# Patient Record
Sex: FEMALE | Race: BLACK OR AFRICAN AMERICAN | Employment: OTHER | URBAN - NONMETROPOLITAN AREA
[De-identification: names, ages, dates, MRNs, and addresses within clinical notes are randomized per-mention and may not be internally consistent; named-entity substitution may affect disease eponyms.]

---

## 2017-01-09 ENCOUNTER — HOSPITAL ENCOUNTER (OUTPATIENT)
Dept: OTHER | Age: 62
Discharge: HOME | End: 2017-01-31
Attending: PREVENTIVE MEDICINE | Admitting: PREVENTIVE MEDICINE

## 2017-02-01 ENCOUNTER — HOSPITAL ENCOUNTER (OUTPATIENT)
Dept: OTHER | Age: 62
Discharge: HOME | End: 2017-02-28
Attending: PREVENTIVE MEDICINE | Admitting: PREVENTIVE MEDICINE

## 2017-03-01 ENCOUNTER — HOSPITAL ENCOUNTER (OUTPATIENT)
Dept: OTHER | Age: 62
Discharge: HOME | End: 2017-03-31
Attending: PREVENTIVE MEDICINE | Admitting: PREVENTIVE MEDICINE

## 2017-03-24 ENCOUNTER — HOSPITAL ENCOUNTER (OUTPATIENT)
Dept: ULTRASOUND IMAGING | Age: 62
Discharge: HOME | End: 2017-03-24
Attending: GENERAL PRACTICE | Admitting: GENERAL PRACTICE

## 2017-03-24 DIAGNOSIS — R59.0 LOCALIZED ENLARGED LYMPH NODES: ICD-10-CM

## 2017-03-24 DIAGNOSIS — N63.0 BREAST LUMP: ICD-10-CM

## 2017-03-24 DIAGNOSIS — R60.0 LOCALIZED EDEMA: ICD-10-CM

## 2018-11-09 ENCOUNTER — HOSPITAL ENCOUNTER (OUTPATIENT)
Dept: WOMENS IMAGING | Age: 63
Discharge: HOME | End: 2018-11-09
Payer: COMMERCIAL

## 2018-11-09 DIAGNOSIS — Z12.31 ENCOUNTER FOR SCREENING MAMMOGRAM FOR BREAST CANCER: ICD-10-CM

## 2018-11-09 PROCEDURE — 77067 SCR MAMMO BI INCL CAD: CPT

## 2019-11-06 ENCOUNTER — HOSPITAL ENCOUNTER (OUTPATIENT)
Dept: ULTRASOUND IMAGING | Age: 64
Discharge: HOME | End: 2019-11-06
Payer: COMMERCIAL

## 2019-11-06 DIAGNOSIS — N17.9 ACUTE RENAL FAILURE, UNSPECIFIED ACUTE RENAL FAILURE TYPE (HCC): ICD-10-CM

## 2019-11-06 PROBLEM — I10 ESSENTIAL HYPERTENSION: Status: ACTIVE | Noted: 2019-11-06

## 2019-11-06 PROBLEM — F41.9 ANXIETY: Status: ACTIVE | Noted: 2019-11-06

## 2019-11-06 PROBLEM — N18.30 CHRONIC KIDNEY DISEASE, STAGE III (MODERATE) (HCC): Status: ACTIVE | Noted: 2019-11-06

## 2019-11-06 PROBLEM — I65.22 CAROTID STENOSIS, ASYMPTOMATIC, LEFT: Status: ACTIVE | Noted: 2019-11-06

## 2019-11-06 PROCEDURE — 76775 US EXAM ABDO BACK WALL LIM: CPT

## 2019-11-12 ENCOUNTER — HOSPITAL ENCOUNTER (OUTPATIENT)
Dept: WOMENS IMAGING | Age: 64
Discharge: HOME | End: 2019-11-12
Payer: COMMERCIAL

## 2019-11-12 DIAGNOSIS — Z12.31 VISIT FOR SCREENING MAMMOGRAM: ICD-10-CM

## 2019-11-12 DIAGNOSIS — N95.1 MENOPAUSAL STATE: ICD-10-CM

## 2019-11-12 PROCEDURE — 77080 DXA BONE DENSITY AXIAL: CPT

## 2019-11-12 PROCEDURE — 77067 SCR MAMMO BI INCL CAD: CPT

## 2019-11-13 ENCOUNTER — HOSPITAL ENCOUNTER (OUTPATIENT)
Age: 64
Discharge: HOME | End: 2019-11-13
Payer: COMMERCIAL

## 2019-11-13 LAB
ALBUMIN SERPL-MCNC: 4 GM/DL (ref 3.4–5)
ANION GAP SERPL CALCULATED.3IONS-SCNC: 12 MMOL/L (ref 4–16)
BACTERIA: ABNORMAL /HPF
BASOPHILS ABSOLUTE: 0.1 K/CU MM
BASOPHILS RELATIVE PERCENT: 1.3 % (ref 0–1)
BILIRUBIN URINE: NEGATIVE MG/DL
BLOOD, URINE: NEGATIVE
BUN BLDV-MCNC: 14 MG/DL (ref 6–23)
CALCIUM SERPL-MCNC: 9.6 MG/DL (ref 8.3–10.6)
CHLORIDE BLD-SCNC: 99 MMOL/L (ref 99–110)
CLARITY: CLEAR
CO2: 29 MMOL/L (ref 21–32)
COLOR: YELLOW
CREAT SERPL-MCNC: 0.9 MG/DL (ref 0.6–1.1)
CREATININE URINE: 113.1 MG/DL (ref 28–217)
DIFFERENTIAL TYPE: ABNORMAL
EOSINOPHILS ABSOLUTE: 0.2 K/CU MM
EOSINOPHILS RELATIVE PERCENT: 2.2 % (ref 0–3)
GFR AFRICAN AMERICAN: >60 ML/MIN/1.73M2
GFR NON-AFRICAN AMERICAN: >60 ML/MIN/1.73M2
GLUCOSE BLD-MCNC: 102 MG/DL (ref 70–99)
GLUCOSE, URINE: NEGATIVE MG/DL
HCT VFR BLD CALC: 43.6 % (ref 37–47)
HEMOGLOBIN: 13.9 GM/DL (ref 12.5–16)
IMMATURE NEUTROPHIL %: 0.5 % (ref 0–0.43)
KETONES, URINE: NEGATIVE MG/DL
LEUKOCYTE ESTERASE, URINE: NEGATIVE
LYMPHOCYTES ABSOLUTE: 3.7 K/CU MM
LYMPHOCYTES RELATIVE PERCENT: 44 % (ref 24–44)
MCH RBC QN AUTO: 28.3 PG (ref 27–31)
MCHC RBC AUTO-ENTMCNC: 31.9 % (ref 32–36)
MCV RBC AUTO: 88.8 FL (ref 78–100)
MONOCYTES ABSOLUTE: 0.6 K/CU MM
MONOCYTES RELATIVE PERCENT: 7.7 % (ref 0–4)
MUCUS: ABNORMAL HPF
NITRITE URINE, QUANTITATIVE: NEGATIVE
NUCLEATED RBC %: 0 %
PDW BLD-RTO: 13.2 % (ref 11.7–14.9)
PH, URINE: 7 (ref 5–8)
PHOSPHORUS: 3.8 MG/DL (ref 2.5–4.9)
PLATELET # BLD: 253 K/CU MM (ref 140–440)
PMV BLD AUTO: 11.8 FL (ref 7.5–11.1)
POTASSIUM SERPL-SCNC: 3.8 MMOL/L (ref 3.5–5.1)
PROT/CREAT RATIO, UR: ABNORMAL
PROTEIN UA: NEGATIVE MG/DL
RBC # BLD: 4.91 M/CU MM (ref 4.2–5.4)
RBC URINE: <1 /HPF (ref 0–6)
SEGMENTED NEUTROPHILS ABSOLUTE COUNT: 3.7 K/CU MM
SEGMENTED NEUTROPHILS RELATIVE PERCENT: 44.3 % (ref 36–66)
SODIUM BLD-SCNC: 140 MMOL/L (ref 135–145)
SPECIFIC GRAVITY UA: 1.01 (ref 1–1.03)
SQUAMOUS EPITHELIAL: 5 /HPF
TOTAL IMMATURE NEUTOROPHIL: 0.04 K/CU MM
TOTAL NUCLEATED RBC: 0 K/CU MM
TRICHOMONAS: ABNORMAL /HPF
URINE TOTAL PROTEIN: 14.7 MG/DL
UROBILINOGEN, URINE: NORMAL MG/DL (ref 0.2–1)
WBC # BLD: 8.4 K/CU MM (ref 4–10.5)
WBC UA: <1 /HPF (ref 0–5)

## 2019-11-13 PROCEDURE — 82040 ASSAY OF SERUM ALBUMIN: CPT

## 2019-11-13 PROCEDURE — 80048 BASIC METABOLIC PNL TOTAL CA: CPT

## 2019-11-13 PROCEDURE — 84156 ASSAY OF PROTEIN URINE: CPT

## 2019-11-13 PROCEDURE — 81001 URINALYSIS AUTO W/SCOPE: CPT

## 2019-11-13 PROCEDURE — 85025 COMPLETE CBC W/AUTO DIFF WBC: CPT

## 2019-11-13 PROCEDURE — 84100 ASSAY OF PHOSPHORUS: CPT

## 2019-11-13 PROCEDURE — 36415 COLL VENOUS BLD VENIPUNCTURE: CPT

## 2019-11-13 PROCEDURE — 82570 ASSAY OF URINE CREATININE: CPT

## 2019-11-25 ENCOUNTER — HOSPITAL ENCOUNTER (OUTPATIENT)
Dept: CT IMAGING | Age: 64
Discharge: HOME | End: 2019-11-25
Payer: COMMERCIAL

## 2019-11-25 DIAGNOSIS — I65.22 CAROTID STENOSIS, ASYMPTOMATIC, LEFT: ICD-10-CM

## 2019-11-25 PROCEDURE — 70498 CT ANGIOGRAPHY NECK: CPT

## 2019-11-25 PROCEDURE — 6360000004 HC RX CONTRAST MEDICATION: Performed by: SURGERY

## 2019-11-25 RX ADMIN — IOPAMIDOL 75 ML: 755 INJECTION, SOLUTION INTRAVENOUS at 10:07

## 2020-06-04 ENCOUNTER — HOSPITAL ENCOUNTER (OUTPATIENT)
Age: 65
Discharge: HOME | End: 2020-06-04
Payer: MEDICARE

## 2020-06-04 LAB
ANION GAP SERPL CALCULATED.3IONS-SCNC: 14 MMOL/L (ref 4–16)
BUN BLDV-MCNC: 14 MG/DL (ref 6–23)
CALCIUM SERPL-MCNC: 10 MG/DL (ref 8.3–10.6)
CHLORIDE BLD-SCNC: 101 MMOL/L (ref 99–110)
CO2: 25 MMOL/L (ref 21–32)
CREAT SERPL-MCNC: 0.9 MG/DL (ref 0.6–1.1)
GFR AFRICAN AMERICAN: >60 ML/MIN/1.73M2
GFR NON-AFRICAN AMERICAN: >60 ML/MIN/1.73M2
GLUCOSE BLD-MCNC: 78 MG/DL (ref 70–99)
POTASSIUM SERPL-SCNC: 3.7 MMOL/L (ref 3.5–5.1)
SODIUM BLD-SCNC: 140 MMOL/L (ref 135–145)

## 2020-06-04 PROCEDURE — 80048 BASIC METABOLIC PNL TOTAL CA: CPT

## 2020-06-04 PROCEDURE — 36415 COLL VENOUS BLD VENIPUNCTURE: CPT

## 2020-06-11 PROBLEM — I65.23 BILATERAL CAROTID ARTERY STENOSIS: Status: ACTIVE | Noted: 2020-06-11

## 2020-06-11 PROBLEM — F17.200 TOBACCO DEPENDENCE: Status: ACTIVE | Noted: 2020-06-11

## 2020-10-19 ENCOUNTER — HOSPITAL ENCOUNTER (OUTPATIENT)
Age: 65
Discharge: HOME | End: 2020-10-19
Payer: MEDICARE

## 2020-10-19 LAB
ALBUMIN SERPL-MCNC: 4 GM/DL (ref 3.4–5)
ALP BLD-CCNC: 105 IU/L (ref 40–128)
ALT SERPL-CCNC: 10 U/L (ref 10–40)
ANION GAP SERPL CALCULATED.3IONS-SCNC: 11 MMOL/L (ref 4–16)
AST SERPL-CCNC: 13 IU/L (ref 15–37)
BACTERIA: ABNORMAL /HPF
BASOPHILS ABSOLUTE: 0.1 K/CU MM
BASOPHILS RELATIVE PERCENT: 0.8 % (ref 0–1)
BILIRUB SERPL-MCNC: 0.4 MG/DL (ref 0–1)
BILIRUBIN URINE: NEGATIVE MG/DL
BLOOD, URINE: NEGATIVE
BUN BLDV-MCNC: 10 MG/DL (ref 6–23)
CALCIUM SERPL-MCNC: 9.6 MG/DL (ref 8.3–10.6)
CHLORIDE BLD-SCNC: 102 MMOL/L (ref 99–110)
CHOLESTEROL: 206 MG/DL
CLARITY: ABNORMAL
CO2: 27 MMOL/L (ref 21–32)
COLOR: YELLOW
CREAT SERPL-MCNC: 0.8 MG/DL (ref 0.6–1.1)
DIFFERENTIAL TYPE: ABNORMAL
EOSINOPHILS ABSOLUTE: 0.2 K/CU MM
EOSINOPHILS RELATIVE PERCENT: 3.2 % (ref 0–3)
GFR AFRICAN AMERICAN: >60 ML/MIN/1.73M2
GFR NON-AFRICAN AMERICAN: >60 ML/MIN/1.73M2
GLUCOSE BLD-MCNC: 94 MG/DL (ref 70–99)
GLUCOSE, URINE: NEGATIVE MG/DL
HCT VFR BLD CALC: 44.8 % (ref 37–47)
HDLC SERPL-MCNC: 60 MG/DL
HEMOGLOBIN: 14.5 GM/DL (ref 12.5–16)
IMMATURE NEUTROPHIL %: 0.4 % (ref 0–0.43)
KETONES, URINE: NEGATIVE MG/DL
LDL CHOLESTEROL DIRECT: 135 MG/DL
LEUKOCYTE ESTERASE, URINE: NEGATIVE
LYMPHOCYTES ABSOLUTE: 2.8 K/CU MM
LYMPHOCYTES RELATIVE PERCENT: 37.4 % (ref 24–44)
MCH RBC QN AUTO: 28.8 PG (ref 27–31)
MCHC RBC AUTO-ENTMCNC: 32.4 % (ref 32–36)
MCV RBC AUTO: 89.1 FL (ref 78–100)
MONOCYTES ABSOLUTE: 0.7 K/CU MM
MONOCYTES RELATIVE PERCENT: 9.8 % (ref 0–4)
MUCUS: ABNORMAL HPF
NITRITE URINE, QUANTITATIVE: NEGATIVE
NUCLEATED RBC %: 0 %
PDW BLD-RTO: 13.4 % (ref 11.7–14.9)
PH, URINE: 7 (ref 5–8)
PLATELET # BLD: 236 K/CU MM (ref 140–440)
PMV BLD AUTO: 12.3 FL (ref 7.5–11.1)
POTASSIUM SERPL-SCNC: 3.6 MMOL/L (ref 3.5–5.1)
PROTEIN UA: NEGATIVE MG/DL
RBC # BLD: 5.03 M/CU MM (ref 4.2–5.4)
RBC URINE: ABNORMAL /HPF (ref 0–6)
SEGMENTED NEUTROPHILS ABSOLUTE COUNT: 3.6 K/CU MM
SEGMENTED NEUTROPHILS RELATIVE PERCENT: 48.4 % (ref 36–66)
SODIUM BLD-SCNC: 140 MMOL/L (ref 135–145)
SPECIFIC GRAVITY UA: 1.01 (ref 1–1.03)
SQUAMOUS EPITHELIAL: 27 /HPF
TOTAL IMMATURE NEUTOROPHIL: 0.03 K/CU MM
TOTAL NUCLEATED RBC: 0 K/CU MM
TOTAL PROTEIN: 7.8 GM/DL (ref 6.4–8.2)
TRICHOMONAS: ABNORMAL /HPF
TRIGL SERPL-MCNC: 108 MG/DL
TSH HIGH SENSITIVITY: 3.14 UIU/ML (ref 0.27–4.2)
UROBILINOGEN, URINE: NORMAL MG/DL (ref 0.2–1)
WBC # BLD: 7.4 K/CU MM (ref 4–10.5)
WBC UA: 1 /HPF (ref 0–5)

## 2020-10-19 PROCEDURE — 85025 COMPLETE CBC W/AUTO DIFF WBC: CPT

## 2020-10-19 PROCEDURE — 84443 ASSAY THYROID STIM HORMONE: CPT

## 2020-10-19 PROCEDURE — 80061 LIPID PANEL: CPT

## 2020-10-19 PROCEDURE — 83721 ASSAY OF BLOOD LIPOPROTEIN: CPT

## 2020-10-19 PROCEDURE — 36415 COLL VENOUS BLD VENIPUNCTURE: CPT

## 2020-10-19 PROCEDURE — 81001 URINALYSIS AUTO W/SCOPE: CPT

## 2020-10-19 PROCEDURE — 80053 COMPREHEN METABOLIC PANEL: CPT

## 2020-12-28 ENCOUNTER — HOSPITAL ENCOUNTER (OUTPATIENT)
Age: 65
Discharge: HOME | End: 2020-12-28
Payer: MEDICARE

## 2020-12-28 LAB
ALBUMIN SERPL-MCNC: 4 GM/DL (ref 3.4–5)
ALP BLD-CCNC: 97 IU/L (ref 40–128)
ALT SERPL-CCNC: 10 U/L (ref 10–40)
ANION GAP SERPL CALCULATED.3IONS-SCNC: 11 MMOL/L (ref 4–16)
AST SERPL-CCNC: 14 IU/L (ref 15–37)
BASOPHILS ABSOLUTE: 0.1 K/CU MM
BASOPHILS RELATIVE PERCENT: 1.4 % (ref 0–1)
BILIRUB SERPL-MCNC: 0.3 MG/DL (ref 0–1)
BUN BLDV-MCNC: 14 MG/DL (ref 6–23)
CALCIUM SERPL-MCNC: 9.2 MG/DL (ref 8.3–10.6)
CHLORIDE BLD-SCNC: 104 MMOL/L (ref 99–110)
CHOLESTEROL: 219 MG/DL
CO2: 25 MMOL/L (ref 21–32)
CREAT SERPL-MCNC: 1 MG/DL (ref 0.6–1.1)
DIFFERENTIAL TYPE: ABNORMAL
EOSINOPHILS ABSOLUTE: 0.2 K/CU MM
EOSINOPHILS RELATIVE PERCENT: 2.7 % (ref 0–3)
GFR AFRICAN AMERICAN: >60 ML/MIN/1.73M2
GFR NON-AFRICAN AMERICAN: 55 ML/MIN/1.73M2
GLUCOSE BLD-MCNC: 91 MG/DL (ref 70–99)
HCT VFR BLD CALC: 43 % (ref 37–47)
HDLC SERPL-MCNC: 63 MG/DL
HEMOGLOBIN: 14.2 GM/DL (ref 12.5–16)
IMMATURE NEUTROPHIL %: 0.2 % (ref 0–0.43)
LDL CHOLESTEROL DIRECT: 144 MG/DL
LYMPHOCYTES ABSOLUTE: 3.3 K/CU MM
LYMPHOCYTES RELATIVE PERCENT: 40.3 % (ref 24–44)
MCH RBC QN AUTO: 29.3 PG (ref 27–31)
MCHC RBC AUTO-ENTMCNC: 33 % (ref 32–36)
MCV RBC AUTO: 88.8 FL (ref 78–100)
MONOCYTES ABSOLUTE: 0.6 K/CU MM
MONOCYTES RELATIVE PERCENT: 6.8 % (ref 0–4)
NUCLEATED RBC %: 0 %
PDW BLD-RTO: 13.4 % (ref 11.7–14.9)
PLATELET # BLD: 236 K/CU MM (ref 140–440)
PMV BLD AUTO: 11.8 FL (ref 7.5–11.1)
POTASSIUM SERPL-SCNC: 3.6 MMOL/L (ref 3.5–5.1)
RBC # BLD: 4.84 M/CU MM (ref 4.2–5.4)
SEGMENTED NEUTROPHILS ABSOLUTE COUNT: 3.9 K/CU MM
SEGMENTED NEUTROPHILS RELATIVE PERCENT: 48.6 % (ref 36–66)
SODIUM BLD-SCNC: 140 MMOL/L (ref 135–145)
TOTAL IMMATURE NEUTOROPHIL: 0.02 K/CU MM
TOTAL NUCLEATED RBC: 0 K/CU MM
TOTAL PROTEIN: 7.8 GM/DL (ref 6.4–8.2)
TRIGL SERPL-MCNC: 73 MG/DL
TSH HIGH SENSITIVITY: 1.82 UIU/ML (ref 0.27–4.2)
WBC # BLD: 8.1 K/CU MM (ref 4–10.5)

## 2020-12-28 PROCEDURE — 80061 LIPID PANEL: CPT

## 2020-12-28 PROCEDURE — 81001 URINALYSIS AUTO W/SCOPE: CPT

## 2020-12-28 PROCEDURE — 84443 ASSAY THYROID STIM HORMONE: CPT

## 2020-12-28 PROCEDURE — 36415 COLL VENOUS BLD VENIPUNCTURE: CPT

## 2020-12-28 PROCEDURE — 85025 COMPLETE CBC W/AUTO DIFF WBC: CPT

## 2020-12-28 PROCEDURE — 83721 ASSAY OF BLOOD LIPOPROTEIN: CPT

## 2020-12-28 PROCEDURE — 80053 COMPREHEN METABOLIC PANEL: CPT

## 2020-12-29 ENCOUNTER — HOSPITAL ENCOUNTER (OUTPATIENT)
Age: 65
Setting detail: SPECIMEN
Discharge: HOME | End: 2020-12-29
Payer: MEDICARE

## 2020-12-29 LAB
BACTERIA: ABNORMAL /HPF
BILIRUBIN URINE: NEGATIVE MG/DL
BLOOD, URINE: NEGATIVE
CLARITY: CLEAR
COLOR: ABNORMAL
GLUCOSE, URINE: NEGATIVE MG/DL
KETONES, URINE: NEGATIVE MG/DL
LEUKOCYTE ESTERASE, URINE: NEGATIVE
NITRITE URINE, QUANTITATIVE: NEGATIVE
PH, URINE: 6 (ref 5–8)
PROTEIN UA: NEGATIVE MG/DL
RBC URINE: 1 /HPF (ref 0–6)
SPECIFIC GRAVITY UA: 1 (ref 1–1.03)
SQUAMOUS EPITHELIAL: 2 /HPF
TRICHOMONAS: ABNORMAL /HPF
UROBILINOGEN, URINE: NORMAL MG/DL (ref 0.2–1)
WBC UA: <1 /HPF (ref 0–5)

## 2020-12-29 PROCEDURE — 81001 URINALYSIS AUTO W/SCOPE: CPT

## 2020-12-29 PROCEDURE — 82274 ASSAY TEST FOR BLOOD FECAL: CPT

## 2020-12-31 LAB — OCCULT BLOOD, FECAL, IA: NEGATIVE

## 2021-01-26 ENCOUNTER — HOSPITAL ENCOUNTER (OUTPATIENT)
Age: 66
Discharge: HOME | End: 2021-01-26
Payer: MEDICARE

## 2021-01-26 LAB
ALBUMIN SERPL-MCNC: 4 GM/DL (ref 3.4–5)
ALP BLD-CCNC: 104 IU/L (ref 40–129)
ALT SERPL-CCNC: 11 U/L (ref 10–40)
AST SERPL-CCNC: 14 IU/L (ref 15–37)
BILIRUB SERPL-MCNC: 0.4 MG/DL (ref 0–1)
BILIRUBIN DIRECT: 0.2 MG/DL (ref 0–0.3)
BILIRUBIN, INDIRECT: 0.2 MG/DL (ref 0–0.7)
TOTAL PROTEIN: 8 GM/DL (ref 6.4–8.2)

## 2021-01-26 PROCEDURE — 80076 HEPATIC FUNCTION PANEL: CPT

## 2021-01-26 PROCEDURE — 36415 COLL VENOUS BLD VENIPUNCTURE: CPT

## 2021-02-23 ENCOUNTER — HOSPITAL ENCOUNTER (OUTPATIENT)
Age: 66
Discharge: HOME | End: 2021-02-23
Payer: MEDICARE

## 2021-02-23 LAB
ALBUMIN SERPL-MCNC: 4 GM/DL (ref 3.4–5)
ALP BLD-CCNC: 110 IU/L (ref 40–129)
ALT SERPL-CCNC: 12 U/L (ref 10–40)
AST SERPL-CCNC: 16 IU/L (ref 15–37)
BILIRUB SERPL-MCNC: 0.3 MG/DL (ref 0–1)
BILIRUBIN DIRECT: 0.2 MG/DL (ref 0–0.3)
BILIRUBIN, INDIRECT: 0.1 MG/DL (ref 0–0.7)
TOTAL PROTEIN: 8.2 GM/DL (ref 6.4–8.2)

## 2021-02-23 PROCEDURE — 36415 COLL VENOUS BLD VENIPUNCTURE: CPT

## 2021-02-23 PROCEDURE — 80076 HEPATIC FUNCTION PANEL: CPT

## 2021-04-07 ENCOUNTER — HOSPITAL ENCOUNTER (OUTPATIENT)
Age: 66
Discharge: HOME | End: 2021-04-07
Payer: MEDICARE

## 2021-04-07 LAB
ALBUMIN SERPL-MCNC: 4.1 GM/DL (ref 3.4–5)
ALP BLD-CCNC: 104 IU/L (ref 40–128)
ALT SERPL-CCNC: 11 U/L (ref 10–40)
ANION GAP SERPL CALCULATED.3IONS-SCNC: 13 MMOL/L (ref 4–16)
AST SERPL-CCNC: 15 IU/L (ref 15–37)
BILIRUB SERPL-MCNC: 0.3 MG/DL (ref 0–1)
BUN BLDV-MCNC: 11 MG/DL (ref 6–23)
CALCIUM SERPL-MCNC: 9.3 MG/DL (ref 8.3–10.6)
CHLORIDE BLD-SCNC: 103 MMOL/L (ref 99–110)
CHOLESTEROL: 177 MG/DL
CO2: 24 MMOL/L (ref 21–32)
CREAT SERPL-MCNC: 0.8 MG/DL (ref 0.6–1.1)
GFR AFRICAN AMERICAN: >60 ML/MIN/1.73M2
GFR NON-AFRICAN AMERICAN: >60 ML/MIN/1.73M2
GLUCOSE BLD-MCNC: 94 MG/DL (ref 70–99)
HDLC SERPL-MCNC: 67 MG/DL
LDL CHOLESTEROL DIRECT: 105 MG/DL
POTASSIUM SERPL-SCNC: 3.9 MMOL/L (ref 3.5–5.1)
SODIUM BLD-SCNC: 140 MMOL/L (ref 135–145)
TOTAL PROTEIN: 8 GM/DL (ref 6.4–8.2)
TRIGL SERPL-MCNC: 86 MG/DL

## 2021-04-07 PROCEDURE — 36415 COLL VENOUS BLD VENIPUNCTURE: CPT

## 2021-04-07 PROCEDURE — 80061 LIPID PANEL: CPT

## 2021-04-07 PROCEDURE — 83721 ASSAY OF BLOOD LIPOPROTEIN: CPT

## 2021-04-07 PROCEDURE — 80053 COMPREHEN METABOLIC PANEL: CPT

## 2021-06-29 ENCOUNTER — HOSPITAL ENCOUNTER (OUTPATIENT)
Age: 66
Discharge: HOME | End: 2021-06-29
Payer: MEDICARE

## 2021-06-29 LAB
ALBUMIN SERPL-MCNC: 4 GM/DL (ref 3.4–5)
ALP BLD-CCNC: 107 IU/L (ref 40–128)
ALT SERPL-CCNC: 10 U/L (ref 10–40)
ANION GAP SERPL CALCULATED.3IONS-SCNC: 11 MMOL/L (ref 4–16)
AST SERPL-CCNC: 13 IU/L (ref 15–37)
BILIRUB SERPL-MCNC: 0.4 MG/DL (ref 0–1)
BUN BLDV-MCNC: 11 MG/DL (ref 6–23)
CALCIUM SERPL-MCNC: 9.8 MG/DL (ref 8.3–10.6)
CHLORIDE BLD-SCNC: 103 MMOL/L (ref 99–110)
CHOLESTEROL: 188 MG/DL
CO2: 26 MMOL/L (ref 21–32)
CREAT SERPL-MCNC: 0.8 MG/DL (ref 0.6–1.1)
GFR AFRICAN AMERICAN: >60 ML/MIN/1.73M2
GFR NON-AFRICAN AMERICAN: >60 ML/MIN/1.73M2
GLUCOSE BLD-MCNC: 90 MG/DL (ref 70–99)
HDLC SERPL-MCNC: 63 MG/DL
LDL CHOLESTEROL DIRECT: 112 MG/DL
POTASSIUM SERPL-SCNC: 3.6 MMOL/L (ref 3.5–5.1)
SODIUM BLD-SCNC: 140 MMOL/L (ref 135–145)
TOTAL PROTEIN: 8 GM/DL (ref 6.4–8.2)
TRIGL SERPL-MCNC: 99 MG/DL

## 2021-06-29 PROCEDURE — 80061 LIPID PANEL: CPT

## 2021-06-29 PROCEDURE — 83721 ASSAY OF BLOOD LIPOPROTEIN: CPT

## 2021-06-29 PROCEDURE — 36415 COLL VENOUS BLD VENIPUNCTURE: CPT

## 2021-06-29 PROCEDURE — 80053 COMPREHEN METABOLIC PANEL: CPT

## 2021-11-24 ENCOUNTER — APPOINTMENT (OUTPATIENT)
Dept: GENERAL RADIOLOGY | Age: 66
End: 2021-11-24
Payer: MEDICARE

## 2021-11-24 ENCOUNTER — HOSPITAL ENCOUNTER (EMERGENCY)
Age: 66
Discharge: HOME | End: 2021-11-24
Attending: STUDENT IN AN ORGANIZED HEALTH CARE EDUCATION/TRAINING PROGRAM
Payer: MEDICARE

## 2021-11-24 VITALS
DIASTOLIC BLOOD PRESSURE: 65 MMHG | OXYGEN SATURATION: 97 % | HEIGHT: 67 IN | RESPIRATION RATE: 17 BRPM | HEART RATE: 72 BPM | BODY MASS INDEX: 23.54 KG/M2 | TEMPERATURE: 97.3 F | SYSTOLIC BLOOD PRESSURE: 119 MMHG | WEIGHT: 150 LBS

## 2021-11-24 DIAGNOSIS — M67.911 TENDINOPATHY OF RIGHT ROTATOR CUFF: ICD-10-CM

## 2021-11-24 DIAGNOSIS — M25.511 ACUTE PAIN OF RIGHT SHOULDER: Primary | ICD-10-CM

## 2021-11-24 PROCEDURE — 6360000002 HC RX W HCPCS: Performed by: STUDENT IN AN ORGANIZED HEALTH CARE EDUCATION/TRAINING PROGRAM

## 2021-11-24 PROCEDURE — 96372 THER/PROPH/DIAG INJ SC/IM: CPT

## 2021-11-24 PROCEDURE — 73030 X-RAY EXAM OF SHOULDER: CPT

## 2021-11-24 PROCEDURE — 99283 EMERGENCY DEPT VISIT LOW MDM: CPT

## 2021-11-24 RX ORDER — KETOROLAC TROMETHAMINE 30 MG/ML
30 INJECTION, SOLUTION INTRAMUSCULAR; INTRAVENOUS ONCE
Status: COMPLETED | OUTPATIENT
Start: 2021-11-24 | End: 2021-11-24

## 2021-11-24 RX ORDER — IBUPROFEN 800 MG/1
800 TABLET ORAL EVERY 8 HOURS PRN
Qty: 24 TABLET | Refills: 0 | Status: SHIPPED | OUTPATIENT
Start: 2021-11-24

## 2021-11-24 RX ORDER — HYDROCODONE BITARTRATE AND ACETAMINOPHEN 5; 325 MG/1; MG/1
1 TABLET ORAL EVERY 6 HOURS PRN
Qty: 12 TABLET | Refills: 0 | Status: SHIPPED | OUTPATIENT
Start: 2021-11-24 | End: 2021-11-27

## 2021-11-24 RX ADMIN — KETOROLAC TROMETHAMINE 30 MG: 30 INJECTION, SOLUTION INTRAMUSCULAR; INTRAVENOUS at 04:52

## 2021-11-24 ASSESSMENT — PAIN DESCRIPTION - PAIN TYPE: TYPE: ACUTE PAIN

## 2021-11-24 ASSESSMENT — PAIN DESCRIPTION - ORIENTATION: ORIENTATION: RIGHT

## 2021-11-24 ASSESSMENT — PAIN DESCRIPTION - LOCATION: LOCATION: SHOULDER

## 2021-11-24 ASSESSMENT — PAIN SCALES - GENERAL
PAINLEVEL_OUTOF10: 10
PAINLEVEL_OUTOF10: 10

## 2021-11-24 NOTE — ED NOTES
Asked pt again to get an EKG.  Pt stated she would like to know the results of her Xray before consenting to an EKG     Darby Álvarez RN  11/24/21 1371

## 2021-11-24 NOTE — ED NOTES
Pt refusing EKG. Pt stated she did not think it is necessary and would like to get the XRAY first and then possibly do the EKG.       Emily Vidal RN  11/24/21 7950       Emily Vidal RN  11/24/21 2258

## 2021-11-24 NOTE — ED PROVIDER NOTES
COLONOSCOPY       Surgical history reviewed and no pertinent surgical history other than the ones mentioned above    CURRENT MEDICATIONS    Current Outpatient Rx   Medication Sig Dispense Refill    amLODIPine (NORVASC) 10 MG tablet Take 10 mg by mouth daily      carvedilol (COREG) 12.5 MG tablet Take 12.5 mg by mouth 2 times daily      triamterene-hydrochlorothiazide (MAXZIDE-25) 37.5-25 MG per tablet Take 1 tablet by mouth three times a week Take Monday wednesday friday 45 tablet 3    citalopram (CELEXA) 10 MG tablet Take 1 tablet by mouth daily 90 tablet 3     Medication is reviewed    ALLERGIES    No Known Allergies  Allergy is reviewed    FAMILY HISTORY    No family history on file. Family history reviewed and no pertinent family history other than the ones mentioned above    SOCIAL HISTORY    Social History     Socioeconomic History    Marital status:      Spouse name: Not on file    Number of children: Not on file    Years of education: Not on file    Highest education level: Not on file   Occupational History    Not on file   Tobacco Use    Smoking status: Current Every Day Smoker    Smokeless tobacco: Former User     Quit date: 10/5/2013   Substance and Sexual Activity    Alcohol use: Yes     Comment: socially    Drug use: No    Sexual activity: Never   Other Topics Concern    Not on file   Social History Narrative    Not on file     Social Determinants of Health     Financial Resource Strain:     Difficulty of Paying Living Expenses: Not on file   Food Insecurity:     Worried About [address] in the Last Year: Not on file    [address] in the Last Year: Not on file   Transportation Needs:     Lack of Transportation (Medical): Not on file    Lack of Transportation (Non-Medical):  Not on file   Physical Activity:     Days of Exercise per Week: Not on file    Minutes of Exercise per Session: Not on file   Stress:     Feeling of Stress : Not on file   Social Connections:     Frequency of Communication with Friends and Family: Not on file    Frequency of Social Gatherings with Friends and Family: Not on file    Attends Baptism Services: Not on file    Active Member of Clubs or Organizations: Not on file    Attends Club or Organization Meetings: Not on file    Marital Status: Not on file   Intimate Partner Violence:     Fear of Current or Ex-Partner: Not on file    Emotionally Abused: Not on file    Physically Abused: Not on file    Sexually Abused: Not on file   Housing Stability:     Unable to Pay for Housing in the Last Year: Not on file    Number of [place] in the Last Year: Not on file    Unstable Housing in the Last Year: Not on file     Live with self  Alcohol and recreational drug use: denies  Social history reviewed and no pertinent social history other than the ones mentioned above    PHYSICAL EXAM    Vital Signs:/65   Pulse 72   Temp 97.3 °F (36.3 °C) (Oral)   Resp 17   Ht 5' 6.5\" (1.689 m)   Wt 150 lb (68 kg)   SpO2 97%   BMI 23.85 kg/m²   I have reviewed the triage vital signs. Constitutional: Well nourished, well developed, appears stated age  Eyes: PERRL, no conjunctival injection  HENT: NCAT, Neck supple without meningismus   CV: RRR, Warm, no edema  RESP: Normal RR, no increased respiratory efforts  GI: soft, non-distended  MSK: Right-sided shoulder around the glenohumeral joint and the biceps insertion area and top of the scapular is tender to palpation, range of motion is limited, patient is unable to raise the arm more than 90 degrees, Laird test is positive, external rotation also exacerbates the pain. There is no pain over the palpation of the clavicle  Skin: Warm, dry. No rashes  Neuro: Alert, CNs II-XII grossly intact. Moving all 4 extremities  Psych: Appropriate mood and affect.     Labs:   Labs Reviewed - No data to display    Radiology:  XR SHOULDER RIGHT (MIN 2 VIEWS)    (Results Pending)       I directly reviewed the images and radiology interpretation    EKG interpreted by myself: See ED course    ED COURSE  Assessment & Medical Decision Making:  Lalo Gomez is a 78 y.o. female who presents with shoulder pain, patient does have pain on palpation with limited range of motion making this more likely musculoskeletal etiology rather than atypical presentation of ACS but will still obtain a EKG to make sure patient does not any significant ST changes. Will obtain x-ray to rule out any fracture or dislocation given patient does have some range of motion I think is unlikely the patient on a dislocation with no injury and unlikely to be fracture, but rotator cuff disease acromial impingement arthritis or adhesive capsulitis still possible, patient is given Toradol for pain control and a sling will be provided for symptoms control. If patient is x-ray EKG is unremarkable patient can be cleared for discharge pending symptom improvement          DDx includes but not limited to: Fracture, dislocations, acute presentation of chest pain, rotator cuff injuries, tendinitis, subacromial impingement, adhesive capsulitis    Workup includes but not limited to: Shoulder x-ray, EKG    Treatment includes but not limited to: Toradol, sling    Critical care time: NA    Impression:   1. Shoulder pain    Disposition: Pending work-up    This note dictated using Dragon medical voice recognition software. Attempts at proofreading were made, but errors may occasionally still occur.            Radha Umana MD  11/24/21 6224

## 2021-11-24 NOTE — ED PROVIDER NOTES
eMERGENCY dEPARTMENT eNCOUnter    ATTENDING SIGN OUT NOTE    HPI/Physical Exam/Medical Decision Making  Time: 06:00 am  I have received sign out of [address] [address]  Emergency Department care from Dr. Leslie Butler. We discussed the history, physical exam, completed/pending test results (if obtained) and current treatment plan. Please refer to his/her chart for further details. In brief, the patient is a 78 y.o. female who presented to the ED with right shoulder pain for the past 2 days. It is sharp and achy. It radiates down the right arm. Denies any known trauma. Right shoulder x-rays have been ordered. An EKG was also ordered, however the patient is refusing the EKG until she has the results of her shoulder x-ray. I am asked to follow-up the results of the shoulder x-ray, to obtain an EKG if the patient is agreeable, and to disposition the patient. 07:30am  X-rays of the right shoulder resulted and there is a suspected calcific rotator cuff tendinopathy. I reassessed the patient he feels significantly better after Toradol and a sling. She does not want EKG. I suspect that she has musculoskeletal right shoulder pain. She will be discharged home and referred to orthopedic surgery for follow-up in 2 to 3 days. Return precautions are given. The patient verbalized understanding, was agreeable with plan, and was discharged in stable condition. Diagnostics:  XR SHOULDER RIGHT (MIN 2 VIEWS) (Final result)  Result time 11/24/21 07:08:06  Final result by Musa Santo MD (11/24/21 07:08:06)                Impression:    No acute osseous abnormality of the right shoulder.  Suspected calcific   rotator cuff tendinopathy. Narrative:    EXAMINATION:   3 XRAY VIEWS OF THE RIGHT SHOULDER     11/24/2021 6:16 am     COMPARISON:   None. HISTORY:   Acute pain. FINDINGS:   No acute fracture or dislocation.  Mild osteoarthrosis of the glenohumeral   joint.  Acromioclavicular joint is unremarkable.  Small calcific density   noted adjacent to the greater tuberosity.  Soft tissues are otherwise   unremarkable. Clinical Impression:  1. Acute pain of right shoulder    2. Tendinopathy of right rotator cuff        Disposition referral (if applicable):  Neva Olsen DO  [address]  [phone]    Schedule an appointment as soon as possible for a visit in 3 days      [facility] Emergency Department  [place] [phone]  Go to   If symptoms worsen      Disposition medications (if applicable):  Discharge Medication List as of 11/24/2021  7:54 AM      START taking these medications    Details   ibuprofen (ADVIL;MOTRIN) 800 MG tablet Take 1 tablet by mouth every 8 hours as needed for Pain, Disp-24 tablet, R-0Normal      HYDROcodone-acetaminophen (NORCO) 5-325 MG per tablet Take 1 tablet by mouth every 6 hours as needed for Pain for up to 3 days. Intended supply: 3 days. Take lowest dose possible to manage pain, Disp-12 tablet, R-0Normal               Comment: Please note this report has been produced using speech recognition software and may contain errors related to that system including errors in grammar, punctuation, and spelling, as well as words and phrases that may be inappropriate. If there are any questions or concerns please feel free to contact the dictating provider for clarification.         Dhiraj Barber MD  11/24/21 3311

## 2021-12-08 ENCOUNTER — OFFICE VISIT (OUTPATIENT)
Dept: ORTHOPEDIC SURGERY | Age: 66
End: 2021-12-08
Payer: MEDICARE

## 2021-12-08 VITALS
HEART RATE: 74 BPM | OXYGEN SATURATION: 95 % | HEIGHT: 67 IN | WEIGHT: 150 LBS | RESPIRATION RATE: 16 BRPM | BODY MASS INDEX: 23.54 KG/M2

## 2021-12-08 DIAGNOSIS — M75.31 CALCIFIC TENDINITIS OF RIGHT SHOULDER: Primary | ICD-10-CM

## 2021-12-08 PROCEDURE — 99203 OFFICE O/P NEW LOW 30 MIN: CPT | Performed by: STUDENT IN AN ORGANIZED HEALTH CARE EDUCATION/TRAINING PROGRAM

## 2021-12-08 ASSESSMENT — ENCOUNTER SYMPTOMS
SHORTNESS OF BREATH: 0
ABDOMINAL PAIN: 0
FACIAL SWELLING: 0
PHOTOPHOBIA: 0
COLOR CHANGE: 0
COUGH: 0
WHEEZING: 0
EYE REDNESS: 0
EYE PAIN: 0
VOMITING: 0
STRIDOR: 0
NAUSEA: 0
BACK PAIN: 0

## 2021-12-08 NOTE — PROGRESS NOTES
12/8/2021   Chief Complaint   Patient presents with    Shoulder Pain     right        History of Present Illness:                             Jam Hagen is a 78 y.o. female left handed patient referred by emergency room for evaluation and treatment right shoulder pain. This is evaluated as a personal injury. Patient states that her pain began approximately 2 weeks prior. She woke up in the morning and had a significant pain at the rotator cuff insertion of the right shoulder without any known injury. She was seen in the emergency room and was given pain medication. She states over this last several weeks she significantly improved and is asymptomatic at this time. She states she has been diagnosed with rotator cuff calcific tendinitis previously and had a cortisone injection which simply helped her pain. She states that this was more than 10 years prior. She has had no issues with the shoulder since then until now. Again however she is asymptomatic at this time. She is here today for her first visit with myself. She has had no recent advanced imaging of the shoulder. The pain occurs every day and when active. Location of pain is right shoulder rotator cuff insertion. No history of shoulder separation, subluxation or dislocation. Symptoms are aggravated by ADL's, repetitive use, work at or above shoulder height, difficulty sleeping on affected side. Symptoms are diminished by rest, avoiding the painful activities. Limited activities include: lifting, repetitive use, work at or above shoulder height, difficulty sleeping, difficulty with ADLs but this is significantly improved. No stiffness is reported. Related history: negative for prior surgery, trauma, arthritis or disorders. Is affecting ADLs. Pain is 2/10 at it's worst.    Outside reports reviewed: Emergency room note and imaging reviewed    MA HPI: Patient is a 78year old female.  Patient is in the office today with right shoulder pain. Pain scale  0/10 currently. Raising her shoulder, getting undressed or lifting increase her pain. Her pain is located is located in the front of her right shoulder. She has been alternating heat and ice for pain relief. She denies any falls or injuries to her right shoulder. She states that her shoulder started hurting when she woke up on 21 and gradually got worse. She was seen in the ED on 21. She denies any surgeries. She states that she had an injection in her right shoulder 20 years ago for bursitis. Dominant hand: left but works with her right  Occupation: retired    Patient's medications, allergies, past medical, surgical, social and family histories were reviewed and updated as appropriate. Patient has previously attempted CSI, PT, NSAIDs for pain relief      Medical History  Patient's medications, allergies, past medical, surgical, social and family histories were reviewed and updated as appropriate. Past Medical History:   Diagnosis Date    Heart abnormality     Hypertension     Pap smear for cervical cancer screening 2010    neg     Past Surgical History:   Procedure Laterality Date    ANKLE FRACTURE SURGERY      right     SECTION      COLONOSCOPY       No family history on file.   Social History     Socioeconomic History    Marital status:      Spouse name: Not on file    Number of children: Not on file    Years of education: Not on file    Highest education level: Not on file   Occupational History    Not on file   Tobacco Use    Smoking status: Current Every Day Smoker    Smokeless tobacco: Former User     Quit date: 10/5/2013   Substance and Sexual Activity    Alcohol use: Yes     Comment: socially    Drug use: No    Sexual activity: Never   Other Topics Concern    Not on file   Social History Narrative    Not on file     Social Determinants of Health     Financial Resource Strain:     Difficulty of Paying Living Expenses: Not on file   Food Insecurity:     Worried About [facility] in the Last Year: Not on file    [employer] in the Last Year: Not on file   Transportation Needs:     Lack of Transportation (Medical): Not on file    Lack of Transportation (Non-Medical): Not on file   Physical Activity:     Days of Exercise per Week: Not on file    Minutes of Exercise per Session: Not on file   Stress:     Feeling of Stress : Not on file   Social Connections:     Frequency of Communication with Friends and Family: Not on file    Frequency of Social Gatherings with Friends and Family: Not on file    Attends [employer] Services: Not on file    Active Member of [address] [place] [address] or Organizations: Not on file    Attends Club or Organization Meetings: Not on file    Marital Status: Not on file   Intimate Partner Violence:     Fear of Current or Ex-Partner: Not on file    Emotionally Abused: Not on file    Physically Abused: Not on file    Sexually Abused: Not on file   Housing Stability:     Unable to Pay for Housing in the Last Year: Not on file    Number of [place] in the Last Year: Not on file    Unstable Housing in the Last Year: Not on file     Current Outpatient Medications   Medication Sig Dispense Refill    ibuprofen (ADVIL;MOTRIN) 800 MG tablet Take 1 tablet by mouth every 8 hours as needed for Pain 24 tablet 0    amLODIPine (NORVASC) 10 MG tablet Take 10 mg by mouth daily      carvedilol (COREG) 12.5 MG tablet Take 12.5 mg by mouth 2 times daily      triamterene-hydrochlorothiazide (MAXZIDE-25) 37.5-25 MG per tablet Take 1 tablet by mouth three times a week Take Monday wednesday friday 45 tablet 3    citalopram (CELEXA) 10 MG tablet Take 1 tablet by mouth daily (Patient not taking: Reported on 12/8/2021) 90 tablet 3     No current facility-administered medications for this visit.      No Known Allergies      Review of Systems   Constitutional: Negative for activity change, appetite change, chills, diaphoresis, fatigue, fever and unexpected weight change. HENT: Negative for congestion, ear pain, facial swelling, hearing loss and sneezing. Eyes: Negative for photophobia, pain and redness. Respiratory: Negative for cough, shortness of breath, wheezing and stridor. Cardiovascular: Negative for chest pain, palpitations and leg swelling. Gastrointestinal: Negative for abdominal pain, nausea and vomiting. Endocrine: Negative for cold intolerance and heat intolerance. Musculoskeletal: Negative for arthralgias, back pain, gait problem, joint swelling, myalgias, neck pain and neck stiffness. Skin: Negative for color change, pallor, rash and wound. Neurological: Negative for dizziness, facial asymmetry, weakness and numbness. Examination:  General Exam:  Vitals: Pulse 74   Resp 16   Ht 5' 6.5\" (1.689 m)   Wt 150 lb (68 kg)   SpO2 95%   BMI 23.85 kg/m²    Physical Exam  Constitutional:       Appearance: Normal appearance. HENT:      Head: Normocephalic and atraumatic. Nose: Nose normal.   Eyes:      General:         Right eye: No discharge. Left eye: No discharge. Extraocular Movements: Extraocular movements intact. Cardiovascular:      Pulses: Normal pulses. Pulmonary:      Effort: Pulmonary effort is normal.      Breath sounds: Normal breath sounds. Musculoskeletal:         General: No swelling, tenderness, deformity or signs of injury. Right shoulder: No swelling, deformity, effusion, laceration, bony tenderness or crepitus. Normal range of motion. Normal strength. Normal pulse. Left shoulder: Normal.      Right upper arm: Normal.      Left upper arm: Normal.      Right elbow: Normal.      Left elbow: Normal.      Right forearm: Normal.      Left forearm: Normal.      Right wrist: Normal.      Left wrist: Normal.      Cervical back: Normal and normal range of motion. No rigidity or tenderness.    Skin: General: Skin is warm and dry. Neurological:      General: No focal deficit present. Mental Status: She is alert and oriented to person, place, and time. RIGHT SHOULDER / UPPER EXTREMITY EXAM      OBSERVATION:      Swelling: none   Deformity: none    Discoloration: none   Scapular winging: none    Scars: none    Atrophy: none      TENDERNESS / CREPITUS (T/C):      Clavicle -/-    SUPRAspinatus  -/-     AC Jt. -/-    INFRAspinatus -/-     SC Jt. -/-    Deltoid -/-     G. Tuberosity -/-   LH BICEP groove -/-    Acromion: -/-    Midline Neck -/-     Scapular Spine -/-   Trapezium -/-    SMA Scapula -/-   GH jt. line - post -/-     Scapulothoracic -/-   GH jt. line - ant -/-       ROM: (* = with pain)  Left shoulder   Right shoulder     AROM (PROM)  AROM (PROM)    FE   170° (175°)    170° (175°)      ER 0°   60° (65°)   60°  (60°)    ER 90° ABD  90°  (90°)   90°  (90°)    IR 90° ABD  NA  (40°)    NA (40°)      IR (spine) T10    T12      STRENGTH: (* = with pain) Left shoulder   Right shoulder    SCAPTION   5/5    5/5     IR    5/5    5/5    ER    5/5    5/5    BICEPS   5/5    5/5    Deltoid   5/5    5/5      SIGNS:  RUE     NEER: neg    OSANDIS: neg      MURCIA: neg  SPEEDS: neg     DROP ARM: neg  BELLY PRESS: neg    LIFT-OFF: neg  Superior escape: none     X-Body ADD: neg   MOVING VALGUS: neg     Crank: neg   Bear Hug: neg    Biceps stretch test: neg      EXTREMITY NEURO-VASCULAR EXAM:     Sensation grossly intact to light touch all dermatomal regions. DTR 2+ Biceps, Triceps, BR and Negative Ozzys sign    Grossly intact motor function at Elbow, Wrist and Hand    Distal pulses radial and ulnar 2+, brisk cap refill, symmetric. NECK:  Painless FROM and spinous processes non-tender. Negative Spurlings sign. Diagnostic testing:  X-ray images were reviewed by myself and discussed with the patient:  No acute fracture or dislocation.  Mild osteoarthrosis of the glenohumeral   joint.

## 2021-12-08 NOTE — PATIENT INSTRUCTIONS
Home exercises given in office today  Continue to weight bear as tolerated  Continue range of motion  Ice and elevate as needed  Tylenol or Motrin for pain  Follow up as needed    Patient Education        Rotator Cuff: Exercises  Introduction  Here are some examples of exercises for you to try. The exercises may be suggested for a condition or for rehabilitation. Start each exercise slowly. Ease off the exercises if you start to have pain. You will be told when to start these exercises and which ones will work best for you. How to do the exercises  Pendulum swing    If you have pain in your back, do not do this exercise. 1. Hold on to a table or the back of a chair with your good arm. Then bend forward a little and let your sore arm hang straight down. This exercise does not use the arm muscles. Rather, use your legs and your hips to create movement that makes your arm swing freely. 2. Use the movement from your hips and legs to guide the slightly swinging arm back and forth like a pendulum (or elephant trunk). Then guide it in circles that start small (about the size of a dinner plate). Make the circles a bit larger each day, as your pain allows. 3. Do this exercise for 5 minutes, 5 to 7 times each day. 4. As you have less pain, try bending over a little farther to do this exercise. This will increase the amount of movement at your shoulder. Posterior stretching exercise    1. Hold the elbow of your injured arm with your other hand. 2. Use your hand to pull your injured arm gently up and across your body. You will feel a gentle stretch across the back of your injured shoulder. 3. Hold for at least 15 to 30 seconds. Then slowly lower your arm. 4. Repeat 2 to 4 times. Up-the-back stretch    Your doctor or physical therapist may want you to wait to do this stretch until you have regained most of your range of motion and strength. You can do this stretch in different ways.  Hold any of these stretches for at least 15 to 30 seconds. Repeat them 2 to 4 times. 1. Light stretch: Put your hand in your back pocket. Let it rest there to stretch your shoulder. 2. Moderate stretch: With your other hand, hold your injured arm (palm outward) behind your back by the wrist. Pull your arm up gently to stretch your shoulder. 3. Advanced stretch: Put a towel over your other shoulder. Put the hand of your injured arm behind your back. Now hold the back end of the towel. With the other hand, hold the front end of the towel in front of your body. Pull gently on the front end of the towel. This will bring your hand farther up your back to stretch your shoulder. Overhead stretch    1. Standing about an arm's length away, grasp onto a solid surface. You could use a countertop, a doorknob, or the back of a sturdy chair. 2. With your knees slightly bent, bend forward with your arms straight. Lower your upper body, and let your shoulders stretch. 3. As your shoulders are able to stretch farther, you may need to take a step or two backward. 4. Hold for at least 15 to 30 seconds. Then stand up and relax. If you had stepped back during your stretch, step forward so you can keep your hands on the solid surface. 5. Repeat 2 to 4 times. Shoulder flexion (lying down)    To make a wand for this exercise, use a piece of PVC pipe or a broom handle with the broom removed. Make the wand about a foot wider than your shoulders. 1. Lie on your back, holding a wand with both hands. Your palms should face down as you hold the wand. 2. Keeping your elbows straight, slowly raise your arms over your head. Raise them until you feel a stretch in your shoulders, upper back, and chest.  3. Hold for 15 to 30 seconds. 4. Repeat 2 to 4 times. Shoulder rotation (lying down)    To make a wand for this exercise, use a piece of PVC pipe or a broom handle with the broom removed. Make the wand about a foot wider than your shoulders. 1. Lie on your back.  Hold a wand with both hands with your elbows bent and palms up. 2. Keep your elbows close to your body, and move the wand across your body toward the sore arm. 3. Hold for 8 to 12 seconds. 4. Repeat 2 to 4 times. Wall climbing (to the side)    Avoid any movement that is straight to your side, and be careful not to arch your back. Your arm should stay about 30 degrees to the front of your side. 1. Stand with your side to a wall so that your fingers can just touch it at an angle about 30 degrees toward the front of your body. 2. Walk the fingers of your injured arm up the wall as high as pain permits. Try not to shrug your shoulder up toward your ear as you move your arm up. 3. Hold that position for a count of at least 15 to 20.  4. Walk your fingers back down to the starting position. 5. Repeat at least 2 to 4 times. Try to reach higher each time. Wall climbing (to the front)    During this stretching exercise, be careful not to arch your back. 1. Face a wall, and stand so your fingers can just touch it. 2. Keeping your shoulder down, walk the fingers of your injured arm up the wall as high as pain permits. (Don't shrug your shoulder up toward your ear.)  3. Hold your arm in that position for at least 15 to 30 seconds. 4. Slowly walk your fingers back down to where you started. 5. Repeat at least 2 to 4 times. Try to reach higher each time. Shoulder blade squeeze    1. Stand with your arms at your sides, and squeeze your shoulder blades together. Do not raise your shoulders up as you squeeze. 2. Hold 6 seconds. 3. Repeat 8 to 12 times. Scapular exercise: Arm reach    1. Lie flat on your back. This exercise is a very slight motion that starts with your arms raised (elbows straight, arms straight). 2. From this position, reach higher toward the lea or ceiling. Keep your elbows straight. All motion should be from your shoulder blade only. 3. Relax your arms back to where you started.   4. Repeat 8 to 12 times.  Arm raise to the side    During this strengthening exercise, your arm should stay about 30 degrees to the front of your side. 1. Slowly raise your injured arm to the side, with your thumb facing up. Raise your arm 60 degrees at the most (shoulder level is 90 degrees). 2. Hold the position for 3 to 5 seconds. Then lower your arm back to your side. If you need to, bring your \"good\" arm across your body and place it under the elbow as you lower your injured arm. Use your good arm to keep your injured arm from dropping down too fast.  3. Repeat 8 to 12 times. 4. When you first start out, don't hold any extra weight in your hand. As you get stronger, you may use a 1-pound to 2-pound dumbbell or a small can of food. Shoulder flexor and extensor exercise    These are isometric exercises. That means you contract your muscles without actually moving. 1. Push forward (flex): Stand facing a wall or doorjamb, about 6 inches or less back. Hold your injured arm against your body. Make a closed fist with your thumb on top. Then gently push your hand forward into the wall with about 25% to 50% of your strength. Don't let your body move backward as you push. Hold for about 6 seconds. Relax for a few seconds. Repeat 8 to 12 times. 2. Push backward (extend): Stand with your back flat against a wall. Your upper arm should be against the wall, with your elbow bent 90 degrees (your hand straight ahead). Push your elbow gently back against the wall with about 25% to 50% of your strength. Don't let your body move forward as you push. Hold for about 6 seconds. Relax for a few seconds. Repeat 8 to 12 times. Scapular exercise: Wall push-ups    This exercise is best done with your fingers somewhat turned out, rather than straight up and down. 1. Stand facing a wall, about 12 inches to 18 inches away. 2. Place your hands on the wall at shoulder height. 3. Slowly bend your elbows and bring your face to the wall.  Keep your back and hips straight. 4. Push back to where you started. 5. Repeat 8 to 12 times. 6. When you can do this exercise against a wall comfortably, you can try it against a counter. You can then slowly progress to the end of a couch, then to a sturdy chair, and finally to the floor. Scapular exercise: Retraction    For this exercise, you will need elastic exercise material, such as surgical tubing or Thera-Band. 1. Put the band around a solid object at about waist level. (A bedpost will work well.) Each hand should hold an end of the band. 2. With your elbows at your sides and bent to 90 degrees, pull the band back. Your shoulder blades should move toward each other. Then move your arms back where you started. 3. Repeat 8 to 12 times. 4. If you have good range of motion in your shoulders, try this exercise with your arms lifted out to the sides. Keep your elbows at a 90-degree angle. Raise the elastic band up to about shoulder level. Pull the band back to move your shoulder blades toward each other. Then move your arms back where you started. Internal rotator strengthening exercise    1. Start by tying a piece of elastic exercise material to a doorknob. You can use surgical tubing or Thera-Band. 2. Stand or sit with your shoulder relaxed and your elbow bent 90 degrees. Your upper arm should rest comfortably against your side. Squeeze a rolled towel between your elbow and your body for comfort. This will help keep your arm at your side. 3. Hold one end of the elastic band in the hand of the painful arm. 4. Slowly rotate your forearm toward your body until it touches your belly. Slowly move it back to where you started. 5. Keep your elbow and upper arm firmly tucked against the towel roll or at your side. 6. Repeat 8 to 12 times. External rotator strengthening exercise    1. Start by tying a piece of elastic exercise material to a doorknob. You can use surgical tubing or Thera-Band.  (You may also hold one end of the band in each hand.)  2. Stand or sit with your shoulder relaxed and your elbow bent 90 degrees. Your upper arm should rest comfortably against your side. Squeeze a rolled towel between your elbow and your body for comfort. This will help keep your arm at your side. 3. Hold one end of the elastic band with the hand of the painful arm. 4. Start with your forearm across your belly. Slowly rotate the forearm out away from your body. Keep your elbow and upper arm tucked against the towel roll or the side of your body until you begin to feel tightness in your shoulder. Slowly move your arm back to where you started. 5. Repeat 8 to 12 times. Follow-up care is a key part of your treatment and safety. Be sure to make and go to all appointments, and call your doctor if you are having problems. It's also a good idea to know your test results and keep a list of the medicines you take. Where can you learn more? Go to https://[employer]. org and sign in to your [employer] account. Enter Camille Ribeiro in the [employer] box to learn more about \"Rotator Cuff: Exercises. \"     If you do not have an account, please click on the \"Sign Up Now\" link. Current as of: July 1, 2021               Content Version: 13.0  © 0465-5245 [employer]. Care instructions adapted under license by [facility] ([facility]). If you have questions about a medical condition or this instruction, always ask your healthcare professional. [place] [address] any warranty or liability for your use of this information.

## 2021-12-08 NOTE — PROGRESS NOTES
Patient is a 78year old female. Patient is in the office today with right shoulder pain. Pain scale  0/10 currently. Raising her shoulder, getting undressed or lifting increase her pain. Her pain is located is located in the front of her right shoulder. She has been alternating heat and ice for pain relief. She denies any falls or injuries to her right shoulder. She states that her shoulder started hurting when she woke up on 11/21/21 and gradually got worse. She was seen in the ED on 11/24/21. She denies any surgeries. She states that she had an injection in her right shoulder 20 years ago for bursitis.    Dominant hand: left but works with her right  Occupation: retired

## 2021-12-10 ENCOUNTER — HOSPITAL ENCOUNTER (OUTPATIENT)
Age: 66
Discharge: HOME | End: 2021-12-10
Payer: MEDICARE

## 2021-12-10 LAB
ALBUMIN SERPL-MCNC: 4 GM/DL (ref 3.4–5)
ALP BLD-CCNC: 124 IU/L (ref 40–128)
ALT SERPL-CCNC: 10 U/L (ref 10–40)
ANION GAP SERPL CALCULATED.3IONS-SCNC: 9 MMOL/L (ref 4–16)
AST SERPL-CCNC: 13 IU/L (ref 15–37)
BILIRUB SERPL-MCNC: 0.3 MG/DL (ref 0–1)
BUN BLDV-MCNC: 12 MG/DL (ref 6–23)
CALCIUM SERPL-MCNC: 9.8 MG/DL (ref 8.3–10.6)
CHLORIDE BLD-SCNC: 105 MMOL/L (ref 99–110)
CHOLESTEROL: 171 MG/DL
CO2: 28 MMOL/L (ref 21–32)
CREAT SERPL-MCNC: 0.8 MG/DL (ref 0.6–1.1)
GFR AFRICAN AMERICAN: >60 ML/MIN/1.73M2
GFR NON-AFRICAN AMERICAN: >60 ML/MIN/1.73M2
GLUCOSE BLD-MCNC: 91 MG/DL (ref 70–99)
HDLC SERPL-MCNC: 76 MG/DL
LDL CHOLESTEROL DIRECT: 81 MG/DL
POTASSIUM SERPL-SCNC: 4.3 MMOL/L (ref 3.5–5.1)
SODIUM BLD-SCNC: 142 MMOL/L (ref 135–145)
TOTAL PROTEIN: 8 GM/DL (ref 6.4–8.2)
TRIGL SERPL-MCNC: 83 MG/DL

## 2021-12-10 PROCEDURE — 36415 COLL VENOUS BLD VENIPUNCTURE: CPT

## 2021-12-10 PROCEDURE — 80053 COMPREHEN METABOLIC PANEL: CPT

## 2021-12-10 PROCEDURE — 83721 ASSAY OF BLOOD LIPOPROTEIN: CPT

## 2021-12-10 PROCEDURE — 80061 LIPID PANEL: CPT

## 2022-01-28 ENCOUNTER — HOSPITAL ENCOUNTER (OUTPATIENT)
Dept: WOMENS IMAGING | Age: 67
Discharge: HOME | End: 2022-01-28
Payer: MEDICARE

## 2022-01-28 DIAGNOSIS — Z12.39 SCREENING BREAST EXAMINATION: ICD-10-CM

## 2022-01-28 PROCEDURE — 77067 SCR MAMMO BI INCL CAD: CPT

## 2022-03-29 ENCOUNTER — HOSPITAL ENCOUNTER (OUTPATIENT)
Age: 67
Discharge: HOME | End: 2022-03-29
Payer: MEDICARE

## 2022-03-29 ENCOUNTER — OFFICE VISIT (OUTPATIENT)
Dept: ORTHOPEDIC SURGERY | Age: 67
End: 2022-03-29
Payer: MEDICARE

## 2022-03-29 VITALS
WEIGHT: 150 LBS | BODY MASS INDEX: 23.54 KG/M2 | HEIGHT: 67 IN | OXYGEN SATURATION: 91 % | RESPIRATION RATE: 16 BRPM | HEART RATE: 76 BPM

## 2022-03-29 DIAGNOSIS — M75.01 ADHESIVE CAPSULITIS OF RIGHT SHOULDER: Primary | ICD-10-CM

## 2022-03-29 LAB
ALBUMIN SERPL-MCNC: 4.2 GM/DL (ref 3.4–5)
ALP BLD-CCNC: 100 IU/L (ref 40–129)
ALT SERPL-CCNC: 13 U/L (ref 10–40)
ANION GAP SERPL CALCULATED.3IONS-SCNC: 11 MMOL/L (ref 4–16)
AST SERPL-CCNC: 13 IU/L (ref 15–37)
BILIRUB SERPL-MCNC: 0.2 MG/DL (ref 0–1)
BUN BLDV-MCNC: 17 MG/DL (ref 6–23)
CALCIUM SERPL-MCNC: 9.6 MG/DL (ref 8.3–10.6)
CHLORIDE BLD-SCNC: 105 MMOL/L (ref 99–110)
CHOLESTEROL: 210 MG/DL
CO2: 27 MMOL/L (ref 21–32)
CREAT SERPL-MCNC: 0.8 MG/DL (ref 0.6–1.1)
GFR AFRICAN AMERICAN: >60 ML/MIN/1.73M2
GFR NON-AFRICAN AMERICAN: >60 ML/MIN/1.73M2
GLUCOSE BLD-MCNC: 105 MG/DL (ref 70–99)
HDLC SERPL-MCNC: 96 MG/DL
LDL CHOLESTEROL CALCULATED: 94 MG/DL
POTASSIUM SERPL-SCNC: 4.3 MMOL/L (ref 3.5–5.1)
SODIUM BLD-SCNC: 143 MMOL/L (ref 135–145)
TOTAL PROTEIN: 8.1 GM/DL (ref 6.4–8.2)
TRIGL SERPL-MCNC: 99 MG/DL

## 2022-03-29 PROCEDURE — 80053 COMPREHEN METABOLIC PANEL: CPT

## 2022-03-29 PROCEDURE — 99213 OFFICE O/P EST LOW 20 MIN: CPT | Performed by: STUDENT IN AN ORGANIZED HEALTH CARE EDUCATION/TRAINING PROGRAM

## 2022-03-29 PROCEDURE — 36415 COLL VENOUS BLD VENIPUNCTURE: CPT

## 2022-03-29 PROCEDURE — 80061 LIPID PANEL: CPT

## 2022-03-29 ASSESSMENT — ENCOUNTER SYMPTOMS
NAUSEA: 0
PHOTOPHOBIA: 0
ABDOMINAL PAIN: 0
FACIAL SWELLING: 0
VOMITING: 0
EYE REDNESS: 0
STRIDOR: 0
BACK PAIN: 0
SHORTNESS OF BREATH: 0
EYE PAIN: 0
WHEEZING: 0
COLOR CHANGE: 0
COUGH: 0

## 2022-03-29 NOTE — PROGRESS NOTES
Pt returns to the office today for a follow up on her R shoulder. Pt states that her shoulder hasn't improved since visit. Pt states she currently has no pain but will occasionally have spasms in her shoulder that will increase pain to a 10/10. Pt states she is unable to lift, reach, or putting any weight through her arm without excruciating pain. Pt states she has been taking prednisone and will apply ice or heat for pain with no relief. Pt states she has tried doing her exercises that were given to her at her last office visit but has no relief due to pain  Pt reports no new injuries, accidents or concerns since her last visit.

## 2022-03-29 NOTE — PROGRESS NOTES
3/29/2022   Chief Complaint   Patient presents with    Shoulder Pain     R shoulder pain       Update HPI: Patient is here for reevaluation of her right shoulder pain. Patient was seen previously after being sent here due to right shoulder pain but at her visit she had no symptoms and was doing great. Patient had been doing well for the last several months but admits that over last several weeks has had increasing right shoulder pain with intermittent episodes of severe 10 out of 10 pain. She denies any injury to the shoulder. She was recently put on prednisone by an outside physician and states that this did not provide any pain relief. She has difficulty doing overhead activities and other activities of daily life. No other treatment. No other complaints this time. No advanced imaging thus far. Previous HPI (12/8/2022): Kelvin Rahman is a 78 y.o. female left handed patient referred by emergency room for evaluation and treatment right shoulder pain. This is evaluated as a personal injury. Patient states that her pain began approximately 2 weeks prior. She woke up in the morning and had a significant pain at the rotator cuff insertion of the right shoulder without any known injury. She was seen in the emergency room and was given pain medication. She states over this last several weeks she significantly improved and is asymptomatic at this time. She states she has been diagnosed with rotator cuff calcific tendinitis previously and had a cortisone injection which simply helped her pain. She states that this was more than 10 years prior. She has had no issues with the shoulder since then until now. Again however she is asymptomatic at this time. She is here today for her first visit with myself. She has had no recent advanced imaging of the shoulder. The pain occurs every day and when active. Location of pain is right shoulder rotator cuff insertion.  No history of shoulder separation, subluxation or dislocation. Symptoms are aggravated by ADL's, repetitive use, work at or above shoulder height, difficulty sleeping on affected side. Symptoms are diminished by rest, avoiding the painful activities. Limited activities include: lifting, repetitive use, work at or above shoulder height, difficulty sleeping, difficulty with ADLs but this is significantly improved. No stiffness is reported. Related history: negative for prior surgery, trauma, arthritis or disorders. Is affecting ADLs. Pain is 2/10 at it's worst.    Outside reports reviewed: Emergency room note and imaging reviewed    MA HPI: Patient is a 78year old female. Patient is in the office today with right shoulder pain. Pain scale  0/10 currently. Raising her shoulder, getting undressed or lifting increase her pain. Her pain is located is located in the front of her right shoulder. She has been alternating heat and ice for pain relief. She denies any falls or injuries to her right shoulder. She states that her shoulder started hurting when she woke up on 21 and gradually got worse. She was seen in the ED on 21. She denies any surgeries. She states that she had an injection in her right shoulder 20 years ago for bursitis. Dominant hand: left but works with her right  Occupation: retired    Patient's medications, allergies, past medical, surgical, social and family histories were reviewed and updated as appropriate. Patient has previously attempted CSI, PT, NSAIDs for pain relief      Medical History  Patient's medications, allergies, past medical, surgical, social and family histories were reviewed and updated as appropriate.     Past Medical History:   Diagnosis Date    Heart abnormality     Hypertension     Pap smear for cervical cancer screening 2010    neg     Past Surgical History:   Procedure Laterality Date    ANKLE FRACTURE SURGERY      right     SECTION      COLONOSCOPY       Family History   Problem Relation Age of Onset    Breast Cancer Neg Hx      Social History     Socioeconomic History    Marital status:      Spouse name: Not on file    Number of children: Not on file    Years of education: Not on file    Highest education level: Not on file   Occupational History    Not on file   Tobacco Use    Smoking status: Current Every Day Smoker    Smokeless tobacco: Former User     Quit date: 10/5/2013   Substance and Sexual Activity    Alcohol use: Yes     Comment: socially    Drug use: No    Sexual activity: Never   Other Topics Concern    Not on file   Social History Narrative    Not on file     Social Determinants of Health     Financial Resource Strain:     Difficulty of Paying Living Expenses: Not on file   Food Insecurity:     Worried About [address] in the Last Year: Not on file    [address] [place] in the Last Year: Not on file   Transportation Needs:     Lack of Transportation (Medical): Not on file    Lack of Transportation (Non-Medical):  Not on file   Physical Activity:     Days of Exercise per Week: Not on file    Minutes of Exercise per Session: Not on file   Stress:     Feeling of Stress : Not on file   Social Connections:     Frequency of Communication with Friends and Family: Not on file    Frequency of Social Gatherings with Friends and Family: Not on file    Attends Buddhism Services: Not on file    Active Member of [address] [place] [address] [employer] or Organizations: Not on file    Attends Club or Organization Meetings: Not on file    Marital Status: Not on file   Intimate Partner Violence:     Fear of Current or Ex-Partner: Not on file    Emotionally Abused: Not on file    Physically Abused: Not on file    Sexually Abused: Not on file   Housing Stability:     Unable to Pay for Housing in the Last Year: Not on file    Number of [place] in the Last Year: Not on file    Unstable Housing in the Last Year: Not on file     Current Outpatient Medications   Medication Sig Dispense Refill    ibuprofen (ADVIL;MOTRIN) 800 MG tablet Take 1 tablet by mouth every 8 hours as needed for Pain 24 tablet 0    amLODIPine (NORVASC) 10 MG tablet Take 10 mg by mouth daily      carvedilol (COREG) 12.5 MG tablet Take 12.5 mg by mouth 2 times daily      triamterene-hydrochlorothiazide (MAXZIDE-25) 37.5-25 MG per tablet Take 1 tablet by mouth three times a week Take Monday wednesday friday 45 tablet 3    citalopram (CELEXA) 10 MG tablet Take 1 tablet by mouth daily (Patient not taking: Reported on 12/8/2021) 90 tablet 3     No current facility-administered medications for this visit. No Known Allergies      Review of Systems   Constitutional: Positive for activity change. Negative for appetite change, chills, diaphoresis, fatigue, fever and unexpected weight change. HENT: Negative for congestion, ear pain, facial swelling, hearing loss and sneezing. Eyes: Negative for photophobia, pain and redness. Respiratory: Negative for cough, shortness of breath, wheezing and stridor. Cardiovascular: Negative for chest pain, palpitations and leg swelling. Gastrointestinal: Negative for abdominal pain, nausea and vomiting. Endocrine: Negative for cold intolerance and heat intolerance. Musculoskeletal: Positive for arthralgias and myalgias. Negative for back pain, gait problem, joint swelling, neck pain and neck stiffness. Skin: Negative for color change, pallor, rash and wound. Neurological: Negative for dizziness, facial asymmetry, weakness and numbness. Examination:  General Exam:  Vitals: There were no vitals taken for this visit. Physical Exam  Constitutional:       Appearance: Normal appearance. HENT:      Head: Normocephalic and atraumatic. Nose: Nose normal.   Eyes:      General:         Right eye: No discharge. Left eye: No discharge.       Extraocular Movements: Extraocular movements intact. Cardiovascular:      Pulses: Normal pulses. Pulmonary:      Effort: Pulmonary effort is normal.      Breath sounds: Normal breath sounds. Musculoskeletal:         General: No swelling, deformity or signs of injury. Right shoulder: Tenderness and bony tenderness present. No swelling, deformity, effusion, laceration or crepitus. Decreased range of motion. Decreased strength. Normal pulse. Left shoulder: Normal.      Right upper arm: Normal.      Left upper arm: Normal.      Right elbow: Normal.      Left elbow: Normal.      Right forearm: Normal.      Left forearm: Normal.      Right wrist: Normal.      Left wrist: Normal.      Cervical back: Normal and normal range of motion. No rigidity or tenderness. Skin:     General: Skin is warm and dry. Neurological:      General: No focal deficit present. Mental Status: She is alert and oriented to person, place, and time. RIGHT SHOULDER / UPPER EXTREMITY EXAM      OBSERVATION:      Swelling: none   Deformity: none    Discoloration: none   Scapular winging: none    Scars: none    Atrophy: none      TENDERNESS / CREPITUS (T/C):      Clavicle -/-    SUPRAspinatus  +/-     AC Jt. -/-    INFRAspinatus -/-     SC Jt. -/-    Deltoid -/-     G.  Tuberosity +/-   LH BICEP groove -/-    Acromion: -/-    Midline Neck -/-     Scapular Spine -/-   Trapezium -/-    SMA Scapula -/-   GH jt. line - post -/-     Scapulothoracic -/-   GH jt. line - ant +/-       ROM: (* = with pain)  Left shoulder   Right shoulder     AROM (PROM)  AROM (PROM)    FE   170° (175°)    130° (155°)*      ER 0°   60° (65°)   30°  (40°)    ER 90° ABD  90°  (90°)   60°  (60°)*    IR 90° ABD  40  (40°)    20 (40°)      IR (spine) T10    sacrum      STRENGTH: (* = with pain) Left shoulder   Right shoulder    SCAPTION   5/5    4/5 *    IR    5/5    5-/5    ER    5/5    4/5*    BICEPS   5/5    5/5    Deltoid   5/5    5/5      SIGNS:  RUE     NEER: +   OSANDIS: neg MURCIA: +   SPEEDS: neg     DROP ARM: neg  BELLY PRESS: neg    LIFT-OFF: neg      X-Body ADD: neg       Bear Hug: neg    Biceps stretch test: +      EXTREMITY NEURO-VASCULAR EXAM:     Sensation grossly intact to light touch all dermatomal regions. DTR 2+ Biceps, Triceps, BR and Negative Ozzys sign    Grossly intact motor function at Elbow, Wrist and Hand    Distal pulses radial and ulnar 2+, brisk cap refill, symmetric. NECK:  Painless FROM and spinous processes non-tender. Negative Spurlings sign. Diagnostic testing:  No new orthopedic imaging    Previous imaging:  X-ray images were reviewed by myself and discussed with the patient:  No acute fracture or dislocation.  Mild osteoarthrosis of the glenohumeral   joint.  Acromioclavicular joint is unremarkable.  Small calcific density   noted adjacent to the greater tuberosity.  Soft tissues are otherwise   unremarkable. Office Procedures:  No orders of the defined types were placed in this encounter. Assessment and Plan    A: Right shoulder calcific tendinitis, adhesive capsulitis    P:   I had a thorough conversation with the patient regarding her right shoulder symptoms and treatment course. I explained that because she recently finished prednisone regimen, I will not proceed with any type of cortisone injection at this time. I explained that she does demonstrate concerning findings for adhesive capsulitis but also has some findings that are related to tendinitis and possibly partial tearing of the rotator cuff. Because she has failed conservative measures and does have pathology on x-ray, we will order an MRI of the right shoulder to better evaluate the shoulder for both the rotator cuff as well as adhesive capsulitis. She was given a prescription for Mobic to help with pain control.   She will follow-up after MRI to discuss the results as well as treatment course which may be conservative in nature versus arthroscopic intervention. Patient should continue anti-inflammatories as needed as well as ice. All questions were answered and patient voiced understanding.     Electronically signed by Augusta Valera DO on 3/29/2022 at 3:27 PM

## 2022-03-31 NOTE — TELEPHONE ENCOUNTER
Patient is requesting the refill of Mobic that was discussed yesterday to be sent in to [facility]. Please advise.

## 2022-04-01 RX ORDER — MELOXICAM 15 MG/1
15 TABLET ORAL DAILY PRN
Qty: 30 TABLET | Refills: 0 | Status: SHIPPED | OUTPATIENT
Start: 2022-04-01

## 2022-04-12 ENCOUNTER — HOSPITAL ENCOUNTER (OUTPATIENT)
Dept: MRI IMAGING | Age: 67
Discharge: HOME | End: 2022-04-12
Payer: MEDICARE

## 2022-04-12 DIAGNOSIS — M75.01 ADHESIVE CAPSULITIS OF RIGHT SHOULDER: ICD-10-CM

## 2022-04-12 PROCEDURE — 73221 MRI JOINT UPR EXTREM W/O DYE: CPT

## 2022-04-13 ENCOUNTER — OFFICE VISIT (OUTPATIENT)
Dept: ORTHOPEDIC SURGERY | Age: 67
End: 2022-04-13
Payer: MEDICARE

## 2022-04-13 VITALS
HEART RATE: 72 BPM | RESPIRATION RATE: 16 BRPM | OXYGEN SATURATION: 93 % | BODY MASS INDEX: 23.54 KG/M2 | WEIGHT: 150 LBS | HEIGHT: 67 IN

## 2022-04-13 DIAGNOSIS — M75.01 ADHESIVE CAPSULITIS OF RIGHT SHOULDER: Primary | ICD-10-CM

## 2022-04-13 PROCEDURE — 99213 OFFICE O/P EST LOW 20 MIN: CPT | Performed by: STUDENT IN AN ORGANIZED HEALTH CARE EDUCATION/TRAINING PROGRAM

## 2022-04-13 RX ORDER — CYCLOBENZAPRINE HCL 5 MG
5 TABLET ORAL 2 TIMES DAILY PRN
Qty: 30 TABLET | Refills: 0 | Status: SHIPPED | OUTPATIENT
Start: 2022-04-13 | End: 2022-04-23

## 2022-04-13 ASSESSMENT — ENCOUNTER SYMPTOMS
STRIDOR: 0
EYE PAIN: 0
PHOTOPHOBIA: 0
COUGH: 0
NAUSEA: 0
COLOR CHANGE: 0
VOMITING: 0
EYE REDNESS: 0
FACIAL SWELLING: 0
WHEEZING: 0
SHORTNESS OF BREATH: 0
BACK PAIN: 0
ABDOMINAL PAIN: 0

## 2022-04-13 NOTE — PROGRESS NOTES
4/13/2022   Chief Complaint   Patient presents with    Shoulder Pain     R shoulder pain       Updated HPI: She is here for reevaluation of her right shoulder pain. Patient states she feels about the same compared to her previous visit. She underwent MRI of the right shoulder and is here for MRI review. She has no new complaints or injury since last being seen. She states that she only has pain with active range of motion beyond certain degrees of movement. No additional treatment since being seen. Previous HPI (3/29/2022): Patient is here for reevaluation of her right shoulder pain. Patient was seen previously after being sent here due to right shoulder pain but at her visit she had no symptoms and was doing great. Patient had been doing well for the last several months but admits that over last several weeks has had increasing right shoulder pain with intermittent episodes of severe 10 out of 10 pain. She denies any injury to the shoulder. She was recently put on prednisone by an outside physician and states that this did not provide any pain relief. She has difficulty doing overhead activities and other activities of daily life. No other treatment. No other complaints this time. No advanced imaging thus far. Previous HPI (12/8/2022): Nils Colorado is a 78 y.o. female left handed patient referred by emergency room for evaluation and treatment right shoulder pain. This is evaluated as a personal injury. Patient states that her pain began approximately 2 weeks prior. She woke up in the morning and had a significant pain at the rotator cuff insertion of the right shoulder without any known injury. She was seen in the emergency room and was given pain medication. She states over this last several weeks she significantly improved and is asymptomatic at this time.   She states she has been diagnosed with rotator cuff calcific tendinitis previously and had a cortisone injection which simply helped her pain. She states that this was more than 10 years prior. She has had no issues with the shoulder since then until now. Again however she is asymptomatic at this time. She is here today for her first visit with myself. She has had no recent advanced imaging of the shoulder. The pain occurs every day and when active. Location of pain is right shoulder rotator cuff insertion. No history of shoulder separation, subluxation or dislocation. Symptoms are aggravated by ADL's, repetitive use, work at or above shoulder height, difficulty sleeping on affected side. Symptoms are diminished by rest, avoiding the painful activities. Limited activities include: lifting, repetitive use, work at or above shoulder height, difficulty sleeping, difficulty with ADLs but this is significantly improved. No stiffness is reported. Related history: negative for prior surgery, trauma, arthritis or disorders. Is affecting ADLs. Pain is 2/10 at it's worst.    Outside reports reviewed: Emergency room note and imaging reviewed    MA HPI: Patient is a 78year old female. Patient is in the office today with right shoulder pain. Pain scale  0/10 currently. Raising her shoulder, getting undressed or lifting increase her pain. Her pain is located is located in the front of her right shoulder. She has been alternating heat and ice for pain relief. She denies any falls or injuries to her right shoulder. She states that her shoulder started hurting when she woke up on 11/21/21 and gradually got worse. She was seen in the ED on 11/24/21. She denies any surgeries. She states that she had an injection in her right shoulder 20 years ago for bursitis. Dominant hand: left but works with her right  Occupation: retired    Patient's medications, allergies, past medical, surgical, social and family histories were reviewed and updated as appropriate.      Patient has previously attempted CSI, PT, NSAIDs for pain relief      Medical History  Patient's medications, allergies, past medical, surgical, social and family histories were reviewed and updated as appropriate. Past Medical History:   Diagnosis Date    Heart abnormality     Hypertension     Pap smear for cervical cancer screening 2010    neg     Past Surgical History:   Procedure Laterality Date    ANKLE FRACTURE SURGERY      right     SECTION      COLONOSCOPY       Family History   Problem Relation Age of Onset    Breast Cancer Neg Hx      Social History     Socioeconomic History    Marital status:      Spouse name: Not on file    Number of children: Not on file    Years of education: Not on file    Highest education level: Not on file   Occupational History    Not on file   Tobacco Use    Smoking status: Current Every Day Smoker    Smokeless tobacco: Former User     Quit date: 10/5/2013   Substance and Sexual Activity    Alcohol use: Yes     Comment: socially    Drug use: No    Sexual activity: Never   Other Topics Concern    Not on file   Social History Narrative    Not on file     Social Determinants of Health     Financial Resource Strain:     Difficulty of Paying Living Expenses: Not on file   Food Insecurity:     Worried About [address] in the Last Year: Not on file    [address] in the Last Year: Not on file   Transportation Needs:     Lack of Transportation (Medical): Not on file    Lack of Transportation (Non-Medical):  Not on file   Physical Activity:     Days of Exercise per Week: Not on file    Minutes of Exercise per Session: Not on file   Stress:     Feeling of Stress : Not on file   Social Connections:     Frequency of Communication with Friends and Family: Not on file    Frequency of Social Gatherings with Friends and Family: Not on file    Attends Buddhism Services: Not on file    Active Member of Clubs or Organizations: Not on file    Attends Club or Organization Meetings: Not on file    Marital Status: Not on file   Intimate Partner Violence:     Fear of Current or Ex-Partner: Not on file    Emotionally Abused: Not on file    Physically Abused: Not on file    Sexually Abused: Not on file   Housing Stability:     Unable to Pay for Housing in the Last Year: Not on file    Number of Amanda in the Last Year: Not on file    Unstable Housing in the Last Year: Not on file     Current Outpatient Medications   Medication Sig Dispense Refill    meloxicam (MOBIC) 15 MG tablet Take 1 tablet by mouth daily as needed for Pain 30 tablet 0    ibuprofen (ADVIL;MOTRIN) 800 MG tablet Take 1 tablet by mouth every 8 hours as needed for Pain (Patient not taking: Reported on 3/29/2022) 24 tablet 0    amLODIPine (NORVASC) 10 MG tablet Take 10 mg by mouth daily      carvedilol (COREG) 12.5 MG tablet Take 12.5 mg by mouth 2 times daily      triamterene-hydrochlorothiazide (MAXZIDE-25) 37.5-25 MG per tablet Take 1 tablet by mouth three times a week Take Monday wednesday friday (Patient not taking: Reported on 3/29/2022) 45 tablet 3    citalopram (CELEXA) 10 MG tablet Take 1 tablet by mouth daily (Patient not taking: Reported on 12/8/2021) 90 tablet 3     No current facility-administered medications for this visit. No Known Allergies      Review of Systems   Constitutional: Positive for activity change. Negative for appetite change, chills, diaphoresis, fatigue, fever and unexpected weight change. HENT: Negative for congestion, ear pain, facial swelling, hearing loss and sneezing. Eyes: Negative for photophobia, pain and redness. Respiratory: Negative for cough, shortness of breath, wheezing and stridor. Cardiovascular: Negative for chest pain, palpitations and leg swelling. Gastrointestinal: Negative for abdominal pain, nausea and vomiting. Endocrine: Negative for cold intolerance and heat intolerance. Musculoskeletal: Positive for arthralgias and myalgias. Negative for back pain, gait problem, joint swelling, neck pain and neck stiffness. Skin: Negative for color change, pallor, rash and wound. Neurological: Negative for dizziness, facial asymmetry, weakness and numbness. Examination:  General Exam:  Vitals: There were no vitals taken for this visit. Physical Exam  Constitutional:       Appearance: Normal appearance. HENT:      Head: Normocephalic and atraumatic. Nose: Nose normal.   Eyes:      General:         Right eye: No discharge. Left eye: No discharge. Extraocular Movements: Extraocular movements intact. Cardiovascular:      Pulses: Normal pulses. Pulmonary:      Effort: Pulmonary effort is normal.      Breath sounds: Normal breath sounds. Musculoskeletal:         General: No swelling, deformity or signs of injury. Right shoulder: Tenderness and bony tenderness present. No swelling, deformity, effusion, laceration or crepitus. Decreased range of motion. Decreased strength. Normal pulse. Left shoulder: Normal.      Right upper arm: Normal.      Left upper arm: Normal.      Right elbow: Normal.      Left elbow: Normal.      Right forearm: Normal.      Left forearm: Normal.      Right wrist: Normal.      Left wrist: Normal.      Cervical back: Normal and normal range of motion. No rigidity or tenderness. Skin:     General: Skin is warm and dry. Neurological:      General: No focal deficit present. Mental Status: She is alert and oriented to person, place, and time. RIGHT SHOULDER / UPPER EXTREMITY EXAM      OBSERVATION:      Swelling: none   Deformity: none    Discoloration: none   Scapular winging: none    Scars: none    Atrophy: none      TENDERNESS / CREPITUS (T/C):      Clavicle -/-    SUPRAspinatus  +/-     AC Jt. -/-    INFRAspinatus -/-     SC Jt. -/-    Deltoid -/-     G.  Tuberosity +/-   LH BICEP groove -/-    Acromion: -/-    Midline Neck -/- Scapular Spine -/-   Trapezium -/-    SMA Scapula -/-   GH jt. line - post -/-     Scapulothoracic -/-   [address] jt. line - ant +/-       ROM: (* = with pain)  Left shoulder   Right shoulder     AROM (PROM)  AROM (PROM)    FE   170° (175°)    130° (155°)*      ER 0°   60° (65°)   30°  (40°)    ER 90° ABD  90°  (90°)   60°  (60°)*    IR 90° ABD  40  (40°)    20 (40°)      IR (spine) T10    sacrum      STRENGTH: (* = with pain) Left shoulder   Right shoulder    SCAPTION   5/5    4/5 *    IR    5/5    5-/5    ER    5/5    4/5*    BICEPS   5/5    5/5    Deltoid   5/5    5/5      SIGNS:  RUE     NEER: +   OSANDIS: neg      MURCIA: +   SPEEDS: neg     DROP ARM: neg  BELLY PRESS: neg    LIFT-OFF: neg      X-Body ADD: neg       Bear Hug: neg    Biceps stretch test: +      EXTREMITY NEURO-VASCULAR EXAM:     Sensation grossly intact to light touch all dermatomal regions. DTR 2+ Biceps, Triceps, BR and Negative Ozzys sign    Grossly intact motor function at Elbow, Wrist and Hand    Distal pulses radial and ulnar 2+, brisk cap refill, symmetric. NECK:  Painless FROM and spinous processes non-tender. Negative Spurlings sign. Diagnostic testing:  MRI R Shoulder without contrast:  ROTATOR CUFF: Mild supraspinatus and infraspinatus tendinosis.  Low-grade   interstitial tearing at the humeral insertion in the far posterior fibers of   the infraspinatus.  Anteriorly within the supraspinatus tendon is a tiny   focus of low signal with adjacent edema and measures approximately 4 x 5 mm. Mild subscapularis tendinosis without tearing.  The teres minor tendon is   intact.  No significant muscle atrophy.       BICEPS TENDON: Biceps tendon is intact.  Increased fluid signal within the   biceps tendon sheath in the bicipital groove.       LABRUM: Mild blunting with signal irregularity throughout the labrum.    Tearing of the labrum is noted anteroinferiorly, for example on series 2,   image 15, as well as along the chondrolabral junction posteroinferiorly, also   on series 2, image 15.       GLENOHUMERAL JOINT: Thickening the inferior glenohumeral ligaments at the   axillary recess with adjacent T2 signal, as well as infiltration of fat and   increased T2 signal within the rotator interval.  No joint effusion.  No   large, full-thickness chondral defect.       AC JOINT AND ACROMIOCLAVICULAR ARCH: Anatomically aligned.  No significant   degenerative changes.  Trace volume of fluid within the   subacromial/subdeltoid bursa.       BONE MARROW: No acute osseous abnormality or suspicious osseous lesion.       OUTLET SPACES: Supraspinatus and spinoglenoid notches and quadrilateral space   normal in appearance.  No mass effect. Previous imaging:  X-ray images were reviewed by myself and discussed with the patient:  No acute fracture or dislocation.  Mild osteoarthrosis of the glenohumeral   joint.  Acromioclavicular joint is unremarkable.  Small calcific density   noted adjacent to the greater tuberosity.  Soft tissues are otherwise   unremarkable. Office Procedures:  No orders of the defined types were placed in this encounter. Assessment and Plan    A: Right shoulder calcific tendinitis and tendinopathy, adhesive capsulitis    P:   I had a thorough conversation with the patient regarding her right shoulder physical exam findings and how they correlate with her MRI findings. I explained that the rotator cuff does not look significantly pathologic however she does demonstrate rotator cuff tendinopathy as well as mild adhesive capsulitis which I think is causing the majority of her symptoms. At this time I suggest we do a course of formal physical therapy and the patient agreed. She will return in approximately 2 months for reevaluation and we will also discuss doing a right subacromial cortisone injection at that time depending on the patient's physical exam findings and resolution of symptoms.   Patient can remain weightbearing and range of motion as tolerated and I encouraged her to continue doing range of motion exercises to try to regain some of her range of motion. Patient was provided with a prescription for Flexeril to help with muscle cramping as well as a new prescription for ibuprofen as she feels the Mobic was not providing any pain relief. All questions were answered and patient voiced understanding.     Electronically signed by Maritza Goldmann, DO on 4/13/2022 at 8:44 AM

## 2022-04-13 NOTE — PROGRESS NOTES
Pt returns to the office today for a follow up on her R shoulder. Pt has completed an MRI on 04/13/22. Pt reports her shoulder is about the same from her last visit. Pt states her pain at rest is 0/10 and 10/10 with 10/10.      MRI Impression:     Impression   Findings suggestive of mild adhesive capsulitis, including thickening of the   inferior glenohumeral ligaments at the axillary recess with increased T2   signal, as well as infiltration increased T2 signal in the rotator interval.       Mild supraspinatus tendinosis.  A tiny focus of low signal with mild adjacent   edema in the anterior supraspinatus tendon is consistent with calcium   hydroxyapatite deposition in the findings of prior radiographs.       Mild infraspinatus tendinosis with low-grade interstitial tearing in the far   posterior fibers at the humeral insertion.       Mild subscapularis tendinosis.       Diffuse degenerative changes of labrum, with tearing in the anteroinferior   and posteroinferior quadrants

## 2022-04-13 NOTE — PATIENT INSTRUCTIONS
Continue weight-bearing as tolerated. Continue range of motion exercises as instructed. Ice and elevate as needed. Tylenol or Motrin for pain. Physical Therapy ordered today.    Follow up in 2 months

## 2022-04-15 ENCOUNTER — HOSPITAL ENCOUNTER (OUTPATIENT)
Dept: PHYSICAL THERAPY | Age: 67
Setting detail: THERAPIES SERIES
Discharge: HOME | End: 2022-04-15
Payer: MEDICARE

## 2022-04-15 PROCEDURE — 97162 PT EVAL MOD COMPLEX 30 MIN: CPT

## 2022-04-15 PROCEDURE — 97110 THERAPEUTIC EXERCISES: CPT

## 2022-04-15 ASSESSMENT — PAIN DESCRIPTION - FREQUENCY: FREQUENCY: INTERMITTENT

## 2022-04-15 ASSESSMENT — PAIN DESCRIPTION - DESCRIPTORS: DESCRIPTORS: SPASM

## 2022-04-15 ASSESSMENT — PAIN DESCRIPTION - LOCATION: LOCATION: SHOULDER

## 2022-04-15 ASSESSMENT — PAIN DESCRIPTION - PAIN TYPE: TYPE: CHRONIC PAIN

## 2022-04-15 ASSESSMENT — PAIN - FUNCTIONAL ASSESSMENT: PAIN_FUNCTIONAL_ASSESSMENT: PREVENTS OR INTERFERES SOME ACTIVE ACTIVITIES AND ADLS

## 2022-04-15 ASSESSMENT — PAIN SCALES - GENERAL: PAINLEVEL_OUTOF10: 0

## 2022-04-15 ASSESSMENT — PAIN DESCRIPTION - PROGRESSION: CLINICAL_PROGRESSION: GRADUALLY IMPROVING

## 2022-04-15 ASSESSMENT — PAIN DESCRIPTION - ORIENTATION: ORIENTATION: RIGHT;OUTER;POSTERIOR

## 2022-04-15 ASSESSMENT — PAIN DESCRIPTION - ONSET: ONSET: SUDDEN

## 2022-04-15 NOTE — PLAN OF CARE
Outpatient Physical Therapy           [place]           [] Phone: [phone]   Fax: [phone]  [facility]           [] Phone: [phone]   Fax: [phone]     To: Referring Practitioner: Dr. Kayla Huff    From: Nida Johnson, PT, DPT, OCS      Patient: aMrques Sanderson        : 1955  Diagnosis: Diagnosis: R shoulder adhesive capsulitis   Treatment Diagnosis: Treatment Diagnosis: R UE pain, weakness, stiffness   Date: 4/15/2022    Physical Therapy Certification/Re-Certification Form  Dear Dr. Kayla Huff   The following patient has been evaluated for physical therapy services and for therapy to continue, insurance requires physician review of the treatment plan initially and every 90 days. Please review the attached evaluation and/or summary of the patient's plan of care, and verify that you agree therapy should continue by signing the attached document and sending it back to our office. Assessment:      Pt is 78year old female with 5 month sudden onset of R shoulder. Pt now has difficulties completing ADLs with R UE. Pt demo deficits this date that include poor A/PROM, weakness and pain . Testing this date indicate signs and symptoms of RTC involvement with adhesive capsulitis with poor ROM in all directions. Pt will benefit with PT services with progression of strength/ROM, manual and modalities to return to PLOF. Pt prior to onset of current condition had no pain with able to complete full ADLs and work activities. Patient received education on their current pathology and how their condition effects them with their functional activities. Patient understood discussion and questions were answered. Patient understands their activity limitations and understands rational for treatment progression.     Plan of Care/Treatment to date:  [x] Therapeutic Exercise  [x] Modalities:  [x] Therapeutic Activity     [] Ultrasound  [x] Electrical Stimulation  [x] Gait Training      [] Cervical Traction [] Lumbar Traction  [x] Neuromuscular Re-education    [x] Cold/hotpack [] Iontophoresis   [x] Instruction in HEP      [x] Vasopneumatic    [] Dry Needling  [x] Manual Therapy               [] Aquatic Therapy       Other:          Frequency/Duration:  # Days per week: [] 1 day # Weeks: [] 1 week [] 5 weeks     [x] 2 days   [] 2 weeks [x] 6 weeks     [] 3 days   [] 3 weeks [] 7 weeks     [] 4 days   [] 4 weeks [] 8 weeks         [] 9 weeks [] 10 weeks         [] 11 weeks [] 12 weeks    Rehab Potential/Progress: [] Excellent [x] Good [] Fair  [] Poor     Goals:    Patient goals : improve pain to ease sleeping and ADLs. Short term goals  Time Frame for Short term goals: Defer to [address] term goals  Time Frame for Long term goals : 6 weeks 5/29/22  Long term goal 1: Pt will demo I with HEP/symptom management. Long term goal 2: Pt will demo <50% disability per [employer] to demo improved functional use of R UE for ADLs. Long term goal 3: Pt will demo >25 deg improvement in R UE AROM to ease reaching. Long term goal 4: Pt will demo >Fair (+) strength in neutral to demo improved ease with light lifting. Long term goal 5: Pt will demo able to lay on shoulder without increase in pain to ease sleeping. Electronically signed by:  Maurice Odell, PT, DPT, OCS  4/15/2022, 1:12 PM    4/15/2022 1:12 PM         If you have any questions or concerns, please don't hesitate to call.   Thank you for your referral.      Physician Signature:________________________________Date:_________ TIME: _____  By signing above, therapists plan is approved by physician

## 2022-04-15 NOTE — PROGRESS NOTES
Physical Therapy  Initial Assessment  Date: 4/15/2022  Patient Name: Eric Christopher  MRN: [id number]  : 1955     Treatment Diagnosis: R UE pain, weakness, stiffness    Restrictions       Subjective   General  Chart Reviewed: Yes  Patient assessed for rehabilitation services?: Yes  Referring Practitioner: Dr. Zuleyma Sawyer  Diagnosis: R shoulder adhesive capsulitis  PT Visit Information  PT Insurance Information: [place]   Pre-cert  Subjective  Subjective: End of 2021 upon waking with pain into R shoulder. Denies history of R shoulder issues. Intermittent pain pending on AROM and at night laying on the shoulder. Have completed ice/heat to the shoulder and muscle relaxant at night and has improved sleep. No injection at this time. Completed MRI with thickening of structures. Pt is L handed but completes most activities R handed. Return to Dr. Zuleyma Sawyer on 2 months. Pain Screening  Patient Currently in Pain: Yes  Pain Assessment  Pain Assessment: 0-10  Pain Level: 0 (Worst: 10/10 with reaching)  Patient's Stated Pain Goal: No pain  Pain Type: Chronic pain  Pain Location: Shoulder  Pain Orientation: Right; Outer;Posterior  Pain Descriptors: Spasm  Pain Frequency: Intermittent  Pain Onset: Sudden  Clinical Progression: Gradually improving  Functional Pain Assessment: Prevents or interferes some active activities and ADLs  Vital Signs  Patient Currently in Pain: Yes    Vision/Hearing  Vision  Vision: Within Functional Limits  Hearing  Hearing: Within functional limits    Orientation  Orientation  Overall Orientation Status: Within Normal Limits    Social/Functional History  Social/Functional History  Lives With: Spouse  ADL Assistance: Independent  Homemaking Assistance: Independent  Ambulation Assistance: Independent  Transfer Assistance: Independent  Active : Yes  Mode of Transportation: Car  Occupation: Retired  Leisure & Hobbies: Gardening    Objective     Observation/Palpation  Palpation: Denies pain with palpation. Observation: Min guarding of R UE, min assist with doffing hoodie for pt comfort. No observable deformities. PROM RUE (degrees)  RUE PROM: Exceptions. Pain limiting at 7/10 at end range. R Shoulder Flex  0-180: 95  R Shoulder Ext  0-45: slightly past  R Shoulder ABduction 0-180: 60 seg scaption  R Shoulder Int Rotation  0-70: WFL  R Shoulder Ext Rotation  0-90: 35 in neutral  AROM RUE (degrees)  RUE AROM : Exceptions  R Shoulder Flexion 0-180: 77*   7/10 pain  R Shoulder Extension 0-45: 22*  7/10 pain  R Shoulder ABduction 0-180: 36* 7/10 pain  R Shoulder Int Rotation  0-70: WFL in neutral, R lateral thigh functionally  R Shoulder Ext Rotation 0-90: 10 deg in neutral, functinoally to occiput  R Shoulder Horiz ADduction 0-120: to armpit. AROM LUE (degrees)  LUE AROM : WNL  LUE General AROM: Flex:   150                      Ext:   58                        ABD: 135                        ER:90, functionally T2                         IR: 70, functionally T4  Joint Mobility  ROM RUE: Min hypomobility with AP/inferior glides with min/no discomfort with Grade III mobs. Strength RUE  Comment: 2-/5 in all directions secondary to pain with empty end feel. Strength LUE  Strength LUE: WNL  Comment: 5/5 in all directions. Strength Other  Other: Poor iso strength in all directions with exception of shoulder ext at fair in neutral     Additional Measures  Other: Quick DASH: 77% disability       Assessment   Conditions Requiring Skilled Therapeutic Intervention  Body structures, Functions, Activity limitations: Decreased functional mobility ; Decreased ROM; Decreased strength;Decreased endurance; Increased pain  Pt is 78year old female with 5 month sudden onset of R shoulder. Pt now has difficulties completing ADLs with R UE. Pt demo deficits this date that include poor A/PROM, weakness and pain .   Testing this date indicate signs and symptoms of RTC involvement with adhesive capsulitis with poor ROM in all directions. Pt will benefit with PT services with progression of strength/ROM, manual and modalities to return to PLOF. Pt prior to onset of current condition had no pain with able to complete full ADLs and work activities. Patient received education on their current pathology and how their condition effects them with their functional activities. Patient understood discussion and questions were answered. Patient understands their activity limitations and understands rational for treatment progression. Treatment Diagnosis: R UE pain, weakness, stiffness  Prognosis: Good  Decision Making: Medium Complexity  Barriers to Learning: None  REQUIRES PT FOLLOW UP: Yes  Activity Tolerance  Activity Tolerance: Patient Tolerated treatment well         Plan   Plan  Times per week: 2  Plan weeks: 6  Specific instructions for Next Treatment: review HEP, PROM manual as tolerated, PROM exercises as tolerated, light scap activation, modalities PRN. Current Treatment Recommendations: Strengthening,ROM,Neuromuscular Re-education,Home Exercise Program,Manual Therapy - Soft Tissue Mobilization,Modalities,Gait Training,Endurance Training    Goals  Short term goals  Time Frame for Short term goals: Defer to [address] term goals  Time Frame for Long term goals : 6 weeks 5/29/22  Long term goal 1: Pt will demo I with HEP/symptom management. Long term goal 2: Pt will demo <50% disability per Quick DASH to demo improved functional use of R UE for ADLs. Long term goal 3: Pt will demo >25 deg improvement in R UE AROM to ease reaching. Long term goal 4: Pt will demo >Fair (+) strength in neutral to demo improved ease with light lifting. Long term goal 5: Pt will demo able to lay on shoulder without increase in pain to ease sleeping. Patient Goals   Patient goals : improve pain to ease sleeping and ADLs.        Alin Hurst, PT, DPT, OCS    4/15/2022 1:07 PM

## 2022-04-15 NOTE — FLOWSHEET NOTE
Outpatient Physical Therapy  [place]           [x] Phone: [phone]   Fax: [phone]  [facility]           [] Phone: [phone]   Fax: [phone]        Physical Therapy Daily Treatment Note  Date:  4/15/2022    Patient Name:  Jackie Huerta    :  1955  MRN: [id number]  Restrictions/Precautions:  Limited ROM  Diagnosis:   Diagnosis: R shoulder adhesive capsulitis  Date of Injury/Surgery:   Treatment Diagnosis: Treatment Diagnosis: R UE pain, weakness, stiffness    Insurance/Certification information: PT Insurance Information: [employer]   Referring Physician:  Referring Practitioner: Dr. Nakia Ashley  Next Doctor Visit:    Plan of care signed (Y/N):  N, sent 4/15/22  Outcome Measure:  Quick DASH: 77% disability   Visit# / total visits:    per POC  Pain level: 2/10   Goals:     Patient goals : improve pain to ease sleeping and ADLs. Short term goals  Time Frame for Short term goals: Defer to [address] [facility] term goals  Time Frame for Long term goals : 6 weeks 22  Long term goal 1: Pt will demo I with HEP/symptom management. Long term goal 2: Pt will demo <50% disability per Quick DASH to demo improved functional use of R UE for ADLs. Long term goal 3: Pt will demo >25 deg improvement in R UE AROM to ease reaching. Long term goal 4: Pt will demo >Fair (+) strength in neutral to demo improved ease with light lifting. Long term goal 5: Pt will demo able to lay on shoulder without increase in pain to ease sleeping. Summary of Evaluation:   Pt is 78year old female with 5 month sudden onset of R shoulder. Pt now has difficulties completing ADLs with R UE. Pt demo deficits this date that include poor A/PROM, weakness and pain . Testing this date indicate signs and symptoms of RTC involvement with adhesive capsulitis with poor ROM in all directions. Pt will benefit with PT services with progression of strength/ROM, manual and modalities to return to PLOF.  Pt prior to onset of current condition had no pain with able to complete full ADLs and work activities. Patient received education on their current pathology and how their condition effects them with their functional activities. Patient understood discussion and questions were answered. Patient understands their activity limitations and understands rational for treatment progression. Subjective:  See eval         Any changes in Ambulatory Summary Sheet? None        Objective:  See eval   COVID screening questions were asked and patient attested that there had been no contact or symptoms        Exercises: (No more than 4 columns)   Exercise/Equipment 4/15/22  #1 Date Date           WARM UP         Pulleys ? TABLE      *PROM table flexion slides  10x2  3\" with stool      *scap retractions  10x2  2\"                          STANDING      *Pendulums in all dir 20\"x2 each dir including circles     *iso shoulder flexion  10x2  2\" Add ext     SB circles/flex      PROM scaption with cane                                                      PROPRIOCEPTION                                    MODALITIES                      Other Therapeutic Activities/Education: Patient received education on their current pathology and how their condition effects them with their functional activities. Patient understood discussion and questions were answered. Patient understands their activity limitations and understands rational for treatment progression. Home Exercise Program:  HO issued, reviewed and discussed with patient. Pt agreed to comply. Manual Treatments:        Modalities:        Communication with other providers:  POC sent 4/15/22       Assessment:  (Response towards treatment session) (Pain Rating)       Pt is 78year old female with 5 month sudden onset of R shoulder. Pt now has difficulties completing ADLs with R UE. Pt demo deficits this date that include poor A/PROM, weakness and pain .   Testing this date

## 2022-04-20 ENCOUNTER — HOSPITAL ENCOUNTER (OUTPATIENT)
Dept: PHYSICAL THERAPY | Age: 67
Setting detail: THERAPIES SERIES
Discharge: HOME | End: 2022-04-20
Payer: MEDICARE

## 2022-04-20 PROCEDURE — 97140 MANUAL THERAPY 1/> REGIONS: CPT

## 2022-04-20 PROCEDURE — 97110 THERAPEUTIC EXERCISES: CPT

## 2022-04-20 NOTE — FLOWSHEET NOTE
Outpatient Physical Therapy  [place]           [x] Phone: [phone]   Fax: [phone]  [place]           [] Phone: [phone]   Fax: [phone]        Physical Therapy Daily Treatment Note  Date:  2022    Patient Name:  Neri Stratton    :  1955  MRN: [id number]  Restrictions/Precautions:  Limited ROM  Diagnosis:   Diagnosis: R shoulder adhesive capsulitis  Date of Injury/Surgery:   Treatment Diagnosis: Treatment Diagnosis: R UE pain, weakness, stiffness    Insurance/Certification information: PT Insurance Information: [employer]   Referring Physician:  Referring Practitioner: Dr. Brandan Kim  Next Doctor Visit:    Plan of care signed (Y/N):  N, sent 4/15/22  Outcome Measure:  Quick DASH: 77% disability   Visit# / total visits:    per POC  Pain level: 2/10   Goals:     Patient goals : improve pain to ease sleeping and ADLs. Short term goals  Time Frame for Short term goals: Defer to [address] [facility] term goals  Time Frame for Long term goals : 6 weeks 22  Long term goal 1: Pt will demo I with HEP/symptom management. Long term goal 2: Pt will demo <50% disability per Quick DASH to demo improved functional use of R UE for ADLs. Long term goal 3: Pt will demo >25 deg improvement in R UE AROM to ease reaching. Long term goal 4: Pt will demo >Fair (+) strength in neutral to demo improved ease with light lifting. Long term goal 5: Pt will demo able to lay on shoulder without increase in pain to ease sleeping. Summary of Evaluation:   Pt is 78year old female with 5 month sudden onset of R shoulder. Pt now has difficulties completing ADLs with R UE. Pt demo deficits this date that include poor A/PROM, weakness and pain . Testing this date indicate signs and symptoms of RTC involvement with adhesive capsulitis with poor ROM in all directions. Pt will benefit with PT services with progression of strength/ROM, manual and modalities to return to PLOF.  Pt prior to onset of Program:  HO issued, reviewed and discussed with patient. Pt agreed to comply. Manual Treatments:  PROM all directions to tolerance, posterior and inferior mobs. Modalities:        Communication with other providers:  POC sent 4/15/22       Assessment: Radha tolerated today's session well. She did experience increase in pain during isometric flex/ext exercise, instructed her to decrease the pressure that she was applying, and pain decreased some. She is very limited with shoulder ROM ER and ext especially. She will continue to benefit from skilled therapy services in order to increase strength and ROM to return to PLOF.     End session pain 0/10    (Response towards treatment session) (Pain Rating)         Plan for Next Session:  review HEP, PROM manual as tolerated, PROM exercises as tolerated, light scap activation, modalities PRN      Time In / Time Out:    1102/1158     If BWC Please Indicate Time In/Out/Total Time  CPT Code Time in Time out Total Time                                                            Total for session             Timed Code/Total Treatment Minutes:  64' 1 MAN 20'  2 TE 36'    Next Progress Note due:  Karyna 4/15/22   Visit 10       Plan of Care Interventions:  [x] Therapeutic Exercise  [x] Modalities:  [x] Therapeutic Activity     [] Ultrasound  [] Estim  [x] Gait Training      [] Cervical Traction [] Lumbar Traction  [x] Neuromuscular Re-education    [x] Cold/hotpack [] Iontophoresis   [x] Instruction in HEP      [x] Vasopneumatic   [] Dry Needling    [x] Manual Therapy               [] Aquatic Therapy              Electronically signed by:  Homero Enciso, PTA     9:16 AM 4/20/2022

## 2022-04-21 ENCOUNTER — HOSPITAL ENCOUNTER (OUTPATIENT)
Dept: PHYSICAL THERAPY | Age: 67
Setting detail: THERAPIES SERIES
Discharge: HOME | End: 2022-04-21
Payer: MEDICARE

## 2022-04-21 PROCEDURE — 97110 THERAPEUTIC EXERCISES: CPT

## 2022-04-21 PROCEDURE — 97140 MANUAL THERAPY 1/> REGIONS: CPT

## 2022-04-21 PROCEDURE — 97016 VASOPNEUMATIC DEVICE THERAPY: CPT

## 2022-04-21 NOTE — FLOWSHEET NOTE
Outpatient Physical Therapy  [place]           [x] Phone: [phone]   Fax: [phone]  [facility]           [] Phone: [phone]   Fax: [phone]        Physical Therapy Daily Treatment Note  Date:  2022    Patient Name:  Holly Corona    :  1955  MRN: [id number]  Restrictions/Precautions:  Limited ROM  Diagnosis:   Diagnosis: R shoulder adhesive capsulitis  Date of Injury/Surgery:   Treatment Diagnosis: Treatment Diagnosis: R UE pain, weakness, stiffness    Insurance/Certification information: PT Insurance Information: [place]   Pre-cert Pt approved for 7 visits  Includes date of eval.  4/15/2022-2022  Auth# [id number]  Referring Physician:  Referring Practitioner: Dr. Rocky Sicard  Next Doctor Visit:    Plan of care signed (Y/N):  N, sent 4/15/22  Outcome Measure:  Quick DASH: 77% disability   Visit# / total visits:   3/12 per POC  Pain level: 2/10   Goals:     Patient goals : improve pain to ease sleeping and ADLs. Short term goals  Time Frame for Short term goals: Defer to [address] [facility] term goals  Time Frame for Long term goals : 6 weeks 22  Long term goal 1: Pt will demo I with HEP/symptom management. Long term goal 2: Pt will demo <50% disability per Quick DASH to demo improved functional use of R UE for ADLs. Long term goal 3: Pt will demo >25 deg improvement in R UE AROM to ease reaching. Long term goal 4: Pt will demo >Fair (+) strength in neutral to demo improved ease with light lifting. Long term goal 5: Pt will demo able to lay on shoulder without increase in pain to ease sleeping. Summary of Evaluation:   Pt is 78year old female with 5 month sudden onset of R shoulder. Pt now has difficulties completing ADLs with R UE. Pt demo deficits this date that include poor A/PROM, weakness and pain . Testing this date indicate signs and symptoms of RTC involvement with adhesive capsulitis with poor ROM in all directions.  Pt will benefit with PT services with progression of strength/ROM, manual and modalities to return to PLOF. Pt prior to onset of current condition had no pain with able to complete full ADLs and work activities. Patient received education on their current pathology and how their condition effects them with their functional activities. Patient understood discussion and questions were answered. Patient understands their activity limitations and understands rational for treatment progression. Subjective: Connor Kendrick arrives to therapy stating that she feels pretty good today. She states that she feels like she has been sleeping better. She also states that she noticed being able to reach up higher in her cabinets yesterday. No pain except with certain movements, currently 0-1/10     Any changes in Ambulatory Summary Sheet? None        Objective:    COVID screening questions were asked and patient attested that there had been no contact or symptoms    Noticeable improvement during PROM flex/ext especially  Slight increase in pain with supine circles - 3/10    Exercises: (No more than 4 columns)   Exercise/Equipment 4/15/22  #1 4/20/22 #2 4/21/22 #3           WARM UP         Pulleys   2x10 ea  2x10 ea          TABLE      Supine circles CW/CCW   2x10 ea    *PROM table flexion slides  10x2  3\" with stool  2x10  3\" with stool 2x10  3\" with stool   *scap retractions  10x2  2\" 2x10 3\" 2x10 3\"                        STANDING      *Pendulums in all dir 20\"x2 each dir including circles 20\"x2 each dir including circles 20\"x2 each dir including circles   *iso shoulder flexion/ext  10x2  2\" 2x10 2\" 2x10 2\"   SB circles/flex   20x ea    PROM scaption with cane    2x10                                                  PROPRIOCEPTION                                    MODALITIES   Vaso 10'                   Other Therapeutic Activities/Education: Patient received education on their current pathology and how their condition effects them with their functional activities. Patient understood discussion and questions were answered. Patient understands their activity limitations and understands rational for treatment progression. Home Exercise Program:  HO issued, reviewed and discussed with patient. Pt agreed to comply. Manual Treatments:  PROM all directions to tolerance, posterior and inferior mobs. Modalities:  VASO 10'      Communication with other providers:  POC sent 4/15/22       Assessment: Shira Goodman tolerated today's session well with slight increase in pain, pain decreased by the end of therapy. Pt demonstrated noticeable increase in PROM with flex and ext especially! Pt reported noticeable increase in AROM while putting away dishes after yesterday's session!      End session pain 0/10       (Response towards treatment session) (Pain Rating)         Plan for Next Session:  review HEP, PROM manual as tolerated, PROM exercises as tolerated, light scap activation, modalities PRN      Time In / Time Out: 4981/8734        If BWC Please Indicate Time In/Out/Total Time  CPT Code Time in Time out Total Time                                                            Total for session             Timed Code/Total Treatment Minutes:  45'/55' 1 MAN 15' , 2 TE 30' 1 vaso 10'    Next Progress Note due:  Eval 4/15/22   Visit 10       Plan of Care Interventions:  [x] Therapeutic Exercise  [x] Modalities:  [x] Therapeutic Activity     [] Ultrasound  [] Estim  [x] Gait Training      [] Cervical Traction [] Lumbar Traction  [x] Neuromuscular Re-education    [x] Cold/hotpack [] Iontophoresis   [x] Instruction in HEP      [x] Vasopneumatic   [] Dry Needling    [x] Manual Therapy               [] Aquatic Therapy              Electronically signed by:  Divya Ramos PTA     10:59 AM 4/21/2022

## 2022-04-27 ENCOUNTER — HOSPITAL ENCOUNTER (OUTPATIENT)
Age: 67
Discharge: HOME | End: 2022-04-27
Payer: MEDICARE

## 2022-04-27 ENCOUNTER — HOSPITAL ENCOUNTER (OUTPATIENT)
Dept: PHYSICAL THERAPY | Age: 67
Setting detail: THERAPIES SERIES
Discharge: HOME | End: 2022-04-27
Payer: MEDICARE

## 2022-04-27 LAB
ALBUMIN SERPL-MCNC: 4.3 GM/DL (ref 3.4–5)
ALP BLD-CCNC: 100 IU/L (ref 40–129)
ALT SERPL-CCNC: 11 U/L (ref 10–40)
AST SERPL-CCNC: 14 IU/L (ref 15–37)
BILIRUB SERPL-MCNC: 0.3 MG/DL (ref 0–1)
BILIRUBIN DIRECT: 0.2 MG/DL (ref 0–0.3)
BILIRUBIN, INDIRECT: 0.1 MG/DL (ref 0–0.7)
TOTAL PROTEIN: 7.8 GM/DL (ref 6.4–8.2)

## 2022-04-27 PROCEDURE — 97140 MANUAL THERAPY 1/> REGIONS: CPT

## 2022-04-27 PROCEDURE — 80076 HEPATIC FUNCTION PANEL: CPT

## 2022-04-27 PROCEDURE — 36415 COLL VENOUS BLD VENIPUNCTURE: CPT

## 2022-04-27 PROCEDURE — 97110 THERAPEUTIC EXERCISES: CPT

## 2022-04-27 NOTE — FLOWSHEET NOTE
strength/ROM, manual and modalities to return to PLOF. Pt prior to onset of current condition had no pain with able to complete full ADLs and work activities. Patient received education on their current pathology and how their condition effects them with their functional activities. Patient understood discussion and questions were answered. Patient understands their activity limitations and understands rational for treatment progression. Subjective: Medardo Emmanuel arrives to therapy stating that she feels pretty good today. She states that she feels like she has been sleeping better with pill has helped. Feels she is improving her ROM. No pain except with certain movements, currently 0-1/10     Any changes in Ambulatory Summary Sheet?   None        Objective:    COVID screening questions were asked and patient attested that there had been no contact or symptoms    R shoulder AROM:  Flexion: 115*     3/10 pain  Extension: 35*      3/10 pain  ABduction: 54*    3/10 pain        Exercises: (No more than 4 columns)   Exercise/Equipment 4/15/22  #1 4/20/22 #2 4/21/22 #3 4/27/22  #4            WARM UP          Pulleys   2x10 ea  2x10 ea  10x2 each           TABLE       Supine circles CW/CCW   2x10 ea     *PROM table flexion slides  10x2  3\" with stool  2x10  3\" with stool 2x10  3\" with stool    *scap retractions  10x2  2\" 2x10 3\" 2x10 3\"    Supine AAROM with cane    10x2                    STANDING       *Pendulums in all dir 20\"x2 each dir including circles 20\"x2 each dir including circles 20\"x2 each dir including circles    *iso shoulder flexion/ext  10x2  2\" 2x10 2\" 2x10 2\"    SB circles/flex   20x ea     PROM scaption with cane    2x10 10x2   3\"   Wall saw    PSB 10x2  2\"    Mid rows     YSB 10x2                                           PROPRIOCEPTION                                          MODALITIES   Vaso 10'                      Other Therapeutic Activities/Education: --      Home Exercise Program:  NANY cardoso, reviewed and discussed with patient. Pt agreed to comply. Added scaption with cane and AAROM shoulder flexion 4/27/22    Manual Treatments:  PROM all directions to tolerance, posterior and inferior mobs into scaption and flexion x15'       Modalities:       Communication with other providers:  POC sent 4/15/22       Assessment: Radha tolerated today's session well. Demo improved AR OM with R UE with able to complete manual and tolerated well with min discomfort. Added to home program with scaption with cane and AAROM shoulder flexion and demo good technique. Progressing well with mod improvement in couple of weeks with home program with anticipated additional improvement. Will assess next visit and progress as tolerated. Pt would continue to benefit from skilled therapy interventions to address remaining impairments, improve mobility and strength and progress toward goal completion while reducing risk for re-injury or further decline.     End session pain 0/10 at rest        (Response towards treatment session) (Pain Rating)         Plan for Next Session:   PROM manual as tolerated, PROM exercises as tolerated, light scap activation, modalities PRN      Time In / Time Out: 3494-0706        Timed Code/Total Treatment Minutes:  40/40'     1 MAN 15' , 2 TE 25'      Next Progress Note due:  Eval 4/15/22   Visit 10       Plan of Care Interventions:  [x] Therapeutic Exercise  [x] Modalities:  [x] Therapeutic Activity     [] Ultrasound  [] Estim  [x] Gait Training      [] Cervical Traction [] Lumbar Traction  [x] Neuromuscular Re-education    [x] Cold/hotpack [] Iontophoresis   [x] Instruction in HEP      [x] Vasopneumatic   [] Dry Needling    [x] Manual Therapy               [] Aquatic Therapy              Electronically signed by:  Burton Habermann PT, DPT, OCS     4/27/2022 8:26 AM

## 2022-04-29 ENCOUNTER — HOSPITAL ENCOUNTER (OUTPATIENT)
Dept: PHYSICAL THERAPY | Age: 67
Setting detail: THERAPIES SERIES
Discharge: HOME | End: 2022-04-29
Payer: MEDICARE

## 2022-04-29 PROCEDURE — 97110 THERAPEUTIC EXERCISES: CPT

## 2022-04-29 PROCEDURE — 97140 MANUAL THERAPY 1/> REGIONS: CPT

## 2022-04-29 NOTE — FLOWSHEET NOTE
Outpatient Physical Therapy  [place]           [x] Phone: [phone]   Fax: [phone]  [facility]           [] Phone: [phone]   Fax: [phone]        Physical Therapy Daily Treatment Note  Date:  2022    Patient Name:  Esequiel Kendrick    :  1955  MRN: [id number]  Restrictions/Precautions:  Limited ROM  Diagnosis:   Diagnosis: R shoulder adhesive capsulitis  Date of Injury/Surgery:   Treatment Diagnosis: Treatment Diagnosis: R UE pain, weakness, stiffness    Insurance/Certification information: PT Insurance Information: [place]   Pre-cert Pt approved for 7 visits  Includes date of eval.  4/15/2022-2022  Auth# [id number]  Referring Physician:  Referring Practitioner: Dr. Ce Perkins  Next Doctor Visit:    Plan of care signed (Y/N):  Yes  Outcome Measure:  Quick DASH: 77% disability   Visit# / total visits:    per POC  Pain level: 0 /10   Goals:     Patient goals : improve pain to ease sleeping and ADLs. Short term goals  Time Frame for Short term goals: Defer to [address] [facility] term goals  Time Frame for Long term goals : 6 weeks 22  Long term goal 1: Pt will demo I with HEP/symptom management. Long term goal 2: Pt will demo <50% disability per Quick DASH to demo improved functional use of R UE for ADLs. Long term goal 3: Pt will demo >25 deg improvement in R UE AROM to ease reaching. Long term goal 4: Pt will demo >Fair (+) strength in neutral to demo improved ease with light lifting. Long term goal 5: Pt will demo able to lay on shoulder without increase in pain to ease sleeping. Summary of Evaluation:   Pt is 78year old female with 5 month sudden onset of R shoulder. Pt now has difficulties completing ADLs with R UE. Pt demo deficits this date that include poor A/PROM, weakness and pain . Testing this date indicate signs and symptoms of RTC involvement with adhesive capsulitis with poor ROM in all directions.  Pt will benefit with PT services with progression of strength/ROM, manual and modalities to return to PLOF. Pt prior to onset of current condition had no pain with able to complete full ADLs and work activities. Patient received education on their current pathology and how their condition effects them with their functional activities. Patient understood discussion and questions were answered. Patient understands their activity limitations and understands rational for treatment progression. Subjective: Cuca Bray arrives to therapy stating that she feels really sore today. Pain is present, but not constant. Pt states that she stumbled while walking out of the back door Wednesday night and jammed her shoulder in the doorway trying to catch herself. Pain beginning session 1/10     Any changes in Ambulatory Summary Sheet? None        Objective:    COVID screening questions were asked and patient attested that there had been no contact or symptoms    R shoulder AROM:  Flexion: 120*       Extension: 40*        ABduction: 70*            Exercises: (No more than 4 columns)   Exercise/Equipment 4/21/22 #3 4/27/22  #4 4/29/22 #5           WARM UP         Pulleys  2x10 ea  10x2 each  2x10 ea          TABLE      Supine circles CW/CCW 2x10 ea      *PROM table flexion slides  2x10  3\" with stool     *scap retractions  2x10 3\"  2x10 3\"   Supine AAROM with cane  10x2                   STANDING      *Pendulums in all dir 20\"x2 each dir including circles     *iso shoulder flexion/ext  2x10 2\"     SB circles/flex 20x ea      PROM scaption with cane  2x10 10x2   3\" 2x10 3\"   Wall saw  PSB 10x2  2\"  RSB 2x10 2\"   Mid rows   YSB 10x2 YTB 2x10                                       PROPRIOCEPTION                                    MODALITIES Vaso 10'  --                   Other Therapeutic Activities/Education: --      Home Exercise Program:  HO issued, reviewed and discussed with patient. Pt agreed to comply.     Added scaption with cane and AAROM shoulder flexion 4/27/22    Manual Treatments:  PROM all directions to tolerance, posterior and inferior mobs into scaption and flexion x15'       Modalities:       Communication with other providers:  POC sent 4/15/22       Assessment: Ana María Haider tolerated today's session well with no increase in pain despite recent accident at home. She is progressing every session with ROM, but still demonstrates strength and ROM deficits, especially with abd AROM which we will continue to address. Pt will continue to benefit from skilled therapy services in order to return to [facility].      End session pain 1/10     (Response towards treatment session) (Pain Rating)         Plan for Next Session:   PROM manual as tolerated, PROM exercises as tolerated, light scap activation, modalities PRN      Time In / Time Out: 1350/1430        Timed Code/Total Treatment Minutes:  36' 1 MAN 10' , 2 TE 30'    Next Progress Note due:  Eval 4/15/22   Visit 10       Plan of Care Interventions:  [x] Therapeutic Exercise  [x] Modalities:  [x] Therapeutic Activity     [] Ultrasound  [] Estim  [x] Gait Training      [] Cervical Traction [] Lumbar Traction  [x] Neuromuscular Re-education    [x] Cold/hotpack [] Iontophoresis   [x] Instruction in HEP      [x] Vasopneumatic   [] Dry Needling    [x] Manual Therapy               [] Aquatic Therapy              Electronically signed by:  Gela Mata, JETT    4/29/2022 7:05 AM

## 2022-05-05 ENCOUNTER — HOSPITAL ENCOUNTER (OUTPATIENT)
Dept: PHYSICAL THERAPY | Age: 67
Setting detail: THERAPIES SERIES
Discharge: HOME | End: 2022-05-05
Payer: MEDICARE

## 2022-05-05 PROCEDURE — 97140 MANUAL THERAPY 1/> REGIONS: CPT

## 2022-05-05 PROCEDURE — 97110 THERAPEUTIC EXERCISES: CPT

## 2022-05-05 NOTE — FLOWSHEET NOTE
Outpatient Physical Therapy  [place]           [x] Phone: [phone]   Fax: [phone]  [facility]           [] Phone: [phone]   Fax: [phone]        Physical Therapy Daily Treatment Note  Date:  2022    Patient Name:  Holly Corona    :  1955  MRN: [id number]  Restrictions/Precautions:  Limited ROM  Diagnosis:   Diagnosis: R shoulder adhesive capsulitis  Date of Injury/Surgery:   Treatment Diagnosis: Treatment Diagnosis: R UE pain, weakness, stiffness    Insurance/Certification information: PT Insurance Information: [place]   Pre-cert Pt approved for 7 visits  Includes date of eval.  4/15/2022-2022  Auth# [id number]  Referring Physician:  Referring Practitioner: Dr. Rocky Sicard  Next Doctor Visit:    Plan of care signed (Y/N):  Yes  Outcome Measure:  Quick DASH: 77% disability   Visit# / total visits:    per POC  Pain level: 0 /10   Goals:     Patient goals : improve pain to ease sleeping and ADLs. Short term goals  Time Frame for Short term goals: Defer to [address] [facility] term goals  Time Frame for Long term goals : 6 weeks 22  Long term goal 1: Pt will demo I with HEP/symptom management. Long term goal 2: Pt will demo <50% disability per Quick DASH to demo improved functional use of R UE for ADLs. Long term goal 3: Pt will demo >25 deg improvement in R UE AROM to ease reaching. Long term goal 4: Pt will demo >Fair (+) strength in neutral to demo improved ease with light lifting. Long term goal 5: Pt will demo able to lay on shoulder without increase in pain to ease sleeping. Summary of Evaluation:   Pt is 78year old female with 5 month sudden onset of R shoulder. Pt now has difficulties completing ADLs with R UE. Pt demo deficits this date that include poor A/PROM, weakness and pain . Testing this date indicate signs and symptoms of RTC involvement with adhesive capsulitis with poor ROM in all directions.  Pt will benefit with PT services with progression of strength/ROM, manual and modalities to return to PLOF. Pt prior to onset of current condition had no pain with able to complete full ADLs and work activities. Patient received education on their current pathology and how their condition effects them with their functional activities. Patient understood discussion and questions were answered. Patient understands their activity limitations and understands rational for treatment progression. Subjective: Padmini Galindo arrives to therapy stating that she feels okay today, no pain just the normal sore shoulder. Pt states that she notices that she has more motion in her shoulder. Pain beginning session 0/10     Any changes in Ambulatory Summary Sheet? None        Objective:    COVID screening questions were asked and patient attested that there had been no contact or symptoms    R shoulder AROM in standing   Flexion: 125*       Extension: 45*        ABduction: 85*      Cueing during Abd in standing to avoid shrugging shoulder for compensation    Exercises: (No more than 4 columns)   Exercise/Equipment 4/27/22  #4 4/29/22 #5 5/5/22 #6           WARM UP         Pulleys  10x2 each  2x10 ea  2x10 ea         TABLE      Supine circles CW/CCW      *PROM table flexion slides       *scap retractions   2x10 3\" 2x10 3\"   Supine AAROM with cane 10x2  2x10 1# bar                  STANDING      *Pendulums in all dir      *iso shoulder flexion/ext       SB circles/flex      PROM scaption with cane  10x2   3\" 2x10 3\" 2x10 3\"   Wall saw PSB 10x2  2\"  RSB 2x10 2\" RSB 2x10 2\"   Mid rows  YSB 10x2 YTB 2x10  YTB 2x10   Bent rows       Abduction     x10                          PROPRIOCEPTION                                    MODALITIES  -- --                   Other Therapeutic Activities/Education: --      Home Exercise Program:  HO issued, reviewed and discussed with patient. Pt agreed to comply.     Added scaption with cane and AAROM shoulder flexion 4/27/22    Manual Treatments:  PROM all directions to tolerance, posterior and inferior mobs into scaption and flexion x15'       Modalities:       Communication with other providers:  POC sent 4/15/22       Assessment: Anand Bailey tolerated today's session well with no increase in pain. Pt AROM improved since last session, and pt reported feeling like she has more motion available in R shoulder recently. Anand Bailey demonstrates fatigue with abduction in standing and generalized weakness in shoulder.       End session pain 0/10     (Response towards treatment session) (Pain Rating)         Plan for Next Session:   PROM manual as tolerated, PROM exercises as tolerated, light scap activation, modalities PRN      Time In / Time Out: 0931/1010        Timed Code/Total Treatment Minutes:  44' , 1 MAN 10' , 2 TE 29'    Next Progress Note due:  Eval 4/15/22   Visit 10       Plan of Care Interventions:  [x] Therapeutic Exercise  [x] Modalities:  [x] Therapeutic Activity     [] Ultrasound  [] Estim  [x] Gait Training      [] Cervical Traction [] Lumbar Traction  [x] Neuromuscular Re-education    [x] Cold/hotpack [] Iontophoresis   [x] Instruction in HEP      [x] Vasopneumatic   [] Dry Needling    [x] Manual Therapy               [] Aquatic Therapy              Electronically signed by:  Pedro Brizuela PTA    5/5/2022 9:09 AM

## 2022-05-10 ENCOUNTER — HOSPITAL ENCOUNTER (OUTPATIENT)
Dept: PHYSICAL THERAPY | Age: 67
Setting detail: THERAPIES SERIES
Discharge: HOME | End: 2022-05-10
Payer: MEDICARE

## 2022-05-10 PROCEDURE — 97140 MANUAL THERAPY 1/> REGIONS: CPT

## 2022-05-10 PROCEDURE — 97110 THERAPEUTIC EXERCISES: CPT

## 2022-05-10 NOTE — PROGRESS NOTES
Outpatient Physical Therapy           [place]           [] Phone: [phone]   Fax: [phone]  [place]           [] Phone: [phone]   Fax: [phone]      To: Nita Mcardle, DO     From: Leandra Hitchcock, PT, DPT, OCS  Patient: Jyotsna Galeana                    : 1955  Diagnosis:  Adhesive capsulitis of right shoulder [M75.01]        Treatment Diagnosis:   R UE pain, weakness, stiffness      Date: 5/10/2022  [x]  Progress Note                []  Discharge Note    Evaluation Date:  4/15/22   Total Visits to date:  7  Cancels/No-shows to date:  0    Subjective:     Kathy Camacho arrives to therapy stating that she feels okay today, no pain just the normal sore shoulder. 5/10 pain at shoulder with reaching behind back and lifting items. >50% improvement since start of therapy. REturn follow up in . Pt is R handed. Plan of Care/Treatment to date:  [x] Therapeutic Exercise    [x] Modalities:  [x] Therapeutic Activity     [] Ultrasound  [] Electrical Stimulation  [] Gait Training      [] Cervical Traction   [] Lumbar Traction  [x] Neuromuscular Re-education  [x] Cold/hotpack [] Iontophoresis  [x] Instruction in HEP      Other:  [x] Manual Therapy       [x]  Vasopneumatic  [] Aquatic Therapy       []   Dry Needle Therapy                      Objective/Significant Findings At Last Visit/Comments:    Denies pain with palpation     R shoulder A/PROM   Flexion: 126/135*       Extension: 45*        ABduction: 75/ 70*  ER: 44 deg in neutral /46 deg at 45 deg ABD   Functionally to R PSIS region. Cueing during Abd in standing to avoid shrugging shoulder for compensation        Good strength in all directions with good (-) in ER/IR with 3/10 pain   Quick DASH: 52% disability       Assessment: Kathy Camacho has completed 7 visit since start of therapy on 4/15/22. Demo functional improvement with use of dominant R UE but remains with limited tolerance, very light lifting and limited reach.  Demo 25-50% improvement in A/PRO with ongoing deficits limited to 126 deg shoulder flex and 75 deg ABD. Willie Lemus has made good progress with expected improvement with continued home program. Will request for additional visits to continue improvement before return to reffering provider in 4.5 weeks. Pt agreed to this plan. Goal Status:  [x] Achieved [x] Partially Achieved  [] Not Achieved      Patient goals : improve pain to ease sleeping and ADLs. Short term goals  Time Frame for Short term goals: Defer to [address] [facility] [address] term goals  Time Frame for Long term goals : 6 weeks 5/29/22  Long term goal 1: Pt will demo I with HEP/symptom management. Mostly MET   Long term goal 2: Pt will demo <50% disability per Quick DASH to demo improved functional use of R UE for ADLs. Partially MET   Long term goal 3: Pt will demo >25 deg improvement in R UE AROM to ease reaching. Mostly MET   Long term goal 4: Pt will demo >Fair (+) strength in neutral to demo improved ease with light lifting. Mostly MET   Long term goal 5: Pt will demo able to lay on shoulder without increase in pain to ease sleeping. Partially MET           Rehab Potential: [] Excellent [x] Good [] Fair  [] Poor         Patient Status: [x] Continue per initial plan of Care     [] Patient now discharged     [x] Additional visits requested, Please re-certify for additional visits:      Requested frequency/duration:  1 /week for 5 weeks    If we are requesting more visits, we fully anticipate the patient's condition is expected to improve within the treatment timeframe we are requesting. Electronically signed by:  Anamika Cosby PT, DPT, KATIE  5/10/2022, 12:25 PM    5/10/2022 12:25 PM     If you have any questions or concerns, please don't hesitate to call.   Thank you for your referral.    Physician Signature:______________________ Date:______ Time: ________  By signing above, therapists plan is approved by physician

## 2022-05-10 NOTE — FLOWSHEET NOTE
Outpatient Physical Therapy  [place]           [x] Phone: [phone]   Fax: [phone]  [facility] Bermudez           [] Phone: [phone]   Fax: [phone]        Physical Therapy Daily Treatment Note  Date:  5/10/2022    Patient Name:  Jackie Huerta    :  1955  MRN: [id number]  Restrictions/Precautions:  Limited ROM  Diagnosis:   Diagnosis: R shoulder adhesive capsulitis  Date of Injury/Surgery:   Treatment Diagnosis: Treatment Diagnosis: R UE pain, weakness, stiffness    Insurance/Certification information: PT Insurance Information: [place]   Pre-cert Pt approved for 7 visits  Includes date of eval.  4/15/2022-2022  Auth# [id number]  Referring Physician:  Referring Practitioner: Dr. Nakia Ashley  Next Doctor Visit:    Plan of care signed (Y/N):  Yes  Outcome Measure:  Quick DASH: 77% disability   Visit# / total visits:    per POC  Pain level:  0 /10   Goals:     Patient goals : improve pain to ease sleeping and ADLs. Short term goals  Time Frame for Short term goals: Defer to [address] [facility] term goals  Time Frame for Long term goals : 6 weeks 22  Long term goal 1: Pt will demo I with HEP/symptom management. Mostly MET   Long term goal 2: Pt will demo <50% disability per Quick DASH to demo improved functional use of R UE for ADLs. Partially MET   Long term goal 3: Pt will demo >25 deg improvement in R UE AROM to ease reaching. Mostly MET   Long term goal 4: Pt will demo >Fair (+) strength in neutral to demo improved ease with light lifting. Mostly MET   Long term goal 5: Pt will demo able to lay on shoulder without increase in pain to ease sleeping. Partially MET     Summary of Evaluation:   Pt is 78year old female with 5 month sudden onset of R shoulder. Pt now has difficulties completing ADLs with R UE. Pt demo deficits this date that include poor A/PROM, weakness and pain .   Testing this date indicate signs and symptoms of RTC involvement with adhesive capsulitis with poor ROM in all directions. Pt will benefit with PT services with progression of strength/ROM, manual and modalities to return to PLOF. Pt prior to onset of current condition had no pain with able to complete full ADLs and work activities. Patient received education on their current pathology and how their condition effects them with their functional activities. Patient understood discussion and questions were answered. Patient understands their activity limitations and understands rational for treatment progression. Subjective: Martha Maier arrives to therapy stating that she feels okay today, no pain just the normal sore shoulder. 5/10 pain at shoulder with reaching behind back and lifting items. >50% improvement since start of therapy. REturn follow up in June. Pt is R handed. Any changes in Ambulatory Summary Sheet? None        Objective:    COVID screening questions were asked and patient attested that there had been no contact or symptoms    Denies pain with palpation    R shoulder A/PROM   Flexion: 126/135*       Extension: 45*        ABduction: 75/ 70*  ER: 44 deg in neutral /46 deg at 45 deg ABD   Functionally to R PSIS region.         Cueing during Abd in standing to avoid shrugging shoulder for compensation      Good strength in all directions with good (-) in ER/IR with 3/10 pain   Quick DASH: 52% disability     Exercises: (No more than 4 columns)   Exercise/Equipment 4/27/22  #4 4/29/22 #5 5/5/22 #6 5/10/22  #7            WARM UP          Pulleys  10x2 each  2x10 ea  2x10 ea 10x2 each          TABLE       Supine circles CW/CCW       *PROM table flexion slides     Wall 5\"x5   *scap retractions   2x10 3\" 2x10 3\"    Supine AAROM with cane 10x2  2x10 1# bar                     STANDING       *Pendulums in all dir       *iso shoulder flexion/ext        SB circles/flex       PROM scaption with cane  10x2   3\" 2x10 3\" 2x10 3\" 10x  3\"    ER 10x  3\"   Wall saw PSB 10x2  2\"  RSB 2x10 2\" RSB 2x10 2\"    Mid rows  YSB 10x2 YTB 2x10  YTB 2x10    Bent rows        Abduction     x10                              PROPRIOCEPTION                                          MODALITIES  -- --                      Other Therapeutic Activities/Education: --      Home Exercise Program:  HO issued, reviewed and discussed with patient. Pt agreed to comply. Added scaption with cane and AAROM shoulder flexion 4/27/22    Manual Treatments:  PROM all directions to tolerance, posterior and inferior mobs into scaption and flexion x12'       Modalities:       Communication with other providers:  POC sent 4/15/22       Assessment: Cuca Bray has completed 7 visit since start of therapy on 4/15/22. Demo functional improvement with use of dominant R UE but remains with limited tolerance, very light lifting and limited reach. Demo 25-50% improvement in A/PRO with ongoing deficits limited to 126 deg shoulder flex and 75 deg ABD. Cuca Bray has made good progress with expected improvement with continued home program. Will request for additional visits to continue improvement before return to reffering provider in 4.5 weeks. Pt agreed to this plan.      End session pain 0/10     (Response towards treatment session) (Pain Rating)         Plan for Next Session:   PROM manual as tolerated, PROM exercises as tolerated, light scap activation, modalities PRN      Time In / Time Out: 9870-2047        Timed Code/Total Treatment Minutes:  42/42'     1 MAN 12' , 2 TE 30'    Next Progress Note due:  Eval 4/15/22   Visit 10       Plan of Care Interventions:  [x] Therapeutic Exercise  [x] Modalities:  [x] Therapeutic Activity     [] Ultrasound  [] Estim  [x] Gait Training      [] Cervical Traction [] Lumbar Traction  [x] Neuromuscular Re-education    [x] Cold/hotpack [] Iontophoresis   [x] Instruction in HEP      [x] Vasopneumatic   [] Dry Needling    [x] Manual Therapy               [] Aquatic Therapy              Electronically signed by:  Dusty Runner PT, DPT, OCS      5/10/2022 7:45 AM

## 2022-05-20 ENCOUNTER — HOSPITAL ENCOUNTER (OUTPATIENT)
Dept: PHYSICAL THERAPY | Age: 67
Setting detail: THERAPIES SERIES
Discharge: HOME | End: 2022-05-20
Payer: MEDICARE

## 2022-05-20 PROCEDURE — 97110 THERAPEUTIC EXERCISES: CPT | Performed by: PHYSICAL THERAPY ASSISTANT

## 2022-05-20 NOTE — FLOWSHEET NOTE
Outpatient Physical Therapy  [place]           [x] Phone: [phone]   Fax: [phone]  [facility]           [] Phone: [phone]   Fax: [phone]        Physical Therapy Daily Treatment Note  Date:  2022    Patient Name:  Kathy Miner    :  1955  MRN: [id number]  Restrictions/Precautions:  Limited ROM  Diagnosis:   Diagnosis: R shoulder adhesive capsulitis  Date of Injury/Surgery:   Treatment Diagnosis: Treatment Diagnosis: R UE pain, weakness, stiffness    Insurance/Certification information: PT Insurance Information: [place]   Pre-cert Pt approved for 7 visits  Includes date of eval.  4/15/2022-2022  Auth# [id number]  Referring Physician:  Referring Practitioner: Dr. Jeremias Meredith  Next Doctor Visit:    Plan of care signed (Y/N):  Yes  Outcome Measure:  Quick DASH: 77% disability   Visit# / total visits:   8/12 per POC  Pain level:  0 /10   Goals:     Patient goals : improve pain to ease sleeping and ADLs. Short term goals  Time Frame for Short term goals: Defer to [address] [facility] term goals  Time Frame for Long term goals : 6 weeks 22  Long term goal 1: Pt will demo I with HEP/symptom management. Mostly MET   Long term goal 2: Pt will demo <50% disability per Quick DASH to demo improved functional use of R UE for ADLs. Partially MET   Long term goal 3: Pt will demo >25 deg improvement in R UE AROM to ease reaching. Mostly MET   Long term goal 4: Pt will demo >Fair (+) strength in neutral to demo improved ease with light lifting. Mostly MET   Long term goal 5: Pt will demo able to lay on shoulder without increase in pain to ease sleeping. Partially MET     Summary of Evaluation:   Pt is 78year old female with 5 month sudden onset of R shoulder. Pt now has difficulties completing ADLs with R UE. Pt demo deficits this date that include poor A/PROM, weakness and pain .   Testing this date indicate signs and symptoms of RTC involvement with adhesive capsulitis with poor ROM in all directions. Pt will benefit with PT services with progression of strength/ROM, manual and modalities to return to PLOF. Pt prior to onset of current condition had no pain with able to complete full ADLs and work activities. Patient received education on their current pathology and how their condition effects them with their functional activities. Patient understood discussion and questions were answered. Patient understands their activity limitations and understands rational for treatment progression. Subjective: Willie Pierre states she has trouble lifing pushing and pulling due to the right shoulder pain, still cannot sleep on it. Any changes in Ambulatory Summary Sheet? None        Objective:    COVID screening questions were asked and patient attested that there had been no contact or symptoms    Denies pain with palpation 5/20/22    R shoulder A/PROM   Flexion: 126/135*     Unchanged 5/20/22  Extension: 45*        ABduction: 75/ 70* Improved today to 90* 5/20/22  ER: 44 deg in neutral /46 deg at 45 deg ABD   Functionally to R PSIS region.         Cueing during Abd in standing to avoid shrugging shoulder for compensation      Good strength in all directions with good (-) in ER/IR with 3/10 pain   Quick DASH: 52% disability     Exercises: (No more than 4 columns)   Exercise/Equipment 4/29/22 #5 5/5/22 #6 5/10/22  #7 5/20/22 #8            WARM UP          Pulleys  2x10 ea  2x10 ea 10x2 each 10x2 scaption          TABLE       Supine circles CW/CCW       *PROM table flexion slides    Wall 5\"x5 Roll ball up wedge 10x1,    *scap retractions  2x10 3\" 2x10 3\"     Supine AAROM with cane  2x10 1# bar  AAROM 10x2 1# bar   Side Lying Abduction AROM    20x1             STANDING       *Pendulums in all dir       *iso shoulder flexion/ext     ISO, right ER, 10x1, 5\" each   SB circles/flex       PROM scaption with cane  2x10 3\" 2x10 3\" 10x  3\"    ER 10x  3\"    Wall saw RSB 2x10 2\" RSB 2x10 2\"  wheel up on wall 10x1   Mid rows YTB 2x10  YTB 2x10     Bent rows        Abduction    x10                               PROPRIOCEPTION                                          MODALITIES -- --                       Other Therapeutic Activities/Education: --      Home Exercise Program:  HO issued, reviewed and discussed with patient. Pt agreed to comply. Added scaption with cane and AAROM shoulder flexion 4/27/22    Manual Treatments:  PROM all directions to tolerance, posterior and inferior mobs into scaption and flexion x12'       Modalities:       Communication with other providers:  POC sent 4/15/22       Assessment: Shira Goodman has completed 7 visit since start of therapy on 4/15/22. Demo functional improvement with use of dominant R UE but remains with limited tolerance, very light lifting and limited reach. Demo 25-50% improvement in A/PRO with ongoing deficits limited to 126 deg shoulder flex and 75 deg ABD. Shira Goodman has made good progress with expected improvement with continued home program. Will request for additional visits to continue improvement before return to reffering provider in 4.5 weeks. Pt agreed to this plan. End session pain 0/10     (Response towards treatment session) (Pain Rating)  Pt is performing good controlled motion within the pain free ranges. The pt did not demonstrate compensation today until above the 90 degrees of shoulder flexion. Still having functional limitations for the use of the right arm.          Plan for Next Session:   PROM manual as tolerated, PROM exercises as tolerated, light scap activation, modalities PRN      Time In / Time Out: 1445/1510        Timed Code/Total Treatment Minutes:  40'40' TE    Next Progress Note due:  Karyna 4/15/22   Visit 10       Plan of Care Interventions:  [x] Therapeutic Exercise  [x] Modalities:  [x] Therapeutic Activity     [] Ultrasound  [] Estim  [x] Gait Training      [] Cervical Traction [] Lumbar Traction  [x] Neuromuscular Re-education    [x] Cold/hotpack [] Iontophoresis   [x] Instruction in HEP      [x] Vasopneumatic   [] Dry Needling    [x] Manual Therapy               [] Aquatic Therapy              Electronically signed by:    Jeramie Godron PTA     5/20/2022 7:08 AM

## 2022-05-27 ENCOUNTER — HOSPITAL ENCOUNTER (OUTPATIENT)
Dept: PHYSICAL THERAPY | Age: 67
Setting detail: THERAPIES SERIES
Discharge: HOME | End: 2022-05-27
Payer: MEDICARE

## 2022-05-27 PROCEDURE — 97140 MANUAL THERAPY 1/> REGIONS: CPT

## 2022-05-27 PROCEDURE — 97110 THERAPEUTIC EXERCISES: CPT

## 2022-05-27 PROCEDURE — 97530 THERAPEUTIC ACTIVITIES: CPT

## 2022-05-27 NOTE — FLOWSHEET NOTE
Outpatient Physical Therapy  [place]           [x] Phone: [phone]   Fax: [phone]  [facility]           [] Phone: [phone]   Fax: [phone]        Physical Therapy Daily Treatment Note  Date:  2022    Patient Name:  Ginny Vazquez    :  1955  MRN: [id number]  Restrictions/Precautions:  Limited ROM  Diagnosis:   Diagnosis: R shoulder adhesive capsulitis  Date of Injury/Surgery:   Treatment Diagnosis: Treatment Diagnosis: R UE pain, weakness, stiffness    Insurance/Certification information: PT Insurance Information: [place]   Pre-cert Pt approved for 7 visits  Includes date of eval.  4/15/2022-2022  Auth# [id number]  Referring Physician:  Referring Practitioner: Dr. Valdo Coulter  Next Doctor Visit:    Plan of care signed (Y/N):  Yes  Outcome Measure:  Quick DASH: 77% disability   Visit# / total visits:    per POC  Pain level:   0 /10   Goals:     Patient goals : improve pain to ease sleeping and ADLs. Short term goals  Time Frame for Short term goals: Defer to [address] [facility] term goals  Time Frame for Long term goals : 6 weeks 22  Long term goal 1: Pt will demo I with HEP/symptom management. Mostly MET   Long term goal 2: Pt will demo <50% disability per Quick DASH to demo improved functional use of R UE for ADLs. Partially MET   Long term goal 3: Pt will demo >25 deg improvement in R UE AROM to ease reaching. Mostly MET   Long term goal 4: Pt will demo >Fair (+) strength in neutral to demo improved ease with light lifting. Mostly MET   Long term goal 5: Pt will demo able to lay on shoulder without increase in pain to ease sleeping. Partially MET     Summary of Evaluation:   Pt is 78year old female with 5 month sudden onset of R shoulder. Pt now has difficulties completing ADLs with R UE. Pt demo deficits this date that include poor A/PROM, weakness and pain .   Testing this date indicate signs and symptoms of RTC involvement with adhesive capsulitis with poor ROM in all directions. Pt will benefit with PT services with progression of strength/ROM, manual and modalities to return to PLOF. Pt prior to onset of current condition had no pain with able to complete full ADLs and work activities. Patient received education on their current pathology and how their condition effects them with their functional activities. Patient understood discussion and questions were answered. Patient understands their activity limitations and understands rational for treatment progression. Subjective: Shaista Williamson states feeling good this morning. Trouble sleeping directly on the shoulder. No issues after last visit. Out of ibuprofen, will call to get new prescription. Any changes in Ambulatory Summary Sheet? None        Objective:    COVID screening questions were asked and patient attested that there had been no contact or symptoms      Improved shoulder position with OH reaching without scapular shrug compensation. ABduction: 90/95 A/PROM  Good technique with all other exercises.        Exercises: (No more than 4 columns)   Exercise/Equipment 4/29/22 #5 5/5/22 #6 5/10/22  #7 5/20/22 #8 5/27/22   #9             WARM UP           Pulleys  2x10 ea  2x10 ea 10x2 each 10x2 scaption 10x2 scaption           TABLE        Supine circles CW/CCW     2# 10x2 each dir    *PROM table flexion slides    Wall 5\"x5 Roll ball up wedge 10x1,     *scap retractions  2x10 3\" 2x10 3\"      Supine AAROM with cane  2x10 1# bar  AAROM 10x2 1# bar    Side Lying Abduction AROM    20x1    Supine punches      2# 10x2                              STANDING        *Pendulums in all dir        *iso shoulder flexion/ext     ISO, right ER, 10x1, 5\" each    SB circles/flex         PROM scaption with cane  2x10 3\" 2x10 3\" 10x  3\"    ER 10x  3\"     Wall saw RSB 2x10 2\" RSB 2x10 2\"  wheel up on wall 10x1 wheel up on wall 10x2   Mid rows  YTB 2x10  YTB 2x10   RTB 10x2   Bent rows         Abduction    x10   ABD off table 10x2   Ball pass around waist      10x2 each dir    Ball on wall overhead      10x each dir         Ball overhead taps      R UE 10x                           PROPRIOCEPTION                                                MODALITIES -- --                          Other Therapeutic Activities/Education: --      Home Exercise Program:  HO issued, reviewed and discussed with patient. Pt agreed to comply. Added scaption with cane and AAROM shoulder flexion 4/27/22  Added ABD off table 5/27/22. Manual Treatments:  PROM all directions to tolerance, posterior and inferior mobs into scaption and flexion/ER x12'       Modalities:       Communication with other providers:  POC sent 4/15/22       Assessment: Silvino Lui tolerated activities well. Improving ROM and tolerance against resistance. Pain into scaption with added resistance but able to reach overhead functionally with very little to no weight. Improved technique without compensation with functional reaches. Improving strength to ease pain with resistance with limitations to <1# due to increased pain with heavier weight. Progressing well overall at this time.      End session pain 0/10         Plan for Next Session:   PROM manual as tolerated, PROM exercises as tolerated, light scap activation, modalities PRN      Time In / Time Out: 1491-6845        Timed Code/Total Treatment Minutes:  45/45' ' TE   10' TA, 12' Man, 23' TE    Next Progress Note due:  Eval 4/15/22   Visit 10       Plan of Care Interventions:  [x] Therapeutic Exercise  [x] Modalities:  [x] Therapeutic Activity     [] Ultrasound  [] Estim  [x] Gait Training      [] Cervical Traction [] Lumbar Traction  [x] Neuromuscular Re-education    [x] Cold/hotpack [] Iontophoresis   [x] Instruction in HEP      [x] Vasopneumatic   [] Dry Needling    [x] Manual Therapy               [] Aquatic Therapy              Electronically signed by:   Navin Jordan PT, DPT, OCS      5/27/2022 7:37 AM

## 2022-06-01 ENCOUNTER — HOSPITAL ENCOUNTER (OUTPATIENT)
Age: 67
Discharge: HOME | End: 2022-06-01
Payer: MEDICARE

## 2022-06-01 ENCOUNTER — HOSPITAL ENCOUNTER (OUTPATIENT)
Dept: PHYSICAL THERAPY | Age: 67
Setting detail: THERAPIES SERIES
Discharge: HOME | End: 2022-06-01
Payer: MEDICARE

## 2022-06-01 LAB
ALBUMIN SERPL-MCNC: 4.2 GM/DL (ref 3.4–5)
ALP BLD-CCNC: 106 IU/L (ref 40–129)
ALT SERPL-CCNC: 11 U/L (ref 10–40)
ANION GAP SERPL CALCULATED.3IONS-SCNC: 15 MMOL/L (ref 4–16)
AST SERPL-CCNC: 15 IU/L (ref 15–37)
BILIRUB SERPL-MCNC: 0.3 MG/DL (ref 0–1)
BUN BLDV-MCNC: 14 MG/DL (ref 6–23)
CALCIUM SERPL-MCNC: 9.5 MG/DL (ref 8.3–10.6)
CHLORIDE BLD-SCNC: 101 MMOL/L (ref 99–110)
CO2: 24 MMOL/L (ref 21–32)
CREAT SERPL-MCNC: 0.8 MG/DL (ref 0.6–1.1)
GFR AFRICAN AMERICAN: >60 ML/MIN/1.73M2
GFR NON-AFRICAN AMERICAN: >60 ML/MIN/1.73M2
GLUCOSE BLD-MCNC: 91 MG/DL (ref 70–99)
POTASSIUM SERPL-SCNC: 3.7 MMOL/L (ref 3.5–5.1)
SODIUM BLD-SCNC: 140 MMOL/L (ref 135–145)
TOTAL PROTEIN: 7.9 GM/DL (ref 6.4–8.2)

## 2022-06-01 PROCEDURE — 97110 THERAPEUTIC EXERCISES: CPT

## 2022-06-01 PROCEDURE — 97530 THERAPEUTIC ACTIVITIES: CPT

## 2022-06-01 PROCEDURE — 80053 COMPREHEN METABOLIC PANEL: CPT

## 2022-06-01 PROCEDURE — 97140 MANUAL THERAPY 1/> REGIONS: CPT

## 2022-06-01 PROCEDURE — 36415 COLL VENOUS BLD VENIPUNCTURE: CPT

## 2022-06-01 NOTE — FLOWSHEET NOTE
Outpatient Physical Therapy  [place]           [x] Phone: [phone]   Fax: [phone]  [place] [place]           [] Phone: [phone]   Fax: [phone]        Physical Therapy Daily Treatment Note  Date:  2022    Patient Name:  Jackie Huerta    :  1955  MRN: [id number]  Restrictions/Precautions:  Limited ROM  Diagnosis:   Diagnosis: R shoulder adhesive capsulitis  Date of Injury/Surgery:   Treatment Diagnosis: Treatment Diagnosis: R UE pain, weakness, stiffness    Insurance/Certification information: PT Insurance Information: [place]   Pre-cert  PT APPROVED FOR 5 MORE VISITS FROM 5/10/22-22    Referring Physician:  Referring Practitioner: Dr. Nakia Ashley  Next Doctor Visit:    Plan of care signed (Y/N):  Yes  Outcome Measure:  Quick DASH: 77% disability   Visit# / total visits:   10/12 per POC  Pain level:   0 /10       Goals:     Patient goals : improve pain to ease sleeping and ADLs. Short term goals  Time Frame for Short term goals: Defer to [address] [facility] term goals  Time Frame for Long term goals : 6 weeks 22  Long term goal 1: Pt will demo I with HEP/symptom management. Mostly MET   Long term goal 2: Pt will demo <50% disability per Quick DASH to demo improved functional use of R UE for ADLs. Partially MET   Long term goal 3: Pt will demo >25 deg improvement in R UE AROM to ease reaching. Mostly MET   Long term goal 4: Pt will demo >Fair (+) strength in neutral to demo improved ease with light lifting. Mostly MET   Long term goal 5: Pt will demo able to lay on shoulder without increase in pain to ease sleeping. Partially MET     Summary of Evaluation:   Pt is 78year old female with 5 month sudden onset of R shoulder. Pt now has difficulties completing ADLs with R UE. Pt demo deficits this date that include poor A/PROM, weakness and pain . Testing this date indicate signs and symptoms of RTC involvement with adhesive capsulitis with poor ROM in all directions.  Pt will benefit with PT services with progression of strength/ROM, manual and modalities to return to PLOF. Pt prior to onset of current condition had no pain with able to complete full ADLs and work activities. Patient received education on their current pathology and how their condition effects them with their functional activities. Patient understood discussion and questions were answered. Patient understands their activity limitations and understands rational for treatment progression. Subjective: Nathalia Haynes arrives to therapy stating that the shoulder is okay, about the same overall. No pain unless she does certain movements and at night. Able to reach back to tuck her shirt in now but has difficulty pushing it down into the pants. Any changes in Ambulatory Summary Sheet? None        Objective:  COVID screening questions were asked and patient attested that there had been no contact or symptoms      Limited to 90° or < with passive abduction secondary to painful catch. .. even with and after joint mobilizations. Exercises: (No more than 4 columns)   Exercise/Equipment 5/20/22 #8 5/27/22   #9 6/1/2022 #10           WARM UP      Pulleys  10x2 scaption 10x2 scaption 2*10 scaption          TABLE      Supine circles CW/CCW  2# 10x2 each dir  2# 2*10 ea dir    Supine punches   2# 10x2 2# 2*0                        STANDING      Wall saw wheel up on wall 10x1 wheel up on wall 10x2 Wheel up on wall 2*10   Mid rows   RTB 10x2 RTB 2*10   Abduction   ABD off table 10x2 1# 2*10 off counter top    Ball pass around waist   10x2 each dir  2*10 ea dir    Ball on wall overhead   10x each dir  10* ea dir ball around head    Ball overhead taps   R UE 10x R UE 10*                     PROPRIOCEPTION                                    MODALITIES                      Other Therapeutic Activities/Education: --      Home Exercise Program:  HO issued, reviewed and discussed with patient. Pt agreed to comply.     Added scaption with cane

## 2022-06-08 ENCOUNTER — HOSPITAL ENCOUNTER (OUTPATIENT)
Dept: PHYSICAL THERAPY | Age: 67
Setting detail: THERAPIES SERIES
Discharge: HOME | End: 2022-06-08
Payer: MEDICARE

## 2022-06-08 PROCEDURE — 97140 MANUAL THERAPY 1/> REGIONS: CPT

## 2022-06-08 PROCEDURE — 97110 THERAPEUTIC EXERCISES: CPT

## 2022-06-08 NOTE — FLOWSHEET NOTE
Outpatient Physical Therapy  [place]           [x] Phone: [phone]   Fax: [phone]  [facility]           [] Phone: [phone]   Fax: [phone]        Physical Therapy Daily Treatment Note  Date:  2022    Patient Name:  Sheldon Damico    :  1955  MRN: [id number]  Restrictions/Precautions:  Limited ROM  Diagnosis:   Diagnosis: R shoulder adhesive capsulitis  Date of Injury/Surgery:   Treatment Diagnosis: Treatment Diagnosis: R UE pain, weakness, stiffness    Insurance/Certification information: PT Insurance Information: [place]   Pre-cert  PT APPROVED FOR 5 MORE VISITS FROM 5/10/22-22    Referring Physician:  Referring Practitioner: Dr. Jocelin Nieves  Next Doctor Visit:    Plan of care signed (Y/N):  Yes  Outcome Measure:  Quick DASH: 77% disability   Visit# / total visits:    per POC  Pain level:   0 /10       Goals:     Patient goals : improve pain to ease sleeping and ADLs. Short term goals  Time Frame for Short term goals: Defer to [address] [facility] term goals  Time Frame for Long term goals : 6 weeks 22  Long term goal 1: Pt will demo I with HEP/symptom management. Mostly MET   Long term goal 2: Pt will demo <50% disability per Quick DASH to demo improved functional use of R UE for ADLs. Partially MET   Long term goal 3: Pt will demo >25 deg improvement in R UE AROM to ease reaching. Mostly MET   Long term goal 4: Pt will demo >Fair (+) strength in neutral to demo improved ease with light lifting. Mostly MET   Long term goal 5: Pt will demo able to lay on shoulder without increase in pain to ease sleeping. Partially MET     Summary of Evaluation:   Pt is 78year old female with 5 month sudden onset of R shoulder. Pt now has difficulties completing ADLs with R UE. Pt demo deficits this date that include poor A/PROM, weakness and pain . Testing this date indicate signs and symptoms of RTC involvement with adhesive capsulitis with poor ROM in all directions.  Pt will benefit with PT services with progression of strength/ROM, manual and modalities to return to PLOF. Pt prior to onset of current condition had no pain with able to complete full ADLs and work activities. Patient received education on their current pathology and how their condition effects them with their functional activities. Patient understood discussion and questions were answered. Patient understands their activity limitations and understands rational for treatment progression. Subjective: Kaylin Argueta arrives to therapy stating that there is currently no pain in the shoulder unless she moves it a certain way. She feels like she has had great improvements in her ROM and functional use of her R UE since starting PT. Any changes in Ambulatory Summary Sheet? None        Objective:  COVID screening questions were asked and patient attested that there had been no contact or symptoms    PROM abduction 115°, pain end range   More pain and difficulty with abduction off counter top with 1# weight today.         Exercises: (No more than 4 columns)   Exercise/Equipment 5/27/22   #9 6/1/2022 #10 6/8/2022 #11           WARM UP      Pulleys  10x2 scaption 2*10 scaption  20* ea dir          TABLE      Supine circles CW/CCW 2# 10x2 each dir  2# 2*10 ea dir  2# 2*10 ea dir    Supine punches  2# 10x2 2# 2*0 2# 2*10                        STANDING      Wall saw wheel up on wall 10x2 Wheel up on wall 2*10 Wheel up on wall 2*10   Mid rows  RTB 10x2 RTB 2*10 RTB 2*10   Abduction  ABD off table 10x2 1# 2*10 off counter top  1# 2*10 off counter top    Ball pass around waist  10x2 each dir  2*10 ea dir  10* ea   Ball on wall overhead  10x each dir  10* ea dir ball around head  10* ea   Ball overhead taps  R UE 10x R UE 10* R UE 10*                     PROPRIOCEPTION                                    MODALITIES                      Other Therapeutic Activities/Education: --      Home Exercise Program:  HO issued, reviewed and discussed with patient. Pt agreed to comply. Added scaption with cane and AAROM shoulder flexion 4/27/22  Added ABD off table 5/27/22. Manual Treatments:  PROM all directions to tolerance, posterior and inferior mobs into scaption and flexion/ER x15'       Modalities:       Communication with other providers:  POC sent 4/15/22       Assessment: Radha tolerated today's session well overall. She had some mild increased pain with a few exercises but was able to reduce the pain with some rest. She is making good progress towards her goals with reports of decreased pain and improved functional use of the arm. She has been able to progress strengthening activities here in the clinic and her PROM has improved as well.  End session pain: 0/10         Plan for Next Session:   PROM manual as tolerated, PROM exercises as tolerated, light scap activation, modalities PRN      Time In / Time Out: 1015/1100        Timed Code/Total Treatment Minutes:  39' 1 man 15' 2 TE 30'        Next Progress Note due:  Eval 4/15/22   Visit 10       Plan of Care Interventions:  [x] Therapeutic Exercise  [x] Modalities:  [x] Therapeutic Activity     [] Ultrasound  [] Estim  [x] Gait Training      [] Cervical Traction [] Lumbar Traction  [x] Neuromuscular Re-education    [x] Cold/hotpack [] Iontophoresis   [x] Instruction in HEP      [x] Vasopneumatic   [] Dry Needling    [x] Manual Therapy               [] Aquatic Therapy              Electronically signed by:  Otoniel Jimenez        6/8/2022 9:16 AM

## 2022-06-10 ENCOUNTER — HOSPITAL ENCOUNTER (OUTPATIENT)
Dept: PHYSICAL THERAPY | Age: 67
Setting detail: THERAPIES SERIES
Discharge: HOME | End: 2022-06-10
Payer: MEDICARE

## 2022-06-10 PROCEDURE — 97110 THERAPEUTIC EXERCISES: CPT

## 2022-06-10 PROCEDURE — 97530 THERAPEUTIC ACTIVITIES: CPT

## 2022-06-10 PROCEDURE — 97140 MANUAL THERAPY 1/> REGIONS: CPT

## 2022-06-10 NOTE — FLOWSHEET NOTE
Outpatient Physical Therapy  [place]           [x] Phone: [phone]   Fax: [phone]  [place]           [] Phone: [phone]   Fax: [phone]        Physical Therapy Daily Treatment Note  Date:  6/10/2022    Patient Name:  Ginny Vazquez    :  1955  MRN: [id number]  Restrictions/Precautions:  Limited ROM  Diagnosis:   Diagnosis: R shoulder adhesive capsulitis  Date of Injury/Surgery:   Treatment Diagnosis: Treatment Diagnosis: R UE pain, weakness, stiffness    Insurance/Certification information: PT Insurance Information: [place]   Pre-cert  PT APPROVED FOR 5 MORE VISITS FROM 5/10/22-22  Referring Physician:  Referring Practitioner: Dr. Valdo Coulter  Next Doctor Visit:    Plan of care signed (Y/N):  Yes  Outcome Measure:  Quick DASH: 77% disability   Visit# / total visits:    per POC  Pain level:   0 /10       Goals:     Patient goals : improve pain to ease sleeping and ADLs. Short term goals  Time Frame for Short term goals: Defer to [address] [facility] term goals  Time Frame for Long term goals : 6 weeks 22  Long term goal 1: Pt will demo I with HEP/symptom management. Mostly MET   Long term goal 2: Pt will demo <50% disability per Quick DASH to demo improved functional use of R UE for ADLs. Partially MET   Long term goal 3: Pt will demo >25 deg improvement in R UE AROM to ease reaching. Mostly MET   Long term goal 4: Pt will demo >Fair (+) strength in neutral to demo improved ease with light lifting. Mostly MET   Long term goal 5: Pt will demo able to lay on shoulder without increase in pain to ease sleeping. Partially MET     Summary of Evaluation:   Pt is 78year old female with 5 month sudden onset of R shoulder. Pt now has difficulties completing ADLs with R UE. Pt demo deficits this date that include poor A/PROM, weakness and pain . Testing this date indicate signs and symptoms of RTC involvement with adhesive capsulitis with poor ROM in all directions.  Pt will benefit with PT services with progression of strength/ROM, manual and modalities to return to PLOF. Pt prior to onset of current condition had no pain with able to complete full ADLs and work activities. Patient received education on their current pathology and how their condition effects them with their functional activities. Patient understood discussion and questions were answered. Patient understands their activity limitations and understands rational for treatment progression. Subjective:  Jagruti Garcia arrives to therapy stating that she feels good, no pain and really feels like she has made some improvements since starting PT. She no longer has the sharp/grabbing pains anymore and she feels she has more ROM. Any changes in Ambulatory Summary Sheet? None        Objective:  COVID screening questions were asked and patient attested that there had been no contact or symptoms    A(P)ROM  Flexion 131° (140°)  Abduction 95° (105°)  IR HBB to sacrum (60°)  ER HBH to C7, 35° with arm in neutral (65°)    MMT  Flexion 4/5  Abduction 4/5  IR 4/5  ER 4/5    Quick Dash - 36% disability    Still has mild to moderate shoulder hike with elevation.      Exercises: (No more than 4 columns)   Exercise/Equipment 6/1/2022 #10 6/8/2022 #11 6/10/2022 #12           WARM UP      Pulleys  2*10 scaption  20* ea dir  20* ea dir          TABLE      Supine circles CW/CCW 2# 2*10 ea dir  2# 2*10 ea dir  2# 2*10 ea dir    Supine punches  2# 2*0 2# 2*10 2# 2*10                        STANDING      Wall saw Wheel up on wall 2*10 Wheel up on wall 2*10 Wheel up on wall 2*10   Mid rows  RTB 2*10 RTB 2*10 RTB 2*10   Abduction  1# 2*10 off counter top  1# 2*10 off counter top  1# 2*10 off counter top    Ball pass around waist  2*10 ea dir  10* ea 10* ea   Ball on wall overhead  10* ea dir ball around head  10* ea 10* ea   Ball overhead taps  R UE 10* R UE 10* R UE 10*                     PROPRIOCEPTION                                    MODALITIES Other Therapeutic Activities/Education: --      Home Exercise Program:  HO issued, reviewed and discussed with patient. Pt agreed to comply. Added scaption with cane and AAROM shoulder flexion 4/27/22  Added ABD off table 5/27/22. Manual Treatments:  PROM all directions to tolerance, posterior and inferior mobs into scaption and flexion/ER x15'       Modalities:       Communication with other providers:  POC sent 4/15/22       Assessment:  India Nicholson has made significant progress with A(P)ROM since starting PT. She has reports of improved function and decreased pain. She is able to perform intermediate level strengthening activities in the clinic with no pain and min-mod compensation.  End session pain: 0/10         Plan for Next Session:   PROM manual as tolerated, PROM exercises as tolerated, light scap activation, modalities PRN      Time In / Time Out:  9846/4689        Timed Code/Total Treatment Minutes:  39' 1 man 15' 1 TA 10' 1 TE 20'        Next Progress Note due:  Eval 4/15/22   Visit 10       Plan of Care Interventions:  [x] Therapeutic Exercise  [x] Modalities:  [x] Therapeutic Activity     [] Ultrasound  [] Estim  [x] Gait Training      [] Cervical Traction [] Lumbar Traction  [x] Neuromuscular Re-education    [x] Cold/hotpack [] Iontophoresis   [x] Instruction in HEP      [x] Vasopneumatic   [] Dry Needling    [x] Manual Therapy               [] Aquatic Therapy              Electronically signed by:  Migue Menjivar        6/10/2022 6:45 AM

## 2022-06-14 ENCOUNTER — TELEPHONE (OUTPATIENT)
Dept: PHARMACY | Facility: CLINIC | Age: 67
End: 2022-06-14

## 2022-06-14 NOTE — TELEPHONE ENCOUNTER
[facility] CLINICAL PHARMACY: STATIN THERAPY REVIEW  Identified statin use in persons with cardiovascular disease care gap per Lilia. Records dated 5/23/22. Last Office Visit: 6/3/22 (per [facility] portal - PCP is an affiliate provider)    Patient not found in Outcomes MTM    ASSESSMENT:  Patient has been identified as having a diagnosis for clinical ASCVD or event (e.g., inpatient hospitalization for MI, CABG, PCI or other revascularization procedures) in the measurement year and not currently filling a moderate or high intensity statin. Patients included in this care gap are males age 18-72 and females age 43-69. Per chart review, patient is prescribed simvastatin 10 mg daily (low-intensity). 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 Lab Results   Component Value Date    CHOL 210 (H) 03/29/2022    TRIG 99 03/29/2022    HDL 96 03/29/2022    LDLCALC 94 03/29/2022    LDLDIRECT 81 12/10/2021     ALT   Date Value Ref Range Status   06/01/2022 11 10 - 40 U/L Final     AST   Date Value Ref Range Status   06/01/2022 15 15 - 37 IU/L Final       The 10-year ASCVD risk score (Theresa Reyes, et al., 2013) is: 16.5%    Values used to calculate the score:      Age: 78 years      Sex: Female      Is Non- : Yes      Diabetic: No      Tobacco smoker: Yes      Systolic Blood Pressure: 835 mmHg      Is BP treated: Yes      HDL Cholesterol: 96 MG/DL      Total Cholesterol: 210 MG/DL     Hyperlipidemia Goal: Patient is prescribed low-intensity statin therapy. PLAN:  Will prepare and send a fax to Yobani Nuñez MD today to recommend increasing simvastatin dose from 10 mg daily to 20 mg daily.      Future Appointments   Date Time Provider Efrain Ibanez   6/15/2022  9:15 AM Nestor Zavala [facility]       Mihaela LawrenceD, [address] [facility] // Department, toll free [phone], option [phone] [facility] in place:  No   Recommendation Provided To: Provider: 1 via Fax sent to office   Intervention Detail: Dose Adjustment: 1, reason: Therapy Optimization   Gap Closed?: No    Intervention Accepted By: Provider: 0   Time Spent (min): 15

## 2022-06-14 NOTE — LETTER
Using a Statin for Your Patient with Cardiovascular Disease    Date: 22    Dear Su Tripp MD,  Fax: [phone]    Concerning patient: Catalina Day  :  1955    It is strongly recommended that your patient with cardiovascular disease be on a moderate or high-intensity statin     Statin therapy for secondary prevention in patients with clinical arthrosclerotic cardiovascular disease (ASCVD) is recommended by the [employer] of Cardiology and American Heart Association (ACC/AHA). These guidelines are based on numerous trials that show moderate to high-intensity statins reduce additional ASCVD events and disease progression. The Centers for Medicare & Medicaid Services (CMS) also supports this recommendation. Their \"Statin Use in Persons with Cardiovascular Disease\" measure as part of the overall STAR rating. Our team of clinical pharmacists is working with the patient's health plan to assess the gap in therapy. ACC/AHA guidelines call for a high-intensity statin for patients with clinical ASCVD. Moderate-intensity statin therapy may be considered if the patient has drug-drug interactions or a history of statin intolerance. These recommendations are regardless of the baseline lipid levels. Consider making the following change to your patients regimen: increase simvastatin dose from 10 mg daily (low-intensity) to 20 mg daily (moderate-intensity). 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 If you agree, send a prescription to your patients pharmacy. If you do not agree, please return response to fax number below for our records. Please also feel free to contact me at the number below with any questions or concerns, or if you would like me to further discuss with your patient.  Thank you for your help and consideration in caring for this patient.     Respectfully,  Derek Herbert, [address] [facility], PharmD  [facility]  Phone: [phone] // Fax: [phone]

## 2022-06-15 ENCOUNTER — OFFICE VISIT (OUTPATIENT)
Dept: ORTHOPEDIC SURGERY | Age: 67
End: 2022-06-15
Payer: MEDICARE

## 2022-06-15 VITALS
BODY MASS INDEX: 23.54 KG/M2 | HEART RATE: 73 BPM | HEIGHT: 67 IN | OXYGEN SATURATION: 97 % | SYSTOLIC BLOOD PRESSURE: 120 MMHG | WEIGHT: 150 LBS | DIASTOLIC BLOOD PRESSURE: 69 MMHG | RESPIRATION RATE: 17 BRPM

## 2022-06-15 DIAGNOSIS — M75.01 ADHESIVE CAPSULITIS OF RIGHT SHOULDER: Primary | ICD-10-CM

## 2022-06-15 PROCEDURE — 1123F ACP DISCUSS/DSCN MKR DOCD: CPT | Performed by: STUDENT IN AN ORGANIZED HEALTH CARE EDUCATION/TRAINING PROGRAM

## 2022-06-15 PROCEDURE — 99213 OFFICE O/P EST LOW 20 MIN: CPT | Performed by: STUDENT IN AN ORGANIZED HEALTH CARE EDUCATION/TRAINING PROGRAM

## 2022-06-15 ASSESSMENT — ENCOUNTER SYMPTOMS
COLOR CHANGE: 0
PHOTOPHOBIA: 0
NAUSEA: 0
WHEEZING: 0
EYE PAIN: 0
STRIDOR: 0
FACIAL SWELLING: 0
VOMITING: 0
SHORTNESS OF BREATH: 0
COUGH: 0
ABDOMINAL PAIN: 0
EYE REDNESS: 0
BACK PAIN: 0

## 2022-06-15 NOTE — PROGRESS NOTES
6/15/2022   Chief Complaint   Patient presents with    Shoulder Pain     right      Updated HPI: Patient is here for reevaluation of her right shoulder status post physical therapy. Patient states she recently completed physical therapy and states that she did excellent and that her range of motion is greatly improved and that her pain is essentially resolved. She has no new complaints or injury since last being seen. She is very happy with her progress. Previous HPI (4/13/2022): She is here for reevaluation of her right shoulder pain. Patient states she feels about the same compared to her previous visit. She underwent MRI of the right shoulder and is here for MRI review. She has no new complaints or injury since last being seen. She states that she only has pain with active range of motion beyond certain degrees of movement. No additional treatment since being seen. Previous HPI (3/29/2022): Patient is here for reevaluation of her right shoulder pain. Patient was seen previously after being sent here due to right shoulder pain but at her visit she had no symptoms and was doing great. Patient had been doing well for the last several months but admits that over last several weeks has had increasing right shoulder pain with intermittent episodes of severe 10 out of 10 pain. She denies any injury to the shoulder. She was recently put on prednisone by an outside physician and states that this did not provide any pain relief. She has difficulty doing overhead activities and other activities of daily life. No other treatment. No other complaints this time. No advanced imaging thus far. Previous HPI (12/8/2022): Kathy Miner is a 78 y.o. female left handed patient referred by emergency room for evaluation and treatment right shoulder pain. This is evaluated as a personal injury. Patient states that her pain began approximately 2 weeks prior.   She woke up in the morning and had a significant pain at the rotator cuff insertion of the right shoulder without any known injury. She was seen in the emergency room and was given pain medication. She states over this last several weeks she significantly improved and is asymptomatic at this time. She states she has been diagnosed with rotator cuff calcific tendinitis previously and had a cortisone injection which simply helped her pain. She states that this was more than 10 years prior. She has had no issues with the shoulder since then until now. Again however she is asymptomatic at this time. She is here today for her first visit with myself. She has had no recent advanced imaging of the shoulder. The pain occurs every day and when active. Location of pain is right shoulder rotator cuff insertion. No history of shoulder separation, subluxation or dislocation. Symptoms are aggravated by ADL's, repetitive use, work at or above shoulder height, difficulty sleeping on affected side. Symptoms are diminished by rest, avoiding the painful activities. Limited activities include: lifting, repetitive use, work at or above shoulder height, difficulty sleeping, difficulty with ADLs but this is significantly improved. No stiffness is reported. Related history: negative for prior surgery, trauma, arthritis or disorders. Is affecting ADLs. Pain is 2/10 at it's worst.    Outside reports reviewed: Emergency room note and imaging reviewed    MA HPI: Patient is a 78year old female. Patient is in the office today with right shoulder pain. Pain scale  0/10 currently. Raising her shoulder, getting undressed or lifting increase her pain. Her pain is located is located in the front of her right shoulder. She has been alternating heat and ice for pain relief. She denies any falls or injuries to her right shoulder. She states that her shoulder started hurting when she woke up on 11/21/21 and gradually got worse.  She was seen in the ED on 21. She denies any surgeries. She states that she had an injection in her right shoulder 20 years ago for bursitis. Dominant hand: left but works with her right  Occupation: retired    Patient's medications, allergies, past medical, surgical, social and family histories were reviewed and updated as appropriate. Patient has previously attempted CSI, PT, NSAIDs for pain relief      Medical History  Patient's medications, allergies, past medical, surgical, social and family histories were reviewed and updated as appropriate. Past Medical History:   Diagnosis Date    Heart abnormality     Hypertension     Pap smear for cervical cancer screening 2010    neg     Past Surgical History:   Procedure Laterality Date    ANKLE FRACTURE SURGERY      right     SECTION      COLONOSCOPY       Family History   Problem Relation Age of Onset    Breast Cancer Neg Hx      Social History     Socioeconomic History    Marital status:      Spouse name: Not on file    Number of children: Not on file    Years of education: Not on file    Highest education level: Not on file   Occupational History    Not on file   Tobacco Use    Smoking status: Current Every Day Smoker    Smokeless tobacco: Former User     Quit date: 10/5/2013   Substance and Sexual Activity    Alcohol use: Yes     Comment: socially    Drug use: No    Sexual activity: Never   Other Topics Concern    Not on file   Social History Narrative    Not on file     Social Determinants of Health     Financial Resource Strain:     Difficulty of Paying Living Expenses: Not on file   Food Insecurity:     Worried About [address] in the Last Year: Not on file    [address] in the Last Year: Not on file   Transportation Needs:     Lack of Transportation (Medical): Not on file    Lack of Transportation (Non-Medical):  Not on file   Physical Activity:     Days of Exercise per Week: Not on file    Minutes of sneezing. Eyes: Negative for photophobia, pain and redness. Respiratory: Negative for cough, shortness of breath, wheezing and stridor. Cardiovascular: Negative for chest pain, palpitations and leg swelling. Gastrointestinal: Negative for abdominal pain, nausea and vomiting. Endocrine: Negative for cold intolerance and heat intolerance. Musculoskeletal: Negative for arthralgias, back pain, gait problem, joint swelling, myalgias, neck pain and neck stiffness. Skin: Negative for color change, pallor, rash and wound. Neurological: Negative for dizziness, facial asymmetry, weakness and numbness. Examination:  General Exam:  Vitals: /69   Pulse 73   Resp 17   Ht 5' 6.5\" (1.689 m)   Wt 150 lb (68 kg)   SpO2 97%   BMI 23.85 kg/m²    Physical Exam  Constitutional:       Appearance: Normal appearance. She is normal weight. HENT:      Head: Normocephalic and atraumatic. Nose: Nose normal.   Eyes:      General:         Right eye: No discharge. Left eye: No discharge. Extraocular Movements: Extraocular movements intact. Cardiovascular:      Pulses: Normal pulses. Pulmonary:      Effort: Pulmonary effort is normal.      Breath sounds: Normal breath sounds. Musculoskeletal:         General: No swelling, tenderness, deformity or signs of injury. Right shoulder: No swelling, deformity, effusion, laceration, bony tenderness or crepitus. Decreased range of motion. Normal strength. Normal pulse. Left shoulder: Normal.      Right upper arm: Normal.      Left upper arm: Normal.      Right elbow: Normal.      Left elbow: Normal.      Right forearm: Normal.      Left forearm: Normal.      Right wrist: Normal.      Left wrist: Normal.      Cervical back: Normal and normal range of motion. No rigidity or tenderness. Skin:     General: Skin is warm and dry. Neurological:      General: No focal deficit present.       Mental Status: She is alert and oriented to person, place, and time. RIGHT SHOULDER / UPPER EXTREMITY EXAM      OBSERVATION:      Swelling: none   Deformity: none    Discoloration: none   Scapular winging: none    Scars: none    Atrophy: none      TENDERNESS / CREPITUS (T/C):      Clavicle -/-    SUPRAspinatus  -/-     AC Jt. -/-    INFRAspinatus -/-     SC Jt. -/-    Deltoid -/-     G. Tuberosity +/-   LH BICEP groove -/-    Acromion: -/-    Midline Neck -/-     Scapular Spine -/-   Trapezium -/-    SMA Scapula -/-   GH jt. line - post -/-     Scapulothoracic -/-   GH jt. line - ant + improved/-       ROM: (* = with pain)  Left shoulder   Right shoulder     AROM (PROM)  AROM (PROM)    FE   170° (175°)    160° (165°)     ER 0°   60° (65°)   40°  (50°)*    ER 90° ABD  90°  (90°)   75°  (75°)*    IR 90° ABD  40  (40°)    30 (30°)      IR (spine) T10    sacrum      STRENGTH: (* = with pain) Left shoulder   Right shoulder    SCAPTION   5/5    5-/5     IR    5/5    5-/5    ER    5/5    5-/5*    BICEPS   5/5    5/5    Deltoid   5/5    5/5      SIGNS:  RUE     NEER: neg   OSANDIS: neg      MURCIA: +   SPEEDS: neg     DROP ARM: neg  BELLY PRESS: neg    LIFT-OFF: neg      X-Body ADD: neg       Bear Hug: neg    Biceps stretch test: +      EXTREMITY NEURO-VASCULAR EXAM:     Sensation grossly intact to light touch all dermatomal regions. DTR 2+ Biceps, Triceps, BR and Negative Ozzys sign    Grossly intact motor function at Elbow, Wrist and Hand    Distal pulses radial and ulnar 2+, brisk cap refill, symmetric. NECK:  Painless FROM and spinous processes non-tender. Negative Spurlings sign.       Diagnostic testing:  No new orthopedic imaging    Previous imaging:  MRI R Shoulder without contrast:  ROTATOR CUFF: Mild supraspinatus and infraspinatus tendinosis.  Low-grade   interstitial tearing at the humeral insertion in the far posterior fibers of   the infraspinatus.  Anteriorly within the supraspinatus tendon is a tiny   focus of low signal with adjacent edema and measures approximately 4 x 5 mm. Mild subscapularis tendinosis without tearing.  The teres minor tendon is   intact.  No significant muscle atrophy.       BICEPS TENDON: Biceps tendon is intact.  Increased fluid signal within the   biceps tendon sheath in the bicipital groove.       LABRUM: Mild blunting with signal irregularity throughout the labrum. Tearing of the labrum is noted anteroinferiorly, for example on series 2,   image 15, as well as along the chondrolabral junction posteroinferiorly, also   on series 2, image 15.       GLENOHUMERAL JOINT: Thickening the inferior glenohumeral ligaments at the   axillary recess with adjacent T2 signal, as well as infiltration of fat and   increased T2 signal within the rotator interval.  No joint effusion.  No   large, full-thickness chondral defect.       AC JOINT AND ACROMIOCLAVICULAR ARCH: Anatomically aligned.  No significant   degenerative changes.  Trace volume of fluid within the   subacromial/subdeltoid bursa.       BONE MARROW: No acute osseous abnormality or suspicious osseous lesion.       OUTLET SPACES: Supraspinatus and spinoglenoid notches and quadrilateral space   normal in appearance.  No mass effect. X-ray images were reviewed by myself and discussed with the patient:  No acute fracture or dislocation.  Mild osteoarthrosis of the glenohumeral   joint.  Acromioclavicular joint is unremarkable.  Small calcific density   noted adjacent to the greater tuberosity.  Soft tissues are otherwise   unremarkable. Office Procedures:  No orders of the defined types were placed in this encounter. Assessment and Plan    A: Right shoulder calcific tendinitis and tendinopathy, adhesive capsulitis    P:   I had a thorough conversation with the patient regarding her right shoulder physical exam findings and treatment course.   I explained that she is greatly improved from her last visit at

## 2022-06-15 NOTE — PROGRESS NOTES
Patient comes in today for a follow up s/p physical therapy for adhesive capsulitis. She was last seen in our office on 4/13/22. She rates her pain at 0/10 today. She reports that she is doing much better and physical therapy is really helping. She denies any new falls or injuries.

## 2022-07-19 NOTE — DISCHARGE SUMMARY
Outpatient Physical Therapy           [place]           [] Phone: [phone]   Fax: [phone]  [place]           [] Phone: [phone]   Fax: [phone]      To: Margarita Butt DO     From: Kingsley Aparicio, PT, DPT, OCS  Patient: Jane Orlando                    : 1955  Diagnosis:  Adhesive capsulitis of right shoulder [M75.01]        Treatment Diagnosis:   R UE pain, weakness, stiffness      Date: 2022  []  Progress Note                [x]  Discharge Note    Evaluation Date:  4/15/22   Total Visits to date:  12  Cancels/No-shows to date:  0    Subjective:  Per note on 6/10/22  Jagruti Garcia arrives to therapy stating that she feels good, no pain and really feels like she has made some improvements since starting PT. She no longer has the sharp/grabbing pains anymore and she feels she has more ROM. Plan of Care/Treatment to date:  [x] Therapeutic Exercise    [x] Modalities:  [x] Therapeutic Activity     [] Ultrasound  [] Electrical Stimulation  [] Gait Training      [] Cervical Traction   [] Lumbar Traction  [x] Neuromuscular Re-education  [x] Cold/hotpack [] Iontophoresis  [x] Instruction in HEP      Other:  [x] Manual Therapy       [x]  Vasopneumatic  [] Aquatic Therapy       []   Dry Needle Therapy                      Objective/Significant Findings At Last Visit/Comments:  Per note on 6/10/22    A(P)ROM  Flexion 131° (140°)  Abduction 95° (105°)  IR HBB to sacrum (60°)  ER HBH to C7, 35° with arm in neutral (65°)     MMT  Flexion 4/5  Abduction 4/5  IR 4/5  ER 4/5     Quick Dash - 36% disability      Assessment: Jagruti Garcia has completed 12 visit since start of therapy on 4/15/22. Demo functional improvement with use of dominant R UE but remains with  limited strength lifting and limited overhead reach. Jagruti Garcia has made good progress with expected improvement with continued home program. Pt has not returned in >30 days, will be discharged at this time.        Goal Status:  [x] Achieved [x] Partially Achieved  [] Not Achieved      Patient goals : improve pain to ease sleeping and ADLs. Short term goals  Time Frame for Short term goals: Defer to [address] term goals  Time Frame for Long term goals : 6 weeks 5/29/22  Long term goal 1: Pt will demo I with HEP/symptom management. Mostly MET   Long term goal 2: Pt will demo <50% disability per Quick DASH to demo improved functional use of R UE for ADLs. Partially MET   Long term goal 3: Pt will demo >25 deg improvement in R UE AROM to ease reaching. Mostly MET   Long term goal 4: Pt will demo >Fair (+) strength in neutral to demo improved ease with light lifting. Mostly MET   Long term goal 5: Pt will demo able to lay on shoulder without increase in pain to ease sleeping. Partially MET           Rehab Potential: [] Excellent [x] Good [] Fair  [] Poor         Patient Status: [] Continue per initial plan of Care     [x] Patient now discharged     [] Additional visits requested, Please re-certify for additional visits: If we are requesting more visits, we fully anticipate the patient's condition is expected to improve within the treatment timeframe we are requesting. Electronically signed by:  Maurice Odell PT, DPT, OCS  7/19/2022, 11:33 AM    7/19/2022 11:33 AM     If you have any questions or concerns, please don't hesitate to call.   Thank you for your referral.    Physician Signature:______________________ Date:______ Time: ________  By signing above, therapists plan is approved by physician I personally performed the service described in the documentation recorded by the scribe in my presence, and it accurately and completely records my words and actions.

## 2022-09-13 ENCOUNTER — HOSPITAL ENCOUNTER (OUTPATIENT)
Age: 67
Discharge: HOME | End: 2022-09-13
Payer: MEDICARE

## 2022-09-13 LAB
ALBUMIN SERPL-MCNC: 4.1 GM/DL (ref 3.4–5)
ALP BLD-CCNC: 99 IU/L (ref 40–129)
ALT SERPL-CCNC: 12 U/L (ref 10–40)
AST SERPL-CCNC: 15 IU/L (ref 15–37)
BILIRUB SERPL-MCNC: 0.4 MG/DL (ref 0–1)
BILIRUBIN DIRECT: 0.2 MG/DL (ref 0–0.3)
BILIRUBIN, INDIRECT: 0.2 MG/DL (ref 0–0.7)
TOTAL PROTEIN: 7.3 GM/DL (ref 6.4–8.2)

## 2022-09-13 PROCEDURE — 36415 COLL VENOUS BLD VENIPUNCTURE: CPT

## 2022-09-13 PROCEDURE — 80076 HEPATIC FUNCTION PANEL: CPT

## 2023-02-01 ENCOUNTER — HOSPITAL ENCOUNTER (OUTPATIENT)
Dept: WOMENS IMAGING | Age: 68
Discharge: HOME | End: 2023-02-01
Payer: MEDICARE

## 2023-02-01 DIAGNOSIS — M81.0 SENILE OSTEOPOROSIS: ICD-10-CM

## 2023-02-01 DIAGNOSIS — Z12.31 ENCOUNTER FOR SCREENING MAMMOGRAM FOR MALIGNANT NEOPLASM OF BREAST: ICD-10-CM

## 2023-02-01 PROCEDURE — 77080 DXA BONE DENSITY AXIAL: CPT

## 2023-02-01 PROCEDURE — 77067 SCR MAMMO BI INCL CAD: CPT

## 2023-09-15 ENCOUNTER — PROCEDURE VISIT (OUTPATIENT)
Dept: PHYSICAL MEDICINE AND REHAB | Age: 68
End: 2023-09-15

## 2023-09-15 DIAGNOSIS — M79.602 PARESTHESIA AND PAIN OF BOTH UPPER EXTREMITIES: ICD-10-CM

## 2023-09-15 DIAGNOSIS — M79.601 PARESTHESIA AND PAIN OF BOTH UPPER EXTREMITIES: ICD-10-CM

## 2023-09-15 DIAGNOSIS — G56.03 BILATERAL CARPAL TUNNEL SYNDROME: Primary | ICD-10-CM

## 2023-09-15 DIAGNOSIS — R20.2 PARESTHESIA AND PAIN OF BOTH UPPER EXTREMITIES: ICD-10-CM

## 2023-09-15 DIAGNOSIS — G56.21 ULNAR NEUROPATHY AT WRIST, RIGHT: ICD-10-CM

## 2023-09-15 NOTE — PROGRESS NOTES
EMG REPORT     CHIEF COMPLAINT: Bilateral hand pain with stiffness and burning. HISTORY OF PRESENT ILLNESS: 70 y.o. left hand dominant female with at least 6 months of more consistent wrist and hand pain with digit burning and stinging. She also complains of left more than right neck pain, left thigh pain and recent fusiform swelling in the right long finger. She described dropping things from her  because of her pains. Her sleep is negatively impacted. She rated the pain severity as 10/10 at times. She was working as a  at the time of her onset. Symptoms have persisted despite changing jobs. She has no history of diabetes or any thyroid disorder. PHYSICAL EXAMINATION: Alert. About 65 degrees of active cervical spine rotation bilaterally. Spurling's maneuver was negative. Tinel sign was negative. Upper limb reflexes were trace only. There was thenar flattening bilaterally. Thumb opposition MMT yielded giveaway with complaints of pain. Power  and digit abduction MMT was complicated by just very inconsistent effort on the patient's part. No proximal weakness was noted. NERVE CONDUCTION STUDIES:     MOTOR         LATENCY NORMAL AMPLITUDE DISTANCE COND. PHUC. RIGHT  MEDIAN 4.8 < 4.2 msec 4.7 8 cm 54   LEFT  MEDIAN 4.5 < 4.2 msec 2.1 8 cm 49   RIGHT  ULNAR 3.4 < 4.2 msec 5.7 8 cm 63   LEFT  ULNAR 2.6 < 4.2 msec 6.9 8 cm >50      SENSORY  ORTHODROMIC        LATENCY NORMAL AMPLITUDE DISTANCE   RIGHT MEDIAN 3.4 <2.3 msec 14 10 cm   LEFT  MEDIAN 2.9 < 2.3 msec 10 10 cm   RIGHT  ULNAR 2.2 < 2.3 msec 9 10 cm   LEFT  ULNAR 2.5 < 2.3 msec 9 10 cm       Right dorsal ulnar sensory: dL 3.0 msec, amp 18 microV.         NEEDLE EMG:      RIGHT   LEFT     Insertional Activity Spontaneous  Activity Volutional  MUAP's Insertional Activity Spontaneous  Activity Volutional  MUAP's   Cerv Parasp Normal None Normal Normal None Normal   Infraspinatus Normal None Normal Normal None Normal   Deltoid

## 2024-03-16 NOTE — PROGRESS NOTES
RHEUMATOLOGY NEW PATIENT VISIT    3/18/2024      Patient Name: Radha Gomez  : 1955  Medical Record: [id number]      CHIEF COMPLAINT    Seropositive rheumatoid arthritis, CCP +, RF +  Osteoporosis  Low back pain     Pertinent Problems  Carpal tunnel syndrome s/p release surgery  Left achilles tendonitis   Active tobacco use    HISTORY OF PRESENT ILLNESS    Radha Gomez is a 68 y.o. female who was referred by Donavan Jauregui. Symptom onset began in 10/ 2022 in bilateral neck, shoulders, knees pain. There was profound neck stiffness affecting sleep, she also had entire leg pain, there was feet and heel pain. PCP started prednisone 10mg bid since 3/2023 that caused weight gain. Currently she is taking prednisone 10mg daily.      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 History of  blood clots, dactylitis, uveitis, enthesitis, psoriasis, buttock pain, change in BM    Risk factors: Active smoking +, social etoh +, + obesity, no recreational drug use, no family hx of RA     Current rheum meds: Prednisone 10mg daily    Past rheum meds:          No data to display                    REVIEW OF SYSTEMS     Constitutional:  Denies fever or chills, decreased appetite, or weight loss   Eyes:  Denies change in visual acuity or eye dryness or irritation  HENT:  Denies dry mouth or oral ulcers  Respiratory:  Chronic cough +   Cardiovascular:  Denies chest pain or edema

## 2024-03-18 ENCOUNTER — OFFICE VISIT (OUTPATIENT)
Dept: RHEUMATOLOGY | Age: 69
End: 2024-03-18
Payer: MEDICARE

## 2024-03-18 ENCOUNTER — HOSPITAL ENCOUNTER (OUTPATIENT)
Dept: GENERAL RADIOLOGY | Age: 69
Discharge: HOME | End: 2024-03-18
Payer: MEDICARE

## 2024-03-18 ENCOUNTER — HOSPITAL ENCOUNTER (OUTPATIENT)
Age: 69
Discharge: HOME | End: 2024-03-18
Payer: MEDICARE

## 2024-03-18 VITALS
BODY MASS INDEX: 26.07 KG/M2 | HEART RATE: 82 BPM | SYSTOLIC BLOOD PRESSURE: 125 MMHG | DIASTOLIC BLOOD PRESSURE: 80 MMHG | WEIGHT: 164 LBS

## 2024-03-18 DIAGNOSIS — M05.79 RHEUMATOID ARTHRITIS INVOLVING MULTIPLE SITES WITH POSITIVE RHEUMATOID FACTOR (HCC): Primary | ICD-10-CM

## 2024-03-18 DIAGNOSIS — Z01.89 ENCOUNTER FOR OTHER SPECIFIED SPECIAL EXAMINATIONS: ICD-10-CM

## 2024-03-18 DIAGNOSIS — M85.89 OSTEOPENIA OF MULTIPLE SITES: ICD-10-CM

## 2024-03-18 DIAGNOSIS — M05.79 RHEUMATOID ARTHRITIS INVOLVING MULTIPLE SITES WITH POSITIVE RHEUMATOID FACTOR (HCC): ICD-10-CM

## 2024-03-18 DIAGNOSIS — Z71.6 ENCOUNTER FOR TOBACCO USE CESSATION COUNSELING: ICD-10-CM

## 2024-03-18 DIAGNOSIS — Z79.52 CURRENT CHRONIC USE OF SYSTEMIC STEROIDS: ICD-10-CM

## 2024-03-18 DIAGNOSIS — R93.89 ABNORMAL CHEST X-RAY: ICD-10-CM

## 2024-03-18 DIAGNOSIS — Z79.899 HIGH RISK MEDICATION USE: ICD-10-CM

## 2024-03-18 LAB
25(OH)D3 SERPL-MCNC: 37.63 NG/ML
ALBUMIN SERPL-MCNC: 4.1 GM/DL (ref 3.4–5)
ALBUMIN SERPL-MCNC: 4.1 GM/DL (ref 3.4–5)
ALP BLD-CCNC: 93 IU/L (ref 40–129)
ALT SERPL-CCNC: 17 U/L (ref 10–40)
ANION GAP SERPL CALCULATED.3IONS-SCNC: 13 MMOL/L (ref 7–16)
AST SERPL-CCNC: 16 IU/L (ref 15–37)
BILIRUB SERPL-MCNC: 0.2 MG/DL (ref 0–1)
BILIRUBIN DIRECT: 0.2 MG/DL (ref 0–0.3)
BILIRUBIN, INDIRECT: 0 MG/DL (ref 0–0.7)
BUN SERPL-MCNC: 16 MG/DL (ref 6–23)
CALCIUM SERPL-MCNC: 10.3 MG/DL (ref 8.3–10.6)
CHLORIDE BLD-SCNC: 103 MMOL/L (ref 99–110)
CO2: 24 MMOL/L (ref 21–32)
CREAT SERPL-MCNC: 0.9 MG/DL (ref 0.6–1.1)
CRP SERPL HS-MCNC: 6.2 MG/L
ERYTHROCYTE SEDIMENTATION RATE: 15 MM/HR (ref 0–30)
GFR SERPL CREATININE-BSD FRML MDRD: >60 ML/MIN/1.73M2
GLUCOSE SERPL-MCNC: 95 MG/DL (ref 70–99)
HAV IGM SERPL QL IA: NON REACTIVE
HBV CORE IGM SERPL QL IA: NON REACTIVE
HBV SURFACE AG SERPL QL IA: NON REACTIVE
HCV AB SERPL QL IA: NON REACTIVE
PHOSPHORUS: 3.6 MG/DL (ref 2.5–4.9)
POTASSIUM SERPL-SCNC: 4.2 MMOL/L (ref 3.5–5.1)
SODIUM BLD-SCNC: 140 MMOL/L (ref 135–145)
TOTAL PROTEIN: 8.2 GM/DL (ref 6.4–8.2)
URATE SERPL-MCNC: 6 MG/DL (ref 2.6–6)

## 2024-03-18 PROCEDURE — 86480 TB TEST CELL IMMUN MEASURE: CPT

## 2024-03-18 PROCEDURE — 82248 BILIRUBIN DIRECT: CPT

## 2024-03-18 PROCEDURE — 1123F ACP DISCUSS/DSCN MKR DOCD: CPT | Performed by: STUDENT IN AN ORGANIZED HEALTH CARE EDUCATION/TRAINING PROGRAM

## 2024-03-18 PROCEDURE — 80053 COMPREHEN METABOLIC PANEL: CPT

## 2024-03-18 PROCEDURE — 84550 ASSAY OF BLOOD/URIC ACID: CPT

## 2024-03-18 PROCEDURE — 86140 C-REACTIVE PROTEIN: CPT

## 2024-03-18 PROCEDURE — 85027 COMPLETE CBC AUTOMATED: CPT

## 2024-03-18 PROCEDURE — 85652 RBC SED RATE AUTOMATED: CPT

## 2024-03-18 PROCEDURE — 82306 VITAMIN D 25 HYDROXY: CPT

## 2024-03-18 PROCEDURE — 84100 ASSAY OF PHOSPHORUS: CPT

## 2024-03-18 PROCEDURE — 99205 OFFICE O/P NEW HI 60 MIN: CPT | Performed by: STUDENT IN AN ORGANIZED HEALTH CARE EDUCATION/TRAINING PROGRAM

## 2024-03-18 PROCEDURE — 3079F DIAST BP 80-89 MM HG: CPT | Performed by: STUDENT IN AN ORGANIZED HEALTH CARE EDUCATION/TRAINING PROGRAM

## 2024-03-18 PROCEDURE — 3074F SYST BP LT 130 MM HG: CPT | Performed by: STUDENT IN AN ORGANIZED HEALTH CARE EDUCATION/TRAINING PROGRAM

## 2024-03-18 PROCEDURE — 80074 ACUTE HEPATITIS PANEL: CPT

## 2024-03-18 PROCEDURE — 36415 COLL VENOUS BLD VENIPUNCTURE: CPT

## 2024-03-18 PROCEDURE — 71046 X-RAY EXAM CHEST 2 VIEWS: CPT

## 2024-03-18 RX ORDER — PREDNISONE 5 MG/1
10 TABLET ORAL DAILY
COMMUNITY
Start: 2024-02-25

## 2024-03-18 NOTE — PATIENT INSTRUCTIONS
Complete ordered labs and get CXR done  Reduce prednisone to 1 tab daily for 2 weeks then reduce to 1 tab every other day.   I plan to start methotrexate after I review your labs  We will discuss results at next visit  RTC in 11 weeks

## 2024-03-19 LAB
HCT VFR BLD CALC: 51.7 % (ref 37–47)
HEMOGLOBIN: 16 GM/DL (ref 12.5–16)
MCH RBC QN AUTO: 29 PG (ref 27–31)
MCHC RBC AUTO-ENTMCNC: 30.9 % (ref 32–36)
MCV RBC AUTO: 93.8 FL (ref 78–100)
PDW BLD-RTO: 15.2 % (ref 11.7–14.9)
PLATELET # BLD: 272 K/CU MM (ref 140–440)
PMV BLD AUTO: 11.6 FL (ref 7.5–11.1)
RBC # BLD: 5.51 M/CU MM (ref 4.2–5.4)
WBC # BLD: 11.6 K/CU MM (ref 4–10.5)

## 2024-03-23 LAB
QUANTI TB1 MINUS NIL: 0 IU/ML (ref 0–0.34)
QUANTI TB2 MINUS NIL: 0 IU/ML (ref 0–0.34)
QUANTIFERON (R) TB GOLD (INCUBATED): NEGATIVE IU/ML
QUANTIFERON MITOGEN MINUS NIL: >10 IU/ML
QUANTIFERON NIL: 0.01 IU/ML

## 2024-03-24 DIAGNOSIS — M06.4 INFLAMMATORY POLYARTHRITIS (HCC): Primary | ICD-10-CM

## 2024-03-24 RX ORDER — FOLIC ACID 1 MG/1
1 TABLET ORAL DAILY
Qty: 90 TABLET | Refills: 1 | Status: SHIPPED | OUTPATIENT
Start: 2024-03-24

## 2024-03-24 RX ORDER — METHOTREXATE 2.5 MG/1
15 TABLET ORAL WEEKLY
Qty: 24 TABLET | Refills: 2 | Status: SHIPPED | OUTPATIENT
Start: 2024-03-24

## 2024-03-25 ENCOUNTER — HOSPITAL ENCOUNTER (OUTPATIENT)
Dept: CT IMAGING | Age: 69
Discharge: HOME | End: 2024-03-25
Payer: MEDICARE

## 2024-03-25 DIAGNOSIS — R93.89 ABNORMAL CHEST X-RAY: ICD-10-CM

## 2024-03-25 PROCEDURE — 71250 CT THORAX DX C-: CPT

## 2024-04-04 ENCOUNTER — HOSPITAL ENCOUNTER (OUTPATIENT)
Dept: PET IMAGING | Age: 69
Discharge: HOME | End: 2024-04-04
Payer: MEDICARE

## 2024-04-04 DIAGNOSIS — R93.89 ABNORMAL CHEST CT: ICD-10-CM

## 2024-04-04 PROCEDURE — 2580000003 HC RX 258: Performed by: STUDENT IN AN ORGANIZED HEALTH CARE EDUCATION/TRAINING PROGRAM

## 2024-04-04 PROCEDURE — A9609 HC RX DIAGNOSTIC RADIOPHARMACEUTICAL: HCPCS | Performed by: STUDENT IN AN ORGANIZED HEALTH CARE EDUCATION/TRAINING PROGRAM

## 2024-04-04 PROCEDURE — 78815 PET IMAGE W/CT SKULL-THIGH: CPT

## 2024-04-04 PROCEDURE — 3430000000 HC RX DIAGNOSTIC RADIOPHARMACEUTICAL: Performed by: STUDENT IN AN ORGANIZED HEALTH CARE EDUCATION/TRAINING PROGRAM

## 2024-04-04 RX ORDER — SODIUM CHLORIDE 0.9 % (FLUSH) 0.9 %
10 SYRINGE (ML) INJECTION PRN
Status: COMPLETED | OUTPATIENT
Start: 2024-04-04 | End: 2024-04-04

## 2024-04-04 RX ORDER — FLUDEOXYGLUCOSE F 18 200 MCI/ML
16.04 INJECTION, SOLUTION INTRAVENOUS
Status: COMPLETED | OUTPATIENT
Start: 2024-04-04 | End: 2024-04-04

## 2024-04-04 RX ADMIN — FLUDEOXYGLUCOSE F 18 16.04 MILLICURIE: 200 INJECTION, SOLUTION INTRAVENOUS at 08:12

## 2024-04-04 RX ADMIN — SODIUM CHLORIDE, PRESERVATIVE FREE 10 ML: 5 INJECTION INTRAVENOUS at 08:12

## 2024-04-14 NOTE — PROGRESS NOTES
RHEUMATOLOGY FOLLOW-UP VISIT    2024      Patient Name: Radha Gomez  : 1955  Medical Record: [id number]      CHIEF COMPLAINT    Seropositive rheumatoid arthritis, CCP +, RF +  Osteoporosis  Low back pain     Pertinent Problems  Carpal tunnel syndrome s/p release surgery  Left achilles tendonitis   Active tobacco use    HISTORY OF PRESENT ILLNESS    Radha Gomez is a 68 y.o. female who established on 3/18/2024 symptom onset began in 10/ 2022 in bilateral neck, shoulders, knees pain. There was profound neck stiffness affecting sleep, she also had entire leg pain, there was feet and heel pain. PCP started prednisone 10mg bid since 3/2023 that caused weight gain. Currently she is taking prednisone 10mg daily.      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   She quit smoking and alcohol       Risk factors: Active smoking +, social etoh +, + obesity, no recreational drug use, no family hx of RA     Current rheum meds: Prednisone  5mg daily    Past rheum meds:          No data to display                    REVIEW OF SYSTEMS     Constitutional:  Denies fever or chills, decreased appetite, or weight loss   Eyes:  Denies change in visual acuity or eye dryness or irritation  HENT:  Denies dry mouth or oral ulcers  Respiratory:  Chronic cough +   Cardiovascular:  Denies chest pain or edema   GI:  Denies abdominal pain, nausea, vomiting, bloody stools or diarrhea   :  Denies dysuria or hematuria  Musculoskeletal:  See HPI  Integument:  Denies rash

## 2024-04-18 ENCOUNTER — OFFICE VISIT (OUTPATIENT)
Dept: RHEUMATOLOGY | Age: 69
End: 2024-04-18
Payer: MEDICARE

## 2024-04-18 VITALS
DIASTOLIC BLOOD PRESSURE: 76 MMHG | OXYGEN SATURATION: 80 % | RESPIRATION RATE: 80 BRPM | BODY MASS INDEX: 25.78 KG/M2 | SYSTOLIC BLOOD PRESSURE: 138 MMHG | WEIGHT: 162.13 LBS

## 2024-04-18 DIAGNOSIS — Z71.6 ENCOUNTER FOR TOBACCO USE CESSATION COUNSELING: ICD-10-CM

## 2024-04-18 DIAGNOSIS — Z51.81 ENCOUNTER FOR MONITORING OF HYDROXYCHLOROQUINE THERAPY: ICD-10-CM

## 2024-04-18 DIAGNOSIS — M85.89 OSTEOPENIA OF MULTIPLE SITES: ICD-10-CM

## 2024-04-18 DIAGNOSIS — M05.79 RHEUMATOID ARTHRITIS INVOLVING MULTIPLE SITES WITH POSITIVE RHEUMATOID FACTOR (HCC): Primary | ICD-10-CM

## 2024-04-18 DIAGNOSIS — Z79.899 ENCOUNTER FOR MONITORING OF HYDROXYCHLOROQUINE THERAPY: ICD-10-CM

## 2024-04-18 DIAGNOSIS — K74.60 CIRRHOSIS OF LIVER WITHOUT ASCITES, UNSPECIFIED HEPATIC CIRRHOSIS TYPE (HCC): ICD-10-CM

## 2024-04-18 DIAGNOSIS — Z79.52 CURRENT CHRONIC USE OF SYSTEMIC STEROIDS: ICD-10-CM

## 2024-04-18 PROCEDURE — 1123F ACP DISCUSS/DSCN MKR DOCD: CPT | Performed by: STUDENT IN AN ORGANIZED HEALTH CARE EDUCATION/TRAINING PROGRAM

## 2024-04-18 PROCEDURE — 3075F SYST BP GE 130 - 139MM HG: CPT | Performed by: STUDENT IN AN ORGANIZED HEALTH CARE EDUCATION/TRAINING PROGRAM

## 2024-04-18 PROCEDURE — 99215 OFFICE O/P EST HI 40 MIN: CPT | Performed by: STUDENT IN AN ORGANIZED HEALTH CARE EDUCATION/TRAINING PROGRAM

## 2024-04-18 PROCEDURE — 3078F DIAST BP <80 MM HG: CPT | Performed by: STUDENT IN AN ORGANIZED HEALTH CARE EDUCATION/TRAINING PROGRAM

## 2024-04-18 RX ORDER — HYDROXYCHLOROQUINE SULFATE 200 MG/1
300 TABLET, FILM COATED ORAL DAILY
Qty: 135 TABLET | Refills: 0 | Status: SHIPPED | OUTPATIENT
Start: 2024-04-18

## 2024-04-18 NOTE — PATIENT INSTRUCTIONS
Continue prednisone 5mg daily for 2 weeks, then reduce to every other day then stop   Start Plaquenil 1.5 tabs daily   Schedule with Dr Briseno ( gastroenterology)   Get labs one week prior to your next appointment  RTC in 6/3/2023        Lab Locations:    [facility]  [address] [place] [address]    To Schedule DEXA Bone Scan, CT or MRI  Call at Central [phone]

## 2024-05-06 ENCOUNTER — TELEPHONE (OUTPATIENT)
Dept: RHEUMATOLOGY | Age: 69
End: 2024-05-06

## 2024-05-06 NOTE — TELEPHONE ENCOUNTER
Pt states that she is still taking Plaquenil. Pt also states she increased her prednisone to 10 mg in hopes to help out with her pain.Pt reports onset pain increase x 2 weeks ago. Pt describes the pain as sharp, 8/10, neck, bilateral elbows, bilateral hands, Pt states she is taking OTC Aleve PM two tablets without any change to her pain level.Pt also reports swelling bilateral feet and ankles, Pt states soaking in epsom salts isn't helping . Please advise

## 2024-05-08 ENCOUNTER — OFFICE VISIT (OUTPATIENT)
Dept: RHEUMATOLOGY | Age: 69
End: 2024-05-08
Payer: MEDICARE

## 2024-05-08 VITALS
BODY MASS INDEX: 25.6 KG/M2 | WEIGHT: 161 LBS | OXYGEN SATURATION: 91 % | SYSTOLIC BLOOD PRESSURE: 100 MMHG | DIASTOLIC BLOOD PRESSURE: 75 MMHG | HEART RATE: 82 BPM

## 2024-05-08 DIAGNOSIS — Z71.6 ENCOUNTER FOR TOBACCO USE CESSATION COUNSELING: ICD-10-CM

## 2024-05-08 DIAGNOSIS — M85.89 OSTEOPENIA OF MULTIPLE SITES: ICD-10-CM

## 2024-05-08 DIAGNOSIS — Z79.899 ENCOUNTER FOR MONITORING OF HYDROXYCHLOROQUINE THERAPY: ICD-10-CM

## 2024-05-08 DIAGNOSIS — Z79.52 CURRENT CHRONIC USE OF SYSTEMIC STEROIDS: ICD-10-CM

## 2024-05-08 DIAGNOSIS — M05.79 RHEUMATOID ARTHRITIS INVOLVING MULTIPLE SITES WITH POSITIVE RHEUMATOID FACTOR (HCC): Primary | ICD-10-CM

## 2024-05-08 DIAGNOSIS — Z51.81 ENCOUNTER FOR MONITORING OF HYDROXYCHLOROQUINE THERAPY: ICD-10-CM

## 2024-05-08 DIAGNOSIS — Z79.899 HIGH RISK MEDICATION USE: ICD-10-CM

## 2024-05-08 PROCEDURE — 3078F DIAST BP <80 MM HG: CPT | Performed by: STUDENT IN AN ORGANIZED HEALTH CARE EDUCATION/TRAINING PROGRAM

## 2024-05-08 PROCEDURE — 99214 OFFICE O/P EST MOD 30 MIN: CPT | Performed by: STUDENT IN AN ORGANIZED HEALTH CARE EDUCATION/TRAINING PROGRAM

## 2024-05-08 PROCEDURE — 3074F SYST BP LT 130 MM HG: CPT | Performed by: STUDENT IN AN ORGANIZED HEALTH CARE EDUCATION/TRAINING PROGRAM

## 2024-05-08 PROCEDURE — 1123F ACP DISCUSS/DSCN MKR DOCD: CPT | Performed by: STUDENT IN AN ORGANIZED HEALTH CARE EDUCATION/TRAINING PROGRAM

## 2024-05-08 RX ORDER — HYDROXYCHLOROQUINE SULFATE 200 MG/1
300 TABLET, FILM COATED ORAL DAILY
Qty: 135 TABLET | Refills: 0 | Status: SHIPPED | OUTPATIENT
Start: 2024-05-08

## 2024-05-08 NOTE — PATIENT INSTRUCTIONS
Okay to continue prednisone 10 mg daily   Continue Plaquenil 1.5 tabs daily   Follow-up with Dr Briseno ( gastroenterology)   Return to clinic on 6/3/24  Get labs 1 week before your next visit.

## 2024-05-08 NOTE — PROGRESS NOTES
Encounters:   04/18/24 138/76   03/18/24 125/80   06/15/22 120/69     Pulse Readings from Last 3 Encounters:   03/18/24 82   06/15/22 73   04/13/22 72       General appearance:  Alert and oriented, NAD, well developed   HEENT: EOMI, no scleral injection, moist mucous membranes, no oral ulcers, normal hearing, no cartilage inflammation  Neck: Trachea midline, no masses  Lymph: no LAD  Lungs: CTAB, no rales  Heart: regular rate and rhythm, S1, S2 normal, no murmur, no lower extremity edema  Abdomen: Soft, ND, NT. + BS.  Extremities: atraumatic, no cyanosis or edema.   Neurologic: CN 2-12 grossly intact.   Skin: No active rashes, warm and dry, no telangiectasias, no digital pitting, no sclerodactyly, no rheumatoid nodules, no livedo  MSK:   HANDS: right 3rd and 5th PIP synovitis, left 2nd PIP synovitis  Elbow: No synovitis, good ROM,   Shoulder:good ROM,   Knee: no effusion, good ROM,   Ankle:right ankle swelling +   FEET: neg forefoot squeeze test    Spine:  Normal range of motion; no tender points, no obvious deformities.    Neuro:  Alert & oriented x 3  Muscle strength: 4/4 in bilateral upper and lower extremities.    Psychiatric: Mood and affect are appropriate, recent and remote memory normal,      LABS AND IMAGING  Available labs were reviewed and discussed with patient    H  RF >650 H  JOSE ROBERTO negative   H>>33H   CRP 2.8H  WBC 6.5 normal  Hemoglobin 14.6 normal  Platelet 243 normal    Hospital Outpatient Visit on 03/18/2024   Component Date Value Ref Range Status    Quantiferon(r) TB Gold (Incubated) 03/18/2024 Negative  Negative IU/ML Final    Quantiferon TB1 Minus NIL 03/18/2024 0.00  0.00 - 0.34 IU/mL Final    Quantiferon TB2 Minus NIL 03/18/2024 0.00  0.00 - 0.34 IU/mL Final    QuantiFERON-TB minus NIL 03/18/2024 >10.00  IU/mL Final    QuantiFERON Nil 03/18/2024 0.01  IU/mL Final    Uric Acid 03/18/2024 6.0  2.6 - 6.0 MG/DL Final    Hepatitis B Surface Ag 03/18/2024 NON REACTIVE  NON REACTIVE Final

## 2024-05-16 ENCOUNTER — OFFICE VISIT (OUTPATIENT)
Dept: GASTROENTEROLOGY | Age: 69
End: 2024-05-16
Payer: MEDICARE

## 2024-05-16 VITALS
HEIGHT: 66 IN | SYSTOLIC BLOOD PRESSURE: 140 MMHG | WEIGHT: 160 LBS | HEART RATE: 83 BPM | DIASTOLIC BLOOD PRESSURE: 70 MMHG | BODY MASS INDEX: 25.71 KG/M2

## 2024-05-16 DIAGNOSIS — K74.60 CIRRHOSIS OF LIVER WITHOUT ASCITES, UNSPECIFIED HEPATIC CIRRHOSIS TYPE (HCC): Primary | ICD-10-CM

## 2024-05-16 PROCEDURE — 1123F ACP DISCUSS/DSCN MKR DOCD: CPT | Performed by: NURSE PRACTITIONER

## 2024-05-16 PROCEDURE — 3078F DIAST BP <80 MM HG: CPT | Performed by: NURSE PRACTITIONER

## 2024-05-16 PROCEDURE — 3077F SYST BP >= 140 MM HG: CPT | Performed by: NURSE PRACTITIONER

## 2024-05-16 PROCEDURE — 99203 OFFICE O/P NEW LOW 30 MIN: CPT | Performed by: NURSE PRACTITIONER

## 2024-05-16 ASSESSMENT — ENCOUNTER SYMPTOMS
DIARRHEA: 0
PHOTOPHOBIA: 0
CONSTIPATION: 0
NAUSEA: 0
ABDOMINAL PAIN: 0
BACK PAIN: 0
SHORTNESS OF BREATH: 0
COUGH: 0
VOMITING: 0
WHEEZING: 0

## 2024-05-16 NOTE — PROGRESS NOTES
Radha Gomez 68 y.o. female was seen by DURAN Lee on 5/16/2024     Wt Readings from Last 3 Encounters:   05/16/24 72.6 kg (160 lb)   05/08/24 73 kg (161 lb)   04/18/24 73.5 kg (162 lb 2 oz)       HPI  Radha Gomez is a pleasant 68 y.o.  female who presents today for possible cirrhosis.  She has a past medical history of heart abnormality, hypertension, and pap smear for cervical cancer screening.  She reported not being told she had cirrhosis just a \"bumpy liver\".  She drinks alcohol socially a beer or two three times a week.  Hard alcohol on weekends shot or two Friday or Saturday.  She has stopped drinking alcohol since pet scan.   Her CT of the abdomen/pelvis done on 10-5-2014 showed nodular appearing liver consistent with cirrhosis.  Her Pet CT skull to mid thigh done on 4-4-2024 no hypermetabolic uptake within the right middle lobe 11 mm nodule, mildly hypermetabolic nonenlarged right inferior hilar lymph node nonspecific but presumed reactive, and lobular liver contour raising the possibility of cirrhosis.  No jaundice, no prurtius, no lower extremity swelling, and no increase in abdominal girth.  She mentioned her older brother had a liver transplant for cirrhosis five years ago due to alcohol abuse.  Her last colonscopy was done by Dr. Gomez on .  She takes Aleve and Naproxen two tablets a couple times a week.  She has rheumatoid arthritis managed by Dr. Randolph started on Plaquenil.  Her lab work done on 3- showed Platelets 272, Total Bilirubin 0.2, Alkaline Phosphatase  93, AST 16, and ALT 17.  Her appetite is good without early satiety.  Her weight is stable.  No nausea or vomiting.  No abdominal pain, bloating or distention.  No heartburn or acid reflux.  No nocturnal awakenings with acid reflux.  No dysphagia or pain with swallowing.  No excess belching or flatulence.  Her typical bowel pattern is daily with soft brown formed stools to loose stools.  No

## 2024-05-27 NOTE — PROGRESS NOTES
RHEUMATOLOGY FOLLOW-UP VISIT    6/3/2024      Patient Name: Radha Gomez  : 1955  Medical Record: [id number]      CHIEF COMPLAINT    Seropositive rheumatoid arthritis, CCP +, RF +  Osteoporosis  Low back pain     Pertinent Problems  Carpal tunnel syndrome s/p release surgery  Left achilles tendonitis   Active tobacco use    HISTORY OF PRESENT ILLNESS    Radha Gomez is a 68 y.o. female who established on 3/18/2024 symptom onset began in 10/ 2022 in bilateral neck, shoulders, knees pain. There was profound neck stiffness affecting sleep, she also had entire leg pain, there was feet and heel pain. PCP started prednisone 10mg bid since 3/2023 that caused weight gain. Currently she is taking prednisone 10mg daily.      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 Plaquenil 1.5 mg daily     Past rheum meds:          No data to display                    REVIEW OF SYSTEMS     Constitutional:  Denies fever or chills, decreased appetite, or weight loss   Eyes:  Denies change in visual acuity or eye dryness or irritation  HENT:  Denies dry mouth or oral ulcers  Respiratory:  Chronic cough +   Cardiovascular:  Denies chest pain or edema   GI:  Denies abdominal

## 2024-05-28 ENCOUNTER — HOSPITAL ENCOUNTER (OUTPATIENT)
Age: 69
Discharge: HOME | End: 2024-05-28
Payer: MEDICARE

## 2024-05-28 DIAGNOSIS — M05.79 RHEUMATOID ARTHRITIS INVOLVING MULTIPLE SITES WITH POSITIVE RHEUMATOID FACTOR (HCC): ICD-10-CM

## 2024-05-28 DIAGNOSIS — K74.60 CIRRHOSIS OF LIVER WITHOUT ASCITES, UNSPECIFIED HEPATIC CIRRHOSIS TYPE (HCC): ICD-10-CM

## 2024-05-28 LAB
ALBUMIN SERPL-MCNC: 4.2 GM/DL (ref 3.4–5)
ALP BLD-CCNC: 80 IU/L (ref 40–128)
ALT SERPL-CCNC: 21 U/L (ref 10–40)
ANION GAP SERPL CALCULATED.3IONS-SCNC: 15 MMOL/L (ref 7–16)
AST SERPL-CCNC: 16 IU/L (ref 15–37)
BASOPHILS ABSOLUTE: 0.1 K/CU MM
BASOPHILS RELATIVE PERCENT: 1.1 % (ref 0–1)
BILIRUB SERPL-MCNC: 0.2 MG/DL (ref 0–1)
BUN SERPL-MCNC: 17 MG/DL (ref 6–23)
CALCIUM SERPL-MCNC: 9.6 MG/DL (ref 8.3–10.6)
CHLORIDE BLD-SCNC: 108 MMOL/L (ref 99–110)
CO2: 20 MMOL/L (ref 21–32)
CREAT SERPL-MCNC: 0.9 MG/DL (ref 0.6–1.1)
CRP SERPL HS-MCNC: 25.9 MG/L
DIFFERENTIAL TYPE: ABNORMAL
EOSINOPHILS ABSOLUTE: 0.2 K/CU MM
EOSINOPHILS RELATIVE PERCENT: 1.5 % (ref 0–3)
FERRITIN: 618 NG/ML (ref 15–150)
GAMMA GLUTAMYL TRANSFERASE: 85 IU/L (ref 5–36)
GFR, ESTIMATED: 69 ML/MIN/1.73M2
GLUCOSE SERPL-MCNC: 81 MG/DL (ref 70–99)
HAV IGM SERPL QL IA: NON REACTIVE
HBV SURFACE AB SERPL IA-ACNC: <3.5 M[IU]/ML
HBV SURFACE AG SERPL QL IA: NON REACTIVE
HCT VFR BLD CALC: 43.7 % (ref 37–47)
HCV AB SERPL QL IA: NON REACTIVE
HEMOGLOBIN: 13.6 GM/DL (ref 12.5–16)
IMMATURE NEUTROPHIL %: 0.3 % (ref 0–0.43)
INR BLD: 1.3 INDEX
IRON: 61 UG/DL (ref 37–145)
LYMPHOCYTES ABSOLUTE: 4.2 K/CU MM
LYMPHOCYTES RELATIVE PERCENT: 39.4 % (ref 24–44)
MCH RBC QN AUTO: 29.6 PG (ref 27–31)
MCHC RBC AUTO-ENTMCNC: 31.1 % (ref 32–36)
MCV RBC AUTO: 95 FL (ref 78–100)
MONOCYTES ABSOLUTE: 0.9 K/CU MM
MONOCYTES RELATIVE PERCENT: 8.2 % (ref 0–4)
NEUTROPHILS ABSOLUTE: 5.3 K/CU MM
NEUTROPHILS RELATIVE PERCENT: 49.5 % (ref 36–66)
NUCLEATED RBC %: 0 %
PCT TRANSFERRIN: 27 % (ref 10–44)
PDW BLD-RTO: 12.7 % (ref 11.7–14.9)
PLATELET # BLD: 200 K/CU MM (ref 140–440)
PMV BLD AUTO: 11.2 FL (ref 7.5–11.1)
POTASSIUM SERPL-SCNC: 3.9 MMOL/L (ref 3.5–5.1)
PROTHROMBIN TIME: 16.4 SECONDS (ref 11.7–14.5)
RBC # BLD: 4.6 M/CU MM (ref 4.2–5.4)
SODIUM BLD-SCNC: 143 MMOL/L (ref 135–145)
TOTAL IMMATURE NEUTOROPHIL: 0.03 K/CU MM
TOTAL IRON BINDING CAPACITY: 224 UG/DL (ref 250–450)
TOTAL NUCLEATED RBC: 0 K/CU MM
TOTAL PROTEIN: 7.9 GM/DL (ref 6.4–8.2)
UNSATURATED IRON BINDING CAPACITY: 163 UG/DL (ref 110–370)
WBC # BLD: 10.8 K/CU MM (ref 4–10.5)

## 2024-05-28 PROCEDURE — 85025 COMPLETE CBC W/AUTO DIFF WBC: CPT

## 2024-05-28 PROCEDURE — 86376 MICROSOMAL ANTIBODY EACH: CPT

## 2024-05-28 PROCEDURE — 82977 ASSAY OF GGT: CPT

## 2024-05-28 PROCEDURE — 80053 COMPREHEN METABOLIC PANEL: CPT

## 2024-05-28 PROCEDURE — 85610 PROTHROMBIN TIME: CPT

## 2024-05-28 PROCEDURE — 87340 HEPATITIS B SURFACE AG IA: CPT

## 2024-05-28 PROCEDURE — 86256 FLUORESCENT ANTIBODY TITER: CPT

## 2024-05-28 PROCEDURE — 86694 HERPES SIMPLEX NES ANTBDY: CPT

## 2024-05-28 PROCEDURE — 86706 HEP B SURFACE ANTIBODY: CPT

## 2024-05-28 PROCEDURE — 82390 ASSAY OF CERULOPLASMIN: CPT

## 2024-05-28 PROCEDURE — 83550 IRON BINDING TEST: CPT

## 2024-05-28 PROCEDURE — 86038 ANTINUCLEAR ANTIBODIES: CPT

## 2024-05-28 PROCEDURE — 82105 ALPHA-FETOPROTEIN SERUM: CPT

## 2024-05-28 PROCEDURE — 86803 HEPATITIS C AB TEST: CPT

## 2024-05-28 PROCEDURE — 83540 ASSAY OF IRON: CPT

## 2024-05-28 PROCEDURE — 82104 ALPHA-1-ANTITRYPSIN PHENO: CPT

## 2024-05-28 PROCEDURE — 86709 HEPATITIS A IGM ANTIBODY: CPT

## 2024-05-28 PROCEDURE — 82728 ASSAY OF FERRITIN: CPT

## 2024-05-28 PROCEDURE — 86708 HEPATITIS A ANTIBODY: CPT

## 2024-05-28 PROCEDURE — 86140 C-REACTIVE PROTEIN: CPT

## 2024-05-28 PROCEDURE — 36415 COLL VENOUS BLD VENIPUNCTURE: CPT

## 2024-05-28 PROCEDURE — 83516 IMMUNOASSAY NONANTIBODY: CPT

## 2024-05-29 LAB
AFP-TM SERPL-MCNC: 3 NG/ML (ref 0–9)
CERULOPLASMIN SERPL-MCNC: 41 MG/DL (ref 16–45)
HAV AB SER QL IA: NEGATIVE

## 2024-05-30 LAB
HSV1+2 IGG SER IA-ACNC: 19.8 IV
Lab: NORMAL
MITOCHONDRIA M2 IGG SER-ACNC: 22.3 UNITS (ref 0–24.9)
NUCLEAR IGG SER QL IA: NORMAL
SMA IGG SER-ACNC: 29 UNITS (ref 0–19)
SMOOTH MUSCLE AB IGG TITER: ABNORMAL
TEST NAME: NORMAL

## 2024-05-31 LAB
A1AT PHENOTYP SERPL-IMP: NORMAL
A1AT SERPL-MCNC: 163 MG/DL (ref 90–200)

## 2024-06-03 ENCOUNTER — OFFICE VISIT (OUTPATIENT)
Dept: RHEUMATOLOGY | Age: 69
End: 2024-06-03
Payer: MEDICARE

## 2024-06-03 VITALS
HEART RATE: 85 BPM | DIASTOLIC BLOOD PRESSURE: 84 MMHG | WEIGHT: 158 LBS | OXYGEN SATURATION: 91 % | SYSTOLIC BLOOD PRESSURE: 146 MMHG | BODY MASS INDEX: 25.12 KG/M2

## 2024-06-03 DIAGNOSIS — Z79.899 ENCOUNTER FOR MONITORING OF HYDROXYCHLOROQUINE THERAPY: ICD-10-CM

## 2024-06-03 DIAGNOSIS — Z79.52 CURRENT CHRONIC USE OF SYSTEMIC STEROIDS: ICD-10-CM

## 2024-06-03 DIAGNOSIS — M05.79 RHEUMATOID ARTHRITIS INVOLVING MULTIPLE SITES WITH POSITIVE RHEUMATOID FACTOR (HCC): Primary | ICD-10-CM

## 2024-06-03 DIAGNOSIS — M85.89 OSTEOPENIA OF MULTIPLE SITES: ICD-10-CM

## 2024-06-03 DIAGNOSIS — Z71.6 ENCOUNTER FOR TOBACCO USE CESSATION COUNSELING: ICD-10-CM

## 2024-06-03 DIAGNOSIS — Z51.81 ENCOUNTER FOR MONITORING OF HYDROXYCHLOROQUINE THERAPY: ICD-10-CM

## 2024-06-03 PROCEDURE — 1123F ACP DISCUSS/DSCN MKR DOCD: CPT | Performed by: STUDENT IN AN ORGANIZED HEALTH CARE EDUCATION/TRAINING PROGRAM

## 2024-06-03 PROCEDURE — 99215 OFFICE O/P EST HI 40 MIN: CPT | Performed by: STUDENT IN AN ORGANIZED HEALTH CARE EDUCATION/TRAINING PROGRAM

## 2024-06-03 PROCEDURE — 3079F DIAST BP 80-89 MM HG: CPT | Performed by: STUDENT IN AN ORGANIZED HEALTH CARE EDUCATION/TRAINING PROGRAM

## 2024-06-03 PROCEDURE — 3077F SYST BP >= 140 MM HG: CPT | Performed by: STUDENT IN AN ORGANIZED HEALTH CARE EDUCATION/TRAINING PROGRAM

## 2024-06-03 RX ORDER — HYDROXYCHLOROQUINE SULFATE 200 MG/1
300 TABLET, FILM COATED ORAL DAILY
Qty: 135 TABLET | Refills: 0 | Status: SHIPPED | OUTPATIENT
Start: 2024-06-03

## 2024-06-03 RX ORDER — PREDNISONE 5 MG/1
10 TABLET ORAL DAILY
Qty: 120 TABLET | Refills: 0 | Status: SHIPPED | OUTPATIENT
Start: 2024-06-03

## 2024-06-03 NOTE — PATIENT INSTRUCTIONS
Okay to continue prednisone 5-10 mg daily   Continue Plaquenil 1.5 tabs daily   Return to clinic in 2 months  I will obtain clearance from Dr Briseno prior to starting Methotrexate/ humira        Lab Location:    [facility]  [address] [place], [place] [id number]  You must be at the lab by 3:00 p.m.   for the [facility] labs to be completed.    To Schedule DEXA Bone Scan, CT or MRI  Call at Central [phone]

## 2024-06-04 ENCOUNTER — HOSPITAL ENCOUNTER (OUTPATIENT)
Dept: SURGERY | Age: 69
Discharge: HOME | End: 2024-06-06
Payer: MEDICARE

## 2024-06-04 ENCOUNTER — HOSPITAL ENCOUNTER (OUTPATIENT)
Dept: ENDOSCOPY | Age: 69
Setting detail: OUTPATIENT SURGERY
Discharge: HOME | End: 2024-06-04

## 2024-06-04 PROCEDURE — APPNB15 APP NON BILLABLE TIME 0-15 MINS: Performed by: LICENSED PRACTICAL NURSE

## 2024-06-04 PROCEDURE — 91200 LIVER ELASTOGRAPHY: CPT | Performed by: SPECIALIST

## 2024-06-04 PROCEDURE — 91200 LIVER ELASTOGRAPHY: CPT

## 2024-06-04 NOTE — PROGRESS NOTES
Pt states NPO 3 hours prior to procedure. No alcoholic beverages for more than a week. No battery implantable devices.

## 2024-06-04 NOTE — PROGRESS NOTES
FIBROSCAN  REPORT     Patient Name: Radha Gomez  : 1955    Date: 24      MRN: [id number]    Physician: No att. providers found    Ordering Physician: Ema Eng NP  Date of exam: 2024      Technician:  Pau Tee                  Probe used:    [x] M   [] XL      Indication: (select suspected liver diagnosis)   [x] NAFLD  (K76.0)  [] Chronic Hepatitis C (B18.2) [] Chronic Hepatitis B (B18.1)   [] Alcohol-related Liver Disease (K70.9)  [] PBC (K74.3)  [] Sclerosing Cholangitis (K83.0)   [] Other:______________________    Recent labs: AST 16 ALT 17 Bilirubin 0.2  Alk. Phosphatase 272 Platelet Count  272  Date of labs:3/18/24    NPO at least 3 hours?  Yes  PREGNANT?  No  BATTERY-POWERED IMPLANTED DEVICE?  No  LAST ALCOHOL USE 1 week ago     Procedure: After providing oral explanation of the Fibroscan VCTE test procedure to the patient, the patient was placed in the supine position with the right arm in maximum abduction to allow exposure of the right lateral abdomen. The patient was briefly assessed, identifying the terminus of the xiphoid process and locating an ideal testing site, mid-line and lateral to this point. The patient was instructed to breath normally and remain stationary during the testing process. Pre-measurement data confirmed that the probe was centered over the liver parenchyma. A series of at least ten 50 Hz mechanical pulses were applied with controlled application pressure to induce a mechanical shear wave in the liver tissue. For each measurement, the shear wave propagation speed was detected, displayed and converted to its equivalent liver stiffness value measured in kilopascals (kPa). Skin-to-liver capsule distance and shear wave characteristics were monitored during the entire exam to assure data quality. Median liver stiffness measurement and interquartile range were calculated and displayed in real time. Acquired measurement data was stored and

## 2024-06-05 PROBLEM — K76.0 NAFLD (NONALCOHOLIC FATTY LIVER DISEASE): Status: ACTIVE | Noted: 2024-06-05

## 2024-06-05 NOTE — PROCEDURES
submitted to the provider for review and interpretation. The patient tolerated the procedure well and was discharged without incident.      FINDINGS:  A. FIBROSIS ASSSESSMENT:   MEDIAN LIVER STIFFNESS SCORE____13.6___________kPa   INTERQUARTILE RANGE (IQR) /MEDIAN _____24____%   INTERPRETATION: Taking into account the patient's history, this liver stiffness     score is consistent with:        []  NO OR MINIMAL FIBROSIS (F0-F1)                   []  MODERATE FIBROSIS (F2)                   [x]  ADVANCED / SIGNIFICANT FIBROSIS (F3)                   []  CIRRHOSIS (F4)  B.  LIVER FAT ESTIMATION:       MEDIAN CAP (controlled attenuation parameter)___191_____ dB/m  IRQ of  _45____ % INTERPRETATION: Taking into account the patient's history, this CAP score is     consistent with:        [x] NO STEATOSIS (S0)        [] MINIMAL-MILD STEATOSIS (S0-S1)        [] MILD- MODERATE STEATOSIS (S1-S2)        [] SIGNIFICANT STEATOSIS (S3)     COMMENTS: THE ABOVE FINDINGS SUGGEST NO STEATOSIS. THE kPa of 13.6 SUGGESTS ADVANCED/SIGNIFICANT FIBROSIS (F3) , HOWEVER IS DISCORDANT WITH THE CALCULATED FIB-4 INDEX OF 0.98, WHICH SUGGESTS THE ABSENCE OF ADVANCED FIBROSIS. THE FIBROSCAN IMAGES ARE LESS THAN IDEAL, THEREFORE CLINICAL CORRELATION IS SUGGESTED.    SIGNATURE OF INTERPRETING PROVIDER: Electronically signed by TU Gandara CNP on 6/4/2024 at 3:56 PM    My interpretation of this Vibration Controlled Transient Elastogtaphy (VCTE) was developed after careful consideration of the patient's current medical evidence. Any and all Fibroscan studies must be carefully evaluated, taking fully into account all individual measurements/scans, patient history, and other factors. Any further medical or surgical intervention should be made only while fully considering the circumstances of this patient, in consultation with this patient, fully informed by the product characteristics of any drugs or treatment protocols.

## 2024-06-13 DIAGNOSIS — M05.79 RHEUMATOID ARTHRITIS INVOLVING MULTIPLE SITES WITH POSITIVE RHEUMATOID FACTOR (HCC): ICD-10-CM

## 2024-06-13 RX ORDER — ADALIMUMAB 40MG/0.4ML
40 KIT SUBCUTANEOUS
Qty: 2 EACH | Refills: 2 | Status: ACTIVE | OUTPATIENT
Start: 2024-06-13

## 2024-06-14 ENCOUNTER — TELEPHONE (OUTPATIENT)
Dept: RHEUMATOLOGY | Age: 69
End: 2024-06-14

## 2024-06-14 NOTE — TELEPHONE ENCOUNTER
----- Message from Chrissy Randolph MD sent at 6/13/2024  9:20 PM EDT -----  Please notify patient that I have ordered Humira following discussions with Dr. Briseno.  He is recommending we avoid methotrexate.  Humira injection should be administered subcutaneously on the same day every 2 weeks.  Prescription has been sent to specialty pharmacy.

## 2024-06-28 ENCOUNTER — TELEPHONE (OUTPATIENT)
Dept: GASTROENTEROLOGY | Age: 69
End: 2024-06-28

## 2024-06-28 NOTE — TELEPHONE ENCOUNTER
COMMENTS: THE ABOVE FINDINGS SUGGEST NO STEATOSIS. THE kPa of 13.6 SUGGESTS ADVANCED/SIGNIFICANT FIBROSIS (F3) , HOWEVER IS DISCORDANT WITH THE CALCULATED FIB-4 INDEX OF 0.98, WHICH SUGGESTS THE ABSENCE OF ADVANCED FIBROSIS. THE FIBROSCAN IMAGES ARE LESS THAN IDEAL, THEREFORE CLINICAL CORRELATION IS SUGGESTED.     Fibroscan per Dr. Lofton is less than ideal for diagnosis of cirrhosis; will continue with clinical correlation of cirrhosis and follow with monitoring as discussed.

## 2024-07-05 ENCOUNTER — OFFICE VISIT (OUTPATIENT)
Dept: GASTROENTEROLOGY | Age: 69
End: 2024-07-05
Payer: MEDICARE

## 2024-07-05 VITALS
HEIGHT: 67 IN | SYSTOLIC BLOOD PRESSURE: 140 MMHG | BODY MASS INDEX: 25.02 KG/M2 | DIASTOLIC BLOOD PRESSURE: 80 MMHG | HEART RATE: 67 BPM | WEIGHT: 159.4 LBS

## 2024-07-05 DIAGNOSIS — K76.9 CHRONIC LIVER DISEASE: Primary | ICD-10-CM

## 2024-07-05 DIAGNOSIS — K75.81 NASH (NONALCOHOLIC STEATOHEPATITIS): ICD-10-CM

## 2024-07-05 PROCEDURE — 99213 OFFICE O/P EST LOW 20 MIN: CPT | Performed by: NURSE PRACTITIONER

## 2024-07-05 PROCEDURE — 1123F ACP DISCUSS/DSCN MKR DOCD: CPT | Performed by: NURSE PRACTITIONER

## 2024-07-05 PROCEDURE — 3079F DIAST BP 80-89 MM HG: CPT | Performed by: NURSE PRACTITIONER

## 2024-07-05 PROCEDURE — 3077F SYST BP >= 140 MM HG: CPT | Performed by: NURSE PRACTITIONER

## 2024-07-05 NOTE — PROGRESS NOTES
Radha Gomez 68 y.o. female was seen by DURAN Lee on 07/05/24     Wt Readings from Last 3 Encounters:   07/05/24 72.3 kg (159 lb 6.4 oz)   06/03/24 71.7 kg (158 lb)   05/16/24 72.6 kg (160 lb)       HPI  Radha Gomez is a pleasant 68 y.o.  female who presents today for follow-up on advanced liver disease.  She has stopped NSAID and alcohol use.  She will start Humira for treatment of rheumatoid arthritis to replace Plaquenil and Prednisone and this should come in by July 9, 2024 and is monitored by Dr. Randolph.  Fibroscan done on 6-4-2024 showing kpa 13.6 indicative of F3 advanced fibrosis.  She has not completed the abdominal ultrasound.  Her CT of the abdomen/pelvis done on 10-5-2014 showed nodular appearing liver consistent with cirrhosis.  Her Pet CT skull to mid thigh done on 4-4-2024 no hypermetabolic uptake within the right middle lobe 11 mm nodule, mildly hypermetabolic nonenlarged right inferior hilar lymph node nonspecific but presumed reactive, and lobular liver contour raising the possibility of cirrhosis.  No jaundice, no prurtius, no lower extremity swelling, and no increase in abdominal girth.  She mentioned her older brother had a liver transplant for cirrhosis five years ago due to alcohol abuse.  Her last colonscopy was done by Dr. Gomez on . Her lab work done on 5- showed AFP tumor marker 3, F-Actin IgG 29, JOSE ROBERTO none detected, mitochondrial antibodies 22.3, Hepatitis A, B, C negative, alpha 1 antitrypsin 163, PT 16.4, INR 1.3, GGT 85, Iron 61, Ferritin 618, Platelets 200, Total Bilirubin 0.2, Alkaline Phosphatase 80, AST 16, and ALT 21.  Her appetite is good and weight is stable.  No nausea or vomiting.  No abdominal pain, bloating or distention.  No heartburn or acid reflux.  No nocturnal awakenings with acid reflux.  No dysphagia or pain with swallowing.  No excess belching or flatulence.  Her typical bowel pattern is daily with soft brown formed

## 2024-07-09 ENCOUNTER — TELEPHONE (OUTPATIENT)
Dept: RHEUMATOLOGY | Age: 69
End: 2024-07-09

## 2024-07-09 NOTE — TELEPHONE ENCOUNTER
Pt called in stating that Humira will be delivered today and she just wants clarification prior to injecting that she is to stop her other Rheumatology medications. Please clarify as the pt is currently taking Plaquenil, Prednisone, and Vitamin D

## 2024-08-04 NOTE — PROGRESS NOTES
know if you need steroid injection to your left elbow   Get labs one week prior to your next appointment      -  The patient indicates understanding of these issues and agrees with the plan.    I spent  40  minutes on the date of service, preparing to see the patient (eg, review of tests), obtaining and/or reviewing separately obtained history, counseling, ordering medications, tests, or procedures, documenting clinical information in the electronic or other health record and in care coordination.This note was dictated with voice recognition software        Chrissy Randolph MD    Portions of this note was copied forward from the note written by me on 5/8/2024.  I have reviewed and updated the history, physical exam, data, assessment and plan of the note so that it reflects the current evaluation and management of the patient. This note was dictated with voice recognition software.       Addendum 6/13/2024  Dr. Briseno (gastroenterology) advised that we should avoid methotrexate at this time due to her diagnosis of liver cirrhosis.  He is recommending starting Humira, preferrably.  Humira was ordered on 6/13/2024.

## 2024-08-08 ENCOUNTER — OFFICE VISIT (OUTPATIENT)
Dept: RHEUMATOLOGY | Age: 69
End: 2024-08-08
Payer: MEDICARE

## 2024-08-08 VITALS
DIASTOLIC BLOOD PRESSURE: 76 MMHG | HEART RATE: 81 BPM | BODY MASS INDEX: 25.69 KG/M2 | RESPIRATION RATE: 18 BRPM | WEIGHT: 164 LBS | SYSTOLIC BLOOD PRESSURE: 134 MMHG

## 2024-08-08 DIAGNOSIS — M85.89 OSTEOPENIA OF MULTIPLE SITES: ICD-10-CM

## 2024-08-08 DIAGNOSIS — Z79.899 HIGH RISK MEDICATION USE: ICD-10-CM

## 2024-08-08 DIAGNOSIS — Z71.6 ENCOUNTER FOR TOBACCO USE CESSATION COUNSELING: ICD-10-CM

## 2024-08-08 DIAGNOSIS — M05.79 RHEUMATOID ARTHRITIS INVOLVING MULTIPLE SITES WITH POSITIVE RHEUMATOID FACTOR (HCC): Primary | ICD-10-CM

## 2024-08-08 DIAGNOSIS — N30.00 ACUTE CYSTITIS WITHOUT HEMATURIA: ICD-10-CM

## 2024-08-08 PROCEDURE — 99215 OFFICE O/P EST HI 40 MIN: CPT | Performed by: STUDENT IN AN ORGANIZED HEALTH CARE EDUCATION/TRAINING PROGRAM

## 2024-08-08 PROCEDURE — 1123F ACP DISCUSS/DSCN MKR DOCD: CPT | Performed by: STUDENT IN AN ORGANIZED HEALTH CARE EDUCATION/TRAINING PROGRAM

## 2024-08-08 PROCEDURE — 3075F SYST BP GE 130 - 139MM HG: CPT | Performed by: STUDENT IN AN ORGANIZED HEALTH CARE EDUCATION/TRAINING PROGRAM

## 2024-08-08 PROCEDURE — 3078F DIAST BP <80 MM HG: CPT | Performed by: STUDENT IN AN ORGANIZED HEALTH CARE EDUCATION/TRAINING PROGRAM

## 2024-08-08 RX ORDER — CARVEDILOL 25 MG/1
25 TABLET ORAL 2 TIMES DAILY
COMMUNITY
Start: 2024-07-03

## 2024-08-08 RX ORDER — NITROFURANTOIN 25; 75 MG/1; MG/1
100 CAPSULE ORAL 2 TIMES DAILY
Qty: 14 CAPSULE | Refills: 0 | Status: SHIPPED | OUTPATIENT
Start: 2024-08-08 | End: 2024-08-15

## 2024-08-08 RX ORDER — ADALIMUMAB 40MG/0.4ML
40 KIT SUBCUTANEOUS
Qty: 2 EACH | Refills: 2 | Status: ACTIVE | OUTPATIENT
Start: 2024-08-08

## 2024-08-08 NOTE — PATIENT INSTRUCTIONS
Stop plaquenil  Start Macrobid/ nitrofurantoin 1 tab 2x daily for 7 days   Continue humira  Return to clinic in 2 months  Let me know if you need steroid injection to your left elbow   Get labs one week prior to your next appointment

## 2024-09-17 ENCOUNTER — TELEPHONE (OUTPATIENT)
Dept: RHEUMATOLOGY | Age: 69
End: 2024-09-17

## 2024-09-17 DIAGNOSIS — M05.79 RHEUMATOID ARTHRITIS INVOLVING MULTIPLE SITES WITH POSITIVE RHEUMATOID FACTOR (HCC): ICD-10-CM

## 2024-09-17 RX ORDER — ADALIMUMAB 40MG/0.4ML
40 KIT SUBCUTANEOUS
Qty: 2 EACH | Refills: 0 | Status: ACTIVE | OUTPATIENT
Start: 2024-09-17

## 2024-09-18 DIAGNOSIS — M05.79 RHEUMATOID ARTHRITIS INVOLVING MULTIPLE SITES WITH POSITIVE RHEUMATOID FACTOR (HCC): ICD-10-CM

## 2024-09-18 RX ORDER — ADALIMUMAB 40MG/0.4ML
40 KIT SUBCUTANEOUS
Qty: 2 EACH | Refills: 0 | OUTPATIENT
Start: 2024-09-18

## 2024-09-18 NOTE — TELEPHONE ENCOUNTER
Medication:   Requested Prescriptions     Pending Prescriptions Disp Refills    adalimumab (HUMIRA, 2 PEN,) 40 MG/0.4ML PNKT 2 each 0     Sig: Inject 40 mg into the skin every 14 days     Patient Phone Number: [phone] (home)     Last appt: 8/8/2024   Next appt: 10/8/2024    Last Labs DM: No results found for: \"LABA1C\"  Last Lipid:   Lab Results   Component Value Date/Time    CHOL 210 03/29/2022 07:32 AM    TRIG 99 03/29/2022 07:32 AM    HDL 96 03/29/2022 07:32 AM     Last PSA: No results found for: \"PSA\"  Last Thyroid: No results found for: \"TSH\", \"FT3\", \"M8NTYXB\", \"T4FREE\"

## 2024-09-24 ENCOUNTER — APPOINTMENT (OUTPATIENT)
Dept: CT IMAGING | Age: 69
End: 2024-09-24
Payer: MEDICARE

## 2024-09-24 ENCOUNTER — HOSPITAL ENCOUNTER (INPATIENT)
Age: 69
LOS: 2 days | Discharge: HOME | End: 2024-09-26
Attending: EMERGENCY MEDICINE | Admitting: INTERNAL MEDICINE
Payer: MEDICARE

## 2024-09-24 ENCOUNTER — APPOINTMENT (OUTPATIENT)
Dept: ULTRASOUND IMAGING | Age: 69
End: 2024-09-24
Payer: MEDICARE

## 2024-09-24 ENCOUNTER — APPOINTMENT (OUTPATIENT)
Dept: GENERAL RADIOLOGY | Age: 69
End: 2024-09-24
Payer: MEDICARE

## 2024-09-24 DIAGNOSIS — R06.89 DYSPNEA AND RESPIRATORY ABNORMALITIES: Primary | ICD-10-CM

## 2024-09-24 DIAGNOSIS — R06.02 SHORTNESS OF BREATH: ICD-10-CM

## 2024-09-24 DIAGNOSIS — J96.01 ACUTE RESPIRATORY FAILURE WITH HYPOXIA: ICD-10-CM

## 2024-09-24 DIAGNOSIS — M79.89 RIGHT LEG SWELLING: ICD-10-CM

## 2024-09-24 DIAGNOSIS — R06.00 DYSPNEA AND RESPIRATORY ABNORMALITIES: Primary | ICD-10-CM

## 2024-09-24 PROBLEM — J44.1 COPD EXACERBATION (HCC): Status: ACTIVE | Noted: 2024-09-24

## 2024-09-24 LAB
ALBUMIN SERPL-MCNC: 3.9 G/DL (ref 3.4–5)
ALBUMIN/GLOB SERPL: 1.1 {RATIO} (ref 1.1–2.2)
ALP SERPL-CCNC: 89 U/L (ref 40–129)
ALT SERPL-CCNC: 12 U/L (ref 10–40)
ANION GAP SERPL CALCULATED.3IONS-SCNC: 11 MMOL/L (ref 9–17)
ARTERIAL PATENCY WRIST A: ABNORMAL
AST SERPL-CCNC: 22 U/L (ref 15–37)
BASOPHILS # BLD: 0.09 K/UL
BASOPHILS NFR BLD: 1 % (ref 0–1)
BILIRUB SERPL-MCNC: 0.5 MG/DL (ref 0–1)
BNP SERPL-MCNC: 685 PG/ML (ref 0–125)
BODY TEMPERATURE: 37
BUN SERPL-MCNC: 12 MG/DL (ref 7–20)
CALCIUM SERPL-MCNC: 9.7 MG/DL (ref 8.3–10.6)
CHLORIDE SERPL-SCNC: 106 MMOL/L (ref 99–110)
CO2 SERPL-SCNC: 21 MMOL/L (ref 21–32)
COHGB MFR BLD: 1 % (ref 0.5–1.5)
CREAT SERPL-MCNC: 1 MG/DL (ref 0.6–1.2)
EKG ATRIAL RATE: 63 BPM
EKG DIAGNOSIS: NORMAL
EKG P AXIS: 63 DEGREES
EKG P-R INTERVAL: 216 MS
EKG Q-T INTERVAL: 420 MS
EKG QRS DURATION: 92 MS
EKG QTC CALCULATION (BAZETT): 429 MS
EKG R AXIS: 35 DEGREES
EKG T AXIS: 56 DEGREES
EKG VENTRICULAR RATE: 63 BPM
EOSINOPHIL # BLD: 0.27 K/UL
EOSINOPHILS RELATIVE PERCENT: 4 % (ref 0–3)
ERYTHROCYTE [DISTWIDTH] IN BLOOD BY AUTOMATED COUNT: 13.5 % (ref 11.7–14.9)
GFR, ESTIMATED: 55 ML/MIN/1.73M2
GLUCOSE SERPL-MCNC: 96 MG/DL (ref 74–99)
HCO3 VENOUS: 22.3 MMOL/L (ref 22–29)
HCT VFR BLD AUTO: 40.1 % (ref 37–47)
HGB BLD-MCNC: 12.7 G/DL (ref 12.5–16)
IMM GRANULOCYTES # BLD AUTO: 0.01 K/UL
IMM GRANULOCYTES NFR BLD: 0 %
INR PPP: 1.1
LYMPHOCYTES NFR BLD: 2.64 K/UL
LYMPHOCYTES RELATIVE PERCENT: 41 % (ref 24–44)
MCH RBC QN AUTO: 28.7 PG (ref 27–31)
MCHC RBC AUTO-ENTMCNC: 31.7 G/DL (ref 32–36)
MCV RBC AUTO: 90.7 FL (ref 78–100)
METHEMOGLOBIN: 0.3 % (ref 0.5–1.5)
MONOCYTES NFR BLD: 0.67 K/UL
MONOCYTES NFR BLD: 10 % (ref 0–4)
NEGATIVE BASE EXCESS, VEN: 2.1 MMOL/L (ref 0–3)
NEUTROPHILS NFR BLD: 43 % (ref 36–66)
NEUTS SEG NFR BLD: 2.76 K/UL
OXYHGB MFR BLD: 62.9 %
PCO2 VENOUS: 37.1 MM HG (ref 38–54)
PH VENOUS: 7.4 (ref 7.32–7.43)
PLATELET CONFIRMATION: NORMAL
PLATELET, FLUORESCENCE: 175 K/UL (ref 140–440)
PMV BLD AUTO: 11.8 FL (ref 7.5–11.1)
PO2 VENOUS: 33.2 MM HG (ref 23–48)
POTASSIUM SERPL-SCNC: 4.5 MMOL/L (ref 3.5–5.1)
PROT SERPL-MCNC: 7.5 G/DL (ref 6.4–8.2)
PROTHROMBIN TIME: 14.2 SEC (ref 11.7–14.5)
RBC # BLD AUTO: 4.42 M/UL (ref 4.2–5.4)
SODIUM SERPL-SCNC: 138 MMOL/L (ref 136–145)
TROPONIN I SERPL HS-MCNC: 13 NG/L (ref 0–14)
TROPONIN I SERPL HS-MCNC: 8 NG/L (ref 0–14)
WBC OTHER # BLD: 6.4 K/UL (ref 4–10.5)

## 2024-09-24 PROCEDURE — 6370000000 HC RX 637 (ALT 250 FOR IP): Performed by: INTERNAL MEDICINE

## 2024-09-24 PROCEDURE — 6360000002 HC RX W HCPCS: Performed by: INTERNAL MEDICINE

## 2024-09-24 PROCEDURE — 94640 AIRWAY INHALATION TREATMENT: CPT

## 2024-09-24 PROCEDURE — 93005 ELECTROCARDIOGRAM TRACING: CPT | Performed by: PHYSICIAN ASSISTANT

## 2024-09-24 PROCEDURE — 6370000000 HC RX 637 (ALT 250 FOR IP): Performed by: PHYSICIAN ASSISTANT

## 2024-09-24 PROCEDURE — 82805 BLOOD GASES W/O2 SATURATION: CPT

## 2024-09-24 PROCEDURE — 36415 COLL VENOUS BLD VENIPUNCTURE: CPT

## 2024-09-24 PROCEDURE — 71045 X-RAY EXAM CHEST 1 VIEW: CPT

## 2024-09-24 PROCEDURE — 1200000000 HC SEMI PRIVATE

## 2024-09-24 PROCEDURE — 2580000003 HC RX 258: Performed by: PHYSICIAN ASSISTANT

## 2024-09-24 PROCEDURE — 2700000000 HC OXYGEN THERAPY PER DAY

## 2024-09-24 PROCEDURE — 84484 ASSAY OF TROPONIN QUANT: CPT

## 2024-09-24 PROCEDURE — 94761 N-INVAS EAR/PLS OXIMETRY MLT: CPT

## 2024-09-24 PROCEDURE — 93010 ELECTROCARDIOGRAM REPORT: CPT | Performed by: INTERNAL MEDICINE

## 2024-09-24 PROCEDURE — 93971 EXTREMITY STUDY: CPT

## 2024-09-24 PROCEDURE — 96374 THER/PROPH/DIAG INJ IV PUSH: CPT

## 2024-09-24 PROCEDURE — 80053 COMPREHEN METABOLIC PANEL: CPT

## 2024-09-24 PROCEDURE — 71275 CT ANGIOGRAPHY CHEST: CPT

## 2024-09-24 PROCEDURE — 6360000002 HC RX W HCPCS: Performed by: PHYSICIAN ASSISTANT

## 2024-09-24 PROCEDURE — 85610 PROTHROMBIN TIME: CPT

## 2024-09-24 PROCEDURE — 99285 EMERGENCY DEPT VISIT HI MDM: CPT

## 2024-09-24 PROCEDURE — 85025 COMPLETE CBC W/AUTO DIFF WBC: CPT

## 2024-09-24 PROCEDURE — 6360000004 HC RX CONTRAST MEDICATION: Performed by: EMERGENCY MEDICINE

## 2024-09-24 PROCEDURE — 2580000003 HC RX 258: Performed by: INTERNAL MEDICINE

## 2024-09-24 PROCEDURE — 83880 ASSAY OF NATRIURETIC PEPTIDE: CPT

## 2024-09-24 RX ORDER — POLYETHYLENE GLYCOL 3350 17 G/17G
17 POWDER, FOR SOLUTION ORAL DAILY PRN
Status: DISCONTINUED | OUTPATIENT
Start: 2024-09-24 | End: 2024-09-26 | Stop reason: HOSPADM

## 2024-09-24 RX ORDER — AZITHROMYCIN 250 MG/1
500 TABLET, FILM COATED ORAL DAILY
Status: COMPLETED | OUTPATIENT
Start: 2024-09-24 | End: 2024-09-26

## 2024-09-24 RX ORDER — ASPIRIN 81 MG/1
81 TABLET, CHEWABLE ORAL DAILY
COMMUNITY

## 2024-09-24 RX ORDER — SODIUM CHLORIDE 9 MG/ML
INJECTION, SOLUTION INTRAVENOUS PRN
Status: DISCONTINUED | OUTPATIENT
Start: 2024-09-24 | End: 2024-09-26 | Stop reason: HOSPADM

## 2024-09-24 RX ORDER — ENOXAPARIN SODIUM 100 MG/ML
40 INJECTION SUBCUTANEOUS DAILY
Status: DISCONTINUED | OUTPATIENT
Start: 2024-09-24 | End: 2024-09-26 | Stop reason: HOSPADM

## 2024-09-24 RX ORDER — ACETAMINOPHEN 325 MG/1
650 TABLET ORAL EVERY 6 HOURS PRN
Status: DISCONTINUED | OUTPATIENT
Start: 2024-09-24 | End: 2024-09-26 | Stop reason: HOSPADM

## 2024-09-24 RX ORDER — CYANOCOBALAMIN (VITAMIN B-12) 500 MCG
500 TABLET ORAL DAILY
COMMUNITY

## 2024-09-24 RX ORDER — VITAMIN B COMPLEX
1000 TABLET ORAL DAILY
Status: DISCONTINUED | OUTPATIENT
Start: 2024-09-24 | End: 2024-09-26 | Stop reason: HOSPADM

## 2024-09-24 RX ORDER — ONDANSETRON 2 MG/ML
4 INJECTION INTRAMUSCULAR; INTRAVENOUS EVERY 6 HOURS PRN
Status: DISCONTINUED | OUTPATIENT
Start: 2024-09-24 | End: 2024-09-26 | Stop reason: HOSPADM

## 2024-09-24 RX ORDER — PREDNISONE 20 MG/1
40 TABLET ORAL DAILY
Status: DISCONTINUED | OUTPATIENT
Start: 2024-09-25 | End: 2024-09-25

## 2024-09-24 RX ORDER — ASPIRIN 81 MG/1
81 TABLET, CHEWABLE ORAL DAILY
Status: DISCONTINUED | OUTPATIENT
Start: 2024-09-24 | End: 2024-09-26 | Stop reason: HOSPADM

## 2024-09-24 RX ORDER — CARVEDILOL 25 MG/1
25 TABLET ORAL 2 TIMES DAILY
Status: DISCONTINUED | OUTPATIENT
Start: 2024-09-24 | End: 2024-09-26 | Stop reason: HOSPADM

## 2024-09-24 RX ORDER — ACETAMINOPHEN 650 MG/1
650 SUPPOSITORY RECTAL EVERY 6 HOURS PRN
Status: DISCONTINUED | OUTPATIENT
Start: 2024-09-24 | End: 2024-09-26 | Stop reason: HOSPADM

## 2024-09-24 RX ORDER — UBIDECARENONE 75 MG
500 CAPSULE ORAL DAILY
Status: DISCONTINUED | OUTPATIENT
Start: 2024-09-24 | End: 2024-09-26 | Stop reason: HOSPADM

## 2024-09-24 RX ORDER — SODIUM CHLORIDE 0.9 % (FLUSH) 0.9 %
5-40 SYRINGE (ML) INJECTION EVERY 12 HOURS SCHEDULED
Status: DISCONTINUED | OUTPATIENT
Start: 2024-09-24 | End: 2024-09-26 | Stop reason: HOSPADM

## 2024-09-24 RX ORDER — FUROSEMIDE 10 MG/ML
20 INJECTION INTRAMUSCULAR; INTRAVENOUS ONCE
Status: COMPLETED | OUTPATIENT
Start: 2024-09-24 | End: 2024-09-24

## 2024-09-24 RX ORDER — IOPAMIDOL 755 MG/ML
80 INJECTION, SOLUTION INTRAVASCULAR
Status: COMPLETED | OUTPATIENT
Start: 2024-09-24 | End: 2024-09-24

## 2024-09-24 RX ORDER — AMLODIPINE BESYLATE 10 MG/1
10 TABLET ORAL DAILY
Status: DISCONTINUED | OUTPATIENT
Start: 2024-09-24 | End: 2024-09-26 | Stop reason: HOSPADM

## 2024-09-24 RX ORDER — CALCIUM CARBONATE 500(1250)
500 TABLET ORAL DAILY
Status: DISCONTINUED | OUTPATIENT
Start: 2024-09-24 | End: 2024-09-26 | Stop reason: HOSPADM

## 2024-09-24 RX ORDER — IPRATROPIUM BROMIDE AND ALBUTEROL SULFATE 2.5; .5 MG/3ML; MG/3ML
1 SOLUTION RESPIRATORY (INHALATION)
Status: DISCONTINUED | OUTPATIENT
Start: 2024-09-24 | End: 2024-09-24 | Stop reason: SDUPTHER

## 2024-09-24 RX ORDER — IPRATROPIUM BROMIDE AND ALBUTEROL SULFATE 2.5; .5 MG/3ML; MG/3ML
1 SOLUTION RESPIRATORY (INHALATION)
Status: DISCONTINUED | OUTPATIENT
Start: 2024-09-24 | End: 2024-09-26 | Stop reason: HOSPADM

## 2024-09-24 RX ORDER — SODIUM CHLORIDE 0.9 % (FLUSH) 0.9 %
5-40 SYRINGE (ML) INJECTION PRN
Status: DISCONTINUED | OUTPATIENT
Start: 2024-09-24 | End: 2024-09-26 | Stop reason: HOSPADM

## 2024-09-24 RX ORDER — CALCIUM CARBONATE 500(1250)
500 TABLET ORAL DAILY
COMMUNITY

## 2024-09-24 RX ORDER — ONDANSETRON 4 MG/1
4 TABLET, ORALLY DISINTEGRATING ORAL EVERY 8 HOURS PRN
Status: DISCONTINUED | OUTPATIENT
Start: 2024-09-24 | End: 2024-09-26 | Stop reason: HOSPADM

## 2024-09-24 RX ADMIN — AZITHROMYCIN DIHYDRATE 500 MG: 250 TABLET ORAL at 17:34

## 2024-09-24 RX ADMIN — IOPAMIDOL 80 ML: 755 INJECTION, SOLUTION INTRAVENOUS at 12:31

## 2024-09-24 RX ADMIN — SODIUM CHLORIDE, PRESERVATIVE FREE 10 ML: 5 INJECTION INTRAVENOUS at 22:09

## 2024-09-24 RX ADMIN — WATER 125 MG: 1 INJECTION INTRAMUSCULAR; INTRAVENOUS; SUBCUTANEOUS at 13:34

## 2024-09-24 RX ADMIN — IPRATROPIUM BROMIDE AND ALBUTEROL SULFATE 1 DOSE: .5; 2.5 SOLUTION RESPIRATORY (INHALATION) at 15:09

## 2024-09-24 RX ADMIN — ENOXAPARIN SODIUM 40 MG: 100 INJECTION SUBCUTANEOUS at 17:34

## 2024-09-24 RX ADMIN — CARVEDILOL 25 MG: 25 TABLET, FILM COATED ORAL at 22:10

## 2024-09-24 RX ADMIN — FUROSEMIDE 20 MG: 10 INJECTION, SOLUTION INTRAMUSCULAR; INTRAVENOUS at 22:49

## 2024-09-24 ASSESSMENT — LIFESTYLE VARIABLES
HOW MANY STANDARD DRINKS CONTAINING ALCOHOL DO YOU HAVE ON A TYPICAL DAY: 1 OR 2
HOW OFTEN DO YOU HAVE A DRINK CONTAINING ALCOHOL: MONTHLY OR LESS

## 2024-09-24 ASSESSMENT — PAIN SCALES - GENERAL
PAINLEVEL_OUTOF10: 0
PAINLEVEL_OUTOF10: 0

## 2024-09-24 NOTE — ED PROVIDER NOTES
[facility] EMERGENCY DEPARTMENT  EMERGENCY DEPARTMENT ENCOUNTER        Pt Name: Radha Gomez  MRN: [id number]  Birthdate 1955  Date of evaluation: 2024  Provider: Robles Varela PA-C  PCP: Luis Buenrostro MD       I have seen and evaluated this patient with my supervising physician Dr. TERRY Cheng      CHIEF COMPLAINT      Chief Complaint   Patient presents with    Shortness of Breath     Patient is short of breath since Friday. She came from Dr Buenrostro's office, he is worried she may have a clot       HISTORY OF PRESENT ILLNESS:     History from : Patient    Limitations to history : None    Radha Gomez is a 69 y.o. female former smoker/vaper who presents via private vehicle from her PCPs office with reported shortness of breath concern for blood clot.  She mentions she had been seen there recently for evaluation of DVT due to his pain and swelling in her right leg, describes having a  ultrasound that did not show blood clot.  Had returned today and apparently was noted to be short of breath she was advised to come to the emergency department for further evaluation.  She describes having an elevated D-dimer.  She mentions some for recent diarrhea, she denies any previus history of blood clots, chest pain, abdominal pain, fevers, recent URI symptoms, confusion, bloody stools, hemoptysis, recent travel    Nursing Notes were all reviewed and agreed with or any disagreements were addressed in the HPI.    REVIEW OF SYSTEMS :     Review of Systems   All other systems reviewed and are negative.      Pertinent positives and negatives are stated within HPI    PAST HISTORY   has a past medical history of Heart abnormality, Hypertension, and Pap smear for cervical cancer screening.    Past Surgical History:   Procedure Laterality Date    ANKLE FRACTURE SURGERY      right     SECTION      COLONOSCOPY         Family History   Problem Relation Age of Onset     Breast Cancer Maternal Grandmother        Social History     Tobacco Use    Smoking status: Former     Types: Cigarettes    Smokeless tobacco: Former     Quit date: 10/5/2013   Substance Use Topics    Alcohol use: Yes     Comment: socially    Drug use: No       Patient has no known allergies.    The patient’s home medications have been reviewed.  Previous Medications    ADALIMUMAB (HUMIRA, 2 PEN,) 40 MG/0.4ML PNKT    Inject 40 mg into the skin every 14 days    AMLODIPINE (NORVASC) 10 MG TABLET    Take 1 tablet by mouth daily    CARVEDILOL (COREG) 25 MG TABLET    Take 1 tablet by mouth 2 times daily    VITAMIN D (CHOLECALCIFEROL) 25 MCG (1000 UT) TABS TABLET    Take 1 tablet by mouth daily         SCREENINGS                  PHYSICAL EXAM    ED Triage Vitals [09/24/24 0925]   BP Systolic BP Percentile Diastolic BP Percentile Temp Temp src Pulse Resp SpO2   -- -- -- -- -- -- -- --      Height Weight - Scale         1.702 m (5' 7\") 74.4 kg (164 lb)             Physical Exam  Constitutional:       General: She is in acute distress.      Appearance: She is well-developed.   HENT:      Head: Atraumatic.   Eyes:      Extraocular Movements: Extraocular movements intact.   Cardiovascular:      Rate and Rhythm: Normal rate and regular rhythm.   Pulmonary:      Effort: Accessory muscle usage and respiratory distress present.      Breath sounds: Wheezing present.   Abdominal:      Tenderness: There is no right CVA tenderness or left CVA tenderness.   Musculoskeletal:         General: No tenderness.      Right lower leg: Edema (RLE) present.   Skin:     General: Skin is warm and dry.   Neurological:      General: No focal deficit present.      Mental Status: She is alert.   Psychiatric:         Mood and Affect: Mood normal.         Behavior: Behavior normal.           DIAGNOSTIC RESULTS    LABS:    Labs Reviewed   CBC WITH AUTO DIFFERENTIAL - Abnormal; Notable for the following components:       Result Value    MCHC 31.7 (*)

## 2024-09-24 NOTE — PROGRESS NOTES
4 Eyes Skin Assessment     NAME:  Radha Gomez  YOB: 1955  MEDICAL RECORD NUMBER:  [id number]    The patient is being assessed for  Admission    I agree that at least one RN has performed a thorough Head to Toe Skin Assessment on the patient. ALL assessment sites listed below have been assessed.      Areas assessed by both nurses:    Head, Face, Ears, Shoulders, Back, Chest, Arms, Elbows, Hands, Sacrum. Buttock, Coccyx, Ischium, Legs. Feet and Heels, and Under Medical Devices         Does the Patient have a Wound? No noted wound(s)       Enzo Prevention initiated by RN: No  Wound Care Orders initiated by RN: No    Pressure Injury (Stage 3,4, Unstageable, DTI, NWPT, and Complex wounds) if present, place Wound referral order by RN under : No    New Ostomies, if present place, Ostomy referral order under : No     Nurse 1 eSignature: Electronically signed by Petey Beard RN on 9/24/24 at 5:14 PM EDT    **SHARE this note so that the co-signing nurse can place an eSignature**    Nurse 2 eSignature: Electronically signed by Sarina Hamm LPN on 9/24/24 at 5:51 PM EDT

## 2024-09-24 NOTE — ED PROVIDER NOTES
THIS IS MY VALARIE SUPERVISORY AND SHARED VISIT NOTE    I independently examined and evaluated Radha Gomez.    In brief, 69-year-old female presents with complaint of short of breath since Friday.  Came from Dr. Buenrostro's office.  They were concerned she had a blood clot.  She has no history of congestive heart failure.  She has not had cough or fevers.  Had right leg swelling but always has right leg swelling.  Had an ultrasound there that showed no evidence of DVT    Focused exam revealed patient speaking in short sentences, using intercostal muscles.  She was 82% on room air, placed on nasal cannula and had some improvement.  Mild expiratory wheezing noted, no significant crackles but blunted at the bases.  Right lower extremity with swelling, left lower extremity with none.        CC/HPI Summary, DDx, ED Course, and Reassessment: Patient had already had ultrasound of the right lower extremity.  We are obtaining a CT PE.  Plan for labs.        History from : Patient    Limitations to history : None    Patient was given the following medications:  Medications   ipratropium 0.5 mg-albuterol 2.5 mg (DUONEB) nebulizer solution 1 Dose (has no administration in time range)   iopamidol (ISOVUE-370) 76 % injection 80 mL (80 mLs IntraVENous Given 9/24/24 1231)   methylPREDNISolone sodium succ (SOLU-MEDROL) 125 mg in sterile water 2 mL injection (125 mg IntraVENous Given 9/24/24 1334)       Independent Imaging Interpretation by me:   I independently interpreted the chest x-ray which showed pulmonary edema and effusions      EKG (if obtained): (All EKG's are interpreted by myself in the absence of a cardiologist)   I independently interpreted the EKG which showed sinus rhythm with first-degree AV block, rate of 63 bpm, CT interval of 260 ms, otherwise normal intervals.  No ST elevation.  No previous to compare      Chronic conditions affecting care: Hypertension    Social Determinants : None    Records Reviewed : Outpatient Notes  practice provider's documentation.    Comment: Please note this report has been produced using speech recognition software and may contain errors related to that system including errors in grammar, punctuation, and spelling, as well as words and phrases that may be inappropriate. If there are any questions or concerns please feel free to contact the dictating provider for clarification.       Irma Cheng MD  09/24/24 0472

## 2024-09-24 NOTE — ED NOTES
Admit; [facility]  
ED TO INPATIENT SBAR HANDOFF    Patient Name: Radha Gomez   :  1955  69 y.o.   Preferred Name  Radha Gomez  Family/Caregiver Present yes   Restraints no   C-SSRS: Risk of Suicide: No Risk  Sitter no   Sepsis Risk Score        Situation  Chief Complaint   Patient presents with    Shortness of Breath     Patient is short of breath since Friday. She came from Dr Buenrostro's office, he is worried she may have a clot     Brief Description of Patient's Condition:   Patient arrived to ED with complaints of shortness of breath since Friday. Patient came from Dr. Hendrix office where they were concerned that she had a blood clot. Patient has had right leg swelling but has always had this swelling. Patient had a ultrasound on right leg with no evidence of of DVT. When patient came in she was speaking in short sentences and using intercostal muscles. CT negative for PE.   Mental Status: oriented, alert, and thought processes intact  Arrived from: home    Imaging:   CTA PULMONARY W CONTRAST   Final Result      No acute PE.      Stable right middle lobe nodule and right hilar adenopathy.      Diffuse fibrotic changes with emphysema and small effusions.      Electronically signed by Oscar Salazar      XR CHEST PORTABLE   Final Result      A mild to moderate degree of interstitial pulmonary edema is seen.   A small left pleural effusion is newly seen.      No focal pneumonic process is seen.   No right pleural effusion is clearly identified.   No pneumothorax is evident.      The cardiomediastinal silhouette is stable, given differences in technique.            Electronically signed by Hill Regan MD        Abnormal labs:   Abnormal Labs Reviewed   CBC WITH AUTO DIFFERENTIAL - Abnormal; Notable for the following components:       Result Value    MCHC 31.7 (*)     MPV 11.8 (*)     Monocytes % 10 (*)     Eosinophils % 4 (*)     All other components within normal limits   COMPREHENSIVE METABOLIC PANEL W/ REFLEX TO MG FOR LOW K - 
accurate as of 9/24/2024 12:48 PM.   Lianet Diane, [facility]   9/24/2024 12:48 PM

## 2024-09-24 NOTE — H&P
V2.0  History and Physical      Name:  Radha Gomez /Age/Sex: 1955  (69 y.o. female)   MRN & CSN:  [id number] & [id number] Encounter Date/Time: 2024 1:53 PM EDT   Location:  ED19/ED-19 PCP: Luis Buenrostro MD       Hospital Day: 1    Assessment and Plan:   Radha Gomez is a 69 y.o. female who presents with COPD exacerbation (HCC)    Hospital Problems             Last Modified POA    * (Principal) COPD exacerbation (HCC) 2024 Yes     # COPD exacerbation  # Acute hypoxic respiratory failure 2/2 COPD exacerbation  -progressive shortness of breath at rest which worsens with movement  -Exam with mild expiratory wheezing  -Chest x-ray with mild to moderate interstitial pulmonary edema  -CTA chest without PE  -Received Solu-Medrol and DuoNebs in the ED  Echo ordered due to pulmonary edema and right lower extremity edema  Right lower extremity venous duplex ordered  Azithromycin 500 mg x 3 days  Prednisone 40 mg daily x 5 days  DuoNebs every 4 hours while awake  Telemetry monitoring  Titrate oxygen to SpO2 of 88 to 92%, wean as tolerated  Will give 1 dose of IV Lasix due to pulmonary edema    # Hypertension  Continue amlodipine, carvedilol    # Seropositive rheumatoid arthritis  -Uses Humira every 14 days    # Advanced liver fibrosis  -Follows with GI, being treated as cirrhosis    Discussed CODE STATUS with patient, would like to be full code.  Disposition:   Current Living situation: Home  Expected Disposition: Home  Estimated D/C: 3 days    Diet No diet orders on file   DVT Prophylaxis [x] Lovenox, []  Heparin, [] SCDs, [] Ambulation,  [] Eliquis, [] Xarelto, [] Coumadin   Code Status No Order   Surrogate Decision Maker/ POA Spouse     Personally reviewed Lab Studies and Imaging     Discussed management of the case with ED provider who recommended admission    Imaging that was interpreted personally includes chest x-ray and results pulmonary edema    Drugs that require monitoring for toxicity

## 2024-09-25 ENCOUNTER — TELEPHONE (OUTPATIENT)
Dept: RHEUMATOLOGY | Age: 69
End: 2024-09-25

## 2024-09-25 ENCOUNTER — APPOINTMENT (OUTPATIENT)
Dept: NON INVASIVE DIAGNOSTICS | Age: 69
End: 2024-09-25
Attending: INTERNAL MEDICINE
Payer: MEDICARE

## 2024-09-25 DIAGNOSIS — M05.79 RHEUMATOID ARTHRITIS INVOLVING MULTIPLE SITES WITH POSITIVE RHEUMATOID FACTOR (HCC): ICD-10-CM

## 2024-09-25 LAB
ECHO AO ROOT DIAM: 2.6 CM
ECHO AO ROOT INDEX: 1.41 CM/M2
ECHO AR MAX VEL PISA: 3.8 M/S
ECHO AV AREA PEAK VELOCITY: 2.4 CM2
ECHO AV AREA VTI: 2.8 CM2
ECHO AV AREA/BSA PEAK VELOCITY: 1.3 CM2/M2
ECHO AV AREA/BSA VTI: 1.5 CM2/M2
ECHO AV MEAN GRADIENT: 8 MMHG
ECHO AV MEAN VELOCITY: 1.3 M/S
ECHO AV PEAK GRADIENT: 12 MMHG
ECHO AV PEAK VELOCITY: 1.8 M/S
ECHO AV REGURGITANT PHT: 197 MS
ECHO AV VELOCITY RATIO: 0.78
ECHO AV VTI: 38.1 CM
ECHO BSA: 1.86 M2
ECHO EST RA PRESSURE: 15 MMHG
ECHO IVC PROX: 2.3 CM
ECHO LA AREA 4C: 15.4 CM2
ECHO LA DIAMETER INDEX: 1.85 CM/M2
ECHO LA DIAMETER: 3.4 CM
ECHO LA MAJOR AXIS: 5 CM
ECHO LA TO AORTIC ROOT RATIO: 1.31
ECHO LA VOL MOD A4C: 38 ML (ref 22–52)
ECHO LA VOLUME INDEX MOD A4C: 21 ML/M2 (ref 16–34)
ECHO LV E' LATERAL VELOCITY: 9.1 CM/S
ECHO LV E' SEPTAL VELOCITY: 9 CM/S
ECHO LV EDV A4C: 41 ML
ECHO LV EDV INDEX A4C: 22 ML/M2
ECHO LV EF PHYSICIAN: 55 %
ECHO LV EJECTION FRACTION A4C: 62 %
ECHO LV ESV A4C: 16 ML
ECHO LV ESV INDEX A4C: 9 ML/M2
ECHO LV FRACTIONAL SHORTENING: 39 % (ref 28–44)
ECHO LV INTERNAL DIMENSION DIASTOLE INDEX: 2.23 CM/M2
ECHO LV INTERNAL DIMENSION DIASTOLIC: 4.1 CM (ref 3.9–5.3)
ECHO LV INTERNAL DIMENSION SYSTOLIC INDEX: 1.36 CM/M2
ECHO LV INTERNAL DIMENSION SYSTOLIC: 2.5 CM
ECHO LV IVSD: 1 CM (ref 0.6–0.9)
ECHO LV MASS 2D: 132.1 G (ref 67–162)
ECHO LV MASS INDEX 2D: 71.8 G/M2 (ref 43–95)
ECHO LV POSTERIOR WALL DIASTOLIC: 1 CM (ref 0.6–0.9)
ECHO LV RELATIVE WALL THICKNESS RATIO: 0.49
ECHO LVOT AREA: 3.1 CM2
ECHO LVOT AV VTI INDEX: 0.88
ECHO LVOT DIAM: 2 CM
ECHO LVOT MEAN GRADIENT: 5 MMHG
ECHO LVOT PEAK GRADIENT: 7 MMHG
ECHO LVOT PEAK VELOCITY: 1.4 M/S
ECHO LVOT STROKE VOLUME INDEX: 57 ML/M2
ECHO LVOT SV: 104.9 ML
ECHO LVOT VTI: 33.4 CM
ECHO MV A VELOCITY: 1.18 M/S
ECHO MV E DECELERATION TIME (DT): 211 MS
ECHO MV E VELOCITY: 1.4 M/S
ECHO MV E/A RATIO: 1.19
ECHO MV E/E' LATERAL: 15.38
ECHO MV E/E' RATIO (AVERAGED): 15.47
ECHO MV E/E' SEPTAL: 15.56
ECHO RIGHT VENTRICULAR SYSTOLIC PRESSURE (RVSP): 67 MMHG
ECHO RV MID DIMENSION: 3.1 CM
ECHO TV REGURGITANT MAX VELOCITY: 3.59 M/S
ECHO TV REGURGITANT PEAK GRADIENT: 52 MMHG

## 2024-09-25 PROCEDURE — 2700000000 HC OXYGEN THERAPY PER DAY

## 2024-09-25 PROCEDURE — 2580000003 HC RX 258: Performed by: INTERNAL MEDICINE

## 2024-09-25 PROCEDURE — 1200000000 HC SEMI PRIVATE

## 2024-09-25 PROCEDURE — 6370000000 HC RX 637 (ALT 250 FOR IP): Performed by: INTERNAL MEDICINE

## 2024-09-25 PROCEDURE — 2580000003 HC RX 258: Performed by: STUDENT IN AN ORGANIZED HEALTH CARE EDUCATION/TRAINING PROGRAM

## 2024-09-25 PROCEDURE — 93306 TTE W/DOPPLER COMPLETE: CPT | Performed by: INTERNAL MEDICINE

## 2024-09-25 PROCEDURE — 6360000002 HC RX W HCPCS: Performed by: STUDENT IN AN ORGANIZED HEALTH CARE EDUCATION/TRAINING PROGRAM

## 2024-09-25 PROCEDURE — 94640 AIRWAY INHALATION TREATMENT: CPT

## 2024-09-25 PROCEDURE — 94761 N-INVAS EAR/PLS OXIMETRY MLT: CPT

## 2024-09-25 PROCEDURE — 6360000002 HC RX W HCPCS: Performed by: INTERNAL MEDICINE

## 2024-09-25 PROCEDURE — 93306 TTE W/DOPPLER COMPLETE: CPT

## 2024-09-25 RX ORDER — ADALIMUMAB 40MG/0.4ML
40 KIT SUBCUTANEOUS
Qty: 2 EACH | Refills: 0 | Status: CANCELLED | OUTPATIENT
Start: 2024-09-25

## 2024-09-25 RX ORDER — PREDNISONE 20 MG/1
40 TABLET ORAL DAILY
Status: DISCONTINUED | OUTPATIENT
Start: 2024-09-26 | End: 2024-09-26 | Stop reason: HOSPADM

## 2024-09-25 RX ADMIN — CARVEDILOL 25 MG: 25 TABLET, FILM COATED ORAL at 09:32

## 2024-09-25 RX ADMIN — WATER 1000 MG: 1 INJECTION INTRAMUSCULAR; INTRAVENOUS; SUBCUTANEOUS at 16:30

## 2024-09-25 RX ADMIN — AMLODIPINE BESYLATE 10 MG: 10 TABLET ORAL at 09:32

## 2024-09-25 RX ADMIN — IPRATROPIUM BROMIDE AND ALBUTEROL SULFATE 1 DOSE: .5; 2.5 SOLUTION RESPIRATORY (INHALATION) at 14:54

## 2024-09-25 RX ADMIN — VITAM B12 500 MCG: 100 TAB at 12:14

## 2024-09-25 RX ADMIN — ENOXAPARIN SODIUM 40 MG: 100 INJECTION SUBCUTANEOUS at 09:33

## 2024-09-25 RX ADMIN — WATER 40 MG: 1 INJECTION INTRAMUSCULAR; INTRAVENOUS; SUBCUTANEOUS at 09:33

## 2024-09-25 RX ADMIN — CARVEDILOL 25 MG: 25 TABLET, FILM COATED ORAL at 21:43

## 2024-09-25 RX ADMIN — WATER 40 MG: 1 INJECTION INTRAMUSCULAR; INTRAVENOUS; SUBCUTANEOUS at 21:43

## 2024-09-25 RX ADMIN — IPRATROPIUM BROMIDE AND ALBUTEROL SULFATE 1 DOSE: .5; 2.5 SOLUTION RESPIRATORY (INHALATION) at 22:44

## 2024-09-25 RX ADMIN — IPRATROPIUM BROMIDE AND ALBUTEROL SULFATE 1 DOSE: .5; 2.5 SOLUTION RESPIRATORY (INHALATION) at 07:01

## 2024-09-25 RX ADMIN — AZITHROMYCIN DIHYDRATE 500 MG: 250 TABLET ORAL at 09:32

## 2024-09-25 RX ADMIN — CALCIUM 500 MG: 500 TABLET ORAL at 09:33

## 2024-09-25 RX ADMIN — ASPIRIN 81 MG: 81 TABLET, CHEWABLE ORAL at 09:32

## 2024-09-25 RX ADMIN — IPRATROPIUM BROMIDE AND ALBUTEROL SULFATE 1 DOSE: .5; 2.5 SOLUTION RESPIRATORY (INHALATION) at 11:18

## 2024-09-25 RX ADMIN — Medication 1000 UNITS: at 09:32

## 2024-09-25 RX ADMIN — SODIUM CHLORIDE, PRESERVATIVE FREE 10 ML: 5 INJECTION INTRAVENOUS at 09:34

## 2024-09-25 ASSESSMENT — PAIN SCALES - GENERAL
PAINLEVEL_OUTOF10: 0
PAINLEVEL_OUTOF10: 0

## 2024-09-25 NOTE — CARE COORDINATION
Chart reviewed and met w/ patient to initiate discharge planning. CM introduced self and explained role. Patient is from home, independent PTA, has PCP and insurance. Patient plans to return home w/  and declined any needs from CM at this time. CM remains available should needs present.       09/25/24 1152   Service Assessment   Patient Orientation Alert and Oriented   Cognition Alert   History Provided By Patient   Primary Caregiver Self   Support Systems Spouse/Significant Other   Patient's Healthcare Decision Maker is: Legal Next of Kin   PCP Verified by CM Yes   Last Visit to PCP Within last 3 months   Prior Functional Level Independent in ADLs/IADLs   Current Functional Level Independent in ADLs/IADLs   Can patient return to prior living arrangement Yes   Ability to make needs known: Good   Family able to assist with home care needs: Yes   Financial Resources Medicare   Community Resources None   Social/Functional History   Lives With Spouse   Type of Home House   Home Equipment None   ADL Assistance Independent   Ambulation Assistance Independent   Transfer Assistance Independent   Discharge Planning   Type of Residence House   Living Arrangements Spouse/Significant Other   Current Services Prior To Admission None   DME Ordered? No   Potential Assistance Purchasing Medications No   Type of Home Care Services None   Patient expects to be discharged to: Unknown   Condition of Participation: Discharge Planning   The Patient and/or Patient Representative was provided with a Choice of Provider? Patient   The Patient and/Or Patient Representative agree with the Discharge Plan? Yes

## 2024-09-25 NOTE — PROGRESS NOTES
V2.0  Progress Note      Name:  Radha Gomez /Age/Sex: 1955  (69 y.o. female)   MRN & CSN:  [id number] & [id number] Encounter Date/Time: 2024 1:53 PM EDT   Location:  [address] [place] [address]- PCP: Luis Buenrostro MD       Hospital Day: 2    Assessment and Plan:   Radha Gomez is a 69 y.o. female who presents with COPD exacerbation (HCC)    Hospital Problems             Last Modified POA    * (Principal) COPD exacerbation (HCC) 2024 Yes     # COPD exacerbation  # Acute hypoxic respiratory failure 2/2 COPD exacerbation  -progressive shortness of breath at rest which worsens with movement  -Exam with mild expiratory wheezing  -Chest x-ray with mild to moderate interstitial pulmonary edema  -CTA chest without PE  -Received Solu-Medrol and DuoNebs in the ED  Echo ordered due to pulmonary edema and right lower extremity edema  Right lower extremity venous duplex ordered  Azithromycin 500 mg x 3 days. Rocephin 1g daily x 5 days   Continue solumedrol for today - followed by Prednisone 40 mg daily x 5 days  DuoNebs every 4 hours while awake  Telemetry monitoring  Titrate oxygen to SpO2 of 88 to 92%, wean as tolerated  S/p once IV Lasix due to pulmonary edema  Currently needing 5 L    # Hypertension  Continue amlodipine, carvedilol    # Seropositive rheumatoid arthritis  -Uses Humira every 14 days    # Advanced liver fibrosis  -Follows with GI, being treated as cirrhosis    Discussed CODE STATUS with patient, would like to be full code.  Disposition:   Current Living situation: Home  Expected Disposition: Home  Estimated D/C: 3 days    Diet ADULT DIET; Regular   DVT Prophylaxis [x] Lovenox, []  Heparin, [] SCDs, [] Ambulation,  [] Eliquis, [] Xarelto, [] Coumadin   Code Status Full Code   Surrogate Decision Maker/ POA Spouse     Personally reviewed Lab Studies and Imaging     Discussed management of the case with ED provider who recommended admission    Imaging that was interpreted personally includes chest

## 2024-09-25 NOTE — TELEPHONE ENCOUNTER
Patient called me while patient is admitted into Hospital. Pt advised her Humira Pen had malfunctioned and is needing a refill sent over to [facility]. Please advise       Medication:   Requested Prescriptions     Pending Prescriptions Disp Refills    adalimumab (HUMIRA, 2 PEN,) 40 MG/0.4ML PNKT 2 each 0     Sig: Inject 40 mg into the skin every 14 days       Last Filled:      Patient Phone Number: [phone] (home)     Last appt: 8/8/2024   Next appt: 10/8/2024    Last Labs DM: No results found for: \"LABA1C\"  Last Lipid:   Lab Results   Component Value Date/Time    CHOL 210 03/29/2022 07:32 AM    TRIG 99 03/29/2022 07:32 AM    HDL 96 03/29/2022 07:32 AM     Last PSA: No results found for: \"PSA\"  Last Thyroid: No results found for: \"TSH\", \"FT3\", \"X1LHHQE\", \"T4FREE\"

## 2024-09-26 VITALS
DIASTOLIC BLOOD PRESSURE: 49 MMHG | BODY MASS INDEX: 25.27 KG/M2 | OXYGEN SATURATION: 92 % | WEIGHT: 161 LBS | HEART RATE: 76 BPM | TEMPERATURE: 97.9 F | RESPIRATION RATE: 20 BRPM | HEIGHT: 67 IN | SYSTOLIC BLOOD PRESSURE: 150 MMHG

## 2024-09-26 DIAGNOSIS — M05.79 RHEUMATOID ARTHRITIS INVOLVING MULTIPLE SITES WITH POSITIVE RHEUMATOID FACTOR (HCC): ICD-10-CM

## 2024-09-26 LAB
ANION GAP SERPL CALCULATED.3IONS-SCNC: 12 MMOL/L (ref 9–17)
BASOPHILS # BLD: 0.01 K/UL
BASOPHILS NFR BLD: 0 % (ref 0–1)
BUN SERPL-MCNC: 17 MG/DL (ref 7–20)
CALCIUM SERPL-MCNC: 9.7 MG/DL (ref 8.3–10.6)
CHLORIDE SERPL-SCNC: 107 MMOL/L (ref 99–110)
CO2 SERPL-SCNC: 21 MMOL/L (ref 21–32)
CREAT SERPL-MCNC: 1 MG/DL (ref 0.6–1.2)
EOSINOPHIL # BLD: 0 K/UL
EOSINOPHILS RELATIVE PERCENT: 0 % (ref 0–3)
ERYTHROCYTE [DISTWIDTH] IN BLOOD BY AUTOMATED COUNT: 13.5 % (ref 11.7–14.9)
GFR, ESTIMATED: 57 ML/MIN/1.73M2
GLUCOSE SERPL-MCNC: 143 MG/DL (ref 74–99)
HCT VFR BLD AUTO: 36.4 % (ref 37–47)
HGB BLD-MCNC: 11.8 G/DL (ref 12.5–16)
IMM GRANULOCYTES # BLD AUTO: 0.07 K/UL
IMM GRANULOCYTES NFR BLD: 1 %
LYMPHOCYTES NFR BLD: 1.15 K/UL
LYMPHOCYTES RELATIVE PERCENT: 9 % (ref 24–44)
MCH RBC QN AUTO: 28.9 PG (ref 27–31)
MCHC RBC AUTO-ENTMCNC: 32.4 G/DL (ref 32–36)
MCV RBC AUTO: 89 FL (ref 78–100)
MONOCYTES NFR BLD: 0.45 K/UL
MONOCYTES NFR BLD: 4 % (ref 0–4)
NEUTROPHILS NFR BLD: 87 % (ref 36–66)
NEUTS SEG NFR BLD: 10.82 K/UL
PLATELET # BLD AUTO: 180 K/UL (ref 140–440)
PMV BLD AUTO: 11.3 FL (ref 7.5–11.1)
POTASSIUM SERPL-SCNC: 4.3 MMOL/L (ref 3.5–5.1)
RBC # BLD AUTO: 4.09 M/UL (ref 4.2–5.4)
SODIUM SERPL-SCNC: 140 MMOL/L (ref 136–145)
WBC OTHER # BLD: 12.5 K/UL (ref 4–10.5)

## 2024-09-26 PROCEDURE — 2700000000 HC OXYGEN THERAPY PER DAY

## 2024-09-26 PROCEDURE — 36415 COLL VENOUS BLD VENIPUNCTURE: CPT

## 2024-09-26 PROCEDURE — 85025 COMPLETE CBC W/AUTO DIFF WBC: CPT

## 2024-09-26 PROCEDURE — 94640 AIRWAY INHALATION TREATMENT: CPT

## 2024-09-26 PROCEDURE — 6360000002 HC RX W HCPCS: Performed by: STUDENT IN AN ORGANIZED HEALTH CARE EDUCATION/TRAINING PROGRAM

## 2024-09-26 PROCEDURE — 94618 PULMONARY STRESS TESTING: CPT

## 2024-09-26 PROCEDURE — 6370000000 HC RX 637 (ALT 250 FOR IP): Performed by: INTERNAL MEDICINE

## 2024-09-26 PROCEDURE — 2580000003 HC RX 258: Performed by: STUDENT IN AN ORGANIZED HEALTH CARE EDUCATION/TRAINING PROGRAM

## 2024-09-26 PROCEDURE — 80048 BASIC METABOLIC PNL TOTAL CA: CPT

## 2024-09-26 PROCEDURE — 2580000003 HC RX 258: Performed by: INTERNAL MEDICINE

## 2024-09-26 PROCEDURE — 94761 N-INVAS EAR/PLS OXIMETRY MLT: CPT

## 2024-09-26 PROCEDURE — 6370000000 HC RX 637 (ALT 250 FOR IP): Performed by: STUDENT IN AN ORGANIZED HEALTH CARE EDUCATION/TRAINING PROGRAM

## 2024-09-26 PROCEDURE — 6360000002 HC RX W HCPCS: Performed by: INTERNAL MEDICINE

## 2024-09-26 RX ORDER — BUDESONIDE AND FORMOTEROL FUMARATE 160; 4.5 UG/1; UG/1
2 AEROSOL, METERED RESPIRATORY (INHALATION) 2 TIMES DAILY
Qty: 92.7 G | Refills: 1 | Status: SHIPPED | OUTPATIENT
Start: 2024-09-26

## 2024-09-26 RX ORDER — PREDNISONE 20 MG/1
40 TABLET ORAL DAILY
Qty: 8 TABLET | Refills: 0 | Status: SHIPPED | OUTPATIENT
Start: 2024-09-27 | End: 2024-10-01

## 2024-09-26 RX ORDER — AZITHROMYCIN 500 MG/1
500 TABLET, FILM COATED ORAL DAILY
Qty: 3 TABLET | Refills: 0 | Status: SHIPPED | OUTPATIENT
Start: 2024-09-26 | End: 2024-09-29

## 2024-09-26 RX ORDER — CEFDINIR 300 MG/1
300 CAPSULE ORAL 2 TIMES DAILY
Qty: 10 CAPSULE | Refills: 0 | Status: SHIPPED | OUTPATIENT
Start: 2024-09-26 | End: 2024-10-01

## 2024-09-26 RX ORDER — ADALIMUMAB 40MG/0.4ML
40 KIT SUBCUTANEOUS
Qty: 2 EACH | Refills: 0 | Status: ACTIVE | OUTPATIENT
Start: 2024-09-26

## 2024-09-26 RX ADMIN — IPRATROPIUM BROMIDE AND ALBUTEROL SULFATE 1 DOSE: .5; 2.5 SOLUTION RESPIRATORY (INHALATION) at 14:58

## 2024-09-26 RX ADMIN — CARVEDILOL 25 MG: 25 TABLET, FILM COATED ORAL at 08:12

## 2024-09-26 RX ADMIN — ENOXAPARIN SODIUM 40 MG: 100 INJECTION SUBCUTANEOUS at 08:13

## 2024-09-26 RX ADMIN — PREDNISONE 40 MG: 20 TABLET ORAL at 08:12

## 2024-09-26 RX ADMIN — Medication 1000 UNITS: at 08:12

## 2024-09-26 RX ADMIN — IPRATROPIUM BROMIDE AND ALBUTEROL SULFATE 1 DOSE: .5; 2.5 SOLUTION RESPIRATORY (INHALATION) at 11:30

## 2024-09-26 RX ADMIN — AMLODIPINE BESYLATE 10 MG: 10 TABLET ORAL at 08:12

## 2024-09-26 RX ADMIN — VITAM B12 500 MCG: 100 TAB at 08:12

## 2024-09-26 RX ADMIN — ASPIRIN 81 MG: 81 TABLET, CHEWABLE ORAL at 08:13

## 2024-09-26 RX ADMIN — AZITHROMYCIN DIHYDRATE 500 MG: 250 TABLET ORAL at 08:12

## 2024-09-26 RX ADMIN — WATER 40 MG: 1 INJECTION INTRAMUSCULAR; INTRAVENOUS; SUBCUTANEOUS at 08:29

## 2024-09-26 RX ADMIN — SODIUM CHLORIDE, PRESERVATIVE FREE 10 ML: 5 INJECTION INTRAVENOUS at 08:18

## 2024-09-26 RX ADMIN — IPRATROPIUM BROMIDE AND ALBUTEROL SULFATE 1 DOSE: .5; 2.5 SOLUTION RESPIRATORY (INHALATION) at 07:32

## 2024-09-26 RX ADMIN — CALCIUM 500 MG: 500 TABLET ORAL at 08:12

## 2024-09-26 ASSESSMENT — PAIN SCALES - GENERAL: PAINLEVEL_OUTOF10: 0

## 2024-09-26 NOTE — PROGRESS NOTES
Pt oxygen concentrator delivered to patient room. Pt and/or family educated on use of concentrator and expressed understanding with equipment and how to operate. Pt instructed to immediately call [facility] @ [phone] upon arrival @ [place].

## 2024-09-26 NOTE — PROGRESS NOTES
Patient was seen in hospital for COPD  .  I am prescribing oxygen because the diagnosis and testing requires the patient to have oxygen in the home.  Conditions will improve or be benefited by oxygen use.  The patient is able to perform good mobility and therefore requires the use of a portable oxygen system for ambulation.

## 2024-09-26 NOTE — PROGRESS NOTES
Attempted to call  to get information in regards to when patient can be picked up for discharge, reached voicemail, message left asking for a call back.

## 2024-09-26 NOTE — PLAN OF CARE
Problem: Discharge Planning  Goal: Discharge to home or other facility with appropriate resources  9/26/2024 1145 by Navya Chawla LPN  Outcome: Adequate for Discharge  9/26/2024 1034 by Navya Chawla LPN  Outcome: Progressing  Flowsheets (Taken 9/25/2024 2145 by Tika Berry RN)  Discharge to home or other facility with appropriate resources:   Identify barriers to discharge with patient and caregiver   Arrange for needed discharge resources and transportation as appropriate   Identify discharge learning needs (meds, wound care, etc)   Refer to discharge planning if patient needs post-hospital services based on physician order or complex needs related to functional status, cognitive ability or social support system

## 2024-09-26 NOTE — PROGRESS NOTES
9/26/2024 10:08 AM  Patient Room #: 4105/4105-A  Patient Name: Radha Gomez    (Step 1 Done by RN if possible otherwise call [facility])  Place patient on room air at rest for at least 30 minutes.  If patient falls below 88% before 30 minutes then you can record the level and stop.  Record room air saturation level _88_ %.  If patient is at 88% or below, they will qualify for home oxygen and you can stop.  If level does not fall below 88%, fill in level above. If indicated continue to Step 2.   Signature:_Maldonado Del Angel RRT____ Date: _09/26/2024__  (Step 2&3 Done by [facility])  Ambulate patient on room air until saturation falls below 89%.  Record level of room air saturation with ambulation___ %.  Next, place patient back on ___lpm oxygen and ambulate, record level __%.  (Note:  this level must show improvement from room air level done with ambulation.)  If patient’s saturation on room air with ambulation is 88% or below AND patient shows improvement with oxygen during ambulation, they will qualify for home oxygen and you can stop.  If patient does not drop below 89%, then patient should have an overnight oximetry trending on room air to see if level falls below 88%.  Complete level in Step 3 below.    Room air overnight oximetry level 88 % for___  cumulative minutes.  If patient’s room air oxygen level is below <89% for any amount of time they will qualify for nocturnal home oxygen.        (Attach Night Trending Report)    Complete order below: Diagnosis:_COPD___  Home oxygen at:  Length of Need: X? Lifetime ? 3 Months     _2__lpm or __%   via  [x] nasal cannula  []mask  [] other         [x]continuous [x]  with activity  [x]  Nocturnal   [x] Portable Tanks [x]  Concentrator  [x] Conserving Device        Therapist Signature:__Maldonado Del Angel RRT_____     Date:  _09/26/2024__  Physician Signature:  __Electronically Signed in EMR_    Date:___  Physician Printed Name:  ___Juvenal Cuellar MD

## 2024-09-26 NOTE — DISCHARGE SUMMARY
Sclerotic, but non-stenotic aortic valve.   Tricuspid Valve: Moderate regurgitation. The estimated RVSP is 67 mmHg. Suggestive of severe PHTN.   Pericardium: There is evidence of epicardial fat. No pericardial effusion.   Dilated IVC with poor inspiratory collapse.     Vascular duplex lower extremity venous right    Result Date: 9/24/2024  Right lower extremity venous ultrasound INDICATION:  Pain and swelling, COMPARISON:  None Doppler ultrasound of the right lower extremity was performed. FINDINGS:  There is normal flow in the great saphenous, common femoral, femoral, and popliteal veins. Normal compression and augmentation is demonstrated. The proximal calf veins are also patent.     No evidence of deep venous thrombosis in the right lower extremity Electronically signed by Aida Painting    CTA PULMONARY W CONTRAST    Result Date: 9/24/2024  CTA PULMONARY W CONTRAST COMPARISON: 3/25/2024 HISTORY: hypoxic to 82%, progressive SOB  TECHNIQUE: Multiple contiguous 2D axial CT images of the chest were obtained from the lung apices to the lung bases after the intravenous administration of 100mL Iso-beck 370 per pulmonary angiography protocol.  Coronal reconstructions were performed. Radiation dose reduction techniques were used for this study. Our CT scanners use one or all of the following: Automated exposure control, adjustment of the mA and/or kV according to patient size, iterative reconstruction. FINDINGS: STUDY QUALITY: The exam study quality is good. AIRWAYS: The central airways are patent. LUNGS: Diffuse centrilobular emphysema with scattered areas of fibrosis and scarring. 1.1 cm nodule right middle lobe. Biapical scarring. These findings are stable from prior study. PLEURA: Small bilateral pleural effusions. No pneumothorax. HEART: The heart is not enlarged. No calcified coronary atherosclerosis.  No pericardial effusion. THORACIC AORTA: The aorta is normal in caliber. PULMONARY ARTERY: No pulmonary embolus  to the segmental level. The main pulmonary artery is normal in caliber. MEDIASTINUM/DYLON: Scattered mediastinal lymph nodes. Bilateral hilar adenopathy greater on the right. CHEST WALL: No mass or axillary lymphadenopathy. UPPER ABDOMEN: Query cirrhotic liver. BONES: No suspicious osseous lesion.     No acute PE. Stable right middle lobe nodule and right hilar adenopathy. Diffuse fibrotic changes with emphysema and small effusions. Electronically signed by Oscar Salazar    XR CHEST PORTABLE    Result Date: 9/24/2024  Chest X-ray INDICATION:  shortness of breath COMPARISON: Chest x-ray of 3/18/2024. TECHNIQUE: AP/PA view of the chest was obtained.     A mild to moderate degree of interstitial pulmonary edema is seen. A small left pleural effusion is newly seen. No focal pneumonic process is seen. No right pleural effusion is clearly identified. No pneumothorax is evident. The cardiomediastinal silhouette is stable, given differences in technique. Electronically signed by Hill Regan MD      CBC:   Recent Labs     09/24/24  0957 09/26/24  0343   WBC 6.4 12.5*   HGB 12.7 11.8*   PLT  --  180     BMP:    Recent Labs     09/24/24  0957 09/26/24  0343    140   K 4.5 4.3    107   CO2 21 21   BUN 12 17   CREATININE 1.0 1.0   GLUCOSE 96 143*     Hepatic:   Recent Labs     09/24/24  0957   AST 22   ALT 12   BILITOT 0.5   ALKPHOS 89     Lipids:   Lab Results   Component Value Date/Time    CHOL 210 03/29/2022 07:32 AM    HDL 96 03/29/2022 07:32 AM    TRIG 99 03/29/2022 07:32 AM     Hemoglobin A1C: No results found for: \"LABA1C\"  TSH: No results found for: \"TSH\"  Troponin: No results found for: \"TROPONINT\"  Lactic Acid: No results for input(s): \"LACTA\" in the last 72 hours.  BNP:   Recent Labs     09/24/24  0957   PROBNP 685*     UA:  Lab Results   Component Value Date/Time    NITRU NEGATIVE 12/29/2020 12:00 PM    COLORU STRAW 12/29/2020 12:00 PM    PHUR 6.0 12/29/2020 12:00 PM    WBCUA <1 12/29/2020 12:00 PM    RBCUA 1

## 2024-09-26 NOTE — PROGRESS NOTES
Pt home O2 paperwork faxed to [facility]. Please do not discharge pt without oxygen. This testing will be good for 48 hours and will have to be repeated if pt has not discharged prior to then. If portable oxygen concentrator or tank has not been delivered prior to pt being ready to leave, please call PFT @ [phone] or Respiratory Therapy @ [phone]. Thanks.

## 2024-09-26 NOTE — PLAN OF CARE
Problem: Discharge Planning  Goal: Discharge to home or other facility with appropriate resources  Outcome: Progressing  Flowsheets (Taken 9/25/2024 2145 by Tika Berry, RN)  Discharge to home or other facility with appropriate resources:   Identify barriers to discharge with patient and caregiver   Arrange for needed discharge resources and transportation as appropriate   Identify discharge learning needs (meds, wound care, etc)   Refer to discharge planning if patient needs post-hospital services based on physician order or complex needs related to functional status, cognitive ability or social support system

## 2024-09-26 NOTE — DISCHARGE INSTRUCTIONS
COPD and Asthma: Care Instructions  Overview     Some people who have chronic obstructive pulmonary disease (COPD) may also have asthma. With both of these conditions, air doesn't flow easily in and out of your lungs. This can make it hard to breathe. You may be short of breath, wheeze, cough, or have mucus in your airways.  When you have COPD and asthma, it's important to follow your treatment plan.  There are two parts to your treatment:  Controlling COPD and asthma over the long term.  Treating attacks or flare-ups when they occur.  You and your doctor can make a plan that will help. This plan tells you the medicines you take to manage your symptoms and prevent attacks or flare-ups. It also tells you what to do if you have an attack or flare-up.  Follow-up care is a key part of your treatment and safety. Be sure to make and go to all appointments, and call your doctor if you are having problems. It's also a good idea to know your test results and keep a list of the medicines you take.  How can you care for yourself at home?  To control COPD and asthma  If you smoke, try to quit or cut back as much as you can. Not smoking can help your lungs work better and help you feel better. It can also slow the progress of COPD. If you need help quitting, talk to your doctor about stop-smoking programs and medicines. These can increase your chances of quitting for good.  Try to learn what sets off your COPD and asthma. Avoid these triggers when you can. Common triggers include infections like COVID-19, colds, the flu, or pneumonia. Other triggers include smoke, pollen, and air pollution.  Check yourself for symptoms to know which step to follow in your action plan. Watch for things like being short of breath, having chest tightness, and coughing more than usual. Look for a change in the color or thickness of your mucus. Also notice if symptoms wake you up at night or if you get tired quickly when you are active or when

## 2024-09-27 ENCOUNTER — CARE COORDINATION (OUTPATIENT)
Dept: CASE MANAGEMENT | Age: 69
End: 2024-09-27

## 2024-09-27 DIAGNOSIS — J44.1 COPD EXACERBATION (HCC): Primary | ICD-10-CM

## 2024-09-27 PROCEDURE — 1111F DSCHRG MED/CURRENT MED MERGE: CPT | Performed by: GENERAL PRACTICE

## 2024-10-01 ENCOUNTER — OFFICE VISIT (OUTPATIENT)
Dept: PULMONOLOGY | Age: 69
End: 2024-10-01
Payer: MEDICARE

## 2024-10-01 ENCOUNTER — CARE COORDINATION (OUTPATIENT)
Dept: CARE COORDINATION | Age: 69
End: 2024-10-01

## 2024-10-01 VITALS
BODY MASS INDEX: 25.49 KG/M2 | HEART RATE: 72 BPM | HEIGHT: 67 IN | WEIGHT: 162.4 LBS | DIASTOLIC BLOOD PRESSURE: 72 MMHG | SYSTOLIC BLOOD PRESSURE: 122 MMHG

## 2024-10-01 DIAGNOSIS — J84.10 PULMONARY FIBROSIS (HCC): ICD-10-CM

## 2024-10-01 DIAGNOSIS — J44.9 CHRONIC OBSTRUCTIVE PULMONARY DISEASE, UNSPECIFIED COPD TYPE (HCC): ICD-10-CM

## 2024-10-01 DIAGNOSIS — J44.9 CHRONIC OBSTRUCTIVE PULMONARY DISEASE WITH HYPOXIA (HCC): Primary | ICD-10-CM

## 2024-10-01 DIAGNOSIS — F17.200 TOBACCO DEPENDENCE: ICD-10-CM

## 2024-10-01 DIAGNOSIS — Z09 HOSPITAL DISCHARGE FOLLOW-UP: ICD-10-CM

## 2024-10-01 DIAGNOSIS — J44.1 COPD EXACERBATION (HCC): ICD-10-CM

## 2024-10-01 PROCEDURE — 1123F ACP DISCUSS/DSCN MKR DOCD: CPT | Performed by: NURSE PRACTITIONER

## 2024-10-01 PROCEDURE — 99204 OFFICE O/P NEW MOD 45 MIN: CPT | Performed by: NURSE PRACTITIONER

## 2024-10-01 PROCEDURE — 3074F SYST BP LT 130 MM HG: CPT | Performed by: NURSE PRACTITIONER

## 2024-10-01 PROCEDURE — 3078F DIAST BP <80 MM HG: CPT | Performed by: NURSE PRACTITIONER

## 2024-10-01 RX ORDER — BUDESONIDE AND FORMOTEROL FUMARATE DIHYDRATE 160; 4.5 UG/1; UG/1
2 AEROSOL RESPIRATORY (INHALATION) 2 TIMES DAILY
Qty: 92.7 G | Refills: 5 | Status: SHIPPED | OUTPATIENT
Start: 2024-10-01

## 2024-10-01 ASSESSMENT — SLEEP AND FATIGUE QUESTIONNAIRES
HOW LIKELY ARE YOU TO NOD OFF OR FALL ASLEEP WHEN YOU ARE A PASSENGER IN A CAR FOR AN HOUR WITHOUT A BREAK: WOULD NEVER DOZE
ESS TOTAL SCORE: 4
HOW LIKELY ARE YOU TO NOD OFF OR FALL ASLEEP WHILE SITTING AND TALKING TO SOMEONE: WOULD NEVER DOZE
HOW LIKELY ARE YOU TO NOD OFF OR FALL ASLEEP WHILE SITTING INACTIVE IN A PUBLIC PLACE: WOULD NEVER DOZE
HOW LIKELY ARE YOU TO NOD OFF OR FALL ASLEEP WHILE LYING DOWN TO REST IN THE AFTERNOON WHEN CIRCUMSTANCES PERMIT: MODERATE CHANCE OF DOZING
HOW LIKELY ARE YOU TO NOD OFF OR FALL ASLEEP WHILE WATCHING TV: SLIGHT CHANCE OF DOZING
HOW LIKELY ARE YOU TO NOD OFF OR FALL ASLEEP WHILE SITTING QUIETLY AFTER LUNCH WITHOUT ALCOHOL: WOULD NEVER DOZE
HOW LIKELY ARE YOU TO NOD OFF OR FALL ASLEEP IN A CAR, WHILE STOPPED FOR A FEW MINUTES IN TRAFFIC: WOULD NEVER DOZE
HOW LIKELY ARE YOU TO NOD OFF OR FALL ASLEEP WHILE SITTING AND READING: SLIGHT CHANCE OF DOZING

## 2024-10-01 NOTE — PROGRESS NOTES
Radha Gomze (:  1955) is a 69 y.o. female,New patient, here for evaluation of the following chief complaint(s):  Follow-Up from Hospital    Subjective   SUBJECTIVE/OBJECTIVE:  Radha Gomez 68 yo female for an after visit discharge from hospital. She was seen for trouble breathing due to possible blood clot. Chest xray showed COPD and was sent home with oxygen. She reports quitting smoking in May cigarette 2014 and took up vaping to help with nicotine addiction. She stopped vaping two week ago.  She stopped using her oxygen because she felt like doesn't need oxygen at night or during the day. Has been checking her oxygen level and it has been in the low 80s. She was unaware that it means she requires oxygen. Oxygen saturation is 87% at rest on with HR at 63. She wants to go back to work. Cannot take the big tank with her.  She works for [employer] in the [employer] department  4 hours a week.      A mild to moderate degree of interstitial pulmonary edema and a small left pleural effusion is newly seen on chest xray on 2024. I would like to wait for results of her PFT before releasing her back to work due to results of her chest xray. I am also planning to tranfers are to Dr. TEODORA Choudhary for further evaluation of ILD/pulmonary fibrosis. Sh has been advised to continue using oxygen on 2 lpm.     Dischsarge with oxygen. OCD assessment ordered.     Review of Systems   Constitutional:  Positive for fatigue.   Respiratory:  Positive for shortness of breath and wheezing.    Neurological:  Positive for weakness.   Psychiatric/Behavioral:  Positive for confusion and sleep disturbance. The patient is nervous/anxious.         Objective   Physical Exam  Constitutional:       General: She is awake.      Appearance: Normal appearance. She is well-developed.   HENT:      Mouth/Throat:      Mouth: Mucous membranes are moist.      Pharynx: Oropharynx is clear.   Eyes:      Extraocular Movements: Extraocular movements

## 2024-10-01 NOTE — CARE COORDINATION
Advance Care Planning Note      Date: 10/1/2024    Patient Current Location:   did not answer    ACP Specialist:  TEE Bender    Date Referral Received:9/27/24    Initial Outreach:     Attempted outreach call to patient in follow-up to referral from: Care Transition Nurse Tika Schaffer  requesting assistance with new advance directive completion. Unable to reach patient.    Outreach Attempts:   Mychart message sent.  and VM message left introducing self, requesting return call to discuss ACP.     Follow Up:   Plan for next outreach in 6 days.        Thank you for this referral.

## 2024-10-02 ENCOUNTER — CARE COORDINATION (OUTPATIENT)
Dept: CASE MANAGEMENT | Age: 69
End: 2024-10-02

## 2024-10-02 PROBLEM — Z09 HOSPITAL DISCHARGE FOLLOW-UP: Status: ACTIVE | Noted: 2024-10-02

## 2024-10-02 ASSESSMENT — ENCOUNTER SYMPTOMS
WHEEZING: 1
SHORTNESS OF BREATH: 1

## 2024-10-02 NOTE — ASSESSMENT & PLAN NOTE
Advised to continue use of oxygen on 2 lpm.   SpO2 at 87% today at rest on room air.   OCD assessment ordered for portable oxygen.

## 2024-10-02 NOTE — CARE COORDINATION
Care Transitions Note    Follow Up Call     Patient Current Location:  Home: [facility] [address]  [facility] [address]    N Care Coordinator contacted the patient by telephone. Verified name and  as identifiers.    Additional needs identified to be addressed with provider   No needs identified                 Method of communication with provider: none.    Care Summary Note: LPN CC spoke with patient. States she's \"not too bad at all.\" SPO2 88-90% on 2 lpm NC continuous. States she was instructed yesterday by pulm to use it . Almost at 2 weeks of no vaping. LPN CC praised patient for her efforts. She does have some cravings, but states she knows she can't vape because of her health. Does not monitor BP routinely. Does not use salt at the table, but does not follow a low sodium diet strictly. Appetite good. Denies problems with bowels or bladder. Completed atbx & prednisone. Patient has been unable to get Spiriva at [facility] d/t supply-chain issues. LPN CC encouraged patient to check around at other pharmacies to see if it is available for transfer. Patient was grateful as she was unaware she could do this. Denies needs.     Thank you,   Shala Saleem, LPN Care Coordinator   [facility]  Remote Patient Monitoring, Care Transitions, Ambulatory Care Management  [phone]    Plan of care updates since last contact:  Review of patient management of conditions/medications:       Advance Care Planning:   Does patient have an Advance Directive: reviewed and current.    Medication Review:  No changes since last call.     Remote Patient Monitoring:  Offered patient enrollment in the Remote Patient Monitoring (RPM) program for in-home monitoring: Yes, but did not enroll at this time: already monitoring with home equipment.    Assessments:  Care Transitions Subsequent and Final Call    Subsequent and Final Calls  Do you have any ongoing symptoms?: No  Have your medications changed?: No  Do you have any

## 2024-10-05 NOTE — PROGRESS NOTES
618 (H)  15 - 150 NG/ML Final    GGT 05/28/2024 85 (H)  5 - 36 IU/L Final    Hep A IgM 05/28/2024 NON REACTIVE  NON REACTIVE Final    Hep A Total Ab 05/28/2024 Negative  Negative Final    Protime 05/28/2024 16.4 (H)  11.7 - 14.5 SECONDS Final    INR 05/28/2024 1.3  INDEX Final    MSAFP 05/28/2024 3  0 - 9 ng/mL Final    Anti-Mitochon Titer 05/28/2024 22.3  0.0 - 24.9 Units Final    Test Name 05/28/2024 Liver Kidney Microsome 1 Antibody, IgG   Final    Result 1 05/28/2024 SEE NOTE   Final    SMOOTH MUSCLE AB IgG TITER 05/28/2024 1:40 (A)  <1:20 Final       Assessment   Patient is a pleasant 68 yo female with RA. Prednisone improved joint stiffness, pain and swelling.  Plaquenil did not help joint pain.  PET scan was concerning for possible cirrhosis of the liver,LFTs are normal. Today humira is significantly helping. We will continue.     Rheumatoid arthritis involving multiple sites with positive rheumatoid factor (HCC)  -     adalimumab (HUMIRA, 2 PEN,) 40 MG/0.4ML PNKT; Inject 40 mg into the skin every 14 days    Osteopenia of multiple sites  There has been a DECREASE of 7.7% in the bone mineral density of the total hip since the prior exam date listed above.  Major osteoporotic fracture: 5.8%  Hip fracture: 0.9%        -     continue daily vit D and calcium       High risk medication use  HUMIRA  I reviewed: increased risk of infections, malignancy including lymphoma, skin cancer, demyelinating disorders, TB, fungal infections, hepatitis B reactivation, immunosuppression, lupus-like syndrome, cytopenias, liver disorders, CHF onset, exacerbation.   - Edu re: side effects injection site reaction and management, instructed to rotate injection sites at least 1 inch apart, no live vaccines.    -     CBC; Future  -     Comprehensive Metabolic Panel; Future      Encounter for tobacco use cessation counseling  The patient was asked about their smoking history. The patient was advised to quit. Risks and benefits were

## 2024-10-07 ENCOUNTER — CARE COORDINATION (OUTPATIENT)
Dept: CARE COORDINATION | Age: 69
End: 2024-10-07

## 2024-10-07 NOTE — TELEPHONE ENCOUNTER
Spoke with patient regarding provider last note to encounter. Patient was being discharged on 9/26 and was being sent home with Antibiotics for a few days and still completed her Humira injection on 10/1 after receiving her refills on 09/26. Pt will be in 10/8.

## 2024-10-07 NOTE — CARE COORDINATION
Advance Care Planning Note      Date: 10/7/2024    Patient Current Location:  Home: [facility] [address]  [place] [address]    ACP Specialist:  TEE Bender    Date Referral Received:9/27/24    Second Outreach:     Outreach call to patient in follow-up to ACP Specialist.   SW did discuss option of completing via [employer]. SW discussed plan to mail out copy to review and SW will follow up on 10/24/24. Pt was in agreement and will consider option of completing via docusign.     Next Step:    Mailed Information Sheets  Mailed Advance Directive  Outreach again in 10/24/24        Thank you for this referral.

## 2024-10-08 ENCOUNTER — OFFICE VISIT (OUTPATIENT)
Dept: RHEUMATOLOGY | Age: 69
End: 2024-10-08
Payer: MEDICARE

## 2024-10-08 VITALS
OXYGEN SATURATION: 85 % | WEIGHT: 167.2 LBS | DIASTOLIC BLOOD PRESSURE: 50 MMHG | BODY MASS INDEX: 26.24 KG/M2 | SYSTOLIC BLOOD PRESSURE: 122 MMHG | HEART RATE: 65 BPM | HEIGHT: 67 IN

## 2024-10-08 DIAGNOSIS — Z79.52 CURRENT CHRONIC USE OF SYSTEMIC STEROIDS: ICD-10-CM

## 2024-10-08 DIAGNOSIS — Z71.6 ENCOUNTER FOR TOBACCO USE CESSATION COUNSELING: ICD-10-CM

## 2024-10-08 DIAGNOSIS — Z79.899 HIGH RISK MEDICATION USE: ICD-10-CM

## 2024-10-08 DIAGNOSIS — M05.79 RHEUMATOID ARTHRITIS INVOLVING MULTIPLE SITES WITH POSITIVE RHEUMATOID FACTOR (HCC): Primary | ICD-10-CM

## 2024-10-08 DIAGNOSIS — M85.89 OSTEOPENIA OF MULTIPLE SITES: ICD-10-CM

## 2024-10-08 PROCEDURE — 99214 OFFICE O/P EST MOD 30 MIN: CPT | Performed by: STUDENT IN AN ORGANIZED HEALTH CARE EDUCATION/TRAINING PROGRAM

## 2024-10-08 PROCEDURE — 1123F ACP DISCUSS/DSCN MKR DOCD: CPT | Performed by: STUDENT IN AN ORGANIZED HEALTH CARE EDUCATION/TRAINING PROGRAM

## 2024-10-08 PROCEDURE — 3074F SYST BP LT 130 MM HG: CPT | Performed by: STUDENT IN AN ORGANIZED HEALTH CARE EDUCATION/TRAINING PROGRAM

## 2024-10-08 PROCEDURE — 3078F DIAST BP <80 MM HG: CPT | Performed by: STUDENT IN AN ORGANIZED HEALTH CARE EDUCATION/TRAINING PROGRAM

## 2024-10-10 ENCOUNTER — CARE COORDINATION (OUTPATIENT)
Dept: CASE MANAGEMENT | Age: 69
End: 2024-10-10

## 2024-10-10 ENCOUNTER — TELEPHONE (OUTPATIENT)
Dept: RHEUMATOLOGY | Age: 69
End: 2024-10-10

## 2024-10-10 DIAGNOSIS — M05.79 RHEUMATOID ARTHRITIS INVOLVING MULTIPLE SITES WITH POSITIVE RHEUMATOID FACTOR (HCC): ICD-10-CM

## 2024-10-10 RX ORDER — ADALIMUMAB 40MG/0.4ML
40 KIT SUBCUTANEOUS
Qty: 2 EACH | Refills: 2 | Status: ACTIVE | OUTPATIENT
Start: 2024-10-10

## 2024-10-10 NOTE — TELEPHONE ENCOUNTER
Pt states she is needing a referral for her eye exam, pt states she needs one by the end of the year for her insurance. States she does go to [facility] eye surgeons.

## 2024-10-10 NOTE — CARE COORDINATION
scheduled   Future Appointments         Provider Specialty Dept Phone    10/14/2024 1:00 PM CARDIOPULMONARY PFT ROOM - [facility] [phone]    10/16/2024 11:15 AM Ema Eng APRN - [facility] [phone]    11/11/2024 9:45 AM Nguyen Adkins APRN - CNP Pulmonology [phone]    1/8/2025 11:30 AM Chrissy Randolph MD Rheumatology [phone]            LPN Care Coordinator provided contact information.  Plan for follow-up call in 6-10 days based on severity of symptoms and risk factors.  Plan for next call: symptom management-   self management-       Cassi Constantino LPN

## 2024-10-14 ENCOUNTER — HOSPITAL ENCOUNTER (OUTPATIENT)
Dept: CARDIAC REHAB | Age: 69
Discharge: HOME | End: 2024-10-14
Payer: MEDICARE

## 2024-10-14 ENCOUNTER — HOSPITAL ENCOUNTER (OUTPATIENT)
Dept: ULTRASOUND IMAGING | Age: 69
Discharge: HOME | End: 2024-10-14
Payer: MEDICARE

## 2024-10-14 DIAGNOSIS — K74.60 CIRRHOSIS OF LIVER WITHOUT ASCITES, UNSPECIFIED HEPATIC CIRRHOSIS TYPE (HCC): ICD-10-CM

## 2024-10-14 PROCEDURE — 94727 GAS DIL/WSHOT DETER LNG VOL: CPT

## 2024-10-14 PROCEDURE — 94729 DIFFUSING CAPACITY: CPT

## 2024-10-14 PROCEDURE — 94640 AIRWAY INHALATION TREATMENT: CPT

## 2024-10-14 PROCEDURE — 94060 EVALUATION OF WHEEZING: CPT

## 2024-10-14 PROCEDURE — 76705 ECHO EXAM OF ABDOMEN: CPT

## 2024-10-16 ENCOUNTER — OFFICE VISIT (OUTPATIENT)
Dept: GASTROENTEROLOGY | Age: 69
End: 2024-10-16
Payer: MEDICARE

## 2024-10-16 VITALS
HEIGHT: 67 IN | OXYGEN SATURATION: 90 % | SYSTOLIC BLOOD PRESSURE: 130 MMHG | WEIGHT: 165.2 LBS | DIASTOLIC BLOOD PRESSURE: 72 MMHG | BODY MASS INDEX: 25.93 KG/M2 | RESPIRATION RATE: 16 BRPM | HEART RATE: 63 BPM

## 2024-10-16 DIAGNOSIS — K74.60 CIRRHOSIS OF LIVER WITHOUT ASCITES, UNSPECIFIED HEPATIC CIRRHOSIS TYPE (HCC): Primary | ICD-10-CM

## 2024-10-16 PROCEDURE — 3075F SYST BP GE 130 - 139MM HG: CPT | Performed by: NURSE PRACTITIONER

## 2024-10-16 PROCEDURE — 99213 OFFICE O/P EST LOW 20 MIN: CPT | Performed by: NURSE PRACTITIONER

## 2024-10-16 PROCEDURE — 3078F DIAST BP <80 MM HG: CPT | Performed by: NURSE PRACTITIONER

## 2024-10-16 PROCEDURE — 1123F ACP DISCUSS/DSCN MKR DOCD: CPT | Performed by: NURSE PRACTITIONER

## 2024-10-16 NOTE — PROGRESS NOTES
Radha Gomez 69 y.o. female was seen by DURAN Lee on 10/16/2024     Wt Readings from Last 3 Encounters:   10/16/24 74.9 kg (165 lb 3.2 oz)   10/08/24 75.8 kg (167 lb 3.2 oz)   10/01/24 73.7 kg (162 lb 6.4 oz)       HPI  Radha Gomez is a pleasant 69 y.o.  female who presents today with her  for follow-up on cirrhosis.  She is on Humira monitored by Dr. Randolph.  She had covid in later part of July and is currently on continuous oxygen at 2 L.  She reports feeling good.  Her lab work done on 5- showed AFP tumor marker 3.  Her lab work on 9- showed Total Bilirubin 0.5, Alkaline Phosphatase 89, ALT 12, AST 22, and Platelets 175.  PT 14.2 and INR 1.1.  Her abdominal ultrasound done on 10- showed sonographic evidence of hepatic cirrhosis with no liver lesions.  No jaundice, foggy brain, increase in abdominal girth or extremity swelling.  Her appetite is good and weight is stable.  No nausea or vomiting.  No abdominal pain, bloating or distention.   F-Actin IgG 29, JOSE ROBERTO none detected, mitochondrial antibodies 22.3, Hepatitis A, B, C negative, alpha 1 antitrypsin 163, PT 16.4, INR 1.3, GGT 85, Iron 61, Ferritin 618, Platelets 200, Total Bilirubin 0.2, Alkaline Phosphatase 80, AST 16, and ALT 21.  Her appetite is good and weight is stable.  No nausea or vomiting.  No abdominal pain, bloating or distention.  No heartburn or acid reflux.  No nocturnal awakenings with acid reflux.  No dysphagia or pain with swallowing.  No excess belching or flatulence.  Her typical bowel pattern is daily with soft brown formed stools to loose stools.  No diarrhea or constipation.  No blood in her stools or melena.  No family history of stomach or colon cancer.          ROS  Review of Systems   Constitutional:  Negative for appetite change, chills, diaphoresis, fatigue and fever.   HENT:  Negative for ear pain, hearing loss and tinnitus.    Eyes:  Negative for photophobia, pain and visual

## 2024-10-17 ENCOUNTER — CARE COORDINATION (OUTPATIENT)
Dept: CASE MANAGEMENT | Age: 69
End: 2024-10-17

## 2024-10-17 NOTE — CARE COORDINATION
Care Transitions Note    Follow Up Call     Dx: Copd Exac, Ac Resp Failure, Severe Pulm Htn     Patient Current Location:  Home: [address] [place] [address] Care Coordinator contacted the patient by telephone. Verified name and  as identifiers.    Additional needs identified to be addressed with provider   No needs identified                 Method of communication with provider: none.    Care Summary Note: Spoke with Radha Gomez who reported that she was doing good. Patient reported that breathing is good. Patient denied cp, sob, cough, dizziness, headache, n/v, diarrhea, abdominal pains, fever, or chills. Patient report that appetite and fluid intake is good and denied any problems with bowel or bladder. Patient reported that she is taking all medications as ordered. Patient denied any other needs at this time. Patient instructed to continue to monitor s/s, reporting any that may present to MD immediately for early intervention.  Patient is agreeable to f/u calls    Plan of care updates since last contact:          Advance Care Planning   The patient has the following advanced directives on file:  Advance Directives       Power of  Living Will ACP-Advance Directive ACP-Power of     Not on File Not on File Not on File Not on File            The patient has appointed the following active healthcare agents:    Primary Decision Maker: Virgilio Gomez - Spouse - [phone]          Medication Review:  No changes since last call.     Remote Patient Monitoring:  Offered patient enrollment in the Remote Patient Monitoring (RPM) program for in-home monitoring: Yes, but did not enroll at this time: already monitoring with home equipment.    Assessments:  Care Transitions Subsequent and Final Call    Subsequent and Final Calls  Do you have any ongoing symptoms?: No  Have your medications changed?: No  Do you have any questions related to your medications?: No  Do you currently have any

## 2024-10-24 ENCOUNTER — CARE COORDINATION (OUTPATIENT)
Dept: CASE MANAGEMENT | Age: 69
End: 2024-10-24

## 2024-10-24 ENCOUNTER — CARE COORDINATION (OUTPATIENT)
Dept: CARE COORDINATION | Age: 69
End: 2024-10-24

## 2024-10-24 NOTE — CARE COORDINATION
Care Transitions Note    Final Call     Patient: Radha Gomez                         Patient : 1955   MRN: [id number]                             Reason for Admission: Copd Exac, Ac Resp Failure, Severe Pulm Htn   Discharge Date: 24       RURS: Readmission Risk Score: 8.3  Facility: [facility] 24-24     Patient Current Location:  Home: [address] [place] [address]    Care Transition Nurse contacted patient by telephone. Verified name and  as identifiers.    Patient graduated from Care Transitions program on 10/24/24.  Patient verbalizes confidence in the ability to self-manage at this time.     Handoff:   Patient was not referred to the ACM team due to no additional needs identified.       Care Summary Note: Patient reports doing well. Was seen by A Adkins NP-Pulm w/ plan for PFTs. Reports chronic sob on exertion or when she has O2 off, wheezing at times hs (not so much during day). Denies cough, ac distress. Noted to be speaking in complete, unlabored sentences. Reports she is wearing O2 at 2L cont now as per Pulm. Reports SPO2 87-94% w/ O2 at 2L. Discussed parameters, when to call provider. Reports using Spiriva, Breyna as directed. Denies rx needs. Reports she is no longer smoking or vapin. Reminded to keep O2 away from any open flames or others smoking as fire hazard.Reports she completed Zithromax, Omnicef, Prednisne as completed. Next pulm appt 24. Reports appetite, fluid intake good w/ b&b wnl. Denies resource needs and agreeable to final call.     Assessments:  Care Transitions Subsequent and Final Call    Schedule Follow Up Appointment with PCP: Completed  Subsequent and Final Calls  Do you have any ongoing symptoms?: Yes  Onset of Patient-reported symptoms: Other  Patient-reported symptoms: Shortness of Breath, Other  Interventions for patient-reported symptoms: Other  Have your medications changed?: No  Do you have any questions related to your medications?: No  Do

## 2024-10-24 NOTE — CARE COORDINATION
Patient Current Location: Home: [address] [place] [id number]     ACP - Pt did receive ACP documents in the mail. Pt has reviewed the documents and started to complete them. Pt reports plans to have the documents notarized. SW advised Pt to provide PCP with a completed copy so they can be uploaded into Pt chart. Pt stated understanding. Pt will contact this SW with any questions.     BELKYS plan of care: SW will resolve from SW services.

## 2024-10-29 ENCOUNTER — HOSPITAL ENCOUNTER (INPATIENT)
Age: 69
LOS: 5 days | Discharge: HOME | DRG: 291 | End: 2024-11-03
Attending: EMERGENCY MEDICINE | Admitting: INTERNAL MEDICINE
Payer: MEDICARE

## 2024-10-29 ENCOUNTER — APPOINTMENT (OUTPATIENT)
Dept: CT IMAGING | Age: 69
DRG: 291 | End: 2024-10-29
Payer: MEDICARE

## 2024-10-29 ENCOUNTER — APPOINTMENT (OUTPATIENT)
Dept: GENERAL RADIOLOGY | Age: 69
DRG: 291 | End: 2024-10-29
Payer: MEDICARE

## 2024-10-29 DIAGNOSIS — J81.0 ACUTE PULMONARY EDEMA: ICD-10-CM

## 2024-10-29 DIAGNOSIS — R91.1 PULMONARY NODULE: ICD-10-CM

## 2024-10-29 DIAGNOSIS — R06.00 DYSPNEA, UNSPECIFIED TYPE: Primary | ICD-10-CM

## 2024-10-29 DIAGNOSIS — R09.02 HYPOXIA: ICD-10-CM

## 2024-10-29 DIAGNOSIS — R79.89 ELEVATED BRAIN NATRIURETIC PEPTIDE (BNP) LEVEL: ICD-10-CM

## 2024-10-29 DIAGNOSIS — J18.9 PNEUMONIA DUE TO INFECTIOUS ORGANISM, UNSPECIFIED LATERALITY, UNSPECIFIED PART OF LUNG: ICD-10-CM

## 2024-10-29 PROBLEM — J96.21 ACUTE ON CHRONIC RESPIRATORY FAILURE WITH HYPOXIA: Status: ACTIVE | Noted: 2024-10-29

## 2024-10-29 LAB
ALBUMIN SERPL-MCNC: 3.9 G/DL (ref 3.4–5)
ALBUMIN/GLOB SERPL: 1.1 {RATIO} (ref 1.1–2.2)
ALP SERPL-CCNC: 114 U/L (ref 40–129)
ALT SERPL-CCNC: 13 U/L (ref 10–40)
ANION GAP SERPL CALCULATED.3IONS-SCNC: 12 MMOL/L (ref 9–17)
ARTERIAL PATENCY WRIST A: ABNORMAL
AST SERPL-CCNC: 17 U/L (ref 15–37)
B PARAP IS1001 DNA NPH QL NAA+NON-PROBE: NOT DETECTED
B PERT DNA SPEC QL NAA+PROBE: NOT DETECTED
BASOPHILS # BLD: 0.12 K/UL
BASOPHILS NFR BLD: 1 % (ref 0–1)
BILIRUB SERPL-MCNC: 0.7 MG/DL (ref 0–1)
BILIRUB UR QL STRIP: NEGATIVE
BNP SERPL-MCNC: 1035 PG/ML (ref 0–125)
BODY TEMPERATURE: 37
BUN SERPL-MCNC: 18 MG/DL (ref 7–20)
C PNEUM DNA NPH QL NAA+NON-PROBE: NOT DETECTED
CALCIUM SERPL-MCNC: 9.8 MG/DL (ref 8.3–10.6)
CHLORIDE SERPL-SCNC: 102 MMOL/L (ref 99–110)
CLARITY UR: CLEAR
CO2 SERPL-SCNC: 21 MMOL/L (ref 21–32)
COHGB MFR BLD: 1 % (ref 0.5–1.5)
COLOR UR: YELLOW
COMMENT: ABNORMAL
CREAT SERPL-MCNC: 1 MG/DL (ref 0.6–1.2)
EKG ATRIAL RATE: 78 BPM
EKG DIAGNOSIS: NORMAL
EKG P AXIS: 57 DEGREES
EKG P-R INTERVAL: 178 MS
EKG Q-T INTERVAL: 384 MS
EKG QRS DURATION: 88 MS
EKG QTC CALCULATION (BAZETT): 437 MS
EKG R AXIS: 27 DEGREES
EKG T AXIS: 67 DEGREES
EKG VENTRICULAR RATE: 78 BPM
EOSINOPHIL # BLD: 0.27 K/UL
EOSINOPHILS RELATIVE PERCENT: 3 % (ref 0–3)
ERYTHROCYTE [DISTWIDTH] IN BLOOD BY AUTOMATED COUNT: 13.2 % (ref 11.7–14.9)
FLUAV RNA NPH QL NAA+NON-PROBE: NOT DETECTED
FLUBV RNA NPH QL NAA+NON-PROBE: NOT DETECTED
GFR, ESTIMATED: 54 ML/MIN/1.73M2
GLUCOSE SERPL-MCNC: 106 MG/DL (ref 74–99)
GLUCOSE UR STRIP-MCNC: NEGATIVE MG/DL
HADV DNA NPH QL NAA+NON-PROBE: NOT DETECTED
HCO3 VENOUS: 21.3 MMOL/L (ref 22–29)
HCOV 229E RNA NPH QL NAA+NON-PROBE: NOT DETECTED
HCOV HKU1 RNA NPH QL NAA+NON-PROBE: NOT DETECTED
HCOV NL63 RNA NPH QL NAA+NON-PROBE: NOT DETECTED
HCOV OC43 RNA NPH QL NAA+NON-PROBE: NOT DETECTED
HCT VFR BLD AUTO: 36.8 % (ref 37–47)
HGB BLD-MCNC: 12.1 G/DL (ref 12.5–16)
HGB UR QL STRIP.AUTO: NEGATIVE
HMPV RNA NPH QL NAA+NON-PROBE: NOT DETECTED
HPIV1 RNA NPH QL NAA+NON-PROBE: NOT DETECTED
HPIV2 RNA NPH QL NAA+NON-PROBE: NOT DETECTED
HPIV3 RNA NPH QL NAA+NON-PROBE: NOT DETECTED
HPIV4 RNA NPH QL NAA+NON-PROBE: NOT DETECTED
IMM GRANULOCYTES # BLD AUTO: 0.03 K/UL
IMM GRANULOCYTES NFR BLD: 0 %
IRON SATN MFR SERPL: 23 % (ref 15–50)
IRON SERPL-MCNC: 51 UG/DL (ref 37–145)
KETONES UR STRIP-MCNC: NEGATIVE MG/DL
LACTATE BLDV-SCNC: 1 MMOL/L (ref 0.4–2)
LEUKOCYTE ESTERASE UR QL STRIP: NEGATIVE
LIPASE SERPL-CCNC: 17 U/L (ref 13–60)
LYMPHOCYTES NFR BLD: 3.79 K/UL
LYMPHOCYTES RELATIVE PERCENT: 43 % (ref 24–44)
M PNEUMO DNA NPH QL NAA+NON-PROBE: NOT DETECTED
MCH RBC QN AUTO: 28.7 PG (ref 27–31)
MCHC RBC AUTO-ENTMCNC: 32.9 G/DL (ref 32–36)
MCV RBC AUTO: 87.2 FL (ref 78–100)
METHEMOGLOBIN: 0.3 % (ref 0.5–1.5)
MONOCYTES NFR BLD: 0.98 K/UL
MONOCYTES NFR BLD: 11 % (ref 0–4)
NEGATIVE BASE EXCESS, VEN: 2.3 MMOL/L (ref 0–3)
NEUTROPHILS NFR BLD: 41 % (ref 36–66)
NEUTS SEG NFR BLD: 3.66 K/UL
NITRITE UR QL STRIP: NEGATIVE
OXYHGB MFR BLD: 55.8 %
PCO2 VENOUS: 33.5 MM HG (ref 38–54)
PH UR STRIP: 5.5 [PH] (ref 5–8)
PH VENOUS: 7.42 (ref 7.32–7.43)
PLATELET # BLD AUTO: 241 K/UL (ref 140–440)
PMV BLD AUTO: 11.1 FL (ref 7.5–11.1)
POTASSIUM SERPL-SCNC: 4.2 MMOL/L (ref 3.5–5.1)
PROT SERPL-MCNC: 7.6 G/DL (ref 6.4–8.2)
PROT UR STRIP-MCNC: NEGATIVE MG/DL
RBC # BLD AUTO: 4.22 M/UL (ref 4.2–5.4)
RSV RNA NPH QL NAA+NON-PROBE: NOT DETECTED
RV+EV RNA NPH QL NAA+NON-PROBE: NOT DETECTED
SARS-COV-2 RDRP RESP QL NAA+PROBE: NOT DETECTED
SARS-COV-2 RNA NPH QL NAA+NON-PROBE: NOT DETECTED
SODIUM SERPL-SCNC: 135 MMOL/L (ref 136–145)
SP GR UR STRIP: <1.005 (ref 1–1.03)
SPECIMEN DESCRIPTION: NORMAL
SPECIMEN DESCRIPTION: NORMAL
TIBC SERPL-MCNC: 225 UG/DL (ref 260–445)
TROPONIN I SERPL HS-MCNC: 14 NG/L (ref 0–14)
TROPONIN I SERPL HS-MCNC: 9 NG/L (ref 0–14)
UNSATURATED IRON BINDING CAPACITY: 174 UG/DL (ref 110–370)
UROBILINOGEN UR STRIP-ACNC: 0.2 EU/DL (ref 0–1)
WBC OTHER # BLD: 8.9 K/UL (ref 4–10.5)

## 2024-10-29 PROCEDURE — 84443 ASSAY THYROID STIM HORMONE: CPT

## 2024-10-29 PROCEDURE — 6360000002 HC RX W HCPCS: Performed by: EMERGENCY MEDICINE

## 2024-10-29 PROCEDURE — 81003 URINALYSIS AUTO W/O SCOPE: CPT

## 2024-10-29 PROCEDURE — 71045 X-RAY EXAM CHEST 1 VIEW: CPT

## 2024-10-29 PROCEDURE — 6360000004 HC RX CONTRAST MEDICATION: Performed by: EMERGENCY MEDICINE

## 2024-10-29 PROCEDURE — 2580000003 HC RX 258: Performed by: EMERGENCY MEDICINE

## 2024-10-29 PROCEDURE — 94640 AIRWAY INHALATION TREATMENT: CPT

## 2024-10-29 PROCEDURE — 82728 ASSAY OF FERRITIN: CPT

## 2024-10-29 PROCEDURE — 83540 ASSAY OF IRON: CPT

## 2024-10-29 PROCEDURE — 94664 DEMO&/EVAL PT USE INHALER: CPT

## 2024-10-29 PROCEDURE — 71275 CT ANGIOGRAPHY CHEST: CPT

## 2024-10-29 PROCEDURE — 83880 ASSAY OF NATRIURETIC PEPTIDE: CPT

## 2024-10-29 PROCEDURE — 93010 ELECTROCARDIOGRAM REPORT: CPT | Performed by: INTERNAL MEDICINE

## 2024-10-29 PROCEDURE — 87635 SARS-COV-2 COVID-19 AMP PRB: CPT

## 2024-10-29 PROCEDURE — 93005 ELECTROCARDIOGRAM TRACING: CPT | Performed by: EMERGENCY MEDICINE

## 2024-10-29 PROCEDURE — 6360000002 HC RX W HCPCS: Performed by: INTERNAL MEDICINE

## 2024-10-29 PROCEDURE — 87641 MR-STAPH DNA AMP PROBE: CPT

## 2024-10-29 PROCEDURE — 96365 THER/PROPH/DIAG IV INF INIT: CPT

## 2024-10-29 PROCEDURE — 82805 BLOOD GASES W/O2 SATURATION: CPT

## 2024-10-29 PROCEDURE — 5A0955A ASSISTANCE WITH RESPIRATORY VENTILATION, GREATER THAN 96 CONSECUTIVE HOURS, HIGH NASAL FLOW/VELOCITY: ICD-10-PCS | Performed by: INTERNAL MEDICINE

## 2024-10-29 PROCEDURE — 87040 BLOOD CULTURE FOR BACTERIA: CPT

## 2024-10-29 PROCEDURE — 2140000000 HC CCU INTERMEDIATE R&B

## 2024-10-29 PROCEDURE — 84484 ASSAY OF TROPONIN QUANT: CPT

## 2024-10-29 PROCEDURE — 36415 COLL VENOUS BLD VENIPUNCTURE: CPT

## 2024-10-29 PROCEDURE — 80053 COMPREHEN METABOLIC PANEL: CPT

## 2024-10-29 PROCEDURE — 83605 ASSAY OF LACTIC ACID: CPT

## 2024-10-29 PROCEDURE — 85025 COMPLETE CBC W/AUTO DIFF WBC: CPT

## 2024-10-29 PROCEDURE — 83550 IRON BINDING TEST: CPT

## 2024-10-29 PROCEDURE — 87086 URINE CULTURE/COLONY COUNT: CPT

## 2024-10-29 PROCEDURE — 96375 TX/PRO/DX INJ NEW DRUG ADDON: CPT

## 2024-10-29 PROCEDURE — 99285 EMERGENCY DEPT VISIT HI MDM: CPT

## 2024-10-29 PROCEDURE — 2580000003 HC RX 258: Performed by: INTERNAL MEDICINE

## 2024-10-29 PROCEDURE — 6370000000 HC RX 637 (ALT 250 FOR IP): Performed by: EMERGENCY MEDICINE

## 2024-10-29 PROCEDURE — 0202U NFCT DS 22 TRGT SARS-COV-2: CPT

## 2024-10-29 PROCEDURE — 96367 TX/PROPH/DG ADDL SEQ IV INF: CPT

## 2024-10-29 PROCEDURE — 83690 ASSAY OF LIPASE: CPT

## 2024-10-29 PROCEDURE — 6370000000 HC RX 637 (ALT 250 FOR IP): Performed by: INTERNAL MEDICINE

## 2024-10-29 RX ORDER — ASPIRIN 81 MG/1
81 TABLET, CHEWABLE ORAL DAILY
Status: DISCONTINUED | OUTPATIENT
Start: 2024-10-29 | End: 2024-11-03 | Stop reason: HOSPADM

## 2024-10-29 RX ORDER — POTASSIUM CHLORIDE 1500 MG/1
40 TABLET, EXTENDED RELEASE ORAL PRN
Status: DISCONTINUED | OUTPATIENT
Start: 2024-10-29 | End: 2024-11-03 | Stop reason: HOSPADM

## 2024-10-29 RX ORDER — FUROSEMIDE 10 MG/ML
40 INJECTION INTRAMUSCULAR; INTRAVENOUS 2 TIMES DAILY
Status: DISCONTINUED | OUTPATIENT
Start: 2024-10-29 | End: 2024-11-02

## 2024-10-29 RX ORDER — IOPAMIDOL 755 MG/ML
80 INJECTION, SOLUTION INTRAVASCULAR
Status: COMPLETED | OUTPATIENT
Start: 2024-10-29 | End: 2024-10-29

## 2024-10-29 RX ORDER — MAGNESIUM SULFATE IN WATER 40 MG/ML
2000 INJECTION, SOLUTION INTRAVENOUS PRN
Status: DISCONTINUED | OUTPATIENT
Start: 2024-10-29 | End: 2024-11-03 | Stop reason: HOSPADM

## 2024-10-29 RX ORDER — ACETAMINOPHEN 325 MG/1
650 TABLET ORAL EVERY 6 HOURS PRN
Status: DISCONTINUED | OUTPATIENT
Start: 2024-10-29 | End: 2024-11-03 | Stop reason: HOSPADM

## 2024-10-29 RX ORDER — ENOXAPARIN SODIUM 100 MG/ML
40 INJECTION SUBCUTANEOUS DAILY
Status: DISCONTINUED | OUTPATIENT
Start: 2024-10-29 | End: 2024-11-03 | Stop reason: HOSPADM

## 2024-10-29 RX ORDER — FUROSEMIDE 10 MG/ML
20 INJECTION INTRAMUSCULAR; INTRAVENOUS ONCE
Status: COMPLETED | OUTPATIENT
Start: 2024-10-29 | End: 2024-10-29

## 2024-10-29 RX ORDER — VITAMIN B COMPLEX
1000 TABLET ORAL DAILY
Status: DISCONTINUED | OUTPATIENT
Start: 2024-10-29 | End: 2024-11-03 | Stop reason: HOSPADM

## 2024-10-29 RX ORDER — BUDESONIDE AND FORMOTEROL FUMARATE DIHYDRATE 160; 4.5 UG/1; UG/1
2 AEROSOL RESPIRATORY (INHALATION) 2 TIMES DAILY
Status: DISCONTINUED | OUTPATIENT
Start: 2024-10-29 | End: 2024-11-03 | Stop reason: HOSPADM

## 2024-10-29 RX ORDER — SODIUM CHLORIDE 0.9 % (FLUSH) 0.9 %
5-40 SYRINGE (ML) INJECTION EVERY 12 HOURS SCHEDULED
Status: DISCONTINUED | OUTPATIENT
Start: 2024-10-29 | End: 2024-11-03 | Stop reason: HOSPADM

## 2024-10-29 RX ORDER — ONDANSETRON 4 MG/1
4 TABLET, ORALLY DISINTEGRATING ORAL EVERY 8 HOURS PRN
Status: DISCONTINUED | OUTPATIENT
Start: 2024-10-29 | End: 2024-11-03 | Stop reason: HOSPADM

## 2024-10-29 RX ORDER — AMLODIPINE BESYLATE 10 MG/1
10 TABLET ORAL DAILY
Status: DISCONTINUED | OUTPATIENT
Start: 2024-10-29 | End: 2024-11-03 | Stop reason: HOSPADM

## 2024-10-29 RX ORDER — SODIUM CHLORIDE 0.9 % (FLUSH) 0.9 %
5-40 SYRINGE (ML) INJECTION PRN
Status: DISCONTINUED | OUTPATIENT
Start: 2024-10-29 | End: 2024-11-03 | Stop reason: HOSPADM

## 2024-10-29 RX ORDER — IPRATROPIUM BROMIDE AND ALBUTEROL SULFATE 2.5; .5 MG/3ML; MG/3ML
1 SOLUTION RESPIRATORY (INHALATION) ONCE
Status: COMPLETED | OUTPATIENT
Start: 2024-10-29 | End: 2024-10-29

## 2024-10-29 RX ORDER — VANCOMYCIN 1.25 G/250ML
1750 INJECTION, SOLUTION INTRAVENOUS ONCE
Status: COMPLETED | OUTPATIENT
Start: 2024-10-29 | End: 2024-10-29

## 2024-10-29 RX ORDER — ACETAMINOPHEN 650 MG/1
650 SUPPOSITORY RECTAL EVERY 6 HOURS PRN
Status: DISCONTINUED | OUTPATIENT
Start: 2024-10-29 | End: 2024-11-03 | Stop reason: HOSPADM

## 2024-10-29 RX ORDER — POLYETHYLENE GLYCOL 3350 17 G/17G
17 POWDER, FOR SOLUTION ORAL DAILY PRN
Status: DISCONTINUED | OUTPATIENT
Start: 2024-10-29 | End: 2024-11-03 | Stop reason: HOSPADM

## 2024-10-29 RX ORDER — CARVEDILOL 25 MG/1
25 TABLET ORAL 2 TIMES DAILY
Status: DISCONTINUED | OUTPATIENT
Start: 2024-10-29 | End: 2024-11-03 | Stop reason: HOSPADM

## 2024-10-29 RX ORDER — POTASSIUM CHLORIDE 7.45 MG/ML
10 INJECTION INTRAVENOUS PRN
Status: DISCONTINUED | OUTPATIENT
Start: 2024-10-29 | End: 2024-11-03 | Stop reason: HOSPADM

## 2024-10-29 RX ORDER — ONDANSETRON 2 MG/ML
4 INJECTION INTRAMUSCULAR; INTRAVENOUS EVERY 6 HOURS PRN
Status: DISCONTINUED | OUTPATIENT
Start: 2024-10-29 | End: 2024-11-03 | Stop reason: HOSPADM

## 2024-10-29 RX ORDER — SODIUM CHLORIDE 9 MG/ML
INJECTION, SOLUTION INTRAVENOUS PRN
Status: DISCONTINUED | OUTPATIENT
Start: 2024-10-29 | End: 2024-11-03 | Stop reason: HOSPADM

## 2024-10-29 RX ADMIN — IPRATROPIUM BROMIDE AND ALBUTEROL SULFATE 1 DOSE: 2.5; .5 SOLUTION RESPIRATORY (INHALATION) at 12:38

## 2024-10-29 RX ADMIN — IPRATROPIUM BROMIDE AND ALBUTEROL SULFATE 1 DOSE: 2.5; .5 SOLUTION RESPIRATORY (INHALATION) at 12:36

## 2024-10-29 RX ADMIN — IPRATROPIUM BROMIDE AND ALBUTEROL SULFATE 1 DOSE: 2.5; .5 SOLUTION RESPIRATORY (INHALATION) at 12:37

## 2024-10-29 RX ADMIN — ENOXAPARIN SODIUM 40 MG: 100 INJECTION SUBCUTANEOUS at 20:58

## 2024-10-29 RX ADMIN — METHYLPREDNISOLONE SODIUM SUCCINATE 125 MG: 125 INJECTION INTRAMUSCULAR; INTRAVENOUS at 13:15

## 2024-10-29 RX ADMIN — FUROSEMIDE 20 MG: 10 INJECTION, SOLUTION INTRAMUSCULAR; INTRAVENOUS at 15:33

## 2024-10-29 RX ADMIN — CEFEPIME 2000 MG: 2 INJECTION, POWDER, FOR SOLUTION INTRAVENOUS at 14:03

## 2024-10-29 RX ADMIN — CARVEDILOL 25 MG: 25 TABLET, FILM COATED ORAL at 20:58

## 2024-10-29 RX ADMIN — SODIUM CHLORIDE, PRESERVATIVE FREE 10 ML: 5 INJECTION INTRAVENOUS at 20:58

## 2024-10-29 RX ADMIN — VANCOMYCIN 1750 MG: 1.25 INJECTION, SOLUTION INTRAVENOUS at 15:42

## 2024-10-29 RX ADMIN — IOPAMIDOL 80 ML: 755 INJECTION, SOLUTION INTRAVENOUS at 14:30

## 2024-10-29 ASSESSMENT — PAIN SCALES - GENERAL
PAINLEVEL_OUTOF10: 0
PAINLEVEL_OUTOF10: 0

## 2024-10-29 ASSESSMENT — PAIN - FUNCTIONAL ASSESSMENT: PAIN_FUNCTIONAL_ASSESSMENT: 0-10

## 2024-10-29 NOTE — ED NOTES
ED TO INPATIENT SBAR HANDOFF    Patient Name: Radha Gomez   :  1955  69 y.o.   Preferred Name  [place]   Family/Caregiver Present yes   Restraints no   C-SSRS: Risk of Suicide: No Risk  Sitter no   Sepsis Risk Score        Situation  Chief Complaint   Patient presents with    Shortness of Breath     Reports recently here and sent home with o2. States sat at home running in 70s when moving. On arrival 65% on home 2L tank.      Brief Description of Patient's Condition: Patient presented to ED with complaints of shortness of breath. Patient was recently admitted and discharged with home oxygen. Upon arrival to ED patient o2 sat was 65% on 2L home tank. Patient is Aox4, can stand and pivot on her own to bedside commode.   Mental Status: oriented  Arrived from: home    Imaging:   CTA PULMONARY W CONTRAST   Final Result      No acute PE.      3 cm right hilar mass and 8 mm right middle lobe nodule. Correlate for history   of lung cancer.      Pleural effusions and pulmonary edema diffusely.      Electronically signed by Oscar Salazar      XR CHEST PORTABLE   Final Result      Superimposed upon interstitial lung disease is seen small bilateral pleural   effusions and right basilar airspace disease. At the right lung base,   differential diagnosis includes pneumonitis and atelectasis.      Also, cannot exclude a mild degree of interstitial pulmonary edema.      No pneumothorax is seen.      The cardiomediastinal silhouette is stable, without evidence of cardiomegaly.      Electronically signed by Hill Regan MD        Abnormal labs:   Abnormal Labs Reviewed   CBC WITH AUTO DIFFERENTIAL - Abnormal; Notable for the following components:       Result Value    Hemoglobin 12.1 (*)     Hematocrit 36.8 (*)     Monocytes % 11 (*)     All other components within normal limits   COMPREHENSIVE METABOLIC PANEL - Abnormal; Notable for the following components:    Sodium 135 (*)     Glucose 106 (*)     Est, Glom Filt Rate 54 (*)     All

## 2024-10-29 NOTE — ED PROVIDER NOTES
[facility]      TRIAGE CHIEF COMPLAINT:   Shortness of Breath (Reports recently here and sent home with o2. States sat at home running in 70s when moving. On arrival 65% on home 2L tank. )      [place]:  Radha Gomez is a 69 y.o. female that presents with complaint of continued shortness of breath fatigue malaise hypoxia.  Patient was just in the hospital in September for the same thing.  She was diagnosed with COPD she was sent home on 2 L of oxygen all the time she arrived on her home O2 she was satting 65%.  We increased her to 6 in the trauma room.  She is doing better now but she feels short of breath.  Denies any fevers nausea vomiting heart issues, no history of DVT, PE, AAA she does complain about lower extremity swelling but states he did ultrasound and a CT scan earlier.,  Denies any smoking drinking drugs no other questions or concerns no chest pain abdominal pain or shortness of breath..    REVIEW OF SYSTEMS:  At least 10 systems reviewed and otherwise acutely negative except as in the [place].    Review of Systems   Constitutional:  Positive for activity change and fatigue.   HENT: Negative.     Eyes: Negative.    Respiratory:  Positive for shortness of breath.    Cardiovascular: Negative.    Gastrointestinal: Negative.    Endocrine: Negative.    Genitourinary: Negative.    Musculoskeletal: Negative.    Skin: Negative.    Allergic/Immunologic: Negative.    Neurological: Negative.    Hematological: Negative.    Psychiatric/Behavioral: Negative.     All other systems reviewed and are negative.      Past Medical History:   Diagnosis Date    Heart abnormality     Hypertension     Pap smear for cervical cancer screening 2010    neg     Past Surgical History:   Procedure Laterality Date    ANKLE FRACTURE SURGERY      right     SECTION      COLONOSCOPY       Family History   Problem Relation Age of Onset    Breast Cancer Maternal Grandmother      Social History     Socioeconomic

## 2024-10-29 NOTE — H&P
History and Physical Exam    Patient:  Radha Gomez 69 y.o. female MRN: [id number]     Date of Service: 10/29/2024    Hospital Day: 1      Chief complaint: had concerns including Shortness of Breath (Reports recently here and sent home with o2. States sat at home running in 70s when moving. On arrival 65% on home 2L tank. ).      Assessment and Plan   Radha Gomez, a 69 y.o. female, with a history of Chronic resp failure with hypoxia on 2L/min, COPD, HTN, RA, liver fibrosis, HFpEF, Severe AR was admitted on 10/29/2024. They had concerns including Shortness of Breath (Reports recently here and sent home with o2. States sat at home running in 70s when moving. On arrival 65% on home 2L tank. ).    Assessment  Acute on Chronic Resp Failure with Hypoxia, Unlikely to be bacterial pneumonia   Baseline 2L/min O2   Was down to 70% SpO2 on 2L/min   Currently on 13L/min   CTA w/o PE or pneumonia but shows pulm edema and effusion     Acute on Chronic HFpEF, Severe Aortic Regurg   Elevated pro-BNP with effusion and pulm edema on CTA.  Has B/L LE swelling - worse recently   TORRES and orthopnea over the past week   Elevated BNP     H/O COPD    H/O HTN     H/O RA   Follows with rheumatology as outpatient   H/O Liver Fibrosis       Plan  Admit to Intermediate with tele   Lasix 40mg IV BID   Consider cardio consult   Resp viral panel   Resume home meds -see orders   Pro-BNP every other day   Send iron panel and TIBC       # Peptic ulcer prophylaxis: -   # DVT Prophylaxis: Lovenox   #CODE STATUS: I did discuss CODE STATUS with the patient, they opted for FULL CODE   Virgilio, Spouse, to help with decision making       Expected Disposition: Likely Home   Estimated discharge date: 2~ days     Personally reviewed Lab Studies and Imaging     Discussed management of the case with ER provider who recommended admission due to higher than baseline O2 requirements.     EKG interpreted personally and results show NSR.     Imaging that was

## 2024-10-29 NOTE — ED NOTES
Medication History  [facility]    Patient Name: Radha Gomez 1955     Medication history has been completed by: Lianet Diane CPhT    Source(s) of information: patient and insurance claims     Primary Care Physician: Luis Buenrostro MD     Pharmacy: [facility]    Allergies as of 10/29/2024    (No Known Allergies)        Prior to Admission medications    Medication Sig Start Date End Date Taking? Authorizing Provider   adalimumab (HUMIRA, 2 PEN,) 40 MG/0.4ML PNKT Inject 40 mg into the skin every 14 days 10/10/24  Yes Chrissy Randolph MD   vitamin D (CHOLECALCIFEROL) 25 MCG (1000 UT) TABS tablet Take 1 tablet by mouth daily 10/8/24  Yes Chrsisy Randolph MD   tiotropium (SPIRIVA RESPIMAT) 2.5 MCG/ACT AERS inhaler Inhale 2 puffs into the lungs daily 10/1/24 10/31/24 Yes Nguyen Adkins APRN - CNP   budesonide-formoterol (BREYNA) 160-4.5 MCG/ACT AERO Inhale 2 puffs into the lungs 2 times daily 10/1/24  Yes Nguyen Adkins APRN - CNP   calcium carbonate (OSCAL) 500 MG TABS tablet Take 1 tablet by mouth daily OTC   Yes ProviderVenkat MD   aspirin 81 MG chewable tablet Take 1 tablet by mouth daily OTC   Yes Provider, MD Venkat   Cyanocobalamin (VITAMIN B 12) 500 MCG TABS Take 500 mcg by mouth daily OTC   Yes Provider, Historical, MD   Elderberry-Vitamin C-Zinc (ELDERBERRY IMMUNE HEALTH GUMMY PO) Take 2 each by mouth daily OTC   Yes ProviderVenkat MD   carvedilol (COREG) 25 MG tablet Take 1 tablet by mouth 2 times daily 7/3/24  Yes Venkat Guthrie MD   amLODIPine (NORVASC) 10 MG tablet Take 1 tablet by mouth daily   Yes Provider, Historical, MD   Handicap Placard MISC by Does not apply route 10/1/24   Nguyen Adkins APRN - CNP     Comments:  Medication list reviewed with patient and insurance claims verified.  Patient reports she took first dose of medications today, Except she did not use he inhaler.  Reports took her Humira injection this

## 2024-10-30 ENCOUNTER — APPOINTMENT (OUTPATIENT)
Dept: GENERAL RADIOLOGY | Age: 69
DRG: 291 | End: 2024-10-30
Payer: MEDICARE

## 2024-10-30 PROBLEM — I35.1 NONRHEUMATIC AORTIC VALVE INSUFFICIENCY: Status: ACTIVE | Noted: 2024-10-30

## 2024-10-30 LAB
ALBUMIN SERPL-MCNC: 3.5 G/DL (ref 3.4–5)
ALBUMIN SERPL-MCNC: 3.8 G/DL (ref 3.4–5)
ALBUMIN/GLOB SERPL: 1 {RATIO} (ref 1.1–2.2)
ALBUMIN/GLOB SERPL: 1.1 {RATIO} (ref 1.1–2.2)
ALP SERPL-CCNC: 102 U/L (ref 40–129)
ALP SERPL-CCNC: 99 U/L (ref 40–129)
ALT SERPL-CCNC: 12 U/L (ref 10–40)
ALT SERPL-CCNC: 8 U/L (ref 10–40)
ANION GAP SERPL CALCULATED.3IONS-SCNC: 11 MMOL/L (ref 9–17)
ANION GAP SERPL CALCULATED.3IONS-SCNC: 14 MMOL/L (ref 9–17)
AST SERPL-CCNC: 13 U/L (ref 15–37)
AST SERPL-CCNC: 15 U/L (ref 15–37)
BILIRUB DIRECT SERPL-MCNC: <0.2 MG/DL (ref 0–0.3)
BILIRUB INDIRECT SERPL-MCNC: ABNORMAL MG/DL (ref 0–0.7)
BILIRUB SERPL-MCNC: 0.3 MG/DL (ref 0–1)
BILIRUB SERPL-MCNC: 0.4 MG/DL (ref 0–1)
BUN SERPL-MCNC: 16 MG/DL (ref 7–20)
BUN SERPL-MCNC: 22 MG/DL (ref 7–20)
CALCIUM SERPL-MCNC: 9.5 MG/DL (ref 8.3–10.6)
CALCIUM SERPL-MCNC: 9.6 MG/DL (ref 8.3–10.6)
CHLORIDE SERPL-SCNC: 102 MMOL/L (ref 99–110)
CHLORIDE SERPL-SCNC: 109 MMOL/L (ref 99–110)
CHOLEST SERPL-MCNC: 181 MG/DL (ref 125–199)
CO2 SERPL-SCNC: 21 MMOL/L (ref 21–32)
CO2 SERPL-SCNC: 23 MMOL/L (ref 21–32)
CREAT SERPL-MCNC: 0.9 MG/DL (ref 0.6–1.2)
CREAT SERPL-MCNC: 1.1 MG/DL (ref 0.6–1.2)
EKG ATRIAL RATE: 84 BPM
EKG DIAGNOSIS: NORMAL
EKG P AXIS: 47 DEGREES
EKG P-R INTERVAL: 146 MS
EKG Q-T INTERVAL: 444 MS
EKG QRS DURATION: 94 MS
EKG QTC CALCULATION (BAZETT): 524 MS
EKG R AXIS: 17 DEGREES
EKG T AXIS: 260 DEGREES
EKG VENTRICULAR RATE: 84 BPM
ERYTHROCYTE [DISTWIDTH] IN BLOOD BY AUTOMATED COUNT: 13.2 % (ref 11.7–14.9)
GFR, ESTIMATED: 52 ML/MIN/1.73M2
GFR, ESTIMATED: 62 ML/MIN/1.73M2
GLUCOSE SERPL-MCNC: 140 MG/DL (ref 74–99)
GLUCOSE SERPL-MCNC: 144 MG/DL (ref 74–99)
HCT VFR BLD AUTO: 34.6 % (ref 37–47)
HDLC SERPL-MCNC: 80 MG/DL
HGB BLD-MCNC: 11.1 G/DL (ref 12.5–16)
LACTATE BLDV-SCNC: 1.7 MMOL/L (ref 0.4–2)
LDLC SERPL CALC-MCNC: 90 MG/DL
MAGNESIUM SERPL-MCNC: 2.3 MG/DL (ref 1.8–2.4)
MCH RBC QN AUTO: 28.6 PG (ref 27–31)
MCHC RBC AUTO-ENTMCNC: 32.1 G/DL (ref 32–36)
MCV RBC AUTO: 89.2 FL (ref 78–100)
MICROORGANISM SPEC CULT: NORMAL
MRSA, DNA, NASAL: NOT DETECTED
PLATELET # BLD AUTO: 247 K/UL (ref 140–440)
PMV BLD AUTO: 11.3 FL (ref 7.5–11.1)
POTASSIUM SERPL-SCNC: 4 MMOL/L (ref 3.5–5.1)
POTASSIUM SERPL-SCNC: 4.1 MMOL/L (ref 3.5–5.1)
PROT SERPL-MCNC: 7.2 G/DL (ref 6.4–8.2)
PROT SERPL-MCNC: 7.4 G/DL (ref 6.4–8.2)
RBC # BLD AUTO: 3.88 M/UL (ref 4.2–5.4)
SERVICE CMNT-IMP: NORMAL
SODIUM SERPL-SCNC: 138 MMOL/L (ref 136–145)
SODIUM SERPL-SCNC: 141 MMOL/L (ref 136–145)
SPECIMEN DESCRIPTION: NORMAL
SPECIMEN DESCRIPTION: NORMAL
TRIGL SERPL-MCNC: 51 MG/DL
TROPONIN I SERPL HS-MCNC: 10 NG/L (ref 0–14)
WBC OTHER # BLD: 15.7 K/UL (ref 4–10.5)

## 2024-10-30 PROCEDURE — 83605 ASSAY OF LACTIC ACID: CPT

## 2024-10-30 PROCEDURE — 80053 COMPREHEN METABOLIC PANEL: CPT

## 2024-10-30 PROCEDURE — 82248 BILIRUBIN DIRECT: CPT

## 2024-10-30 PROCEDURE — 85027 COMPLETE CBC AUTOMATED: CPT

## 2024-10-30 PROCEDURE — 6370000000 HC RX 637 (ALT 250 FOR IP): Performed by: INTERNAL MEDICINE

## 2024-10-30 PROCEDURE — 93010 ELECTROCARDIOGRAM REPORT: CPT | Performed by: INTERNAL MEDICINE

## 2024-10-30 PROCEDURE — 6360000002 HC RX W HCPCS: Performed by: INTERNAL MEDICINE

## 2024-10-30 PROCEDURE — 94640 AIRWAY INHALATION TREATMENT: CPT

## 2024-10-30 PROCEDURE — 94761 N-INVAS EAR/PLS OXIMETRY MLT: CPT

## 2024-10-30 PROCEDURE — 94010 BREATHING CAPACITY TEST: CPT

## 2024-10-30 PROCEDURE — 80061 LIPID PANEL: CPT

## 2024-10-30 PROCEDURE — 93005 ELECTROCARDIOGRAM TRACING: CPT | Performed by: INTERNAL MEDICINE

## 2024-10-30 PROCEDURE — 2580000003 HC RX 258: Performed by: INTERNAL MEDICINE

## 2024-10-30 PROCEDURE — 99223 1ST HOSP IP/OBS HIGH 75: CPT | Performed by: INTERNAL MEDICINE

## 2024-10-30 PROCEDURE — 36415 COLL VENOUS BLD VENIPUNCTURE: CPT

## 2024-10-30 PROCEDURE — 83735 ASSAY OF MAGNESIUM: CPT

## 2024-10-30 PROCEDURE — 84484 ASSAY OF TROPONIN QUANT: CPT

## 2024-10-30 PROCEDURE — 71045 X-RAY EXAM CHEST 1 VIEW: CPT

## 2024-10-30 PROCEDURE — 2140000000 HC CCU INTERMEDIATE R&B

## 2024-10-30 RX ORDER — IPRATROPIUM BROMIDE AND ALBUTEROL SULFATE 2.5; .5 MG/3ML; MG/3ML
1 SOLUTION RESPIRATORY (INHALATION)
Status: DISCONTINUED | OUTPATIENT
Start: 2024-10-30 | End: 2024-11-03 | Stop reason: HOSPADM

## 2024-10-30 RX ADMIN — AMLODIPINE BESYLATE 10 MG: 10 TABLET ORAL at 08:10

## 2024-10-30 RX ADMIN — CARVEDILOL 25 MG: 25 TABLET, FILM COATED ORAL at 22:46

## 2024-10-30 RX ADMIN — ENOXAPARIN SODIUM 40 MG: 100 INJECTION SUBCUTANEOUS at 08:11

## 2024-10-30 RX ADMIN — ACETAMINOPHEN 650 MG: 325 TABLET ORAL at 22:47

## 2024-10-30 RX ADMIN — SODIUM CHLORIDE, PRESERVATIVE FREE 10 ML: 5 INJECTION INTRAVENOUS at 22:49

## 2024-10-30 RX ADMIN — CARVEDILOL 25 MG: 25 TABLET, FILM COATED ORAL at 08:10

## 2024-10-30 RX ADMIN — BUDESONIDE AND FORMOTEROL FUMARATE DIHYDRATE 2 PUFF: 160; 4.5 AEROSOL RESPIRATORY (INHALATION) at 11:17

## 2024-10-30 RX ADMIN — ASPIRIN 81 MG: 81 TABLET, CHEWABLE ORAL at 08:10

## 2024-10-30 RX ADMIN — WATER 40 MG: 1 INJECTION INTRAMUSCULAR; INTRAVENOUS; SUBCUTANEOUS at 22:48

## 2024-10-30 RX ADMIN — FUROSEMIDE 40 MG: 10 INJECTION, SOLUTION INTRAMUSCULAR; INTRAVENOUS at 08:10

## 2024-10-30 RX ADMIN — FUROSEMIDE 40 MG: 10 INJECTION, SOLUTION INTRAMUSCULAR; INTRAVENOUS at 17:57

## 2024-10-30 RX ADMIN — IPRATROPIUM BROMIDE AND ALBUTEROL SULFATE 1 DOSE: 2.5; .5 SOLUTION RESPIRATORY (INHALATION) at 21:43

## 2024-10-30 RX ADMIN — SODIUM CHLORIDE, PRESERVATIVE FREE 10 ML: 5 INJECTION INTRAVENOUS at 08:11

## 2024-10-30 RX ADMIN — ACETAMINOPHEN 650 MG: 325 TABLET ORAL at 14:50

## 2024-10-30 RX ADMIN — TIOTROPIUM BROMIDE INHALATION SPRAY 2 PUFF: 3.12 SPRAY, METERED RESPIRATORY (INHALATION) at 11:17

## 2024-10-30 RX ADMIN — Medication 1000 UNITS: at 08:10

## 2024-10-30 ASSESSMENT — COPD QUESTIONNAIRES
GOLD_GROUP: GROUP B
QUESTION5_HOMEACTIVITIES: 5
QUESTION8_ENERGYLEVEL: 5
QUESTION7_SLEEPQUALITY: 5
QUESTION1_COUGHFREQUENCY: 1
QUESTION3_CHESTTIGHTNESS: 0
QUESTION2_CHESTPHLEGM: 3
TOTAL_EXACERBATIONS_PASTYEAR: 1
CAT_TOTALSCORE: 27
QUESTION6_LEAVINGHOUSE: 3
QUESTION4_WALKINCLINE: 5

## 2024-10-30 ASSESSMENT — PAIN DESCRIPTION - ORIENTATION: ORIENTATION: MID

## 2024-10-30 ASSESSMENT — PAIN DESCRIPTION - LOCATION: LOCATION: HEAD

## 2024-10-30 ASSESSMENT — PULMONARY FUNCTION TESTS
FEV1 (%PREDICTED): 73
PIF_VALUE: 95
POST BRONCHODILATOR FEV1/FVC: 78

## 2024-10-30 ASSESSMENT — PAIN SCALES - GENERAL
PAINLEVEL_OUTOF10: 1
PAINLEVEL_OUTOF10: 3

## 2024-10-30 ASSESSMENT — PAIN - FUNCTIONAL ASSESSMENT: PAIN_FUNCTIONAL_ASSESSMENT: ACTIVITIES ARE NOT PREVENTED

## 2024-10-30 NOTE — PLAN OF CARE
Problem: Discharge Planning  Goal: Discharge to home or other facility with appropriate resources  10/30/2024 1011 by Jd Gallardo RN  Outcome: Progressing  10/30/2024 0458 by Laly Crow RN  Outcome: Progressing  10/30/2024 0458 by Laly Crow RN  Outcome: Progressing     Problem: Pain  Goal: Verbalizes/displays adequate comfort level or baseline comfort level  10/30/2024 1011 by Jd Gallardo RN  Outcome: Progressing  10/30/2024 0458 by Laly Crow RN  Outcome: Progressing  10/30/2024 0458 by Laly Crow RN  Outcome: Progressing     Problem: Safety - Adult  Goal: Free from fall injury  Outcome: Progressing

## 2024-10-30 NOTE — CARE COORDINATION
10/30/24 0852   Service Assessment   Patient Orientation Alert and Oriented   Cognition Alert   History Provided By Medical Record   Primary Caregiver Self   Support Systems Spouse/Significant Other   Patient's Healthcare Decision Maker is: Legal Next of Kin   PCP Verified by CM Yes   Prior Functional Level Independent in ADLs/IADLs   Current Functional Level Independent in ADLs/IADLs   Can patient return to prior living arrangement Yes   Ability to make needs known: Good   Family able to assist with home care needs: Yes   Would you like for me to discuss the discharge plan with any other family members/significant others, and if so, who? No   Financial Resources Medicare   Community Resources None     Chart reviewed, screened for discharge planning.  Pt is from home.  No discharge needs identified at this time.  CM following

## 2024-10-30 NOTE — PLAN OF CARE
Problem: Discharge Planning  Goal: Discharge to home or other facility with appropriate resources  10/30/2024 0458 by Laly Crow RN  Outcome: Progressing  10/30/2024 0458 by Laly Crow RN  Outcome: Progressing     Problem: Pain  Goal: Verbalizes/displays adequate comfort level or baseline comfort level  10/30/2024 0458 by Laly Crow RN  Outcome: Progressing  10/30/2024 0458 by Laly Crow RN  Outcome: Progressing

## 2024-10-31 ENCOUNTER — APPOINTMENT (OUTPATIENT)
Dept: INTERVENTIONAL RADIOLOGY/VASCULAR | Age: 69
DRG: 291 | End: 2024-10-31
Payer: MEDICARE

## 2024-10-31 PROBLEM — R06.00 DYSPNEA: Status: ACTIVE | Noted: 2024-10-31

## 2024-10-31 LAB
ALBUMIN SERPL-MCNC: 3.7 G/DL (ref 3.4–5)
ALBUMIN/GLOB SERPL: 0.9 {RATIO} (ref 1.1–2.2)
ALP SERPL-CCNC: 99 U/L (ref 40–129)
ALT SERPL-CCNC: 10 U/L (ref 10–40)
ANION GAP SERPL CALCULATED.3IONS-SCNC: 10 MMOL/L (ref 9–17)
AST SERPL-CCNC: 17 U/L (ref 15–37)
BASOPHILS # BLD: 0.03 K/UL
BASOPHILS NFR BLD: 0 % (ref 0–1)
BILIRUB SERPL-MCNC: 0.4 MG/DL (ref 0–1)
BNP SERPL-MCNC: 800 PG/ML (ref 0–125)
BUN SERPL-MCNC: 20 MG/DL (ref 7–20)
CALCIUM SERPL-MCNC: 9.5 MG/DL (ref 8.3–10.6)
CEA SERPL-MCNC: 6.2 NG/ML (ref 0–5)
CHLORIDE SERPL-SCNC: 104 MMOL/L (ref 99–110)
CO2 SERPL-SCNC: 24 MMOL/L (ref 21–32)
CREAT SERPL-MCNC: 0.9 MG/DL (ref 0.6–1.2)
EKG ATRIAL RATE: 91 BPM
EKG DIAGNOSIS: NORMAL
EKG P AXIS: 71 DEGREES
EKG P-R INTERVAL: 188 MS
EKG Q-T INTERVAL: 346 MS
EKG QRS DURATION: 92 MS
EKG QTC CALCULATION (BAZETT): 425 MS
EKG R AXIS: 52 DEGREES
EKG T AXIS: 82 DEGREES
EKG VENTRICULAR RATE: 91 BPM
EOSINOPHIL # BLD: 0 K/UL
EOSINOPHILS RELATIVE PERCENT: 0 % (ref 0–3)
ERYTHROCYTE [DISTWIDTH] IN BLOOD BY AUTOMATED COUNT: 13.2 % (ref 11.7–14.9)
FERRITIN SERPL-MCNC: 347 NG/ML (ref 15–150)
FERRITIN SERPL-MCNC: 362 NG/ML (ref 15–150)
FOLATE SERPL-MCNC: 7.2 NG/ML (ref 4.8–24.2)
GFR, ESTIMATED: 59 ML/MIN/1.73M2
GLUCOSE SERPL-MCNC: 159 MG/DL (ref 74–99)
HAPTOGLOB SERPL-MCNC: 91 MG/DL (ref 30–200)
HCT VFR BLD AUTO: 34.5 % (ref 37–47)
HGB BLD-MCNC: 11.1 G/DL (ref 12.5–16)
IMM GRANULOCYTES # BLD AUTO: 0.04 K/UL
IMM GRANULOCYTES NFR BLD: 0 %
LDH SERPL-CCNC: 281 U/L (ref 100–190)
LYMPHOCYTES NFR BLD: 1.32 K/UL
LYMPHOCYTES RELATIVE PERCENT: 14 % (ref 24–44)
MAGNESIUM SERPL-MCNC: 2.4 MG/DL (ref 1.8–2.4)
MCH RBC QN AUTO: 28.2 PG (ref 27–31)
MCHC RBC AUTO-ENTMCNC: 32.2 G/DL (ref 32–36)
MCV RBC AUTO: 87.8 FL (ref 78–100)
MONOCYTES NFR BLD: 0.18 K/UL
MONOCYTES NFR BLD: 2 % (ref 0–4)
NEUTROPHILS NFR BLD: 84 % (ref 36–66)
NEUTS SEG NFR BLD: 7.97 K/UL
PHOSPHATE SERPL-MCNC: 3.4 MG/DL (ref 2.5–4.9)
PLATELET # BLD AUTO: 237 K/UL (ref 140–440)
PMV BLD AUTO: 11.2 FL (ref 7.5–11.1)
POTASSIUM SERPL-SCNC: 4.2 MMOL/L (ref 3.5–5.1)
PROCALCITONIN SERPL-MCNC: 0.03 NG/ML
PROT SERPL-MCNC: 7.8 G/DL (ref 6.4–8.2)
RBC # BLD AUTO: 3.93 M/UL (ref 4.2–5.4)
RETICS # AUTO: 0.15 M/UL
RETICS/RBC NFR AUTO: 3.5 % (ref 0.2–2)
SODIUM SERPL-SCNC: 138 MMOL/L (ref 136–145)
TSH SERPL DL<=0.05 MIU/L-ACNC: 0.48 UIU/ML (ref 0.27–4.2)
VIT B12 SERPL-MCNC: >2000 PG/ML (ref 211–911)
WBC OTHER # BLD: 9.5 K/UL (ref 4–10.5)

## 2024-10-31 PROCEDURE — 82378 CARCINOEMBRYONIC ANTIGEN: CPT

## 2024-10-31 PROCEDURE — 6360000002 HC RX W HCPCS: Performed by: INTERNAL MEDICINE

## 2024-10-31 PROCEDURE — 82607 VITAMIN B-12: CPT

## 2024-10-31 PROCEDURE — 2580000003 HC RX 258: Performed by: INTERNAL MEDICINE

## 2024-10-31 PROCEDURE — 99233 SBSQ HOSP IP/OBS HIGH 50: CPT | Performed by: INTERNAL MEDICINE

## 2024-10-31 PROCEDURE — 84100 ASSAY OF PHOSPHORUS: CPT

## 2024-10-31 PROCEDURE — 80053 COMPREHEN METABOLIC PANEL: CPT

## 2024-10-31 PROCEDURE — 83880 ASSAY OF NATRIURETIC PEPTIDE: CPT

## 2024-10-31 PROCEDURE — 93010 ELECTROCARDIOGRAM REPORT: CPT | Performed by: INTERNAL MEDICINE

## 2024-10-31 PROCEDURE — 82746 ASSAY OF FOLIC ACID SERUM: CPT

## 2024-10-31 PROCEDURE — 6370000000 HC RX 637 (ALT 250 FOR IP): Performed by: INTERNAL MEDICINE

## 2024-10-31 PROCEDURE — 85045 AUTOMATED RETICULOCYTE COUNT: CPT

## 2024-10-31 PROCEDURE — 94761 N-INVAS EAR/PLS OXIMETRY MLT: CPT

## 2024-10-31 PROCEDURE — 36415 COLL VENOUS BLD VENIPUNCTURE: CPT

## 2024-10-31 PROCEDURE — 84145 PROCALCITONIN (PCT): CPT

## 2024-10-31 PROCEDURE — 82728 ASSAY OF FERRITIN: CPT

## 2024-10-31 PROCEDURE — 83615 LACTATE (LD) (LDH) ENZYME: CPT

## 2024-10-31 PROCEDURE — 99222 1ST HOSP IP/OBS MODERATE 55: CPT | Performed by: INTERNAL MEDICINE

## 2024-10-31 PROCEDURE — 76705 ECHO EXAM OF ABDOMEN: CPT

## 2024-10-31 PROCEDURE — 83735 ASSAY OF MAGNESIUM: CPT

## 2024-10-31 PROCEDURE — 2140000000 HC CCU INTERMEDIATE R&B

## 2024-10-31 PROCEDURE — 85025 COMPLETE CBC W/AUTO DIFF WBC: CPT

## 2024-10-31 PROCEDURE — 82164 ANGIOTENSIN I ENZYME TEST: CPT

## 2024-10-31 PROCEDURE — APPNB45 APP NON BILLABLE 31-45 MINUTES: Performed by: PHYSICIAN ASSISTANT

## 2024-10-31 PROCEDURE — 94640 AIRWAY INHALATION TREATMENT: CPT

## 2024-10-31 PROCEDURE — 2700000000 HC OXYGEN THERAPY PER DAY

## 2024-10-31 PROCEDURE — 83010 ASSAY OF HAPTOGLOBIN QUANT: CPT

## 2024-10-31 RX ORDER — HYDROXYZINE PAMOATE 25 MG/1
25 CAPSULE ORAL 3 TIMES DAILY PRN
Status: DISCONTINUED | OUTPATIENT
Start: 2024-10-31 | End: 2024-11-03 | Stop reason: HOSPADM

## 2024-10-31 RX ADMIN — ENOXAPARIN SODIUM 40 MG: 100 INJECTION SUBCUTANEOUS at 08:08

## 2024-10-31 RX ADMIN — AMLODIPINE BESYLATE 10 MG: 10 TABLET ORAL at 08:07

## 2024-10-31 RX ADMIN — WATER 40 MG: 1 INJECTION INTRAMUSCULAR; INTRAVENOUS; SUBCUTANEOUS at 08:07

## 2024-10-31 RX ADMIN — SODIUM CHLORIDE, PRESERVATIVE FREE 10 ML: 5 INJECTION INTRAVENOUS at 08:08

## 2024-10-31 RX ADMIN — IPRATROPIUM BROMIDE AND ALBUTEROL SULFATE 1 DOSE: 2.5; .5 SOLUTION RESPIRATORY (INHALATION) at 15:27

## 2024-10-31 RX ADMIN — BUDESONIDE AND FORMOTEROL FUMARATE DIHYDRATE 2 PUFF: 160; 4.5 AEROSOL RESPIRATORY (INHALATION) at 07:09

## 2024-10-31 RX ADMIN — Medication 1000 UNITS: at 08:07

## 2024-10-31 RX ADMIN — IPRATROPIUM BROMIDE AND ALBUTEROL SULFATE 1 DOSE: 2.5; .5 SOLUTION RESPIRATORY (INHALATION) at 07:08

## 2024-10-31 RX ADMIN — IPRATROPIUM BROMIDE AND ALBUTEROL SULFATE 1 DOSE: 2.5; .5 SOLUTION RESPIRATORY (INHALATION) at 11:06

## 2024-10-31 RX ADMIN — SODIUM CHLORIDE, PRESERVATIVE FREE 10 ML: 5 INJECTION INTRAVENOUS at 20:31

## 2024-10-31 RX ADMIN — WATER 40 MG: 1 INJECTION INTRAMUSCULAR; INTRAVENOUS; SUBCUTANEOUS at 17:57

## 2024-10-31 RX ADMIN — CARVEDILOL 25 MG: 25 TABLET, FILM COATED ORAL at 20:31

## 2024-10-31 RX ADMIN — ASPIRIN 81 MG: 81 TABLET, CHEWABLE ORAL at 08:07

## 2024-10-31 RX ADMIN — IPRATROPIUM BROMIDE AND ALBUTEROL SULFATE 1 DOSE: 2.5; .5 SOLUTION RESPIRATORY (INHALATION) at 20:32

## 2024-10-31 RX ADMIN — BUDESONIDE AND FORMOTEROL FUMARATE DIHYDRATE 2 PUFF: 160; 4.5 AEROSOL RESPIRATORY (INHALATION) at 20:32

## 2024-10-31 RX ADMIN — CARVEDILOL 25 MG: 25 TABLET, FILM COATED ORAL at 08:07

## 2024-10-31 RX ADMIN — FUROSEMIDE 40 MG: 10 INJECTION, SOLUTION INTRAMUSCULAR; INTRAVENOUS at 08:07

## 2024-10-31 RX ADMIN — FUROSEMIDE 40 MG: 10 INJECTION, SOLUTION INTRAMUSCULAR; INTRAVENOUS at 17:57

## 2024-10-31 ASSESSMENT — PAIN SCALES - GENERAL
PAINLEVEL_OUTOF10: 0
PAINLEVEL_OUTOF10: 0

## 2024-10-31 NOTE — PLAN OF CARE
Rapid response was called for chest heaviness level symptoms along with shortness of breath.  Chest x-ray was ordered that showed hyperinflation but no signs of fluid overload.  To be started on Solu-Medrol DuoNeb as the patient has a history of COPD/emphysema.  Longer DuoNeb.

## 2024-10-31 NOTE — CONSULTS
[place] [id number] [place] [id number]                              CONSULTATION      PATIENT NAME: ROSLAIND EDWARDS                  : 1955  MED REC NO: [id number]                      ROOM: 3128  ACCOUNT NO: [id number]                       ADMIT DATE: 10/29/2024  PROVIDER: Papito Veras MD      CONSULT DATE: 10/30/2024    HISTORY OF PRESENT ILLNESS:  The patient is a 69-year-old lady with multiple medical problems including COPD; chronic respiratory failure, on home oxygen; hypertension; rheumatoid arthritis, who came to the emergency room with complaints of increasing shortness of breath and generalized malaise.  She was found to be hypoxic.  She denied any hemoptysis.  She denied any fever or chills.  She denied any nausea or vomiting.  She was complaining of some cough.  She had a CAT scan of the chest which showed right hilar density.    PAST MEDICAL HISTORY:  Significant for COPD, hypertension.    PAST SURGICAL HISTORY:  Remarkable for  section and colonoscopy.    FAMILY HISTORY:  Noncontributory.    SOCIAL HISTORY:  Reveals that she has history of significant smoking in the past.  She drinks alcohol off and on.  No history of drug abuse.    MEDICATIONS:  Reviewed.    ALLERGIES:  SHE HAS NO KNOWN ALLERGIES.      REVIEW OF SYSTEMS:  Ten to fourteen-point review of systems was reviewed and is negative except for what is mentioned in history of present illness.    PHYSICAL EXAMINATION:  GENERAL:  The patient is alert, oriented x3, no acute respiratory distress.  VITAL SIGNS:  Her blood pressure is 117/57 mmHg, pulse of 84 per minute, and respiratory rate of 17 per minute.  She is afebrile.  Her saturation is 92% on 6 L nasal cannula.  HEENT:  Essentially unremarkable.  There is mild JVD.  No lymphadenopathy.  NECK:  Supple.  LUNGS:  Revealed diminished breath sounds, very occasional rhonchi.  HEART:  Showed normal S1, 
Dict [id number]  
IR consulted for thoracentesis.      Pre-procedural ultrasound on the floor showed a small amount of fluid.  IR will plan for thoracentesis on the next available day.  
10/30/24 0810    sodium chloride flush 0.9 % injection 5-40 mL  5-40 mL IntraVENous 2 times per day Remigio Lee MD   10 mL at 10/30/24 0811    sodium chloride flush 0.9 % injection 5-40 mL  5-40 mL IntraVENous PRN Remigio Lee MD        0.9 % sodium chloride infusion   IntraVENous PRN Remigio Lee MD        ondansetron (ZOFRAN-ODT) disintegrating tablet 4 mg  4 mg Oral Q8H PRN Remigio Lee MD        Or    ondansetron (ZOFRAN) injection 4 mg  4 mg IntraVENous Q6H PRN Remigio Lee MD        polyethylene glycol (GLYCOLAX) packet 17 g  17 g Oral Daily PRN Remigio Lee MD        acetaminophen (TYLENOL) tablet 650 mg  650 mg Oral Q6H PRN Remigio Lee MD   650 mg at 10/30/24 1450    Or    acetaminophen (TYLENOL) suppository 650 mg  650 mg Rectal Q6H PRN Remigio Lee MD        enoxaparin (LOVENOX) injection 40 mg  40 mg SubCUTAneous Daily Remigio Lee MD   40 mg at 10/30/24 0811    potassium chloride (KLOR-CON M) extended release tablet 40 mEq  40 mEq Oral PRN Remigio Lee MD        Or    potassium bicarb-citric acid (EFFER-K) effervescent tablet 40 mEq  40 mEq Oral PRN Remigio Lee MD        Or    potassium chloride 10 mEq/100 mL IVPB (Peripheral Line)  10 mEq IntraVENous PRN Remigio Lee MD        magnesium sulfate 2000 mg in 50 mL IVPB premix  2,000 mg IntraVENous PRN Remigio Lee MD        furosemide (LASIX) injection 40 mg  40 mg IntraVENous BID Remigio Lee MD   40 mg at 10/30/24 0810     Review of Systems:   Constitutional: No Fever or Weight Loss   Eyes: No Decreased Vision  ENT: No Headaches, Hearing Loss or Vertigo  Cardiovascular: As per HPI  Respiratory: As per HPI  Gastrointestinal: No abdominal pain, appetite loss, blood in stools, constipation, diarrhea or heartburn  Genitourinary: No dysuria, trouble voiding, or hematuria  Musculoskeletal:  No gait disturbance, weakness or joint 
Markers:  Lab Results   Component Value Date    CEA 6.2 (H) 10/31/2024        Observations:  No data recorded     Assessment:  Lung mass  Anemia    Plan:  I reviewed her CT scan images. I compared it her previous CT and PET/CT scan. Her pulmonary nodules seem to be stable in size and will recommend to continue with observation. If she will have thoracentesis, I will recommend to send pleural fluid for cytology. I will also f/u with pulmonary recommendations for her lung nodules.     Mild anemia with Hg 11.1. She has mild anemia since 9/2024. No signs of bleeding, anemia work-up ordered. Will trend hemoglobin.     I answered all the questions and concerns for today. Thank you for allowing me to participate in the care of this very pleasant patient.    I have independently evaluated and examined this patient today.  I have reviewed radiologic and biochemical tests on this patient. Management Plan is developed mutually with Fransisca Kaiser PA-C. I have reviewed above note and agree with assessment and plan. I personally performed a substantive portion of the visit including all aspects of the medical decision making.

## 2024-11-01 PROBLEM — Z09 HOSPITAL DISCHARGE FOLLOW-UP: Status: RESOLVED | Noted: 2024-10-02 | Resolved: 2024-11-01

## 2024-11-01 LAB
ANION GAP SERPL CALCULATED.3IONS-SCNC: 13 MMOL/L (ref 9–17)
BUN SERPL-MCNC: 21 MG/DL (ref 7–20)
CALCIUM SERPL-MCNC: 9.5 MG/DL (ref 8.3–10.6)
CHLORIDE SERPL-SCNC: 100 MMOL/L (ref 99–110)
CO2 SERPL-SCNC: 25 MMOL/L (ref 21–32)
CREAT SERPL-MCNC: 0.9 MG/DL (ref 0.6–1.2)
GFR, ESTIMATED: 60 ML/MIN/1.73M2
GLUCOSE SERPL-MCNC: 160 MG/DL (ref 74–99)
POTASSIUM SERPL-SCNC: 4 MMOL/L (ref 3.5–5.1)
SODIUM SERPL-SCNC: 138 MMOL/L (ref 136–145)

## 2024-11-01 PROCEDURE — 2700000000 HC OXYGEN THERAPY PER DAY

## 2024-11-01 PROCEDURE — 94761 N-INVAS EAR/PLS OXIMETRY MLT: CPT

## 2024-11-01 PROCEDURE — 2580000003 HC RX 258: Performed by: INTERNAL MEDICINE

## 2024-11-01 PROCEDURE — 6370000000 HC RX 637 (ALT 250 FOR IP): Performed by: INTERNAL MEDICINE

## 2024-11-01 PROCEDURE — 2140000000 HC CCU INTERMEDIATE R&B

## 2024-11-01 PROCEDURE — 99232 SBSQ HOSP IP/OBS MODERATE 35: CPT | Performed by: INTERNAL MEDICINE

## 2024-11-01 PROCEDURE — 6360000002 HC RX W HCPCS: Performed by: INTERNAL MEDICINE

## 2024-11-01 PROCEDURE — 80048 BASIC METABOLIC PNL TOTAL CA: CPT

## 2024-11-01 PROCEDURE — 94640 AIRWAY INHALATION TREATMENT: CPT

## 2024-11-01 PROCEDURE — 36415 COLL VENOUS BLD VENIPUNCTURE: CPT

## 2024-11-01 RX ADMIN — WATER 40 MG: 1 INJECTION INTRAMUSCULAR; INTRAVENOUS; SUBCUTANEOUS at 05:35

## 2024-11-01 RX ADMIN — ENOXAPARIN SODIUM 40 MG: 100 INJECTION SUBCUTANEOUS at 08:37

## 2024-11-01 RX ADMIN — IPRATROPIUM BROMIDE AND ALBUTEROL SULFATE 1 DOSE: 2.5; .5 SOLUTION RESPIRATORY (INHALATION) at 12:01

## 2024-11-01 RX ADMIN — FUROSEMIDE 40 MG: 10 INJECTION, SOLUTION INTRAMUSCULAR; INTRAVENOUS at 17:42

## 2024-11-01 RX ADMIN — BUDESONIDE AND FORMOTEROL FUMARATE DIHYDRATE 2 PUFF: 160; 4.5 AEROSOL RESPIRATORY (INHALATION) at 21:33

## 2024-11-01 RX ADMIN — Medication 1000 UNITS: at 08:37

## 2024-11-01 RX ADMIN — SODIUM CHLORIDE, PRESERVATIVE FREE 10 ML: 5 INJECTION INTRAVENOUS at 08:38

## 2024-11-01 RX ADMIN — BUDESONIDE AND FORMOTEROL FUMARATE DIHYDRATE 2 PUFF: 160; 4.5 AEROSOL RESPIRATORY (INHALATION) at 07:35

## 2024-11-01 RX ADMIN — CARVEDILOL 25 MG: 25 TABLET, FILM COATED ORAL at 08:37

## 2024-11-01 RX ADMIN — ASPIRIN 81 MG: 81 TABLET, CHEWABLE ORAL at 08:37

## 2024-11-01 RX ADMIN — AMLODIPINE BESYLATE 10 MG: 10 TABLET ORAL at 08:37

## 2024-11-01 RX ADMIN — IPRATROPIUM BROMIDE AND ALBUTEROL SULFATE 1 DOSE: 2.5; .5 SOLUTION RESPIRATORY (INHALATION) at 15:46

## 2024-11-01 RX ADMIN — IPRATROPIUM BROMIDE AND ALBUTEROL SULFATE 1 DOSE: 2.5; .5 SOLUTION RESPIRATORY (INHALATION) at 21:32

## 2024-11-01 RX ADMIN — IPRATROPIUM BROMIDE AND ALBUTEROL SULFATE 1 DOSE: 2.5; .5 SOLUTION RESPIRATORY (INHALATION) at 07:35

## 2024-11-01 RX ADMIN — FUROSEMIDE 40 MG: 10 INJECTION, SOLUTION INTRAMUSCULAR; INTRAVENOUS at 08:37

## 2024-11-01 RX ADMIN — SODIUM CHLORIDE, PRESERVATIVE FREE 10 ML: 5 INJECTION INTRAVENOUS at 19:36

## 2024-11-01 RX ADMIN — CARVEDILOL 25 MG: 25 TABLET, FILM COATED ORAL at 19:35

## 2024-11-01 ASSESSMENT — PAIN SCALES - GENERAL
PAINLEVEL_OUTOF10: 0

## 2024-11-01 NOTE — CARE COORDINATION
Chart reviewed, screened for discharge planning.  Plan remains home.  No discharge needs identified at this time.  CM following

## 2024-11-02 ENCOUNTER — APPOINTMENT (OUTPATIENT)
Dept: GENERAL RADIOLOGY | Age: 69
DRG: 291 | End: 2024-11-02
Payer: MEDICARE

## 2024-11-02 LAB
ACE SERPL-CCNC: 48 U/L (ref 16–85)
BNP SERPL-MCNC: 657 PG/ML (ref 0–125)

## 2024-11-02 PROCEDURE — 36415 COLL VENOUS BLD VENIPUNCTURE: CPT

## 2024-11-02 PROCEDURE — 6360000002 HC RX W HCPCS: Performed by: INTERNAL MEDICINE

## 2024-11-02 PROCEDURE — 6370000000 HC RX 637 (ALT 250 FOR IP): Performed by: STUDENT IN AN ORGANIZED HEALTH CARE EDUCATION/TRAINING PROGRAM

## 2024-11-02 PROCEDURE — 2140000000 HC CCU INTERMEDIATE R&B

## 2024-11-02 PROCEDURE — 99232 SBSQ HOSP IP/OBS MODERATE 35: CPT | Performed by: INTERNAL MEDICINE

## 2024-11-02 PROCEDURE — 94640 AIRWAY INHALATION TREATMENT: CPT

## 2024-11-02 PROCEDURE — 2700000000 HC OXYGEN THERAPY PER DAY

## 2024-11-02 PROCEDURE — 6370000000 HC RX 637 (ALT 250 FOR IP): Performed by: INTERNAL MEDICINE

## 2024-11-02 PROCEDURE — 83880 ASSAY OF NATRIURETIC PEPTIDE: CPT

## 2024-11-02 PROCEDURE — 2580000003 HC RX 258: Performed by: INTERNAL MEDICINE

## 2024-11-02 PROCEDURE — 94761 N-INVAS EAR/PLS OXIMETRY MLT: CPT

## 2024-11-02 PROCEDURE — 71046 X-RAY EXAM CHEST 2 VIEWS: CPT

## 2024-11-02 RX ORDER — FUROSEMIDE 40 MG/1
40 TABLET ORAL DAILY
Status: DISCONTINUED | OUTPATIENT
Start: 2024-11-02 | End: 2024-11-03 | Stop reason: HOSPADM

## 2024-11-02 RX ADMIN — IPRATROPIUM BROMIDE AND ALBUTEROL SULFATE 1 DOSE: 2.5; .5 SOLUTION RESPIRATORY (INHALATION) at 07:17

## 2024-11-02 RX ADMIN — CARVEDILOL 25 MG: 25 TABLET, FILM COATED ORAL at 20:14

## 2024-11-02 RX ADMIN — ENOXAPARIN SODIUM 40 MG: 100 INJECTION SUBCUTANEOUS at 08:41

## 2024-11-02 RX ADMIN — BUDESONIDE AND FORMOTEROL FUMARATE DIHYDRATE 2 PUFF: 160; 4.5 AEROSOL RESPIRATORY (INHALATION) at 07:17

## 2024-11-02 RX ADMIN — SODIUM CHLORIDE, PRESERVATIVE FREE 5 ML: 5 INJECTION INTRAVENOUS at 08:46

## 2024-11-02 RX ADMIN — CARVEDILOL 25 MG: 25 TABLET, FILM COATED ORAL at 08:40

## 2024-11-02 RX ADMIN — IPRATROPIUM BROMIDE AND ALBUTEROL SULFATE 1 DOSE: 2.5; .5 SOLUTION RESPIRATORY (INHALATION) at 11:13

## 2024-11-02 RX ADMIN — IPRATROPIUM BROMIDE AND ALBUTEROL SULFATE 1 DOSE: 2.5; .5 SOLUTION RESPIRATORY (INHALATION) at 15:08

## 2024-11-02 RX ADMIN — Medication 1000 UNITS: at 08:41

## 2024-11-02 RX ADMIN — BUDESONIDE AND FORMOTEROL FUMARATE DIHYDRATE 2 PUFF: 160; 4.5 AEROSOL RESPIRATORY (INHALATION) at 21:18

## 2024-11-02 RX ADMIN — ASPIRIN 81 MG: 81 TABLET, CHEWABLE ORAL at 08:41

## 2024-11-02 RX ADMIN — IPRATROPIUM BROMIDE AND ALBUTEROL SULFATE 1 DOSE: 2.5; .5 SOLUTION RESPIRATORY (INHALATION) at 21:18

## 2024-11-02 RX ADMIN — PREDNISONE 50 MG: 20 TABLET ORAL at 08:41

## 2024-11-02 RX ADMIN — SODIUM CHLORIDE, PRESERVATIVE FREE 10 ML: 5 INJECTION INTRAVENOUS at 20:15

## 2024-11-02 RX ADMIN — AMLODIPINE BESYLATE 10 MG: 10 TABLET ORAL at 08:41

## 2024-11-02 RX ADMIN — FUROSEMIDE 40 MG: 40 TABLET ORAL at 08:40

## 2024-11-02 ASSESSMENT — PAIN SCALES - GENERAL
PAINLEVEL_OUTOF10: 0

## 2024-11-03 VITALS
DIASTOLIC BLOOD PRESSURE: 66 MMHG | WEIGHT: 158.73 LBS | OXYGEN SATURATION: 93 % | TEMPERATURE: 98.6 F | HEART RATE: 76 BPM | BODY MASS INDEX: 24.91 KG/M2 | RESPIRATION RATE: 18 BRPM | HEIGHT: 67 IN | SYSTOLIC BLOOD PRESSURE: 147 MMHG

## 2024-11-03 PROBLEM — J96.21 ACUTE ON CHRONIC RESPIRATORY FAILURE WITH HYPOXIA: Status: RESOLVED | Noted: 2024-10-29 | Resolved: 2024-11-03

## 2024-11-03 LAB
ANION GAP SERPL CALCULATED.3IONS-SCNC: 8 MMOL/L (ref 9–17)
BUN SERPL-MCNC: 24 MG/DL (ref 7–20)
CALCIUM SERPL-MCNC: 9.4 MG/DL (ref 8.3–10.6)
CHLORIDE SERPL-SCNC: 103 MMOL/L (ref 99–110)
CO2 SERPL-SCNC: 27 MMOL/L (ref 21–32)
CREAT SERPL-MCNC: 0.9 MG/DL (ref 0.6–1.2)
GFR, ESTIMATED: 64 ML/MIN/1.73M2
GLUCOSE SERPL-MCNC: 101 MG/DL (ref 74–99)
MICROORGANISM SPEC CULT: NORMAL
MICROORGANISM SPEC CULT: NORMAL
POTASSIUM SERPL-SCNC: 4.1 MMOL/L (ref 3.5–5.1)
SERVICE CMNT-IMP: NORMAL
SERVICE CMNT-IMP: NORMAL
SODIUM SERPL-SCNC: 138 MMOL/L (ref 136–145)
SPECIMEN DESCRIPTION: NORMAL
SPECIMEN DESCRIPTION: NORMAL

## 2024-11-03 PROCEDURE — 6370000000 HC RX 637 (ALT 250 FOR IP): Performed by: STUDENT IN AN ORGANIZED HEALTH CARE EDUCATION/TRAINING PROGRAM

## 2024-11-03 PROCEDURE — 94761 N-INVAS EAR/PLS OXIMETRY MLT: CPT

## 2024-11-03 PROCEDURE — 36415 COLL VENOUS BLD VENIPUNCTURE: CPT

## 2024-11-03 PROCEDURE — 94664 DEMO&/EVAL PT USE INHALER: CPT

## 2024-11-03 PROCEDURE — 80048 BASIC METABOLIC PNL TOTAL CA: CPT

## 2024-11-03 PROCEDURE — 6370000000 HC RX 637 (ALT 250 FOR IP): Performed by: INTERNAL MEDICINE

## 2024-11-03 PROCEDURE — 2700000000 HC OXYGEN THERAPY PER DAY

## 2024-11-03 PROCEDURE — 94640 AIRWAY INHALATION TREATMENT: CPT

## 2024-11-03 RX ORDER — PREDNISONE 50 MG/1
50 TABLET ORAL DAILY
Qty: 4 TABLET | Refills: 0 | Status: SHIPPED | OUTPATIENT
Start: 2024-11-03 | End: 2024-11-07

## 2024-11-03 RX ORDER — FUROSEMIDE 40 MG/1
40 TABLET ORAL DAILY
Qty: 60 TABLET | Refills: 3 | Status: SHIPPED | OUTPATIENT
Start: 2024-11-03

## 2024-11-03 RX ADMIN — Medication 1000 UNITS: at 08:32

## 2024-11-03 RX ADMIN — BUDESONIDE AND FORMOTEROL FUMARATE DIHYDRATE 2 PUFF: 160; 4.5 AEROSOL RESPIRATORY (INHALATION) at 07:24

## 2024-11-03 RX ADMIN — IPRATROPIUM BROMIDE AND ALBUTEROL SULFATE 1 DOSE: 2.5; .5 SOLUTION RESPIRATORY (INHALATION) at 10:49

## 2024-11-03 RX ADMIN — ASPIRIN 81 MG: 81 TABLET, CHEWABLE ORAL at 08:32

## 2024-11-03 RX ADMIN — CARVEDILOL 25 MG: 25 TABLET, FILM COATED ORAL at 08:32

## 2024-11-03 RX ADMIN — IPRATROPIUM BROMIDE AND ALBUTEROL SULFATE 1 DOSE: 2.5; .5 SOLUTION RESPIRATORY (INHALATION) at 07:23

## 2024-11-03 RX ADMIN — FUROSEMIDE 40 MG: 40 TABLET ORAL at 08:32

## 2024-11-03 RX ADMIN — PREDNISONE 50 MG: 20 TABLET ORAL at 08:32

## 2024-11-03 RX ADMIN — AMLODIPINE BESYLATE 10 MG: 10 TABLET ORAL at 08:32

## 2024-11-03 ASSESSMENT — PAIN SCALES - GENERAL: PAINLEVEL_OUTOF10: 0

## 2024-11-03 NOTE — DISCHARGE INSTRUCTIONS
Please follow up with the pulmonologist and your PCP  You will need to have the lung mass monitored and possible follow up with oncology  Continue to take medications as prescribed, you have 2 new prescriptions: lasix and steroid  Please have your pulmonologist/PCP determine need to continue the lasix  If symptoms recur, please return to the nearest ED

## 2024-11-03 NOTE — DISCHARGE SUMMARY
tablet  Commonly known as: COREG     ELDERBERRY IMMUNE HEALTH GUMMY PO     Humira (2 Pen) 40 MG/0.4ML Pnkt injection pen kit  Generic drug: adalimumab  Inject 40 mg into the skin every 14 days     tiotropium 2.5 MCG/ACT Aers inhaler  Commonly known as: Spiriva Respimat  Inhale 2 puffs into the lungs daily     Vitamin B 12 500 MCG Tabs     vitamin D 25 MCG (1000 UT) Tabs tablet  Commonly known as: CHOLECALCIFEROL  Take 1 tablet by mouth daily             Objective Findings at Discharge:   BP (!) 146/60   Pulse 72   Temp 98.6 °F (37 °C) (Oral)   Resp 19   Ht 1.702 m (5' 7\")   Wt 72 kg (158 lb 11.7 oz)   SpO2 100%   BMI 24.86 kg/m²       Physical Exam:   General: NAD  Eyes: EOMI  ENT: neck supple  Cardiovascular: Regular rate.  Respiratory: CTAB  Gastrointestinal: Soft, non tender  Genitourinary: no suprapubic tenderness  Musculoskeletal: improvement in LE edema  Skin: warm, dry  Neuro: Alert.  Psych: Mood appropriate.         Labs and Imaging   XR CHEST (2 VW)    Result Date: 11/3/2024  Chest X-ray INDICATION: Pulmonary congestion. Pleural effusion. COMPARISON: Chest x-ray from 10/30/2024. CTA chest from 10/29/2024. TECHNIQUE: PA and lateral views of the chest were obtained. FINDINGS: Cardiac size is within normal limits. There is mild prominence of the pulmonary interstitium. There is blunting of the bilateral costophrenic angles relating to small bilateral pleural effusions. No pneumothorax is identified.     1. Small bilateral pleural effusions. 2. Mild prominence of the pulmonary interstitium. Electronically signed by [facility]    XR CHEST PORTABLE    Result Date: 10/30/2024  Chest X-ray INDICATION: Chest pain COMPARISON: 10/29/2024 TECHNIQUE: AP/PA view of the chest was obtained. FINDINGS: Reticulonodular opacities within the lungs consistent with interstitial pulmonary edema. Tiny left pleural effusion.. Cardiomegaly. Pulmonary vascular ingestion consistent with volume overload..  The bony thorax is

## 2024-11-03 NOTE — PROGRESS NOTES
Cardiology Progress Note     Admit Date:  10/29/2024    Consult reason/ Seen today for :       Subjective and  Overnight Events : Breathing somewhat better no hemoptysis  Rapid response was called for chest heaviness      Chief complain on admission : 69 y.o.year old who is admitted for  Chief Complaint   Patient presents with    Shortness of Breath     Reports recently here and sent home with o2. States sat at home running in 70s when moving. On arrival 65% on home 2L tank.       Assessment / Plan:   breathing and hypoxia is out of proportion to BNP agree with diuresis for now  CHF: Acute Systolic/ Diastolic decompensated heart failure. Appears to be volume overloaded . Plan IV  diuresis. We can try IV Lasix 40 mg BID. And  titrate diuretics accordingly.   Pulmonary evaluation for lung mass also seen oncology workup   HTN: stable, continue present medications   Severe aortic regurgitation probably contributing to her symptoms?  May need further investigation including MARY depending on her clinical course she needs to have better respiratory status before MARY is done after lung mass worjk up   DVT prophylaxis if no contraindication  6.   Dyslipidemia: continue statins   Will see as needed , follow up in office after lung mass work up   Discussed with primary team, hospitalist service, bedside nursing staff and family  Past medical history:    has a past medical history of Heart abnormality, Hypertension, and Pap smear for cervical cancer screening.  Past surgical history:   has a past surgical history that includes  section; Ankle fracture surgery; and Colonoscopy.  Social History:   reports that she has quit smoking. Her smoking use included cigarettes. She quit smokeless tobacco use about 11 years ago. She reports current alcohol use. She reports that she does not use drugs.  Family history:  family history includes Breast 
    V2.0    [facility] Progress Note      Name:  Radha Gomez /Age/Sex: 1955  (69 y.o. female)   MRN & CSN:  [id number] & [id number] Encounter Date/Time: 10/30/2024 7:15 AM EDT   Location:  [facility], [facility]/312A PCP: Luis Buenrostro MD     Attending:Riki Hernandez*       Hospital Day: 2    Assessment and Recommendations   Radha Gomez is a 69 y.o. female with pmh of Chronic resp failure with hypoxia on 2L/min, COPD, HTN, RA, liver fibrosis, HFpEF, and severe AR who presents with Acute on chronic respiratory failure with hypoxia      Plan:   Acute on Chronic Resp Failure with Hypoxia   Acute on Chronic HFpEF  Severe Aortic Regurg   Patient on baseline O2 2L, presented saturating in the 60s on this.  --BNP 1035  --Ciurrently on HFNC @ 9L, will wean as able  --CTA  showed no acute PE, 3cm right hilar mass and 8mm RML nodule. There were pleural effusions and pulmonary edema diffusely  --Last echo is from 2021 and showed EF 65-70% with normal wall motion and G2DD  --Continue lasix IV BID  --Repeat CXR in the AM  --Strict I/O and daily weights  --Cardio consult    Lung mass  CTA  showed no acute PE, 3cm right hilar mass and 8mm RML nodule  --Patient smoked a PPD for multiple years, quit in may of this year  --Pulm and onc consulted, will appreciate recs    H/O COPD  Does not appear to be in exacerbation, continue home regimen    H/O HTN  --Continue amlodipine amd coreg    H/O RA  On Humira Bi weekly    H/O liver fibrosis      Diet ADULT DIET; Regular; No Added Salt (3-4 gm)   DVT Prophylaxis [x] Lovenox, []  Heparin, [] SCDs, [] Ambulation,  [] Eliquis, [] Xarelto  [] Coumadin   Code Status Full Code   Disposition From: Home  Expected Disposition: Home  Estimated Date of Discharge: 2-3 days  Patient requires continued admission due to HF exacebration and need for mass work up   Surrogate Decision Maker/ POA  Spouse     Personally reviewed Lab Studies and Imaging     Discussed management of the case with the 
    V2.0    [facility] [place]      Name:  Radha Gomez /Age/Sex: 1955  (69 y.o. female)   MRN & CSN:  [id number] & [id number] Encounter Date/Time: 2024 7:15 AM EDT   Location:  [facility]/312-A PCP: Luis Buenrostro MD     Attending:Riki Hernandez*       Hospital Day: 4    Assessment and Recommendations   Radha Gomez is a 69 y.o. female with pmh of Chronic resp failure with hypoxia on 2L/min, COPD, HTN, RA, liver fibrosis, HFpEF, and severe AR who presents with Acute on chronic respiratory failure with hypoxia      Plan:   Acute on Chronic Resp Failure with Hypoxia - resolving  Acute on Chronic HFpEF  Severe Aortic Regurg   Patient on baseline O2 2L, presented saturating in the 60s on this.  --BNP 1035-->800  --Currently on HFNC @ 4L, will wean as able  --CTA  showed no acute PE, 3cm right hilar mass and 8mm RML nodule. There were pleural effusions and pulmonary edema diffusely. Mass compared to imaging from 2024 is unchanged  --Last echo is from 2021 and showed EF 65-70% with normal wall motion and G2DD  --Continue lasix IV BID and transition to PO in the AM  --CXR showed pulm congestion, will repeat in the AM  --Given addendum to CT by radiology, IR consulted for thoracentesis but not enough fluid for a tap  --Strict I/O and daily weights  --Cardio consult, appreciate recs, OP follow up    Lung mass  CTA  showed no acute PE, 3cm right hilar mass and 8mm RML nodule  --Patient smoked a PPD for multiple years, quit in may of this year  --Pulm and onc consulted, appreciate recs. Mass unchanged in comparison to imaging from a month ago, OP follow up with pulmonology  --CEA mildly elevated    H/O COPD  Does not appear to be in exacerbation, continue home regimen  --Started on solumedrol per pulm, will transition to Po an complete a burst    H/O HTN  --Continue amlodipine amd coreg    H/O RA  On Humira Bi weekly    H/O liver fibrosis      Diet ADULT DIET; Regular; No Added Salt (3-4 gm) 
   10/30/24 1608   Spirometry Assessment   FEV1 (%PRED) 73   Post Bronchodilator FEV/FVC 78   COPD Exacerbations in last year 1   PIF 95 L/min   COPD Assessment (CAT Score)   Cough Assessment 1   Phlegm Assessment 3   Chest tightness 0   Walking on an incline 5   Home Activities 5   Confident Leaving The Home 3   Sleeping Soundly 5   Have Energy 5   Assessment Score 27   $RT COPD Assessment Yes   GOLD Staging   Group Group B       
4 Eyes Skin Assessment     NAME:  Radha Gomez  YOB: 1955  MEDICAL RECORD NUMBER:  [id number]    The patient is being assessed for  Admission    I agree that at least one RN has performed a thorough Head to Toe Skin Assessment on the patient. ALL assessment sites listed below have been assessed.      Areas assessed by both nurses:    Head, Face, Ears, Shoulders, Back, Chest, Arms, Elbows, Hands, Sacrum. Buttock, Coccyx, Ischium, and Legs. Feet and Heels        Does the Patient have a Wound? No noted wound(s)       Enzo Prevention initiated by RN: Yes  Wound Care Orders initiated by RN: No    Pressure Injury (Stage 3,4, Unstageable, DTI, NWPT, and Complex wounds) if present, place Wound referral order by RN under : No    New Ostomies, if present place, Ostomy referral order under : No     Nurse 1 eSignature: Electronically signed by Laly Crow RN on 10/30/24 at 1:10 AM EDT    **SHARE this note so that the co-signing nurse can place an eSignature**    Nurse 2 eSignature: {Esignature:[id number]}   
@ [id number] Rapid Response Team called for pt complaing of new onset chest heaviness, worsening SOB and mental status changes.EKG completed, labs drawn and BARRY Lee MD at bedside.   
Discharge instructions reviewed with patient, all questions answered. IV removed. Patient waiting on her  to bring her portable O2.  
Hematology Oncology Progress Note    Patient Name:  Radha Gomez  Patient :  1955  Patient MRN:  [id number]     Primary Oncologist: Mario Mae MD  Referring Provider: Luis Buenrostro MD    Radha Gomez is a 69 y.o. female with past medical history of severe aortic regurgitation, HFpEF, HTN, COPD with chronic respiratory failure on home O2 2L NC, RA, and nonalcoholic liver cirrhosis who was admitted for acute on chronic respiratory failure with hypoxia and acute on chronic HFpEF with severe aortic regurgitation.      CTA chest found a 3 cm right hilar mass and 8 mm right middle lobe nodule. These were noted on CT scans done on 3/25/24 and 24. Radiologist placed addedam after comparing. They are stable in size. Patient was hospitalized from  to  for COPD exacerbation and discharged on home 2L NC O2, as well as antibiotics and steroids for 5 days.      CTA Chest 10/29/24 showed no acute PE. 3 cm right hilar mass and 8 mm right middle lobe nodule. Correlate for history of lung cancer. Pleural effusions and pulmonary edema diffusely.     Today, patient is sitting up in bed in no acute distress.  She had an episode of chest heaviness and breathing difficulty last night which resolved quickly.  She is overall breathing easier with less fatigue.  Has not been up walking yet. She denies headache, dizziness, palpitations, cough, nausea, abdominal pain, or bloody/black stools.      She quit smoking in May 2024.  She drinks alcohol occasionally.  Denies illicit drug use.  Per patient, she had benign mammogram and Cologuard a few months ago.  She is due for colonoscopy in 2025.  Patient was started on Humira for her RA in 2024.  Her prednisone and Plaquenil was stopped at that time.      Interval 24  Pt was seen and examined this morning. Not in any acute distress. No overnight events. She is feeling some better today. CXR images done this morning was reviewed.     Labs on 24 
Hematology Oncology Progress Note    Patient Name:  Radha Gomez  Patient :  1955  Patient MRN:  [id number]     Primary Oncologist: Mario Mae MD  Referring Provider: Luis Buenrostro MD    Radha Gomez is a 69 y.o. female with past medical history of severe aortic regurgitation, HFpEF, HTN, COPD with chronic respiratory failure on home O2 2L NC, RA, and nonalcoholic liver cirrhosis who was admitted for acute on chronic respiratory failure with hypoxia and acute on chronic HFpEF with severe aortic regurgitation.      CTA chest found a 3 cm right hilar mass and 8 mm right middle lobe nodule. These were noted on CT scans done on 3/25/24 and 24. Radiologist placed addedam after comparing. They are stable in size. Patient was hospitalized from  to  for COPD exacerbation and discharged on home 2L NC O2, as well as antibiotics and steroids for 5 days.      CTA Chest 10/29/24 showed no acute PE. 3 cm right hilar mass and 8 mm right middle lobe nodule. Correlate for history of lung cancer. Pleural effusions and pulmonary edema diffusely.     Today, patient is sitting up in bed in no acute distress.  She had an episode of chest heaviness and breathing difficulty last night which resolved quickly.  She is overall breathing easier with less fatigue.  Has not been up walking yet. She denies headache, dizziness, palpitations, cough, nausea, abdominal pain, or bloody/black stools.      She quit smoking in May 2024.  She drinks alcohol occasionally.  Denies illicit drug use.  Per patient, she had benign mammogram and Cologuard a few months ago.  She is due for colonoscopy in 2025.  Patient was started on Humira for her RA in 2024.  Her prednisone and Plaquenil was stopped at that time.      Interval 24  Pt was seen and examined this morning. Not in any acute distress. No overnight events. She is feeling some better today.     Labs today showed Na 138, Cr 0.9, Ca 9.5,     10/31/24  Alk 
Met with patient and spouse. Introduced myself as the Heart failure education R.N. Patient is health conscious. She avoids processed and fast foods.   She does report drinking a large quantity of water daily.    Admitting diagnosis- acute on chronic respiratory failure with hypoxia  Cardiologist- Drew Dash this admission)  Heart Failure Education Nurse consulted- yes   Ejection fraction 65-70 % as of 9/25/2024 with grade II DD and severe aortic regurgitation  Pro BNP- 1,035 on 10/29  Hospital follow up appt-  11/11 Dr. Choudhary. She will also reschedule PCP appt and see Dr. Russell   Cardiac rehabilitation referral- N/A   ICD information- N/A   Smoking Cessation information and referral- N/A   Pneumonia vaccine- none -discussed pneumonia prevention  PCP- Porter  Patient has a digital scale? Given to patient  Transportation- has transportation    Able to obtain medications without difficulty?- Yes- Patient denies difficulty in obtaining or taking medications.     Heart failure specific Medications-  Norvasc, Coreg, Lasix, Potassium      Reviewed Heart failure patient education book with patient. Heart failure education included: Type of heart failure, How it is diagnosed, causes, symptoms, medications, diet, daily weights, exercise and fluid control. Recommendation for Mediterranean or DASH diet made.  Questions answered.      Patient's heart failure medications reviewed and information given on each.     Reviewed the Stop Light Handout with patient and instructed when to call her provider.   Patient was engaged and attentive during education session.    The following handouts were reviewed with patient and patient was given a copy of the following : Heart Failure education booklet, the 'Stop Light' Handout,  a link to the American [employer] Association's Healthier Living with Heart Failure Interactive workbook and a list of heart failure related education available on the hospital TV and how to access it.       
Patient ambulated in gardner 1 lap around unit with standby assist on 4 liters O2. O2 sats remained 87-93%.  
Patient ambulated in gardner approx. 400 feet in gardner with standby assist on 3 liters O2 nasal cannula. O2 sats remained 87-92%.  
Patient instructed and educated on Incentive Spirometer. Patient able to do 250 ml. Patient's goal is 2500ml.  
Patient received from the ER vitals taken and resp panel completed per order.   
Pulmonary and Critical Care  Progress Note      VITALS:  /64   Pulse 84   Temp 98.1 °F (36.7 °C) (Oral)   Resp 14   Ht 1.702 m (5' 7\")   Wt 73.6 kg (162 lb 4.1 oz)   SpO2 95%   BMI 25.41 kg/m²     Subjective:   CHIEF COMPLAINT :SOB     HPI:                The patient is a 69 y.o. female is sitting in the chair. She is in mild resp distress. Her recent CT chest compared to her old CT chest showed stable right hilar mass.    Objective:   PHYSICAL EXAM:    LUNGS:Basal coarse crackles  Abd -soft, BS+,NT  Ext-no pedal edema  CVS-s1s2 no murmurs      DATA:    CBC:  Recent Labs     10/30/24  2122 10/31/24  0539   WBC 15.7* 9.5   RBC 3.88* 3.93*   HGB 11.1* 11.1*   HCT 34.6* 34.5*    237   MCV 89.2 87.8   MCH 28.6 28.2   MCHC 32.1 32.2   RDW 13.2 13.2      BMP:  Recent Labs     10/30/24  2122 10/31/24  0539 11/01/24  0839    138 138   K 4.0 4.2 4.0    104 100   CO2 23 24 25   BUN 22* 20 21*   CREATININE 1.1 0.9 0.9   CALCIUM 9.6 9.5 9.5   GLUCOSE 144* 159* 160*      ABG:  No results for input(s): \"PH\", \"PO2ART\", \"QUX6QUC\", \"HCO3\", \"BEART\", \"O2SAT\" in the last 72 hours.  BNP  No results found for: \"BNP\"   D-Dimer:  No results found for: \"DDIMER\"   Radiology: None      Assessment/Plan     Patient Active Problem List    Diagnosis Date Noted    Dyspnea 10/31/2024    Nonrheumatic aortic valve insufficiency 10/30/2024    Acute on chronic respiratory failure with hypoxia 10/29/2024    COPD exacerbation (HCC) 09/24/2024    NAFLD (nonalcoholic fatty liver disease) 06/05/2024    Calcific tendinitis of right shoulder 12/08/2021    Bilateral carotid artery stenosis 06/11/2020    Tobacco dependence 06/11/2020    Chronic kidney disease, stage III (moderate) (HCC) 11/06/2019    Carotid stenosis, asymptomatic, left 11/06/2019    Essential hypertension 11/06/2019    Anxiety 11/06/2019     Acute on chronic hypoxic resp Failure- improving  AECOPD- improving  Cig smoker  ILD   RA  NIDDM  HTN  Grade II Diastolic 
Pulmonary and Critical Care  Progress Note    Subjective:   The patient has improved.CT comparison not available.  Shortness of breath has improved  Chest pain none.  Addressing respiratory complaints Patient is negative for  hemoptysis and cyanosis  CONSTITUTIONAL:  negative for fevers and chills      Past Medical History:     has a past medical history of Heart abnormality, Hypertension, and Pap smear for cervical cancer screening.   has a past surgical history that includes  section; Ankle fracture surgery; and Colonoscopy.   reports that she has quit smoking. Her smoking use included cigarettes. She quit smokeless tobacco use about 11 years ago. She reports current alcohol use. She reports that she does not use drugs.  Family history:  family history includes Breast Cancer in her maternal grandmother.    No Known Allergies  Social History:    Reviewed; no changes    Objective:   PHYSICAL EXAM:        VITALS:  BP (!) 140/66   Pulse 84   Temp 99 °F (37.2 °C) (Oral)   Resp 16   Ht 1.702 m (5' 7\")   Wt 76.5 kg (168 lb 10.4 oz)   SpO2 93%   BMI 26.41 kg/m²     24HR INTAKE/OUTPUT:    Intake/Output Summary (Last 24 hours) at 10/31/2024 1034  Last data filed at 10/31/2024 0631  Gross per 24 hour   Intake 1020 ml   Output 2900 ml   Net -1880 ml       CONSTITUTIONAL:  awake, alert, cooperative, no apparent distress, and appears stated age  LUNGS:  Moving air better.  CARDIOVASCULAR:  normal S1 and S2 and negative JVD  ABD:Abdomen soft, non-tender. BS normal. No masses,  No organomegaly  NEURO:Alert and oriented x3. Gait normal. Reflexes and motor strength normal and symmetric. Cranial nerves 2-12 and sensation grossly intact.  DATA:    CBC:  Recent Labs     10/29/24  1225 10/30/24  2122 10/31/24  0539   WBC 8.9 15.7* 9.5   RBC 4.22 3.88* 3.93*   HGB 12.1* 11.1* 11.1*   HCT 36.8* 34.6* 34.5*    247 237   MCV 87.2 89.2 87.8   MCH 28.7 28.6 28.2   MCHC 32.9 32.1 32.2   RDW 13.2 13.2 13.2    
Received an IR consult.  Per Dr Street note IR will plan for thoracenteses on next available day.  Updated the pt nurse Jd PARHAM and Dr. Hernandez.  
11/06/2019    Carotid stenosis, asymptomatic, left 11/06/2019    Essential hypertension 11/06/2019    Anxiety 11/06/2019     Acute on chronic hypoxic resp Failure- improving  AECOPD- improving  Cig smoker  ILD   RA  NIDDM  HTN  Grade II Diastolic Dysfunction  Severe Pulmonary HTN  Right Hilar Mass- PET Negative in April'24  8 mm RML nodule       Diuresis  I/O chart  BMP in am  Inhalers  Prednisone taper  OP follow up for the right hilar mass  Insulin  Keep sats > 92%  DVT and GI Prophylaxis  Await placement  C/w present management    Electronically signed by Mp Choudhary MD on 11/1/2024 at 9:12 AM    
chloride flush  5-40 mL IntraVENous 2 times per day    enoxaparin  40 mg SubCUTAneous Daily      Infusions:    sodium chloride       PRN Meds: hydrOXYzine pamoate, 25 mg, TID PRN  sodium chloride flush, 5-40 mL, PRN  sodium chloride, , PRN  ondansetron, 4 mg, Q8H PRN   Or  ondansetron, 4 mg, Q6H PRN  polyethylene glycol, 17 g, Daily PRN  acetaminophen, 650 mg, Q6H PRN   Or  acetaminophen, 650 mg, Q6H PRN  potassium chloride, 40 mEq, PRN   Or  potassium alternative oral replacement, 40 mEq, PRN   Or  potassium chloride, 10 mEq, PRN  magnesium sulfate, 2,000 mg, PRN        Labs and Imaging   CTA PULMONARY W CONTRAST    Result Date: 10/29/2024  CTA PULMONARY W CONTRAST COMPARISON: None available HISTORY: dyspnea/hypoxia  TECHNIQUE: Multiple contiguous 2D axial CT images of the chest were obtained from the lung apices to the lung bases after the intravenous administration of 100mL Iso-beck 370 per pulmonary angiography protocol.  Coronal reconstructions were performed. Radiation dose reduction techniques were used for this study. Our CT scanners use one or all of the following: Automated exposure control, adjustment of the mA and/or kV according to patient size, iterative reconstruction. FINDINGS: STUDY QUALITY: The exam study quality is good. AIRWAYS: The central airways are patent. LUNGS: 3 cm right hilar mass. Diffuse groundglass opacities with interlobular septal thickening and emphysema. 8 mm solid nodule in the right middle lobe. PLEURA: Small pleural effusions. HEART: The heart is not enlarged. No calcified coronary atherosclerosis.  No pericardial effusion. THORACIC AORTA: The aorta is normal in caliber. PULMONARY ARTERY: No pulmonary embolus to the segmental level. The main pulmonary artery is normal in caliber. MEDIASTINUM/DYLON: Mediastinal and hilar adenopathy CHEST WALL: No mass or axillary lymphadenopathy. UPPER ABDOMEN: Nodular liver BONES: No suspicious osseous lesion.     No acute PE. 3 cm right hilar 
glycol, acetaminophen **OR** acetaminophen, potassium chloride **OR** potassium alternative oral replacement **OR** potassium chloride, magnesium sulfate    Lab Data:  CBC:   Recent Labs     10/29/24  1225 10/30/24  2122 10/31/24  0539   WBC 8.9 15.7* 9.5   HGB 12.1* 11.1* 11.1*   HCT 36.8* 34.6* 34.5*   MCV 87.2 89.2 87.8    247 237     BMP:   Recent Labs     10/30/24  0604 10/30/24  2122 10/31/24  0539    138 138   K 4.1 4.0 4.2    102 104   CO2 21 23 24   PHOS  --   --  3.4   BUN 16 22* 20   CREATININE 0.9 1.1 0.9     PT/INR: No results for input(s): \"PROTIME\", \"INR\" in the last 72 hours.  BNP:    Recent Labs     10/29/24  1225 10/31/24  0539   PROBNP 1,035* 800*     TROPONIN: No results for input(s): \"TROPONINT\" in the last 72 hours.     ECHO : (interpreted by myself)  echocardiogram     Assessment:  69 y.o.year old who is admitted for  Chief Complaint   Patient presents with    Shortness of Breath     Reports recently here and sent home with o2. States sat at home running in 70s when moving. On arrival 65% on home 2L tank.     , active issues as noted below:  Impression:  Principal Problem:    Acute on chronic respiratory failure with hypoxia  Active Problems:    Essential hypertension    Tobacco dependence    Nonrheumatic aortic valve insufficiency  Resolved Problems:    * No resolved hospital problems. *        Medical Decision Making:  The following items were considered in medical decision making:  Discussion of patient care with other providers  Reviewed clinical lab tests if any  Reviewed radiology tests if any  Reviewed other diagnostic tests/interventions  Independent review of radiologic images if any       Estimated time spent for medical decision-making encompassing complexity of the case, history taking, medication review, physical examination, communication with family, RN, , discussion with primary team , and ancillary staff members to provide accurate care for the 
INDICATION:  dyspnea/hypoxia COMPARISON: Chest x-ray of 9/24/2024. TECHNIQUE: AP/PA view of the chest was obtained.     Superimposed upon interstitial lung disease is seen small bilateral pleural effusions and right basilar airspace disease. At the right lung base, differential diagnosis includes pneumonitis and atelectasis. Also, cannot exclude a mild degree of interstitial pulmonary edema. No pneumothorax is seen. The cardiomediastinal silhouette is stable, without evidence of cardiomegaly. Electronically signed by Hill Regan MD      CBC:   Recent Labs     10/29/24  1225 10/30/24  2122 10/31/24  0539   WBC 8.9 15.7* 9.5   HGB 12.1* 11.1* 11.1*    247 237     BMP:    Recent Labs     10/30/24  0604 10/30/24  2122 10/31/24  0539    138 138   K 4.1 4.0 4.2    102 104   CO2 21 23 24   BUN 16 22* 20   CREATININE 0.9 1.1 0.9   GLUCOSE 140* 144* 159*     Hepatic:   Recent Labs     10/30/24  0604 10/30/24  2122 10/31/24  0539   AST 13* 15 17   ALT 8* 12 10   BILITOT 0.4 0.3 0.4   ALKPHOS 99 102 99     Lipids:   Lab Results   Component Value Date/Time    CHOL 181 10/30/2024 06:04 AM    HDL 80 10/30/2024 06:04 AM    TRIG 51 10/30/2024 06:04 AM     Hemoglobin A1C: No results found for: \"LABA1C\"  TSH: No results found for: \"TSH\"  Troponin: No results found for: \"TROPONINT\"  Lactic Acid:   Recent Labs     10/29/24  1225 10/30/24  2122   LACTA 1.0 1.7     BNP:   Recent Labs     10/29/24  1225 10/31/24  0539   PROBNP 1,035* 800*     UA:  Lab Results   Component Value Date/Time    NITRU NEGATIVE 10/29/2024 04:12 PM    COLORU Yellow 10/29/2024 04:12 PM    PHUR 5.5 10/29/2024 04:12 PM    WBCUA <1 12/29/2020 12:00 PM    RBCUA 1 12/29/2020 12:00 PM    MUCUS RARE 10/19/2020 07:49 AM    TRICHOMONAS NONE SEEN 12/29/2020 12:00 PM    BACTERIA RARE 12/29/2020 12:00 PM    CLARITYU CLEAR 12/29/2020 12:00 PM    LEUKOCYTESUR NEGATIVE 10/29/2024 04:12 PM    UROBILINOGEN 0.2 10/29/2024 04:12 PM    BILIRUBINUR NEGATIVE

## 2024-11-04 ENCOUNTER — OFFICE VISIT (OUTPATIENT)
Dept: PULMONOLOGY | Age: 69
End: 2024-11-04
Payer: MEDICARE

## 2024-11-04 ENCOUNTER — CARE COORDINATION (OUTPATIENT)
Dept: CASE MANAGEMENT | Age: 69
End: 2024-11-04

## 2024-11-04 ENCOUNTER — TELEPHONE (OUTPATIENT)
Dept: PULMONOLOGY | Age: 69
End: 2024-11-04

## 2024-11-04 VITALS
BODY MASS INDEX: 25.24 KG/M2 | SYSTOLIC BLOOD PRESSURE: 146 MMHG | HEIGHT: 67 IN | DIASTOLIC BLOOD PRESSURE: 56 MMHG | WEIGHT: 160.8 LBS | OXYGEN SATURATION: 92 % | HEART RATE: 72 BPM

## 2024-11-04 DIAGNOSIS — R09.02 HYPOXIA: ICD-10-CM

## 2024-11-04 DIAGNOSIS — J44.9 CHRONIC OBSTRUCTIVE PULMONARY DISEASE, UNSPECIFIED COPD TYPE (HCC): Primary | ICD-10-CM

## 2024-11-04 DIAGNOSIS — G47.10 HYPERSOMNIA: ICD-10-CM

## 2024-11-04 DIAGNOSIS — F17.210 CIGARETTE SMOKER: ICD-10-CM

## 2024-11-04 DIAGNOSIS — I50.9 ACUTE ON CHRONIC CONGESTIVE HEART FAILURE, UNSPECIFIED HEART FAILURE TYPE (HCC): Primary | ICD-10-CM

## 2024-11-04 DIAGNOSIS — R91.8 HILAR MASS: ICD-10-CM

## 2024-11-04 DIAGNOSIS — G47.33 OSA (OBSTRUCTIVE SLEEP APNEA): ICD-10-CM

## 2024-11-04 PROCEDURE — 1111F DSCHRG MED/CURRENT MED MERGE: CPT | Performed by: GENERAL PRACTICE

## 2024-11-04 PROCEDURE — 3078F DIAST BP <80 MM HG: CPT | Performed by: INTERNAL MEDICINE

## 2024-11-04 PROCEDURE — 1123F ACP DISCUSS/DSCN MKR DOCD: CPT | Performed by: INTERNAL MEDICINE

## 2024-11-04 PROCEDURE — 1159F MED LIST DOCD IN RCRD: CPT | Performed by: INTERNAL MEDICINE

## 2024-11-04 PROCEDURE — 99215 OFFICE O/P EST HI 40 MIN: CPT | Performed by: INTERNAL MEDICINE

## 2024-11-04 PROCEDURE — 3077F SYST BP >= 140 MM HG: CPT | Performed by: INTERNAL MEDICINE

## 2024-11-04 ASSESSMENT — ENCOUNTER SYMPTOMS
EYE ITCHING: 0
COUGH: 1
EYE DISCHARGE: 0
BACK PAIN: 0
ABDOMINAL DISTENTION: 0
SHORTNESS OF BREATH: 1
ABDOMINAL PAIN: 0

## 2024-11-04 ASSESSMENT — SLEEP AND FATIGUE QUESTIONNAIRES
HOW LIKELY ARE YOU TO NOD OFF OR FALL ASLEEP WHEN YOU ARE A PASSENGER IN A CAR FOR AN HOUR WITHOUT A BREAK: HIGH CHANCE OF DOZING
HOW LIKELY ARE YOU TO NOD OFF OR FALL ASLEEP WHILE SITTING INACTIVE IN A PUBLIC PLACE: WOULD NEVER DOZE
HOW LIKELY ARE YOU TO NOD OFF OR FALL ASLEEP WHILE SITTING AND READING: MODERATE CHANCE OF DOZING
HOW LIKELY ARE YOU TO NOD OFF OR FALL ASLEEP WHILE SITTING QUIETLY AFTER LUNCH WITHOUT ALCOHOL: HIGH CHANCE OF DOZING
HOW LIKELY ARE YOU TO NOD OFF OR FALL ASLEEP IN A CAR, WHILE STOPPED FOR A FEW MINUTES IN TRAFFIC: WOULD NEVER DOZE
ESS TOTAL SCORE: 13
HOW LIKELY ARE YOU TO NOD OFF OR FALL ASLEEP WHILE SITTING AND TALKING TO SOMEONE: WOULD NEVER DOZE
HOW LIKELY ARE YOU TO NOD OFF OR FALL ASLEEP WHILE WATCHING TV: MODERATE CHANCE OF DOZING
HOW LIKELY ARE YOU TO NOD OFF OR FALL ASLEEP WHILE LYING DOWN TO REST IN THE AFTERNOON WHEN CIRCUMSTANCES PERMIT: HIGH CHANCE OF DOZING

## 2024-11-04 NOTE — CARE COORDINATION
Care Transitions Note    Initial Call - Call within 2 business days of discharge: Yes    Patient Current Location:  Home: [address] [place] [address]    Care Transition Nurse contacted the patient by telephone to perform post hospital discharge assessment, verified name and  as identifiers. Provided introduction to self, and explanation of the Care Transition Nurse role.     Patient: Radha Gomez    Patient : 1955   MRN: [id number]    Reason for Admission: A/C CHF, A/C Resp Failure, Lung Mass   Discharge Date: 11/3/24  RURS: Readmission Risk Score: 12.9  Facility: [facility] 10/29/24-11/3/24    Last Discharge Facility       Date Complaint Diagnosis Description Type Department Provider    10/29/24 Shortness of Breath Dyspnea, unspecified type ... ED to Hosp-Admission (Discharged) (ADMITTED) MUNA EulaliaN Riki Hernandez MD; S...   Was this an external facility discharge? No    Additional needs identified to be addressed with provider   No needs identified         Method of communication with provider: none.    Patients top risk factors for readmission: medical condition-CHF, COPD, HTN, CH Resp Failure, Lung Mass    Interventions to address risk factors:   Education: CHF  Review of patient management of conditions/medications: AVS    Care Summary Note:   Reviewed CHF Zone Mgt:   Daily weights in a.m. before breakfast, after void/bm  Keep written log of weights for review  Notify MD immediately of weight gain/loss 3# or more in 1-7days  Take all rx as prescribed, keep scheduled MD appts  Low sodium diet- read labels; avoid/limit high sodium foods such as fast foods, canned/boxed/processed foods, frozen meals, soups, cheeses, breads, chips, soda.  Do not add salt to food  Adhere to MD recommended fluid restriction  Notify MD immediately if experiencing increased sob, orthopnea, edema, weight gain, feeling of uneasiness, chest pain, increased cough, confusion, wheezing or chest tightness. Call

## 2024-11-04 NOTE — ASSESSMENT & PLAN NOTE
Advised to c/w quitting smoking  C/w Inhalers   Home O2 eval  PFT in 1 year  No flu vaccine per patient

## 2024-11-04 NOTE — PROGRESS NOTES
160-4.5 MCG/ACT AERO    predniSONE (DELTASONE) 50 MG tablet    JAK (obstructive sleep apnea)       She has symptoms of JAK  Advised to go for the PSG         Relevant Orders    Baseline Diagnostic Sleep Study    Hypoxia       Home O2 eval now         Relevant Orders    Home O2 eval (desaturation screen)       Other    Cigarette smoker       Advised to c/w quitting smoking         Relevant Orders    Full PFT Study With Bronchodilator    Home O2 eval (desaturation screen)    Hypersomnia       She has symptoms of JAK  Advised to go for the PSG         Hilar mass       3 cm right hilar mass in a 50 pk yr smoker  I'll get a PET scan         Relevant Orders    PET CT SKULL BASE TO MID THIGH     85% Resting on RA  83% Ambulating on RA  87% Ambulating on 2 L.min of oxygen  89% Ambulating on 3 L.min of oxygen  89% Ambulating on 4 L.min of oxygen  91% Ambulating on 5 L.min of oxygen  93% Ambulating on 6 L.min of oxygen    Total time spent for this encounter: 40 mins d/w patient and her       Follow-Up:    Return in about 4 weeks (around 12/2/2024) for Sleep Study.     Progress notes sent to the referring Provider    Mp Choudhary MD MD  11/4/2024  4:12 PM

## 2024-11-12 ENCOUNTER — CARE COORDINATION (OUTPATIENT)
Dept: CASE MANAGEMENT | Age: 69
End: 2024-11-12

## 2024-11-12 RX ORDER — TIOTROPIUM BROMIDE INHALATION SPRAY 1.56 UG/1
2 SPRAY, METERED RESPIRATORY (INHALATION) DAILY
Qty: 1 EACH | Refills: 4 | Status: SHIPPED | OUTPATIENT
Start: 2024-11-12

## 2024-11-12 NOTE — CARE COORDINATION
Care Transitions Note    Follow Up Call     Patient: Radha Gomez                         Patient : 1955   MRN: [id number]                             Reason for Admission: A/C CHF, A/C Resp Failure, Lung Mass   Discharge Date: 11/3/24       RURS: Readmission Risk Score: 12.9  Facility: [facility] 10/29/24-11/3/24    Patient Current Location:  Home: [address] [place] [address]    Care Transition Nurse contacted Patient by telephone. Verified name and  as identifiers.    Additional needs identified to be addressed with provider   Patient: Radha Gomez                         Patient : 1955   MRN: [id number]                             Reason for Admission: A/C CHF, A/C Resp Failure, Lung Mass   Facility: [facility] 10/29/24-11/3/24    Dr Choudhary:  Patient w/ the following rx--  tiotropium (SPIRIVA RESPIMAT) 2.5 MCG/ACT AERS inhaler [[id number]]  ENDED    Order Details  Dose: 2 puff Route: Inhalation Frequency: DAILY RESP   Dispense Quantity: 4 each Refills: 5          Sig: Inhale 2 puffs into the lungs daily         Start Date: 10/01/24 End Date: 24 after 30 doses   Written Date: 10/01/24 Expiration Date: 10/01/25     Patient out of Spiriva, no refills on rx. Do you want to reorder Spiriva or is this rx therapy complete? Uses [facility].    Patient reports when she saw you on 24 you mentioned placing cardiology referral. Reports she has not been contacted by cardiology for appt scheduling.     Thanks,  Eulalia CTN       Method of communication with provider: chart routing Dr Choudhary.    Care Summary Note: 1343 Re-review of CHF. Reports \"feeling fantastic\". Denies cp, sob, orthopnea, cough, edema. Reports weight 152.8# this morning which is down from 160.12# 24 pulm appt wt. Reports pulm mentioned placing cardiology referral however she has not been contacted for appt scheduling. CTN f/u as above. Reports taking Lasix, Coreg as directed. Denies need for RD.

## 2024-11-14 ENCOUNTER — HOSPITAL ENCOUNTER (OUTPATIENT)
Dept: PET IMAGING | Age: 69
Discharge: HOME | End: 2024-11-14
Attending: INTERNAL MEDICINE
Payer: MEDICARE

## 2024-11-14 DIAGNOSIS — R91.8 HILAR MASS: ICD-10-CM

## 2024-11-14 PROCEDURE — 2580000003 HC RX 258: Performed by: INTERNAL MEDICINE

## 2024-11-14 PROCEDURE — 3430000000 HC RX DIAGNOSTIC RADIOPHARMACEUTICAL: Performed by: INTERNAL MEDICINE

## 2024-11-14 PROCEDURE — A9609 HC RX DIAGNOSTIC RADIOPHARMACEUTICAL: HCPCS | Performed by: INTERNAL MEDICINE

## 2024-11-14 PROCEDURE — 78815 PET IMAGE W/CT SKULL-THIGH: CPT

## 2024-11-14 RX ORDER — FLUDEOXYGLUCOSE F 18 200 MCI/ML
15.04 INJECTION, SOLUTION INTRAVENOUS
Status: COMPLETED | OUTPATIENT
Start: 2024-11-14 | End: 2024-11-14

## 2024-11-14 RX ORDER — SODIUM CHLORIDE 0.9 % (FLUSH) 0.9 %
10 SYRINGE (ML) INJECTION PRN
Status: COMPLETED | OUTPATIENT
Start: 2024-11-14 | End: 2024-11-14

## 2024-11-14 RX ADMIN — SODIUM CHLORIDE, PRESERVATIVE FREE 10 ML: 5 INJECTION INTRAVENOUS at 08:02

## 2024-11-14 RX ADMIN — FLUDEOXYGLUCOSE F 18 15.04 MILLICURIE: 200 INJECTION, SOLUTION INTRAVENOUS at 08:02

## 2024-11-18 ENCOUNTER — CARE COORDINATION (OUTPATIENT)
Dept: CASE MANAGEMENT | Age: 69
End: 2024-11-18

## 2024-11-18 NOTE — CARE COORDINATION
Care Transitions Note    Follow Up Call     Dx: A/C CHF, A/C Resp Failure, Lung Mass     Patient Current Location:  Home: [address] [place], [place] [id number]    [facility] Care Coordinator contacted the patient by telephone. Verified name and  as identifiers.    Additional needs identified to be addressed with provider   No needs identified                 Method of communication with provider: none.    Care Summary Note: Spoke with Radha Gomez who reported that she was doing good. Patient denied cp, sob, cough, dizziness, headache, n/v, diarrhea, abdominal pains, fever, or chills. Patient stated that she is taking inhalers as ordered. Patient reported that wt today and yesterday was 158. Patient report that appetite and fluid intake is good and denied any problems with bowel or bladder. Patient reported that she is taking all medications as ordered. Patient denied any other needs at this time. Patient instructed to continue to monitor s/s, reporting any that may present to MD immediately for early intervention.  Patient is agreeable to f/u calls.     Plan of care updates since last contact:          Advance Care Planning   The patient has the following advanced directives on file:  Advance Directives       Power of  Living Will ACP-Advance Directive ACP-Power of     Not on File Not on File Not on File Not on File            The patient has appointed the following active healthcare agents:    Primary Decision Maker: Virgilio Gomez - Spouse - [phone]        Medication Review:       Remote Patient Monitoring:  Declined on previous call as self monitoring.    Assessments:  Care Transitions Subsequent and Final Call    Subsequent and Final Calls  Do you have any ongoing symptoms?: No  Have your medications changed?: No  Do you have any questions related to your medications?: No  Do you currently have any active services?: No  Do you have any needs or concerns that I can assist you with?: No  Care

## 2024-11-27 ENCOUNTER — CARE COORDINATION (OUTPATIENT)
Dept: CASE MANAGEMENT | Age: 69
End: 2024-11-27

## 2024-11-27 NOTE — CARE COORDINATION
Care Transitions Note    Follow Up Call     Attempted to reach patient for transitions of care follow up.  Unable to reach patient.      Outreach Attempts:   HIPAA compliant voicemail left for patient.     Follow Up Appointment:   Future Appointments         Provider Specialty Dept Phone    1/8/2025 11:30 AM Chrissy Randolph MD Rheumatology [phone]    1/16/2025 11:00 AM Ema Eng APRN - CNP Gastroenterology [phone]    1/17/2025 9:00 AM [facility] PFT Pulmonary Function Testing [phone]    1/20/2025 8:30 PM [facility] IN LAB SLEEP STUDY [facility] [phone]    1/28/2025 11:15 AM Mp Choudhary MD Pulmonology [phone]            Plan for follow-up call in 6-10 days based on severity of symptoms and risk factors. Plan for next call: symptom management-.  self management-.    Pearl Calvillo LPN

## 2024-12-04 ENCOUNTER — CARE COORDINATION (OUTPATIENT)
Dept: CASE MANAGEMENT | Age: 69
End: 2024-12-04

## 2024-12-04 NOTE — CARE COORDINATION
Care Transitions Note    Final Call     Patient: Radha Gomez                         Patient : 1955   MRN: [id number]                             Reason for Admission: A/C CHF, A/C Resp Failure, Lung Mass   Discharge Date: 11/3/24       RURS: Readmission Risk Score: 12.9  Facility: [facility] 10/29/24-11/3/24    Patient Current Location:  Home: [address] [place] [address]    Care Transition Nurse contacted patient by telephone. Verified name and  as identifiers.    Patient graduated from Care Transitions program on 24  .  Patient verbalizes confidence in the ability to self-manage at this time..      Advance Care Planning:   Does patient have an Advance Directive: Not on file; patient encouraged to bring existing ACP documents to a [facility] facility.  Healthcare Decision Maker:    Primary Decision Maker: Virgilio Gomez - Spouse - [phone]    Handoff:   Patient was not referred to the ACM team due to patient declined services.      Care Summary Note: Review of CHF education, v/u and adherence. Denies cp, cough, sob, orthopnea, edema, ac distress. Reports wt steady at 158-159#; patient aware of when to call MD. Reports still has not been contacted by cardiology--note routed to PCP re: need for referral. As with chf, htn. Reports taking all meds and inhalers as directed; denies refill needs. Reports appetite, fluid intake good w/ b&b wnl. Denies resource needs including hhc, dme, community assistance. Denies need for ongoing monitoring and agreeable to final call.    Assessments:  Care Transitions Subsequent and Final Call    Schedule Follow Up Appointment with PCP: Completed  Subsequent and Final Calls  Do you have any ongoing symptoms?: No  Have your medications changed?: No  Do you have any questions related to your medications?: No  Do you currently have any active services?: No  Do you have any needs or concerns that I can assist you with?: No  Identified Barriers: Other  Care Transitions

## 2024-12-06 ENCOUNTER — APPOINTMENT (OUTPATIENT)
Dept: ULTRASOUND IMAGING | Age: 69
DRG: 291 | End: 2024-12-06
Payer: MEDICARE

## 2024-12-06 ENCOUNTER — APPOINTMENT (OUTPATIENT)
Dept: CT IMAGING | Age: 69
DRG: 291 | End: 2024-12-06
Payer: MEDICARE

## 2024-12-06 ENCOUNTER — HOSPITAL ENCOUNTER (INPATIENT)
Age: 69
LOS: 7 days | Discharge: HOME | DRG: 291 | End: 2024-12-13
Attending: EMERGENCY MEDICINE | Admitting: INTERNAL MEDICINE
Payer: MEDICARE

## 2024-12-06 ENCOUNTER — APPOINTMENT (OUTPATIENT)
Dept: GENERAL RADIOLOGY | Age: 69
DRG: 291 | End: 2024-12-06
Payer: MEDICARE

## 2024-12-06 DIAGNOSIS — M79.89 LEG SWELLING: ICD-10-CM

## 2024-12-06 DIAGNOSIS — I50.9 ACUTE ON CHRONIC CONGESTIVE HEART FAILURE, UNSPECIFIED HEART FAILURE TYPE (HCC): ICD-10-CM

## 2024-12-06 DIAGNOSIS — I65.22 CAROTID STENOSIS, ASYMPTOMATIC, LEFT: ICD-10-CM

## 2024-12-06 DIAGNOSIS — I35.1 NONRHEUMATIC AORTIC VALVE INSUFFICIENCY: ICD-10-CM

## 2024-12-06 DIAGNOSIS — R91.1 LUNG NODULE: ICD-10-CM

## 2024-12-06 DIAGNOSIS — R06.02 SHORTNESS OF BREATH: ICD-10-CM

## 2024-12-06 DIAGNOSIS — J96.21 ACUTE ON CHRONIC RESPIRATORY FAILURE WITH HYPOXIA: Primary | ICD-10-CM

## 2024-12-06 DIAGNOSIS — N18.30 STAGE 3 CHRONIC KIDNEY DISEASE, UNSPECIFIED WHETHER STAGE 3A OR 3B CKD (HCC): ICD-10-CM

## 2024-12-06 DIAGNOSIS — J44.1 COPD EXACERBATION (HCC): ICD-10-CM

## 2024-12-06 DIAGNOSIS — J44.9 CHRONIC OBSTRUCTIVE PULMONARY DISEASE, UNSPECIFIED COPD TYPE (HCC): ICD-10-CM

## 2024-12-06 LAB
ANION GAP SERPL CALCULATED.3IONS-SCNC: 12 MMOL/L (ref 9–17)
ARTERIAL PATENCY WRIST A: ABNORMAL
B PARAP IS1001 DNA NPH QL NAA+NON-PROBE: NOT DETECTED
B PERT DNA SPEC QL NAA+PROBE: NOT DETECTED
BASOPHILS # BLD: 0.11 K/UL
BASOPHILS NFR BLD: 2 % (ref 0–1)
BNP SERPL-MCNC: 1157 PG/ML (ref 0–125)
BODY TEMPERATURE: 37
BUN SERPL-MCNC: 18 MG/DL (ref 7–20)
C PNEUM DNA NPH QL NAA+NON-PROBE: NOT DETECTED
CALCIUM SERPL-MCNC: 9.7 MG/DL (ref 8.3–10.6)
CHLORIDE SERPL-SCNC: 104 MMOL/L (ref 99–110)
CO2 SERPL-SCNC: 21 MMOL/L (ref 21–32)
COHGB MFR BLD: 0.7 % (ref 0.5–1.5)
CREAT SERPL-MCNC: 1.1 MG/DL (ref 0.6–1.2)
D DIMER PPP FEU-MCNC: 5.22 UG/ML FEU (ref 0–0.46)
EOSINOPHIL # BLD: 0.2 K/UL
EOSINOPHILS RELATIVE PERCENT: 3 % (ref 0–3)
ERYTHROCYTE [DISTWIDTH] IN BLOOD BY AUTOMATED COUNT: 13.5 % (ref 11.7–14.9)
FERRITIN SERPL-MCNC: 361 NG/ML (ref 15–150)
FLUAV RNA NPH QL NAA+NON-PROBE: NOT DETECTED
FLUBV RNA NPH QL NAA+NON-PROBE: NOT DETECTED
GFR, ESTIMATED: 50 ML/MIN/1.73M2
GLUCOSE SERPL-MCNC: 89 MG/DL (ref 74–99)
HADV DNA NPH QL NAA+NON-PROBE: NOT DETECTED
HCO3 VENOUS: 24.8 MMOL/L (ref 22–29)
HCOV 229E RNA NPH QL NAA+NON-PROBE: NOT DETECTED
HCOV HKU1 RNA NPH QL NAA+NON-PROBE: NOT DETECTED
HCOV NL63 RNA NPH QL NAA+NON-PROBE: NOT DETECTED
HCOV OC43 RNA NPH QL NAA+NON-PROBE: NOT DETECTED
HCT VFR BLD AUTO: 41.3 % (ref 37–47)
HGB BLD-MCNC: 12.1 G/DL (ref 12.5–16)
HMPV RNA NPH QL NAA+NON-PROBE: NOT DETECTED
HPIV1 RNA NPH QL NAA+NON-PROBE: NOT DETECTED
HPIV2 RNA NPH QL NAA+NON-PROBE: NOT DETECTED
HPIV3 RNA NPH QL NAA+NON-PROBE: NOT DETECTED
HPIV4 RNA NPH QL NAA+NON-PROBE: NOT DETECTED
IMM GRANULOCYTES # BLD AUTO: 0.01 K/UL
IMM GRANULOCYTES NFR BLD: 0 %
IRON SATN MFR SERPL: NORMAL % (ref 15–50)
LYMPHOCYTES NFR BLD: 2.48 K/UL
LYMPHOCYTES RELATIVE PERCENT: 33 % (ref 24–44)
M PNEUMO DNA NPH QL NAA+NON-PROBE: NOT DETECTED
MAGNESIUM SERPL-MCNC: 2.3 MG/DL (ref 1.8–2.4)
MCH RBC QN AUTO: 27.9 PG (ref 27–31)
MCHC RBC AUTO-ENTMCNC: 29.3 G/DL (ref 32–36)
MCV RBC AUTO: 95.2 FL (ref 78–100)
METHEMOGLOBIN: 0.3 % (ref 0.5–1.5)
MONOCYTES NFR BLD: 0.84 K/UL
MONOCYTES NFR BLD: 11 % (ref 0–4)
NEUTROPHILS NFR BLD: 51 % (ref 36–66)
NEUTS SEG NFR BLD: 3.82 K/UL
OXYHGB MFR BLD: 76.8 %
PCO2 VENOUS: 38 MM HG (ref 38–54)
PH VENOUS: 7.43 (ref 7.32–7.43)
PLATELET # BLD AUTO: 234 K/UL (ref 140–440)
PMV BLD AUTO: 10.6 FL (ref 7.5–11.1)
PO2 VENOUS: 41.7 MM HG (ref 23–48)
POSITIVE BASE EXCESS, VEN: 0.7 MMOL/L (ref 0–3)
POTASSIUM SERPL-SCNC: 4.5 MMOL/L (ref 3.5–5.1)
PROCALCITONIN SERPL-MCNC: 0.04 NG/ML
RBC # BLD AUTO: 4.34 M/UL (ref 4.2–5.4)
RSV RNA NPH QL NAA+NON-PROBE: NOT DETECTED
RV+EV RNA NPH QL NAA+NON-PROBE: NOT DETECTED
SARS-COV-2 RNA NPH QL NAA+NON-PROBE: NOT DETECTED
SODIUM SERPL-SCNC: 137 MMOL/L (ref 136–145)
SPECIMEN DESCRIPTION: NORMAL
TIBC SERPL-MCNC: 333 UG/DL (ref 260–445)
TROPONIN I SERPL HS-MCNC: 15 NG/L (ref 0–14)
TROPONIN I SERPL HS-MCNC: 21 NG/L (ref 0–14)
TSH SERPL DL<=0.05 MIU/L-ACNC: 1.3 UIU/ML (ref 0.27–4.2)
WBC OTHER # BLD: 7.5 K/UL (ref 4–10.5)

## 2024-12-06 PROCEDURE — 82805 BLOOD GASES W/O2 SATURATION: CPT

## 2024-12-06 PROCEDURE — 0202U NFCT DS 22 TRGT SARS-COV-2: CPT

## 2024-12-06 PROCEDURE — 94640 AIRWAY INHALATION TREATMENT: CPT

## 2024-12-06 PROCEDURE — 85379 FIBRIN DEGRADATION QUANT: CPT

## 2024-12-06 PROCEDURE — 2580000003 HC RX 258: Performed by: INTERNAL MEDICINE

## 2024-12-06 PROCEDURE — 71275 CT ANGIOGRAPHY CHEST: CPT

## 2024-12-06 PROCEDURE — 80048 BASIC METABOLIC PNL TOTAL CA: CPT

## 2024-12-06 PROCEDURE — 6370000000 HC RX 637 (ALT 250 FOR IP): Performed by: INTERNAL MEDICINE

## 2024-12-06 PROCEDURE — 82728 ASSAY OF FERRITIN: CPT

## 2024-12-06 PROCEDURE — 6360000002 HC RX W HCPCS: Performed by: INTERNAL MEDICINE

## 2024-12-06 PROCEDURE — 93971 EXTREMITY STUDY: CPT

## 2024-12-06 PROCEDURE — 83550 IRON BINDING TEST: CPT

## 2024-12-06 PROCEDURE — 84443 ASSAY THYROID STIM HORMONE: CPT

## 2024-12-06 PROCEDURE — 83540 ASSAY OF IRON: CPT

## 2024-12-06 PROCEDURE — 84484 ASSAY OF TROPONIN QUANT: CPT

## 2024-12-06 PROCEDURE — 85025 COMPLETE CBC W/AUTO DIFF WBC: CPT

## 2024-12-06 PROCEDURE — 36415 COLL VENOUS BLD VENIPUNCTURE: CPT

## 2024-12-06 PROCEDURE — 84145 PROCALCITONIN (PCT): CPT

## 2024-12-06 PROCEDURE — 99285 EMERGENCY DEPT VISIT HI MDM: CPT

## 2024-12-06 PROCEDURE — 6360000002 HC RX W HCPCS

## 2024-12-06 PROCEDURE — 71045 X-RAY EXAM CHEST 1 VIEW: CPT

## 2024-12-06 PROCEDURE — 93005 ELECTROCARDIOGRAM TRACING: CPT | Performed by: EMERGENCY MEDICINE

## 2024-12-06 PROCEDURE — 83735 ASSAY OF MAGNESIUM: CPT

## 2024-12-06 PROCEDURE — 2140000000 HC CCU INTERMEDIATE R&B

## 2024-12-06 PROCEDURE — 6360000004 HC RX CONTRAST MEDICATION

## 2024-12-06 PROCEDURE — 83880 ASSAY OF NATRIURETIC PEPTIDE: CPT

## 2024-12-06 RX ORDER — SODIUM CHLORIDE 9 MG/ML
INJECTION, SOLUTION INTRAVENOUS PRN
Status: DISCONTINUED | OUTPATIENT
Start: 2024-12-06 | End: 2024-12-13 | Stop reason: HOSPADM

## 2024-12-06 RX ORDER — LANOLIN ALCOHOL/MO/W.PET/CERES
500 CREAM (GRAM) TOPICAL DAILY
Status: DISCONTINUED | OUTPATIENT
Start: 2024-12-06 | End: 2024-12-13 | Stop reason: HOSPADM

## 2024-12-06 RX ORDER — CARVEDILOL 25 MG/1
25 TABLET ORAL 2 TIMES DAILY
Status: DISCONTINUED | OUTPATIENT
Start: 2024-12-06 | End: 2024-12-13 | Stop reason: HOSPADM

## 2024-12-06 RX ORDER — ENOXAPARIN SODIUM 100 MG/ML
40 INJECTION SUBCUTANEOUS NIGHTLY
Status: DISCONTINUED | OUTPATIENT
Start: 2024-12-06 | End: 2024-12-13 | Stop reason: HOSPADM

## 2024-12-06 RX ORDER — ACETAMINOPHEN 650 MG/1
650 SUPPOSITORY RECTAL EVERY 6 HOURS PRN
Status: DISCONTINUED | OUTPATIENT
Start: 2024-12-06 | End: 2024-12-13 | Stop reason: HOSPADM

## 2024-12-06 RX ORDER — BUDESONIDE AND FORMOTEROL FUMARATE DIHYDRATE 160; 4.5 UG/1; UG/1
2 AEROSOL RESPIRATORY (INHALATION) 2 TIMES DAILY
Status: DISCONTINUED | OUTPATIENT
Start: 2024-12-06 | End: 2024-12-13 | Stop reason: HOSPADM

## 2024-12-06 RX ORDER — POTASSIUM CHLORIDE 7.45 MG/ML
10 INJECTION INTRAVENOUS PRN
Status: DISCONTINUED | OUTPATIENT
Start: 2024-12-06 | End: 2024-12-13 | Stop reason: HOSPADM

## 2024-12-06 RX ORDER — CALCIUM CARBONATE 500(1250)
500 TABLET ORAL DAILY
Status: DISCONTINUED | OUTPATIENT
Start: 2024-12-06 | End: 2024-12-13 | Stop reason: HOSPADM

## 2024-12-06 RX ORDER — ONDANSETRON 4 MG/1
4 TABLET, ORALLY DISINTEGRATING ORAL EVERY 8 HOURS PRN
Status: DISCONTINUED | OUTPATIENT
Start: 2024-12-06 | End: 2024-12-13 | Stop reason: HOSPADM

## 2024-12-06 RX ORDER — SODIUM CHLORIDE 0.9 % (FLUSH) 0.9 %
5-40 SYRINGE (ML) INJECTION EVERY 12 HOURS SCHEDULED
Status: DISCONTINUED | OUTPATIENT
Start: 2024-12-06 | End: 2024-12-13 | Stop reason: HOSPADM

## 2024-12-06 RX ORDER — ASPIRIN 81 MG/1
81 TABLET, CHEWABLE ORAL DAILY
Status: DISCONTINUED | OUTPATIENT
Start: 2024-12-06 | End: 2024-12-13 | Stop reason: HOSPADM

## 2024-12-06 RX ORDER — VITAMIN B COMPLEX
1000 TABLET ORAL DAILY
Status: DISCONTINUED | OUTPATIENT
Start: 2024-12-06 | End: 2024-12-13 | Stop reason: HOSPADM

## 2024-12-06 RX ORDER — AMLODIPINE BESYLATE 10 MG/1
10 TABLET ORAL DAILY
Status: DISCONTINUED | OUTPATIENT
Start: 2024-12-06 | End: 2024-12-13 | Stop reason: HOSPADM

## 2024-12-06 RX ORDER — IOPAMIDOL 755 MG/ML
80 INJECTION, SOLUTION INTRAVASCULAR
Status: COMPLETED | OUTPATIENT
Start: 2024-12-06 | End: 2024-12-06

## 2024-12-06 RX ORDER — MAGNESIUM SULFATE IN WATER 40 MG/ML
2000 INJECTION, SOLUTION INTRAVENOUS PRN
Status: DISCONTINUED | OUTPATIENT
Start: 2024-12-06 | End: 2024-12-13 | Stop reason: HOSPADM

## 2024-12-06 RX ORDER — ONDANSETRON 2 MG/ML
4 INJECTION INTRAMUSCULAR; INTRAVENOUS EVERY 6 HOURS PRN
Status: DISCONTINUED | OUTPATIENT
Start: 2024-12-06 | End: 2024-12-13 | Stop reason: HOSPADM

## 2024-12-06 RX ORDER — FUROSEMIDE 10 MG/ML
40 INJECTION INTRAMUSCULAR; INTRAVENOUS 2 TIMES DAILY
Status: DISCONTINUED | OUTPATIENT
Start: 2024-12-06 | End: 2024-12-07

## 2024-12-06 RX ORDER — POLYETHYLENE GLYCOL 3350 17 G/17G
17 POWDER, FOR SOLUTION ORAL DAILY PRN
Status: DISCONTINUED | OUTPATIENT
Start: 2024-12-06 | End: 2024-12-13 | Stop reason: HOSPADM

## 2024-12-06 RX ORDER — SODIUM CHLORIDE 0.9 % (FLUSH) 0.9 %
5-40 SYRINGE (ML) INJECTION PRN
Status: DISCONTINUED | OUTPATIENT
Start: 2024-12-06 | End: 2024-12-13 | Stop reason: HOSPADM

## 2024-12-06 RX ORDER — POTASSIUM CHLORIDE 1500 MG/1
40 TABLET, EXTENDED RELEASE ORAL PRN
Status: DISCONTINUED | OUTPATIENT
Start: 2024-12-06 | End: 2024-12-13 | Stop reason: HOSPADM

## 2024-12-06 RX ORDER — FUROSEMIDE 10 MG/ML
60 INJECTION INTRAMUSCULAR; INTRAVENOUS ONCE
Status: COMPLETED | OUTPATIENT
Start: 2024-12-06 | End: 2024-12-06

## 2024-12-06 RX ORDER — ACETAMINOPHEN 325 MG/1
650 TABLET ORAL EVERY 6 HOURS PRN
Status: DISCONTINUED | OUTPATIENT
Start: 2024-12-06 | End: 2024-12-13 | Stop reason: HOSPADM

## 2024-12-06 RX ADMIN — Medication 500 MCG: at 21:10

## 2024-12-06 RX ADMIN — FUROSEMIDE 60 MG: 10 INJECTION, SOLUTION INTRAMUSCULAR; INTRAVENOUS at 15:39

## 2024-12-06 RX ADMIN — Medication 1000 UNITS: at 17:06

## 2024-12-06 RX ADMIN — FUROSEMIDE 40 MG: 10 INJECTION, SOLUTION INTRAMUSCULAR; INTRAVENOUS at 18:31

## 2024-12-06 RX ADMIN — SODIUM CHLORIDE, PRESERVATIVE FREE 10 ML: 5 INJECTION INTRAVENOUS at 21:11

## 2024-12-06 RX ADMIN — AMLODIPINE BESYLATE 10 MG: 10 TABLET ORAL at 17:06

## 2024-12-06 RX ADMIN — CARVEDILOL 25 MG: 25 TABLET, FILM COATED ORAL at 21:10

## 2024-12-06 RX ADMIN — CALCIUM 500 MG: 500 TABLET ORAL at 17:06

## 2024-12-06 RX ADMIN — IOPAMIDOL 80 ML: 755 INJECTION, SOLUTION INTRAVENOUS at 13:26

## 2024-12-06 RX ADMIN — BUDESONIDE AND FORMOTEROL FUMARATE DIHYDRATE 2 PUFF: 160; 4.5 AEROSOL RESPIRATORY (INHALATION) at 22:53

## 2024-12-06 ASSESSMENT — PAIN SCALES - GENERAL
PAINLEVEL_OUTOF10: 0
PAINLEVEL_OUTOF10: 0

## 2024-12-06 NOTE — ED NOTES
FAISAL CRUZ and Dr. Ramos at bedside. RT notified of need for Bipap. 02 sat 72% on 6 L NC, which patient wears at home. Increased to 10 L HF, waiting for RT

## 2024-12-06 NOTE — ED NOTES
Medication History  [facility]    Patient Name: Radha Gomez 1955     Medication history has been completed by: Lianet Diane CPhT    Source(s) of information: patient and insurance claims     Primary Care Physician: Luis Buenrostro MD     Pharmacy: [facility]    Allergies as of 12/06/2024    (No Known Allergies)        Prior to Admission medications    Medication Sig Start Date End Date Taking? Authorizing Provider   tiotropium (SPIRIVA RESPIMAT) 1.25 MCG/ACT AERS inhaler Inhale 2 puffs into the lungs daily 11/12/24  Yes Mp Choudhary MD   furosemide (LASIX) 40 MG tablet Take 1 tablet by mouth daily 11/3/24  Yes Riki Hernandez MD   vitamin D (CHOLECALCIFEROL) 25 MCG (1000 UT) TABS tablet Take 1 tablet by mouth daily 10/8/24  Yes Chrissy Randolph MD   budesonide-formoterol (BREYNA) 160-4.5 MCG/ACT AERO Inhale 2 puffs into the lungs 2 times daily 10/1/24  Yes Nguyen Adkins APRN - CNP   calcium carbonate (OSCAL) 500 MG TABS tablet Take 1 tablet by mouth daily OTC   Yes Venkat Guthrie MD   aspirin 81 MG chewable tablet Take 1 tablet by mouth daily OTC   Yes Venkat Guthrie MD   Cyanocobalamin (VITAMIN B 12) 500 MCG TABS Take 500 mcg by mouth daily OTC   Yes Provider, Historical, MD   Elderberry-Vitamin C-Zinc (ELDERBERRY IMMUNE HEALTH GUMMY PO) Take 2 each by mouth daily OTC   Yes Venkat Guthrie MD   carvedilol (COREG) 25 MG tablet Take 1 tablet by mouth 2 times daily 7/3/24  Yes Venkat Guthrie MD   amLODIPine (NORVASC) 10 MG tablet Take 1 tablet by mouth daily   Yes Venkat Guthrie MD   adalimumab (HUMIRA, 2 PEN,) 40 MG/0.4ML PNKT Inject 40 mg into the skin every 14 days 10/10/24   Chrissy Randolph MD   Handicap Placard MISC by Does not apply route 10/1/24   Nguyen Adkins APRN - CNP     Medications removed from list (include reason, ex. noncompliance, medication cost, therapy complete etc.):   Spiriva

## 2024-12-06 NOTE — ED PROVIDER NOTES
I independently examined and evaluated Radha Gomez.  I personally saw the patient and made/approved the management plan and take responsibility of the patient management.     In brief their history revealed patient with several days of progressive shortness of breath.  Patient reports that when she gets up to walk despite wearing her home nasal cannula oxygen her oxygen saturation will drop into the 50s and 60s.  Patient reports that she has to sit and rest to recover which takes a few minutes with improving oxygen saturation.  No chest pain.  No fevers or coughing.  Patient does report adherence to her home medication regimen.  Patient does report very similar hospitalization in end of October beginning of November of this year demonstrating some fluid on her lungs with concern for congestive heart failure on top of her COPD.    Their focused exam revealed the patient is afebrile, hypertensive, with oxygen saturation in the 70s on arrival despite home nasal cannula.  The patient appears age appropriate, appears well-hydrated, well-nourished.  Very pleasant. Mucous membranes are moist. Speech is clear. Breathing is unlabored.  No peripheral edema. skin is dry. Mental status is normal. The patient moves all extremities and is without facial droop.    12 lead EKG per my interpretation:  Normal Sinus Rhythm at 81  Axis is   Normal  QTc is  within an acceptable range  There is no specific T wave changes appreciated.  There is no specific ST wave changes appreciated.  No STEMI    Prior EKG to compare with was available and no clinically significant change in her morphology when compared to prior.      ED course:   CC/HPI Summary, DDx, ED Course, and Reassessment:   Pt presents as above.  Emergent conditions considered.  Presentation prompted initial broad workup with labs, EKG and imaging.  EKG is with normal sinus rhythm as above.    Workup is demonstrating an abnormal troponin that is marginally elevated as well as

## 2024-12-06 NOTE — H&P
V2.0  History and Physical      Name:  Radha Gomez /Age/Sex: 1955  (69 y.o. female)   MRN & CSN:  [id number] & [id number] Encounter Date/Time: 2024 3:09 PM EST   Location:  ED17/ED-17 PCP: Luis Buenrostro MD       Hospital Day: 1    Assessment and Plan:   Radha Gomez is a 69 y.o. female  who presents with Heart failure (HCC)    Hospital Problems             Last Modified POA    * (Principal) Heart failure (HCC) 2024 Yes       # Acute on chronic HFpEF  # Acute on chronic hypoxic respiratory failure 2/2 HFpEF exacerbation  # Severe AR  # Grade 2 diastolic dysfunction  # Moderate TR  # CAD pulmonary hypertension  -Patient with 1 week of worsening TORRES, PND and orthopnea  -Chest x-ray showing pulmonary vascular congestion and small bilateral pleural effusions  -Elevated proBNP to 1157, low procalcitonin  -Last echo from 2024 showing EF of 65 to 70%, grade 2 diastolic dysfunction, severe AR, moderate TR, concern for severe pulmonary hypertension  -Negative respiratory PCR panel  -Received IV Lasix 60 mg in the ED  -Elevated D-dimer  -CTA chest without PE  IV Lasix 40 mg two times daily  Strict I's and O's  Daily weights  Telemetry monitoring  Continue home GDMT  Left lower extremity venous duplex ordered    # Lung mass with adenopathy  -CT chest  showing 2.0 x 2.3 cm right hilar mass along with a 1.0 x 1.8 cm left hilar lymph node and 1.2 x 2.1 cm subcarinal lymph node, right middle lobe nodule is now 1.3 cm  -Right middle lobe lung nodule was 0.7 cm on PET/CT from   -Right hilar mass was 1.2 x 1.3 cm on   Pulmonology consulted due to concern for worsening malignancy    # Elevated troponin likely in the setting of demand ischemia  -Troponin: 20--> 15  -Troponin remained flat and EKG without acute ischemic changes  Continue to monitor for signs of ACS    # COPD, not in exacerbation  Continue home inhalers    # Hypertension  Continue amlodipine and carvedilol    # Rheumatoid  FINDINGS: Emphysematous lungs. Moderate pulmonary vascular congestion. Small bilateral pleural effusions. No pneumothorax. The heart is not enlarged.     Heart and pulmonary findings as described above. Electronically signed by Gerardo Cole MD        Electronically signed by Araceli Sanz MD on 12/6/2024 at 3:09 PM

## 2024-12-06 NOTE — ED PROVIDER NOTES
[facility] EMERGENCY DEPARTMENT  EMERGENCY DEPARTMENT ENCOUNTER        Pt Name: Radha Gomez  MRN: [id number]  Birthdate 1955  Date of evaluation: 2024  Provider: Aidan Pacheco PA-C  PCP: Luis Fernando MD  Note Started: 10:28 AM EST 24      VALARIE. I have evaluated this patient.        CHIEF COMPLAINT       Chief Complaint   Patient presents with    Shortness of Breath     Pt presents to the ED with complaints of shortness of breath from home. States that her o2 is in the 60s while moving and 80s while resting. Was told to come by dr fernando. Pt recently put on home o2 but is still struggling on 4L.        HISTORY OF PRESENT ILLNESS: 1 or more Elements     Radha Gomez is a 69 y.o. female with chronic respiratory failure on 4 L of oxygen at home, COPD, hypertension, rheumatoid arthritis on Humira, heart failure with preserved ejection fraction, and aortic regurgitation who presents complaining of worsening shortness of breath. Reports O2 sats at home in the 60s to 80s.  Does endorse some orthopnea, denies any fever, chills, chest pain, recent travel, leg swelling    Nursing Notes were all reviewed and agreed with or any disagreements were addressed in the HPI.    Historians other than the patient: none    Limitations to history : None    Social Determinants Significantly Affecting Health : None    Records Reviewed (External and Source): Outpatient office visit with the gastroenterology team from 2024.    PAST MEDICAL HISTORY      has a past medical history of Heart abnormality, Hypertension, and Pap smear for cervical cancer screening (2010).     REVIEW OF SYSTEMS :      Review of Systems    Positives and Pertinent negatives as per HPI.     SURGICAL HISTORY     Past Surgical History:   Procedure Laterality Date    ANKLE FRACTURE SURGERY      right     SECTION      COLONOSCOPY         CURRENTMEDICATIONS       Previous Medications

## 2024-12-06 NOTE — ED NOTES
ED TO INPATIENT SBAR HANDOFF    Patient Name: Radha Gomez   :  1955  69 y.o.   Preferred Name  [place]  Family/Caregiver Present yes   Restraints no   C-SSRS: Risk of Suicide: No Risk  Sitter no   Sepsis Risk Score        Situation  Chief Complaint   Patient presents with    Shortness of Breath     Pt presents to the ED with complaints of shortness of breath from home. States that her o2 is in the 60s while moving and 80s while resting. Was told to come by dr fernando. Pt recently put on home o2 but is still struggling on 4L.      Brief Description of Patient's Condition: Radha Gomez is a 69 y.o. female with chronic respiratory failure on 4 L of oxygen at home, COPD, hypertension, rheumatoid arthritis on Humira, heart failure with preserved ejection fraction, and aortic regurgitation who presents complaining of worsening shortness of breath. Reports O2 sats at home in the 60s to 80s.  Does endorse some orthopnea, denies any fever, chills, chest pain, recent travel, leg swelling   Pt on 8L HF, quickly desats, slow to recover. Using bedside commode  Mental Status: oriented, alert, coherent, logical, thought processes intact, and able to concentrate and follow conversation  Arrived from: home    Imaging:   CTA PULMONARY W CONTRAST   Final Result   No pulmonary arterial embolus or thoracic aneurysm is identified.      Bilateral pleural effusions and associated atelectasis.      Adenopathy as noted above. This is suspicious for malignancy.      There is a 1.3 cm nodule of the right middle lobe. This is suspicious for   malignancy.         Electronically signed by Gerardo Cole MD      XR CHEST PORTABLE   Final Result   Heart and pulmonary findings as described above.            Electronically signed by Gerardo Cole MD        Abnormal labs:   Abnormal Labs Reviewed   CBC WITH AUTO DIFFERENTIAL - Abnormal; Notable for the following components:       Result Value    Hemoglobin 12.1 (*)     MCHC 29.3 (*)     Monocytes % 11

## 2024-12-06 NOTE — ED NOTES
Secure message to Dr. Sanz regarding need to change admission to SD due oxygen requirement and concern for Lasix admin with BP 91/55 (67). Advised to give ordered Lasix and will change admit to SD

## 2024-12-06 NOTE — PROGRESS NOTES
4 Eyes Skin Assessment     NAME:  Radha Gomez  YOB: 1955  MEDICAL RECORD NUMBER:  [id number]    The patient is being assessed for  Admission    I agree that at least one RN has performed a thorough Head to Toe Skin Assessment on the patient. ALL assessment sites listed below have been assessed.      Areas assessed by both nurses:    Head, Face, Ears, Shoulders, Back, Chest, Arms, Elbows, Hands, Sacrum. Buttock, Coccyx, Ischium, and Legs. Feet and Heels        Does the Patient have a Wound? No noted wound(s)       Enzo Prevention initiated by RN: No  Wound Care Orders initiated by RN: No    Pressure Injury (Stage 3,4, Unstageable, DTI, NWPT, and Complex wounds) if present, place Wound referral order by RN under : No    New Ostomies, if present place, Ostomy referral order under : No     Nurse 1 eSignature: Electronically signed by DRU GAINES RN on 12/6/24 at 4:10 PM EST    **SHARE this note so that the co-signing nurse can place an eSignature**    Nurse 2 eSignature: Electronically signed by Sandra Bhatti RN on 12/7/24 at 6:57 AM EST

## 2024-12-07 LAB
ALBUMIN SERPL-MCNC: 3.2 G/DL (ref 3.4–5)
ALBUMIN/GLOB SERPL: 0.9 {RATIO} (ref 1.1–2.2)
ALP SERPL-CCNC: 93 U/L (ref 40–129)
ALT SERPL-CCNC: 7 U/L (ref 10–40)
ANION GAP SERPL CALCULATED.3IONS-SCNC: 12 MMOL/L (ref 9–17)
ANION GAP SERPL CALCULATED.3IONS-SCNC: 8 MMOL/L (ref 9–17)
ARTERIAL PATENCY WRIST A: NO
AST SERPL-CCNC: 15 U/L (ref 15–37)
BASOPHILS # BLD: 0.08 K/UL
BASOPHILS NFR BLD: 1 % (ref 0–1)
BILIRUB DIRECT SERPL-MCNC: <0.2 MG/DL (ref 0–0.3)
BILIRUB INDIRECT SERPL-MCNC: ABNORMAL MG/DL (ref 0–0.7)
BILIRUB SERPL-MCNC: 0.5 MG/DL (ref 0–1)
BODY TEMPERATURE: 37
BUN SERPL-MCNC: 22 MG/DL (ref 7–20)
BUN SERPL-MCNC: 23 MG/DL (ref 7–20)
CALCIUM SERPL-MCNC: 10.1 MG/DL (ref 8.3–10.6)
CALCIUM SERPL-MCNC: 9.2 MG/DL (ref 8.3–10.6)
CHLORIDE SERPL-SCNC: 102 MMOL/L (ref 99–110)
CHLORIDE SERPL-SCNC: 103 MMOL/L (ref 99–110)
CHOLEST SERPL-MCNC: 183 MG/DL (ref 125–199)
CO2 SERPL-SCNC: 23 MMOL/L (ref 21–32)
CO2 SERPL-SCNC: 26 MMOL/L (ref 21–32)
COHGB MFR BLD: 1.3 % (ref 0.5–1.5)
CREAT SERPL-MCNC: 1.2 MG/DL (ref 0.6–1.2)
CREAT SERPL-MCNC: 1.4 MG/DL (ref 0.6–1.2)
EOSINOPHIL # BLD: 0.22 K/UL
EOSINOPHILS RELATIVE PERCENT: 3 % (ref 0–3)
ERYTHROCYTE [DISTWIDTH] IN BLOOD BY AUTOMATED COUNT: 13.5 % (ref 11.7–14.9)
FERRITIN SERPL-MCNC: 328 NG/ML (ref 15–150)
GFR, ESTIMATED: 37 ML/MIN/1.73M2
GFR, ESTIMATED: 46 ML/MIN/1.73M2
GLUCOSE SERPL-MCNC: 100 MG/DL (ref 74–99)
GLUCOSE SERPL-MCNC: 132 MG/DL (ref 74–99)
HCO3 VENOUS: 27 MMOL/L (ref 22–29)
HCT VFR BLD AUTO: 33.1 % (ref 37–47)
HDLC SERPL-MCNC: 71 MG/DL
HGB BLD-MCNC: 10.9 G/DL (ref 12.5–16)
IMM GRANULOCYTES # BLD AUTO: 0.02 K/UL
IMM GRANULOCYTES NFR BLD: 0 %
IRON SATN MFR SERPL: 18 % (ref 15–50)
IRON SERPL-MCNC: 43 UG/DL (ref 37–145)
LDLC SERPL CALC-MCNC: 93 MG/DL
LYMPHOCYTES NFR BLD: 2.65 K/UL
LYMPHOCYTES RELATIVE PERCENT: 35 % (ref 24–44)
MAGNESIUM SERPL-MCNC: 2.1 MG/DL (ref 1.8–2.4)
MCH RBC QN AUTO: 28.2 PG (ref 27–31)
MCHC RBC AUTO-ENTMCNC: 32.9 G/DL (ref 32–36)
MCV RBC AUTO: 85.8 FL (ref 78–100)
METHEMOGLOBIN: 0.4 % (ref 0.5–1.5)
MONOCYTES NFR BLD: 0.96 K/UL
MONOCYTES NFR BLD: 13 % (ref 0–4)
NEUTROPHILS NFR BLD: 48 % (ref 36–66)
NEUTS SEG NFR BLD: 3.61 K/UL
OXYHGB MFR BLD: 58.7 %
PCO2 VENOUS: 43.5 MM HG (ref 38–54)
PH VENOUS: 7.41 (ref 7.32–7.43)
PLATELET # BLD AUTO: 255 K/UL (ref 140–440)
PMV BLD AUTO: 11.2 FL (ref 7.5–11.1)
PO2 VENOUS: 32.2 MM HG (ref 23–48)
POSITIVE BASE EXCESS, VEN: 2 MMOL/L (ref 0–3)
POTASSIUM SERPL-SCNC: 3.5 MMOL/L (ref 3.5–5.1)
POTASSIUM SERPL-SCNC: 4.6 MMOL/L (ref 3.5–5.1)
PROT SERPL-MCNC: 7 G/DL (ref 6.4–8.2)
RBC # BLD AUTO: 3.86 M/UL (ref 4.2–5.4)
SODIUM SERPL-SCNC: 137 MMOL/L (ref 136–145)
SODIUM SERPL-SCNC: 138 MMOL/L (ref 136–145)
TIBC SERPL-MCNC: 239 UG/DL (ref 260–445)
TRIGL SERPL-MCNC: 97 MG/DL
UNSATURATED IRON BINDING CAPACITY: 196 UG/DL (ref 110–370)
WBC OTHER # BLD: 7.5 K/UL (ref 4–10.5)

## 2024-12-07 PROCEDURE — 94761 N-INVAS EAR/PLS OXIMETRY MLT: CPT

## 2024-12-07 PROCEDURE — 83735 ASSAY OF MAGNESIUM: CPT

## 2024-12-07 PROCEDURE — 82248 BILIRUBIN DIRECT: CPT

## 2024-12-07 PROCEDURE — 6360000002 HC RX W HCPCS: Performed by: INTERNAL MEDICINE

## 2024-12-07 PROCEDURE — 85025 COMPLETE CBC W/AUTO DIFF WBC: CPT

## 2024-12-07 PROCEDURE — 6370000000 HC RX 637 (ALT 250 FOR IP): Performed by: INTERNAL MEDICINE

## 2024-12-07 PROCEDURE — 36415 COLL VENOUS BLD VENIPUNCTURE: CPT

## 2024-12-07 PROCEDURE — 80053 COMPREHEN METABOLIC PANEL: CPT

## 2024-12-07 PROCEDURE — 2580000003 HC RX 258: Performed by: INTERNAL MEDICINE

## 2024-12-07 PROCEDURE — 2140000000 HC CCU INTERMEDIATE R&B

## 2024-12-07 PROCEDURE — 2700000000 HC OXYGEN THERAPY PER DAY

## 2024-12-07 PROCEDURE — 80061 LIPID PANEL: CPT

## 2024-12-07 PROCEDURE — 80048 BASIC METABOLIC PNL TOTAL CA: CPT

## 2024-12-07 PROCEDURE — 94640 AIRWAY INHALATION TREATMENT: CPT

## 2024-12-07 RX ORDER — IPRATROPIUM BROMIDE AND ALBUTEROL SULFATE 2.5; .5 MG/3ML; MG/3ML
1 SOLUTION RESPIRATORY (INHALATION) ONCE
Status: COMPLETED | OUTPATIENT
Start: 2024-12-07 | End: 2024-12-07

## 2024-12-07 RX ORDER — POTASSIUM CHLORIDE 1500 MG/1
40 TABLET, EXTENDED RELEASE ORAL ONCE
Status: COMPLETED | OUTPATIENT
Start: 2024-12-07 | End: 2024-12-07

## 2024-12-07 RX ADMIN — BUDESONIDE AND FORMOTEROL FUMARATE DIHYDRATE 2 PUFF: 160; 4.5 AEROSOL RESPIRATORY (INHALATION) at 20:22

## 2024-12-07 RX ADMIN — Medication 1000 UNITS: at 09:14

## 2024-12-07 RX ADMIN — FUROSEMIDE 2 MG/HR: 10 INJECTION, SOLUTION INTRAMUSCULAR; INTRAVENOUS at 11:38

## 2024-12-07 RX ADMIN — CARVEDILOL 25 MG: 25 TABLET, FILM COATED ORAL at 22:54

## 2024-12-07 RX ADMIN — IPRATROPIUM BROMIDE AND ALBUTEROL SULFATE 1 DOSE: 2.5; .5 SOLUTION RESPIRATORY (INHALATION) at 15:14

## 2024-12-07 RX ADMIN — BUDESONIDE AND FORMOTEROL FUMARATE DIHYDRATE 2 PUFF: 160; 4.5 AEROSOL RESPIRATORY (INHALATION) at 07:16

## 2024-12-07 RX ADMIN — POTASSIUM CHLORIDE 40 MEQ: 1500 TABLET, EXTENDED RELEASE ORAL at 09:14

## 2024-12-07 RX ADMIN — WATER 125 MG: 1 INJECTION INTRAMUSCULAR; INTRAVENOUS; SUBCUTANEOUS at 12:07

## 2024-12-07 RX ADMIN — CARVEDILOL 25 MG: 25 TABLET, FILM COATED ORAL at 09:14

## 2024-12-07 RX ADMIN — ASPIRIN 81 MG: 81 TABLET, CHEWABLE ORAL at 09:14

## 2024-12-07 RX ADMIN — Medication 500 MCG: at 22:54

## 2024-12-07 RX ADMIN — ONDANSETRON 4 MG: 2 INJECTION INTRAMUSCULAR; INTRAVENOUS at 22:53

## 2024-12-07 RX ADMIN — AMLODIPINE BESYLATE 10 MG: 10 TABLET ORAL at 09:14

## 2024-12-07 RX ADMIN — FUROSEMIDE 40 MG: 10 INJECTION, SOLUTION INTRAMUSCULAR; INTRAVENOUS at 09:14

## 2024-12-07 RX ADMIN — CALCIUM 500 MG: 500 TABLET ORAL at 09:14

## 2024-12-07 RX ADMIN — ACETAMINOPHEN 650 MG: 325 TABLET ORAL at 22:53

## 2024-12-07 RX ADMIN — SODIUM CHLORIDE, PRESERVATIVE FREE 10 ML: 5 INJECTION INTRAVENOUS at 09:15

## 2024-12-07 ASSESSMENT — PAIN DESCRIPTION - LOCATION: LOCATION: HEAD

## 2024-12-07 ASSESSMENT — PAIN SCALES - GENERAL: PAINLEVEL_OUTOF10: 7

## 2024-12-07 ASSESSMENT — PAIN DESCRIPTION - DESCRIPTORS: DESCRIPTORS: ACHING

## 2024-12-07 NOTE — PROGRESS NOTES
Patient Room #: 3104/3104-A  Patient Name: Radha Gomez    Home NIV Evaluation / Orders  1. Notify Pulmonary Diagnostics of order to evaluate for home NIV.    2. Perform the following tests at any point before discharge.     [x] Perform an Overnight Oximetry while the patient is on at least                  2 lpm of oxygen. The saturation level must be <  88% for > 5                   cumulative minutes to qualify.                 [x] Arterial puncture (ABG) for blood gas analysis done while awake.      Qualifying result is a PaCO2 >   52 mmHg    3.         [x]  I have documented in a progress note that JAK is not the primary cause of hypercapnia in this patient. CPAP will not effectively treat this patient hypercapnia. BIPA Therapy will benefit and more effectively treat the hypercapnia.                            Results Documentation    (Attach a copy of Reports)  1. ABG Results       Date _________  PaCO2 __________ mmHg on ___________ lpm oxygen    2. Oximetry Level _________% for ___________ minutes on ________ lpm oxygen    3. [] Patient Qualifies      [] Patient Does NOT qualify      Complete order below:  Diagnosis Severe COPD, Chronic Respiratory Failure _________________________________________           Length of Need: [x] Lifetime  Home NIV () at:   Inspiratory Setting __________ cm H2O         Expiratory Setting _________ cm H2O    Therapist Signature:Janeth Ceron RRT Date: 12/7/2024    Physician Signature:   Electronically Signed in EMR_______  Date: _____________    Physician Printed Name:Ordering User: Araceli Sanz MD  Provider ID: [id number]  NPI:  [id number]   Physician Transition of Care Summary  Invasive Cardiovascular Lab    Procedure Date: 8/30/2024  Attending:    * Db Moser - Primary  Resident/Fellow/Other Assistant: Surgeons and Role:  * No surgeons found with a matching role *    Pre Procedure Diagnosis:   CAD, remote PCI, worsening angina pectoris    Post Procedure Diagnosis:   Three-vessel CAD, left anterior ejection fraction 60%, diabetes mellitus, recommend CABG    Complications:   None    Stents/Implants:   None    Anticoagulation/Antiplatelet Plan:   Resume Eliquis tomorrow morning    Estimated Blood Loss:   0 mL    Electronically signed by: Db Moser MD, 8/30/2024 1:02 PM    Anesthesia: Moderate                            anesthesia Staff: None

## 2024-12-07 NOTE — CONSULTS
Subjective:   CHIEF COMPLAINT / HPI:  69 yerar old female admitted with increasing sob and this is accompanied by wheeze. She has cough with some sputum. She denies any fever or hemoptysis.      Past Medical History:  Past Medical History:   Diagnosis Date    Heart abnormality     Hypertension     Pap smear for cervical cancer screening 2010    neg       Past Surgical History:        Procedure Laterality Date    ANKLE FRACTURE SURGERY      right     SECTION      COLONOSCOPY         Current Medications:    Current Facility-Administered Medications: furosemide (LASIX) 100 mg in sodium chloride 0.9 % 100 mL infusion, 2 mg/hr, IntraVENous, Continuous  methylPREDNISolone sodium succ (SOLU-MEDROL) 125 mg in sterile water 2 mL injection, 125 mg, IntraVENous, Once  ipratropium 0.5 mg-albuterol 2.5 mg (DUONEB) nebulizer solution 1 Dose, 1 Dose, Inhalation, Once  sodium chloride flush 0.9 % injection 5-40 mL, 5-40 mL, IntraVENous, 2 times per day  sodium chloride flush 0.9 % injection 5-40 mL, 5-40 mL, IntraVENous, PRN  0.9 % sodium chloride infusion, , IntraVENous, PRN  ondansetron (ZOFRAN-ODT) disintegrating tablet 4 mg, 4 mg, Oral, Q8H PRN **OR** ondansetron (ZOFRAN) injection 4 mg, 4 mg, IntraVENous, Q6H PRN  polyethylene glycol (GLYCOLAX) packet 17 g, 17 g, Oral, Daily PRN  acetaminophen (TYLENOL) tablet 650 mg, 650 mg, Oral, Q6H PRN **OR** acetaminophen (TYLENOL) suppository 650 mg, 650 mg, Rectal, Q6H PRN  enoxaparin (LOVENOX) injection 40 mg, 40 mg, SubCUTAneous, Nightly  potassium chloride (KLOR-CON M) extended release tablet 40 mEq, 40 mEq, Oral, PRN **OR** potassium bicarb-citric acid (EFFER-K) effervescent tablet 40 mEq, 40 mEq, Oral, PRN **OR** potassium chloride 10 mEq/100 mL IVPB (Peripheral Line), 10 mEq, IntraVENous, PRN  magnesium sulfate 2000 mg in 50 mL IVPB premix, 2,000 mg, IntraVENous, PRN  nicotine (NICODERM CQ) 7 MG/24HR 1 patch, 1 patch, TransDERmal, Daily  furosemide (LASIX) injection

## 2024-12-07 NOTE — PROGRESS NOTES
V2.0  [facility] [place]      Name:  Radha Gomez /Age/Sex: 1955  (69 y.o. female)   MRN & CSN:  [id number] & [id number] Encounter Date/Time: 2024 8:27 AM EST    Location:  [address] [place] [address]- PCP: Luis Buenrostro MD       Hospital Day: 2    Assessment and Plan:   Radha Gomez is a 69 y.o. female  who presents with Heart failure (HCC)    # Acute on chronic HFpEF  # Acute on chronic hypoxic respiratory failure 2/2 HFpEF exacerbation  # Severe AR  # Grade 2 diastolic dysfunction  # Moderate TR  # CAD pulmonary hypertension  -Patient with 1 week of worsening TORRES, PND and orthopnea  -baseline home O2 is 6-6.5 L (she uses 4L when she is not at home)  -Chest x-ray showing pulmonary vascular congestion and small bilateral pleural effusions  -Elevated proBNP to 1157, low procalcitonin  -Last echo from 2024 showing EF of 65 to 70%, grade 2 diastolic dysfunction, severe AR, moderate TR, concern for severe pulmonary hypertension  -Negative respiratory PCR panel  -Received IV Lasix 60 mg in the ED  -Elevated D-dimer  -CTA chest without PE  -TSH WNL  -Left lower extremity venous duplex without DVT   -received a few doses of IV lasix, patient did not feel like she was urinating as much as she normally does with Lasix  Started on Lasix drip  Will recheck BMP later today to make sure kidneys are okay  Strict I's and O's  Daily weights  Telemetry monitoring  Continue home GDMT  Patient states that she tends to desaturate overnight, will get overnight BiPAP eval      # Lung mass with adenopathy  -CT chest  showing 2.0 x 2.3 cm right hilar mass along with a 1.0 x 1.8 cm left hilar lymph node and 1.2 x 2.1 cm subcarinal lymph node, right middle lobe nodule is now 1.3 cm  -Right middle lobe lung nodule was 0.7 cm on PET/CT from   -Right hilar mass was 1.2 x 1.3 cm on   Pulmonology consulted due to concern for worsening malignancy     # Elevated troponin likely in the setting of demand  IntraVENous BID    amLODIPine  10 mg Oral Daily    aspirin  81 mg Oral Daily    budesonide-formoterol  2 puff Inhalation BID    calcium elemental  500 mg Oral Daily    carvedilol  25 mg Oral BID    vitamin B-12  500 mcg Oral Daily    tiotropium  1 puff Inhalation Daily RT    Vitamin D  1,000 Units Oral Daily      Infusions:    sodium chloride       PRN Meds: sodium chloride flush, 5-40 mL, PRN  sodium chloride, , PRN  ondansetron, 4 mg, Q8H PRN   Or  ondansetron, 4 mg, Q6H PRN  polyethylene glycol, 17 g, Daily PRN  acetaminophen, 650 mg, Q6H PRN   Or  acetaminophen, 650 mg, Q6H PRN  potassium chloride, 40 mEq, PRN   Or  potassium alternative oral replacement, 40 mEq, PRN   Or  potassium chloride, 10 mEq, PRN  magnesium sulfate, 2,000 mg, PRN        Labs      Recent Results (from the past 24 hour(s))   CBC with Auto Differential    Collection Time: 12/06/24 10:50 AM   Result Value Ref Range    WBC 7.5 4.0 - 10.5 k/uL    RBC 4.34 4.20 - 5.40 m/uL    Hemoglobin 12.1 (L) 12.5 - 16.0 g/dL    Hematocrit 41.3 37.0 - 47.0 %    MCV 95.2 78.0 - 100.0 fL    MCH 27.9 27.0 - 31.0 pg    MCHC 29.3 (L) 32.0 - 36.0 g/dL    RDW 13.5 11.7 - 14.9 %    Platelets 234 140 - 440 k/uL    MPV 10.6 7.5 - 11.1 fL    Neutrophils % 51 36 - 66 %    Lymphocytes % 33 24 - 44 %    Monocytes % 11 (H) 0 - 4 %    Eosinophils % 3 0 - 3 %    Basophils % 2 (H) 0 - 1 %    Immature Granulocytes % 0 0 %    Neutrophils Absolute 3.82 k/uL    Lymphocytes Absolute 2.48 k/uL    Monocytes Absolute 0.84 k/uL    Eosinophils Absolute 0.20 k/uL    Basophils Absolute 0.11 k/uL    Immature Granulocytes Absolute 0.01 k/uL   BMP    Collection Time: 12/06/24 10:50 AM   Result Value Ref Range    Sodium 137 136 - 145 mmol/L    Potassium 4.5 3.5 - 5.1 mmol/L    Chloride 104 99 - 110 mmol/L    CO2 21 21 - 32 mmol/L    Anion Gap 12 9 - 17 mmol/L    Glucose 89 74 - 99 mg/dL    BUN 18 7 - 20 mg/dL    Creatinine 1.1 0.6 - 1.2 mg/dL    Est, Glom Filt Rate 50 (L) >60 mL/min/1.73m2

## 2024-12-08 ENCOUNTER — APPOINTMENT (OUTPATIENT)
Dept: GENERAL RADIOLOGY | Age: 69
DRG: 291 | End: 2024-12-08
Payer: MEDICARE

## 2024-12-08 ENCOUNTER — APPOINTMENT (OUTPATIENT)
Dept: ULTRASOUND IMAGING | Age: 69
DRG: 291 | End: 2024-12-08
Payer: MEDICARE

## 2024-12-08 LAB
ANION GAP SERPL CALCULATED.3IONS-SCNC: 11 MMOL/L (ref 9–17)
ANION GAP SERPL CALCULATED.3IONS-SCNC: 11 MMOL/L (ref 9–17)
BNP SERPL-MCNC: 1295 PG/ML (ref 0–125)
BUN SERPL-MCNC: 27 MG/DL (ref 7–20)
BUN SERPL-MCNC: 28 MG/DL (ref 7–20)
CALCIUM SERPL-MCNC: 9.3 MG/DL (ref 8.3–10.6)
CALCIUM SERPL-MCNC: 9.9 MG/DL (ref 8.3–10.6)
CHLORIDE SERPL-SCNC: 102 MMOL/L (ref 99–110)
CHLORIDE SERPL-SCNC: 102 MMOL/L (ref 99–110)
CO2 SERPL-SCNC: 23 MMOL/L (ref 21–32)
CO2 SERPL-SCNC: 24 MMOL/L (ref 21–32)
CREAT SERPL-MCNC: 1.2 MG/DL (ref 0.6–1.2)
CREAT SERPL-MCNC: 1.3 MG/DL (ref 0.6–1.2)
EKG ATRIAL RATE: 81 BPM
EKG DIAGNOSIS: NORMAL
EKG P AXIS: 65 DEGREES
EKG P-R INTERVAL: 174 MS
EKG Q-T INTERVAL: 376 MS
EKG QRS DURATION: 88 MS
EKG QTC CALCULATION (BAZETT): 436 MS
EKG R AXIS: 34 DEGREES
EKG T AXIS: 84 DEGREES
EKG VENTRICULAR RATE: 81 BPM
GFR, ESTIMATED: 42 ML/MIN/1.73M2
GFR, ESTIMATED: 46 ML/MIN/1.73M2
GLUCOSE SERPL-MCNC: 128 MG/DL (ref 74–99)
GLUCOSE SERPL-MCNC: 157 MG/DL (ref 74–99)
MAGNESIUM SERPL-MCNC: 2.2 MG/DL (ref 1.8–2.4)
POTASSIUM SERPL-SCNC: 4.3 MMOL/L (ref 3.5–5.1)
POTASSIUM SERPL-SCNC: 4.4 MMOL/L (ref 3.5–5.1)
SODIUM SERPL-SCNC: 136 MMOL/L (ref 136–145)
SODIUM SERPL-SCNC: 137 MMOL/L (ref 136–145)

## 2024-12-08 PROCEDURE — 71045 X-RAY EXAM CHEST 1 VIEW: CPT

## 2024-12-08 PROCEDURE — 6370000000 HC RX 637 (ALT 250 FOR IP): Performed by: INTERNAL MEDICINE

## 2024-12-08 PROCEDURE — 93880 EXTRACRANIAL BILAT STUDY: CPT

## 2024-12-08 PROCEDURE — 83735 ASSAY OF MAGNESIUM: CPT

## 2024-12-08 PROCEDURE — 6360000002 HC RX W HCPCS: Performed by: INTERNAL MEDICINE

## 2024-12-08 PROCEDURE — 2700000000 HC OXYGEN THERAPY PER DAY

## 2024-12-08 PROCEDURE — 94761 N-INVAS EAR/PLS OXIMETRY MLT: CPT

## 2024-12-08 PROCEDURE — 83880 ASSAY OF NATRIURETIC PEPTIDE: CPT

## 2024-12-08 PROCEDURE — 93010 ELECTROCARDIOGRAM REPORT: CPT | Performed by: INTERNAL MEDICINE

## 2024-12-08 PROCEDURE — 2140000000 HC CCU INTERMEDIATE R&B

## 2024-12-08 PROCEDURE — 80048 BASIC METABOLIC PNL TOTAL CA: CPT

## 2024-12-08 PROCEDURE — 36415 COLL VENOUS BLD VENIPUNCTURE: CPT

## 2024-12-08 PROCEDURE — 94640 AIRWAY INHALATION TREATMENT: CPT

## 2024-12-08 PROCEDURE — 2580000003 HC RX 258: Performed by: INTERNAL MEDICINE

## 2024-12-08 RX ORDER — SODIUM CHLORIDE, SODIUM LACTATE, POTASSIUM CHLORIDE, CALCIUM CHLORIDE 600; 310; 30; 20 MG/100ML; MG/100ML; MG/100ML; MG/100ML
INJECTION, SOLUTION INTRAVENOUS CONTINUOUS
Status: DISPENSED | OUTPATIENT
Start: 2024-12-08 | End: 2024-12-08

## 2024-12-08 RX ADMIN — SODIUM CHLORIDE, PRESERVATIVE FREE 10 ML: 5 INJECTION INTRAVENOUS at 20:49

## 2024-12-08 RX ADMIN — ACETAMINOPHEN 650 MG: 325 TABLET ORAL at 20:48

## 2024-12-08 RX ADMIN — SODIUM CHLORIDE, POTASSIUM CHLORIDE, SODIUM LACTATE AND CALCIUM CHLORIDE: 600; 310; 30; 20 INJECTION, SOLUTION INTRAVENOUS at 09:03

## 2024-12-08 RX ADMIN — POLYETHYLENE GLYCOL (3350) 17 G: 17 POWDER, FOR SOLUTION ORAL at 08:53

## 2024-12-08 RX ADMIN — AMLODIPINE BESYLATE 10 MG: 10 TABLET ORAL at 08:54

## 2024-12-08 RX ADMIN — CALCIUM 500 MG: 500 TABLET ORAL at 08:53

## 2024-12-08 RX ADMIN — SODIUM CHLORIDE, PRESERVATIVE FREE 10 ML: 5 INJECTION INTRAVENOUS at 08:55

## 2024-12-08 RX ADMIN — Medication 1000 UNITS: at 08:54

## 2024-12-08 RX ADMIN — CARVEDILOL 25 MG: 25 TABLET, FILM COATED ORAL at 20:48

## 2024-12-08 RX ADMIN — CARVEDILOL 25 MG: 25 TABLET, FILM COATED ORAL at 08:54

## 2024-12-08 RX ADMIN — BUDESONIDE AND FORMOTEROL FUMARATE DIHYDRATE 2 PUFF: 160; 4.5 AEROSOL RESPIRATORY (INHALATION) at 21:35

## 2024-12-08 RX ADMIN — BUDESONIDE AND FORMOTEROL FUMARATE DIHYDRATE 2 PUFF: 160; 4.5 AEROSOL RESPIRATORY (INHALATION) at 08:12

## 2024-12-08 RX ADMIN — TIOTROPIUM BROMIDE INHALATION SPRAY 2 PUFF: 3.12 SPRAY, METERED RESPIRATORY (INHALATION) at 08:12

## 2024-12-08 RX ADMIN — ONDANSETRON 4 MG: 2 INJECTION INTRAMUSCULAR; INTRAVENOUS at 20:48

## 2024-12-08 RX ADMIN — ASPIRIN 81 MG: 81 TABLET, CHEWABLE ORAL at 08:54

## 2024-12-08 NOTE — PROGRESS NOTES
nondistended  MSK: Nonpitting left lower extremity edema, right leg without edema   Skin: Intact, dry, warm, no rashes  Neuro: CN II to XII grossly intact  Psych: Normal mood    Medications:   Medications:    tiotropium  2 puff Inhalation Daily RT    sodium chloride flush  5-40 mL IntraVENous 2 times per day    enoxaparin  40 mg SubCUTAneous Nightly    nicotine  1 patch TransDERmal Daily    amLODIPine  10 mg Oral Daily    aspirin  81 mg Oral Daily    budesonide-formoterol  2 puff Inhalation BID    calcium elemental  500 mg Oral Daily    carvedilol  25 mg Oral BID    vitamin B-12  500 mcg Oral Daily    Vitamin D  1,000 Units Oral Daily      Infusions:    sodium chloride       PRN Meds: sodium chloride flush, 5-40 mL, PRN  sodium chloride, , PRN  ondansetron, 4 mg, Q8H PRN   Or  ondansetron, 4 mg, Q6H PRN  polyethylene glycol, 17 g, Daily PRN  acetaminophen, 650 mg, Q6H PRN   Or  acetaminophen, 650 mg, Q6H PRN  potassium chloride, 40 mEq, PRN   Or  potassium alternative oral replacement, 40 mEq, PRN   Or  potassium chloride, 10 mEq, PRN  magnesium sulfate, 2,000 mg, PRN        Labs      Recent Results (from the past 24 hour(s))   Basic Metabolic Panel    Collection Time: 12/07/24  2:15 PM   Result Value Ref Range    Sodium 138 136 - 145 mmol/L    Potassium 4.6 3.5 - 5.1 mmol/L    Chloride 102 99 - 110 mmol/L    CO2 23 21 - 32 mmol/L    Anion Gap 12 9 - 17 mmol/L    Glucose 132 (H) 74 - 99 mg/dL    BUN 22 (H) 7 - 20 mg/dL    Creatinine 1.4 (H) 0.6 - 1.2 mg/dL    Est, Glom Filt Rate 37 (L) >60 mL/min/1.73m2    Calcium 10.1 8.3 - 10.6 mg/dL   Basic Metabolic Panel    Collection Time: 12/08/24  2:02 AM   Result Value Ref Range    Sodium 136 136 - 145 mmol/L    Potassium 4.4 3.5 - 5.1 mmol/L    Chloride 102 99 - 110 mmol/L    CO2 23 21 - 32 mmol/L    Anion Gap 11 9 - 17 mmol/L    Glucose 157 (H) 74 - 99 mg/dL    BUN 28 (H) 7 - 20 mg/dL    Creatinine 1.3 (H) 0.6 - 1.2 mg/dL    Est, Glom Filt Rate 42 (L) >60 mL/min/1.73m2     control, adjustment of the mA and/or kV according to patient size and/or use of iterative reconstruction technique. FINDINGS: No focal filling defect in the central pulmonary arteries to suggest a pulmonary arterial embolus. The pulmonary arteries are normal in caliber. The heart is not enlarged. There is no pericardial effusion. No thoracic aortic aneurysm is identified. There is no mediastinal hematoma. The visualized thyroid gland is unremarkable. The esophagus is unremarkable. There is a 2.0 x 2.3 cm right hilar mass. There is a 1.0 x 1.8 cm left hilar lymph node. There is a 1.2 x 2.1 cm subcarinal lymph node. The trachea and mainstem bronchi patent bilaterally. Small bilateral pleural effusions and associated atelectasis. No pneumothorax. No focal consolidation. There is a 1.3 cm nodule of the right middle lobe. Advanced emphysematous lungs. Moderate pulmonary vascular congestion. No acute osseous findings. No acute findings in the visualized abdomen.     No pulmonary arterial embolus or thoracic aneurysm is identified. Bilateral pleural effusions and associated atelectasis. Adenopathy as noted above. This is suspicious for malignancy. There is a 1.3 cm nodule of the right middle lobe. This is suspicious for malignancy. Electronically signed by Gerardo Cole MD    XR CHEST PORTABLE    Result Date: 12/6/2024  EXAM: Chest, single view HISTORY: sob COMPARISON: November 2, 2024 TECHNIQUE: Single view of the chest was obtained. FINDINGS: Emphysematous lungs. Moderate pulmonary vascular congestion. Small bilateral pleural effusions. No pneumothorax. The heart is not enlarged.     Heart and pulmonary findings as described above. Electronically signed by Gerardo Cole MD      Electronically signed by Araceli Sanz MD on 12/8/2024 at 7:56 AM

## 2024-12-08 NOTE — PROGRESS NOTES
pulmonary      SUBJECTIVE:  seen early am and sleeping     OBJECTIVE    VITALS:  BP (!) 140/63   Pulse 95   Temp 97.9 °F (36.6 °C) (Oral)   Resp 19   Ht 1.702 m (5' 7\")   Wt 74.5 kg (164 lb 3.2 oz)   SpO2 96%   BMI 25.72 kg/m²   HEAD AND FACE EXAM:  No throat injection, no active exudate,no thrush  NECK EXAM;No JVD, no masses, symmetrical  CHEST EXAM; Expansion equal and symmetrical, no masses  LUNG EXAM; Good breath sounds bilaterally. There are expiratory wheezes both lungs, there are crackles at both lung bases  CARDIOVASCULAR EXAM: Positive S1 and S2, no S3 or S4, no clicks ,no murmurs  RIGHT AND LEFT LOWER EXTRIMITY EXAM: No edema, no swelling, no inflamation  .          LABS   Lab Results   Component Value Date    WBC 7.5 12/07/2024    HGB 10.9 (L) 12/07/2024    HCT 33.1 (L) 12/07/2024    MCV 85.8 12/07/2024     12/07/2024     Lab Results   Component Value Date    CREATININE 1.3 (H) 12/08/2024    BUN 28 (H) 12/08/2024     12/08/2024    K 4.4 12/08/2024     12/08/2024    CO2 23 12/08/2024     Lab Results   Component Value Date    INR 1.1 09/24/2024    PROTIME 14.2 09/24/2024          Lab Results   Component Value Date/Time    PHOS 3.4 10/31/2024 05:39 AM    PHOS 3.6 03/18/2024 04:00 PM    PHOS 3.8 11/13/2019 07:24 AM      No results for input(s): \"PH\", \"PO2ART\", \"UZS7KCD\", \"HCO3\", \"BEART\", \"O2SAT\" in the last 72 hours.      Wt Readings from Last 3 Encounters:   12/07/24 74.5 kg (164 lb 3.2 oz)   11/04/24 72.9 kg (160 lb 12.8 oz)   11/03/24 72 kg (158 lb 11.7 oz)               ASSESMENT  Ac on ch resp failure  Ac chf  Ac copd  Sev AR  RML 1.3 cm nodule enlarging with ingris hilar and subcarinal lymphadenopathy  Sev pulm htn        PLAN  Bd rx  Steroids  Treat chf  O2 adm  Dr gomez will be back tomorrow    12/8/2024  Jermaine Correa MD, MJonathanD.

## 2024-12-09 ENCOUNTER — TELEPHONE (OUTPATIENT)
Dept: RHEUMATOLOGY | Age: 69
End: 2024-12-09

## 2024-12-09 LAB
ANION GAP SERPL CALCULATED.3IONS-SCNC: 10 MMOL/L (ref 9–17)
BUN SERPL-MCNC: 28 MG/DL (ref 7–20)
CALCIUM SERPL-MCNC: 9.4 MG/DL (ref 8.3–10.6)
CHLORIDE SERPL-SCNC: 105 MMOL/L (ref 99–110)
CO2 SERPL-SCNC: 25 MMOL/L (ref 21–32)
CREAT SERPL-MCNC: 1.1 MG/DL (ref 0.6–1.2)
GFR, ESTIMATED: 47 ML/MIN/1.73M2
GLUCOSE SERPL-MCNC: 151 MG/DL (ref 74–99)
MAGNESIUM SERPL-MCNC: 2.3 MG/DL (ref 1.8–2.4)
POTASSIUM SERPL-SCNC: 4 MMOL/L (ref 3.5–5.1)
SODIUM SERPL-SCNC: 139 MMOL/L (ref 136–145)

## 2024-12-09 PROCEDURE — 94640 AIRWAY INHALATION TREATMENT: CPT

## 2024-12-09 PROCEDURE — 6370000000 HC RX 637 (ALT 250 FOR IP): Performed by: INTERNAL MEDICINE

## 2024-12-09 PROCEDURE — 5A0945A ASSISTANCE WITH RESPIRATORY VENTILATION, 24-96 CONSECUTIVE HOURS, HIGH NASAL FLOW/VELOCITY: ICD-10-PCS | Performed by: INTERNAL MEDICINE

## 2024-12-09 PROCEDURE — 2580000003 HC RX 258: Performed by: INTERNAL MEDICINE

## 2024-12-09 PROCEDURE — 6370000000 HC RX 637 (ALT 250 FOR IP): Performed by: PREVENTIVE MEDICINE

## 2024-12-09 PROCEDURE — 99223 1ST HOSP IP/OBS HIGH 75: CPT | Performed by: NURSE PRACTITIONER

## 2024-12-09 PROCEDURE — 6360000002 HC RX W HCPCS: Performed by: INTERNAL MEDICINE

## 2024-12-09 PROCEDURE — 83735 ASSAY OF MAGNESIUM: CPT

## 2024-12-09 PROCEDURE — 80048 BASIC METABOLIC PNL TOTAL CA: CPT

## 2024-12-09 PROCEDURE — 36415 COLL VENOUS BLD VENIPUNCTURE: CPT

## 2024-12-09 PROCEDURE — 2700000000 HC OXYGEN THERAPY PER DAY

## 2024-12-09 PROCEDURE — 2140000000 HC CCU INTERMEDIATE R&B

## 2024-12-09 PROCEDURE — 94761 N-INVAS EAR/PLS OXIMETRY MLT: CPT

## 2024-12-09 RX ORDER — FUROSEMIDE 10 MG/ML
40 INJECTION INTRAMUSCULAR; INTRAVENOUS DAILY
Status: DISCONTINUED | OUTPATIENT
Start: 2024-12-09 | End: 2024-12-12

## 2024-12-09 RX ORDER — ATORVASTATIN CALCIUM 40 MG/1
40 TABLET, FILM COATED ORAL NIGHTLY
Status: DISCONTINUED | OUTPATIENT
Start: 2024-12-09 | End: 2024-12-13 | Stop reason: HOSPADM

## 2024-12-09 RX ADMIN — MELATONIN TAB 3 MG 6 MG: 3 TAB at 22:47

## 2024-12-09 RX ADMIN — ENOXAPARIN SODIUM 40 MG: 100 INJECTION SUBCUTANEOUS at 21:54

## 2024-12-09 RX ADMIN — SODIUM CHLORIDE, PRESERVATIVE FREE 10 ML: 5 INJECTION INTRAVENOUS at 09:58

## 2024-12-09 RX ADMIN — ATORVASTATIN CALCIUM 40 MG: 40 TABLET, FILM COATED ORAL at 21:54

## 2024-12-09 RX ADMIN — CARVEDILOL 25 MG: 25 TABLET, FILM COATED ORAL at 21:52

## 2024-12-09 RX ADMIN — Medication 500 MCG: at 21:52

## 2024-12-09 RX ADMIN — FUROSEMIDE 40 MG: 10 INJECTION, SOLUTION INTRAMUSCULAR; INTRAVENOUS at 14:55

## 2024-12-09 RX ADMIN — TIOTROPIUM BROMIDE INHALATION SPRAY 2 PUFF: 3.12 SPRAY, METERED RESPIRATORY (INHALATION) at 08:28

## 2024-12-09 RX ADMIN — AMLODIPINE BESYLATE 10 MG: 10 TABLET ORAL at 09:55

## 2024-12-09 RX ADMIN — SODIUM CHLORIDE, PRESERVATIVE FREE 10 ML: 5 INJECTION INTRAVENOUS at 21:55

## 2024-12-09 RX ADMIN — ASPIRIN 81 MG: 81 TABLET, CHEWABLE ORAL at 09:56

## 2024-12-09 RX ADMIN — Medication 1000 UNITS: at 09:56

## 2024-12-09 RX ADMIN — CALCIUM 500 MG: 500 TABLET ORAL at 09:56

## 2024-12-09 RX ADMIN — BUDESONIDE AND FORMOTEROL FUMARATE DIHYDRATE 2 PUFF: 160; 4.5 AEROSOL RESPIRATORY (INHALATION) at 08:28

## 2024-12-09 RX ADMIN — CARVEDILOL 25 MG: 25 TABLET, FILM COATED ORAL at 09:56

## 2024-12-09 RX ADMIN — BUDESONIDE AND FORMOTEROL FUMARATE DIHYDRATE 2 PUFF: 160; 4.5 AEROSOL RESPIRATORY (INHALATION) at 22:15

## 2024-12-09 NOTE — PLAN OF CARE
Problem: Discharge Planning  Goal: Discharge to home or other facility with appropriate resources  12/9/2024 1000 by Eliane Mahmood RN  Outcome: Progressing  12/9/2024 0010 by CHENCHO REHMAN  Outcome: Progressing     Problem: Safety - Adult  Goal: Free from fall injury  12/9/2024 1000 by Eliane Mahmood, RN  Outcome: Progressing  12/9/2024 0010 by CHENCHO REHMAN  Outcome: Progressing

## 2024-12-09 NOTE — PROGRESS NOTES
Physician Progress Note      PATIENT:               ROSALIND EDWARDS  CSN #:                  [id number]  :                       1955  ADMIT DATE:       2024 10:27 AM  DISCH DATE:  RESPONDING  PROVIDER #:        Araceli Sanz MD          QUERY TEXT:    Pt admitted with Acute on chronic diastolic heart failure. Pt noted to also   have HTN, CAD and valvular disease. If possible, please document in progress   notes and discharge summary the etiology of CHF, if able to be determined.    The medical record reflects the following:  Risk Factors: Acute on chronic diastolic heart failure, HTN, CAD and valvular   disease  Clinical Indicators: # Acute on chronic HFpEF, Acute on chronic hypoxic   respiratory failure 2/2 HFpEF exacerbation,  CAD pulmonary hypertension, Severe aortic regurgitation, Elevated proBNP to   1157, Chest x-ray showing pulmonary vascular congestion and small bilateral   pleural effusions, Last echo from 2024 showing EF of 65 to 70%, grade 2   diastolic dysfunction, severe AR, moderate TR, concern for severe pulmonary   hypertension  Treatment: Received IV Lasix 60 mg in the ED, Started on Lasix drip, recheck   BMP, Strict I's and O's, Daily weights, Telemetry monitoring  Options provided:  -- CHF due to Hypertensive Heart Disease  -- CHF due to Hypertensive Heart Disease and CAD  -- CHF not due to Hypertension but due to CAD  -- CHF due to Hypertensive Heart Disease and Valvular Heart Disease  -- CHF not due to Hypertension but due to valvular heart disease  -- CHF due to HTN, CAD, and valvular disease  -- Other - I will add my own diagnosis  -- Disagree - Not applicable / Not valid  -- Disagree - Clinically unable to determine / Unknown  -- Refer to Clinical Documentation Reviewer    PROVIDER RESPONSE TEXT:    CHF due to HTN heart disease, valvular heart disease, COPD leading to RH   failure and pulm hypertension    Query created by: Halle Degroot on 2024 11:57 AM      Electronically

## 2024-12-09 NOTE — TELEPHONE ENCOUNTER
Pt left vm stating she is in the hospital again with conjunctive heart failure she has been in since Friday  , the Doctors at the hospital are telling her they think it could be from the Humira, pt wishes to stop this medication she says this is the third time she has been in the  hospital and she doesn't want to go through it again, she states she did cancel her next shipment for the Humira and wanted to know if you has any suggestions for anything else to treat her RA. Pt sates she is due for her next injection on Tuesday but she is not going to get it. Please advise

## 2024-12-09 NOTE — PLAN OF CARE
Problem: Discharge Planning  Goal: Discharge to home or other facility with appropriate resources  12/9/2024 0010 by CHENCHO REHMAN  Outcome: Progressing  12/8/2024 1204 by Tasha Zuniga, RN  Outcome: Progressing     Problem: Safety - Adult  Goal: Free from fall injury  12/9/2024 0010 by CHENCHO REHMAN  Outcome: Progressing  12/8/2024 1204 by Tasha Zuniga, RN  Outcome: Progressing

## 2024-12-09 NOTE — CONSULTS
Vascular/Interventional Neurology Service Consult Note  [facility]   Patient Name: Radha Gomez  : 1955        Subjective:   Reason for consult:   Radha Gomez is a 69-year-old female with a past medical history of chronic HFpEF, COPD, moderate TR, CAD, pulmonary hypertension, carotid stenosis, HTN, rheumatoid arthritis presenting to [facility] with 1 week of worsening dyspnea on exertion.  The patient has COPD on home O2 6L NC currently being treated for acute on chronic heart failure.  The patient has a history of carotid stenosis which she previously followed Dr. Casey in .  At that time the left carotid was moderate range not suitable to surgical intervention recommended for [facility] modification and follow-up Doppler in 1 year.  The patient has not been seen in follow-up since.  The patient request carotid ultrasound during this hospitalization.  Carotid ultrasound notes severe stenosis in the left ICA.   cm/sec. right ICA mild stenosis.        Past Medical History:   Diagnosis Date    Heart abnormality     Hypertension     Pap smear for cervical cancer screening 2010    neg    :   Past Surgical History:   Procedure Laterality Date    ANKLE FRACTURE SURGERY      right     SECTION      COLONOSCOPY       Medications:  Scheduled Meds:   atorvastatin  40 mg Oral Nightly    furosemide  40 mg IntraVENous Daily    tiotropium  2 puff Inhalation Daily RT    sodium chloride flush  5-40 mL IntraVENous 2 times per day    enoxaparin  40 mg SubCUTAneous Nightly    amLODIPine  10 mg Oral Daily    aspirin  81 mg Oral Daily    budesonide-formoterol  2 puff Inhalation BID    calcium elemental  500 mg Oral Daily    carvedilol  25 mg Oral BID    vitamin B-12  500 mcg Oral Daily    Vitamin D  1,000 Units Oral Daily     Continuous Infusions:   sodium chloride       PRN Meds:.sodium chloride flush, sodium chloride, ondansetron **OR** ondansetron, polyethylene         Gen: A&O x 4, NAD, cooperative  HEENT: NC/AT, EOMI, PERRL, mmm, no carotid bruits, neck supple  Heart: RRR, S1S2  Lungs: Respirations even and unlabored  Ext: no edema, no calf tenderness b/l  Psych: normal mood and affect  Skin: no rashes or lesions    NEUROLOGIC EXAM:    Mental Status: A&O to self, location, month and year, NAD, speech clear, language fluent, repetition and naming intact, follows commands appropriately    Cranial Nerve Exam:   CN II-XII: PERRL, VFF, no nystagmus, no gaze paresis, sensation V1-V3 intact b/l, muscles of facial expression symmetric; hearing intact to conversational tone, palate elevates symmetrically, shoulder elevation symmetric and tongue protrudes midline with movement side to side.    Motor Exam:       Strength 5/5 UE's/LE's b/l  Tone and bulk normal   No pronator drift    Deep Tendon Reflexes: 2/4 biceps, triceps, brachioradialis, patellar, and achilles b/l; flexor plantar responses b/l    Sensation: Intact light touch/pinprick/vibration UE's/LE's b/l    Coordination/Cerebellum:       Tremors--none      Rapidly alternating movements: no dysdiadochokinesia b/l                Heel-to-Shin: no dysmetria b/l      Finger-to-Nose: no dysmetria b/l    Gait and stance:      Gait: deferred      LABS:     Recent Labs     12/07/24  0132 12/07/24  1415 12/08/24  0202 12/08/24  1413 12/09/24  0840   WBC 7.5  --   --   --   --       < > 136 137 139   K 3.5   < > 4.4 4.3 4.0      < > 102 102 105   CO2 26   < > 23 24 25   BUN 23*   < > 28* 27* 28*   CREATININE 1.2   < > 1.3* 1.2 1.1   GLUCOSE 100*   < > 157* 128* 151*   ALBUMIN 3.2*  --   --   --   --     < > = values in this interval not displayed.   GETLIPIDS@  Panel:3,Ammonia:3,UA:3@,[unfilled],Last TSH Results,[unfilled],     Last Liver Function Results:  @LABRCNTIP(ALT:3,AST:3,BILITOTAL:3,BILIDIR:3,ALKPHOS:3)@          IMAGING:      Carotid ultrasound  IMPRESSION:  1. Severe stenosis of the left internal carotid artery with

## 2024-12-09 NOTE — PROGRESS NOTES
Patient awake. Watching TV, oxygen in use , no needs at this time , call light in reach will continue to monitor.

## 2024-12-09 NOTE — PROGRESS NOTES
mL/min/1.73m2    Calcium 9.9 8.3 - 10.6 mg/dL        Imaging/Diagnostics Last 24 Hours   Vascular duplex lower extremity venous left    Result Date: 12/6/2024  EXAM: US Duplex Left Lower Extremity Veins. CLINICAL HISTORY: Edema left calf. In this 69-year-old female TECHNIQUE: Real-time ultrasound scan of the veins of the left lower extremity with color Doppler flow, spectral waveform analysis and compression. COMPARISON: Right lower extremity Doppler venous examination from 09/24/2024. FINDINGS: DEEP VEINS: The common femoral, superficial femoral, and popliteal veins are echolucent and compressible. These vessels demonstrate respiratory variation and augmentation. There is normal color Doppler flow throughout. The visualized calf veins are also patent. SUPERFICIAL VEINS: The visualized greater saphenous vein is patent. SOFT TISSUES: No popliteal fossa cyst or other abnormalities.     1. No deep venous thrombosis in the left lower extremity from the common femoral to the calf veins. Electronically signed by Moises Kaplan    CTA PULMONARY W CONTRAST    Result Date: 12/6/2024  EXAM: CT CHEST ANGIOGRAPHY WITH IV CONTRAST CLINICAL HISTORY: chronic repsiratory failure, requiring more O2, lungs sound good, don't think COPD, dimer of 5+, recent hospitalization COMPARISON: October 29, 2024 TECHNIQUE: CT angiography of the chest was performed following the intravenous administration of contrast medium utilizing pulmonary arterial embolus protocol. The study was reconstructed into axial images utilizing lung and soft tissue filters. Coronal and sagittal reconstructions and MIP imaging was obtained. This exam was performed according to our departmental dose-optimization program which includes automated exposure control, adjustment of the mA and/or kV according to patient size and/or use of iterative reconstruction technique. FINDINGS: No focal filling defect in the central pulmonary arteries to suggest a pulmonary arterial  embolus. The pulmonary arteries are normal in caliber. The heart is not enlarged. There is no pericardial effusion. No thoracic aortic aneurysm is identified. There is no mediastinal hematoma. The visualized thyroid gland is unremarkable. The esophagus is unremarkable. There is a 2.0 x 2.3 cm right hilar mass. There is a 1.0 x 1.8 cm left hilar lymph node. There is a 1.2 x 2.1 cm subcarinal lymph node. The trachea and mainstem bronchi patent bilaterally. Small bilateral pleural effusions and associated atelectasis. No pneumothorax. No focal consolidation. There is a 1.3 cm nodule of the right middle lobe. Advanced emphysematous lungs. Moderate pulmonary vascular congestion. No acute osseous findings. No acute findings in the visualized abdomen.     No pulmonary arterial embolus or thoracic aneurysm is identified. Bilateral pleural effusions and associated atelectasis. Adenopathy as noted above. This is suspicious for malignancy. There is a 1.3 cm nodule of the right middle lobe. This is suspicious for malignancy. Electronically signed by Gerardo Cole MD    XR CHEST PORTABLE    Result Date: 12/6/2024  EXAM: Chest, single view HISTORY: sob COMPARISON: November 2, 2024 TECHNIQUE: Single view of the chest was obtained. FINDINGS: Emphysematous lungs. Moderate pulmonary vascular congestion. Small bilateral pleural effusions. No pneumothorax. The heart is not enlarged.     Heart and pulmonary findings as described above. Electronically signed by Gerardo Cole MD      Electronically signed by Araceli Sanz MD on 12/9/2024 at 8:04 AM

## 2024-12-09 NOTE — PROGRESS NOTES
Met with patient and introduced myself as the Heart failure education HAZEL. I also saw this patient for heart failure education on 11/1/2024.  Patient reports she was not instructed to follow up with cardiology and has not seen cardiologist.   She is requesting an inpatient  cardiology consult. Eliane KESSLER sent message to hospitalist requesting cardiology consult.    Admitting diagnosis- heart failure   Cardiologist- Ariella (last admission)   Heart Failure Education Nurse consulted-yes   Ejection fraction 65-70% as of 9/25/2024 with grade II DD and VHD  Pro BNP- 1,295 on 12/8  Hospital follow up appt-  pending cardiology consult   Smoking Cessation information and referral-N/A   PCP- Buenrostro  Patient has a digital scale? Given at last admission  Transportation- has transportation    Able to obtain medications without difficulty?- Yes- Patient denies difficulty in obtaining or taking medications.       Reviewed Heart failure patient education with patient. Heart failure education included: Type of heart failure, How it is diagnosed, causes, symptoms, medications, diet, daily weights, exercise and fluid control.   Questions answered.

## 2024-12-10 ENCOUNTER — APPOINTMENT (OUTPATIENT)
Dept: NON INVASIVE DIAGNOSTICS | Age: 69
DRG: 291 | End: 2024-12-10
Attending: INTERNAL MEDICINE
Payer: MEDICARE

## 2024-12-10 LAB
ANION GAP SERPL CALCULATED.3IONS-SCNC: 9 MMOL/L (ref 9–17)
BNP SERPL-MCNC: 843 PG/ML (ref 0–125)
BUN SERPL-MCNC: 29 MG/DL (ref 7–20)
CALCIUM SERPL-MCNC: 8.9 MG/DL (ref 8.3–10.6)
CHLORIDE SERPL-SCNC: 105 MMOL/L (ref 99–110)
CHLORIDE UR-SCNC: 47 MMOL/L (ref 110–250)
CO2 SERPL-SCNC: 26 MMOL/L (ref 21–32)
CREAT SERPL-MCNC: 1.2 MG/DL (ref 0.6–1.2)
GFR, ESTIMATED: 43 ML/MIN/1.73M2
GLUCOSE SERPL-MCNC: 91 MG/DL (ref 74–99)
MAGNESIUM SERPL-MCNC: 2.2 MG/DL (ref 1.8–2.4)
POTASSIUM SERPL-SCNC: 4.1 MMOL/L (ref 3.5–5.1)
POTASSIUM, UR: 54.7 MMOL/L (ref 25–150)
SODIUM SERPL-SCNC: 139 MMOL/L (ref 136–145)
SODIUM UR-SCNC: 72 MMOL/L (ref 40–220)

## 2024-12-10 PROCEDURE — 2140000000 HC CCU INTERMEDIATE R&B

## 2024-12-10 PROCEDURE — 80048 BASIC METABOLIC PNL TOTAL CA: CPT

## 2024-12-10 PROCEDURE — 6370000000 HC RX 637 (ALT 250 FOR IP): Performed by: INTERNAL MEDICINE

## 2024-12-10 PROCEDURE — 2580000003 HC RX 258: Performed by: INTERNAL MEDICINE

## 2024-12-10 PROCEDURE — 83735 ASSAY OF MAGNESIUM: CPT

## 2024-12-10 PROCEDURE — 84133 ASSAY OF URINE POTASSIUM: CPT

## 2024-12-10 PROCEDURE — 99223 1ST HOSP IP/OBS HIGH 75: CPT | Performed by: INTERNAL MEDICINE

## 2024-12-10 PROCEDURE — 2700000000 HC OXYGEN THERAPY PER DAY

## 2024-12-10 PROCEDURE — 36415 COLL VENOUS BLD VENIPUNCTURE: CPT

## 2024-12-10 PROCEDURE — 83880 ASSAY OF NATRIURETIC PEPTIDE: CPT

## 2024-12-10 PROCEDURE — 84300 ASSAY OF URINE SODIUM: CPT

## 2024-12-10 PROCEDURE — 6370000000 HC RX 637 (ALT 250 FOR IP): Performed by: PREVENTIVE MEDICINE

## 2024-12-10 PROCEDURE — 82436 ASSAY OF URINE CHLORIDE: CPT

## 2024-12-10 PROCEDURE — 94640 AIRWAY INHALATION TREATMENT: CPT

## 2024-12-10 PROCEDURE — 94761 N-INVAS EAR/PLS OXIMETRY MLT: CPT

## 2024-12-10 PROCEDURE — 6360000002 HC RX W HCPCS: Performed by: INTERNAL MEDICINE

## 2024-12-10 RX ADMIN — FUROSEMIDE 40 MG: 10 INJECTION, SOLUTION INTRAMUSCULAR; INTRAVENOUS at 09:43

## 2024-12-10 RX ADMIN — MELATONIN TAB 3 MG 6 MG: 3 TAB at 21:09

## 2024-12-10 RX ADMIN — ASPIRIN 81 MG: 81 TABLET, CHEWABLE ORAL at 09:43

## 2024-12-10 RX ADMIN — ATORVASTATIN CALCIUM 40 MG: 40 TABLET, FILM COATED ORAL at 21:09

## 2024-12-10 RX ADMIN — CARVEDILOL 25 MG: 25 TABLET, FILM COATED ORAL at 21:12

## 2024-12-10 RX ADMIN — CARVEDILOL 25 MG: 25 TABLET, FILM COATED ORAL at 09:43

## 2024-12-10 RX ADMIN — BUDESONIDE AND FORMOTEROL FUMARATE DIHYDRATE 2 PUFF: 160; 4.5 AEROSOL RESPIRATORY (INHALATION) at 19:09

## 2024-12-10 RX ADMIN — Medication 500 MCG: at 21:09

## 2024-12-10 RX ADMIN — AMLODIPINE BESYLATE 10 MG: 10 TABLET ORAL at 09:43

## 2024-12-10 RX ADMIN — ENOXAPARIN SODIUM 40 MG: 100 INJECTION SUBCUTANEOUS at 21:10

## 2024-12-10 RX ADMIN — SODIUM CHLORIDE, PRESERVATIVE FREE 10 ML: 5 INJECTION INTRAVENOUS at 09:45

## 2024-12-10 RX ADMIN — TIOTROPIUM BROMIDE INHALATION SPRAY 2 PUFF: 3.12 SPRAY, METERED RESPIRATORY (INHALATION) at 08:45

## 2024-12-10 RX ADMIN — BUDESONIDE AND FORMOTEROL FUMARATE DIHYDRATE 2 PUFF: 160; 4.5 AEROSOL RESPIRATORY (INHALATION) at 08:45

## 2024-12-10 ASSESSMENT — PAIN SCALES - GENERAL: PAINLEVEL_OUTOF10: 4

## 2024-12-10 ASSESSMENT — PAIN DESCRIPTION - LOCATION: LOCATION: ABDOMEN

## 2024-12-10 NOTE — PROGRESS NOTES
Vascular/Interventional Neurology Progress Note  [facility]  Patient Name: Radha Gomez     : 1955      Subjective:     The patient was seen and examined.  Chart reviewed.  Patient denies any neurological changes.  She states\" I am not in a good mood today\".  We discussed outpatient follow-up for carotid stenosis.  She is agreeable.         Objective:   Scheduled Meds:   atorvastatin  40 mg Oral Nightly    furosemide  40 mg IntraVENous Daily    tiotropium  2 puff Inhalation Daily RT    sodium chloride flush  5-40 mL IntraVENous 2 times per day    enoxaparin  40 mg SubCUTAneous Nightly    amLODIPine  10 mg Oral Daily    aspirin  81 mg Oral Daily    budesonide-formoterol  2 puff Inhalation BID    calcium elemental  500 mg Oral Daily    carvedilol  25 mg Oral BID    vitamin B-12  500 mcg Oral Daily    Vitamin D  1,000 Units Oral Daily     Continuous Infusions:   sodium chloride       PRN Meds:.melatonin, sodium chloride flush, sodium chloride, ondansetron **OR** ondansetron, polyethylene glycol, acetaminophen **OR** acetaminophen, potassium chloride **OR** potassium alternative oral replacement **OR** potassium chloride, magnesium sulfate    Vital Signs:  Vitals:    12/10/24 0000 12/10/24 0845 12/10/24 0847 12/10/24 0856   BP: 112/74   (!) 146/53   Pulse: 65 65 71    Resp: 16 17 23    Temp: 97.8 °F (36.6 °C)   98.3 °F (36.8 °C)   TempSrc: Oral   Oral   SpO2: 95% 95% 96%    Weight:       Height:            General: A&O x 4, NAD, cooperative  HEENT: NC/AT, EOMI, PERRL, mmm, neck supple  Extremities: no edema, no calf tenderness b/l    Neurological Exam:         Mental Status:  A&O to self, location, and year. NAD, speech clear, language fluent, repetition and naming intact, follows commands appropriately     Cranial Nerves:  CN II-XII intact     Sensation: intact LT/temp UE/LE's b/l     Motor: 5/5 UE/LE's b/l, tone and bulk normal, no pronator drift     Reflexes: 2/4 biceps, triceps,

## 2024-12-10 NOTE — PROGRESS NOTES
Pt is feeling the temp was too hot for her, RRT decreased the temp to 34.    Pt's SaO2 95% on 50lpm/80%, RRT decreased flow to 40lpm and decreased O2 to 72%.    Pt is tolerating the Air Vo2, temp is much better since RRT decreased the temp.

## 2024-12-10 NOTE — PROGRESS NOTES
Pt request to trial off of heated high flow NC. Switched to HFNC on 13 L and weaned to 7L. SPO2 88-91% on the 7 L. Pt tolerating well.

## 2024-12-10 NOTE — PROGRESS NOTES
BUN 29 (H) 7 - 20 mg/dL    Creatinine 1.2 0.6 - 1.2 mg/dL    Est, Glom Filt Rate 43 (L) >60 mL/min/1.73m2    Calcium 8.9 8.3 - 10.6 mg/dL   Magnesium    Collection Time: 12/10/24  1:42 AM   Result Value Ref Range    Magnesium 2.2 1.8 - 2.4 mg/dL   Brain Natriuretic Peptide    Collection Time: 12/10/24  1:42 AM   Result Value Ref Range    NT Pro- (H) 0 - 125 pg/mL   Electrolytes urine random    Collection Time: 12/10/24  5:08 PM   Result Value Ref Range    Chloride, Ur 47 (L) 110 - 250 mmol/L    Potassium, Ur 54.7 25 - 150 mmol/L    Sodium, Ur 72 40 - 220 mmol/L        Imaging/Diagnostics Last 24 Hours   Vascular duplex lower extremity venous left    Result Date: 12/6/2024  EXAM: US Duplex Left Lower Extremity Veins. CLINICAL HISTORY: Edema left calf. In this 69-year-old female TECHNIQUE: Real-time ultrasound scan of the veins of the left lower extremity with color Doppler flow, spectral waveform analysis and compression. COMPARISON: Right lower extremity Doppler venous examination from 09/24/2024. FINDINGS: DEEP VEINS: The common femoral, superficial femoral, and popliteal veins are echolucent and compressible. These vessels demonstrate respiratory variation and augmentation. There is normal color Doppler flow throughout. The visualized calf veins are also patent. SUPERFICIAL VEINS: The visualized greater saphenous vein is patent. SOFT TISSUES: No popliteal fossa cyst or other abnormalities.     1. No deep venous thrombosis in the left lower extremity from the common femoral to the calf veins. Electronically signed by Moises Kaplan    CTA PULMONARY W CONTRAST    Result Date: 12/6/2024  EXAM: CT CHEST ANGIOGRAPHY WITH IV CONTRAST CLINICAL HISTORY: chronic repsiratory failure, requiring more O2, lungs sound good, don't think COPD, dimer of 5+, recent hospitalization COMPARISON: October 29, 2024 TECHNIQUE: CT angiography of the chest was performed following the intravenous administration of contrast medium  as described above. Electronically signed by Gerardo Cole MD      Electronically signed by Tonia Simpson MD on 12/10/2024 at 5:45 PM

## 2024-12-10 NOTE — PROGRESS NOTES
Patient seen and examined full note to follow concern for cardiorenal syndrome with diuresis has known diastolic dysfunction follow-up further cardiac workup and testing currently on humidified oxygen feels less short of breath and anxious full consult to follow.  Agree with current treatment

## 2024-12-10 NOTE — CONSULTS
Name:  Radha Gomez /Age/Sex: 1955  (69 y.o. female)   MRN & CSN:  [id number] & [id number] Admission Date/Time: 2024 10:27 AM   Location:  3104/3104-A PCP: Lius Buenrostro MD       Hospital Day: 5          Referring physician:  Araceli Sanz MD         Reason for consultation: Congestive heart failure            Thanks for referral.    Information source: Chart/staff/patient    CC; short of breath      HPI:   Thank you for involving me in taking  care of Radha Gomez who  is a 69 y.o.year  Old female  Presents with history of COPD, HFpEF, hypertension admitted complains of increasing dyspnea has been diuresed cardiology has been consulted for management of congestive heart failure                     Past medical history:    has a past medical history of Heart abnormality, Hypertension, and Pap smear for cervical cancer screening.  Past surgical history:   has a past surgical history that includes  section; Ankle fracture surgery; and Colonoscopy.  Social History:   reports that she has quit smoking. Her smoking use included cigarettes. She quit smokeless tobacco use about 11 years ago. She reports current alcohol use. She reports that she does not use drugs.  Family history:  family history includes Breast Cancer in her maternal grandmother.    No Known Allergies    atorvastatin (LIPITOR) tablet 40 mg, Nightly  furosemide (LASIX) injection 40 mg, Daily  melatonin tablet 6 mg, Nightly PRN  tiotropium (SPIRIVA RESPIMAT) 2.5 MCG/ACT inhaler 2 puff, Daily RT  sodium chloride flush 0.9 % injection 5-40 mL, 2 times per day  sodium chloride flush 0.9 % injection 5-40 mL, PRN  0.9 % sodium chloride infusion, PRN  ondansetron (ZOFRAN-ODT) disintegrating tablet 4 mg, Q8H PRN   Or  ondansetron (ZOFRAN) injection 4 mg, Q6H PRN  polyethylene glycol (GLYCOLAX) packet 17 g, Daily PRN  acetaminophen (TYLENOL) tablet 650 mg, Q6H PRN   Or  acetaminophen (TYLENOL) suppository 650 mg, Q6H   Values used to calculate the score:      Age: 69 years      Sex: Female      Is Non- : Yes      Diabetic: No      Tobacco smoker: No      Systolic Blood Pressure: 112 mmHg      Is BP treated: Yes      HDL Cholesterol: 71 mg/dL      Total Cholesterol: 183 mg/dL     Assessment/Recommendations:     Congestive heart failure has HFpEF we will continue to diurese and monitor  -Severe pulmonary hypertension pulmonary has been consulted for further management-hypertension on amlodipine and carvedilol  -BNP is elevated suggestive of fluid overload  -Also has valvular heart disease with severe aortic insufficiency, will need a transesophageal echocardiogram for further clarification and might need cardiac cath depending on the findings for further decision making               Jose Taylor MD, 12/10/2024 6:41 AM       Please note this report has been partially produced using speech recognition software and may contain errors related to that system including errors in grammar, punctuation, and spelling, as well as words and phrases that may be inappropriate. If there are any questions or concerns please feel free to contact the dictating provider for clarification.

## 2024-12-10 NOTE — CARE COORDINATION
.CM met with pt and spouse for d/c planning.  Introduced self, updated white board and explained role of CM. Permission received from pt to speak freely with spouse at bedside.   Pt lives with spouse and is independent with ADL's.  Pt drives, has a PCP, has insurance, and is able to afford her medication. Pt is on 6L O2 via concentrator 24/7.  [facility] offered and pt declined. Elderly resource info offered, pt declined.  She states that she is aware of [facility].  D/c plan is home with spouse.  Pt denies any d/c needs at this time. Notify CM if any d/c needs arise.  TE       12/10/24 1541   Service Assessment   Patient Orientation Alert and Oriented   Cognition Alert   History Provided By Patient   Primary Caregiver Self   Accompanied By/Relationship SPOUSE   Support Systems Spouse/Significant Other   Patient's Healthcare Decision Maker is: Legal Next of Kin  (SELF)   PCP Verified by CM Yes   Last Visit to PCP Within last 6 months   Prior Functional Level Independent in ADLs/IADLs   Current Functional Level Independent in ADLs/IADLs   Can patient return to prior living arrangement Yes   Ability to make needs known: Good   Family able to assist with home care needs: Yes   Financial Resources Medicare   Social/Functional History   Lives With Spouse   Type of Home House   Home Layout One level   Bathroom Shower/Tub Tub/Shower unit   Bathroom Toilet Standard   Bathroom Equipment Shower chair   Bathroom Accessibility Accessible   Home Equipment Oxygen  (O2 CONCENTRATOR)   Receives Help From Family   Prior Level of Assist for ADLs Independent   Prior Level of Assist for Homemaking Independent   Ambulation Assistance Independent   Prior Level of Assist for Transfers Independent   Active  Yes   Mode of Transportation Car   Occupation Retired   Discharge Planning   Type of Residence House   Living Arrangements Spouse/Significant Other   Current Services Prior To Admission Oxygen Therapy   Potential Assistance

## 2024-12-10 NOTE — PROGRESS NOTES
Nephrology Service Consultation    Patient:  Radha Gomez  MRN: [id number]  Consulting physician:  Tonia Simpson MD  Reason for Consult: Risk of acute renal failure in setting of CHF    History Obtained From:  patient, electronic medical record  PCP: Luis Buenrostro MD    HISTORY OF PRESENT ILLNESS:   The patient is a 69 y.o. female who presents with  weakness fatigue shortness of breath dyspnea on exertion.  Patient with known diastolic heart failure COPD arthritis hypertension.  Seen by renal back about 4 5 years ago with mild chronic kidney disease and in the past when developed shortness of breath and increased diuresis developed acute renal failure in the above setting.  This admission was short of breath again was started on humidified O2 and now oxygenation improving.  Initial labs were indicative of fluid overload was given diuretic therapy CAT scan was done with IV contrast as well as showed no evidence of a pulmonary embolism with diuresis  oxygenation was improving but concern for worsening renal function in setting of possible cardiorenal syndrome and wanted nephrology to evaluate.  Patient's weight is holding at 164 pounds no recent weight redrawn but patient is down about 9 L of fluid since admission initial respiratory cultures were negative.  BNP follow-up is down to 843 and creatinine holding 1.2 and wanted nephrology to evaluate with risk of cardiorenal syndrome with stage III kidney disease    Past Medical History:        Diagnosis Date    Heart abnormality     Hypertension     Pap smear for cervical cancer screening 2010    neg       Past Surgical History:        Procedure Laterality Date    ANKLE FRACTURE SURGERY      right     SECTION      COLONOSCOPY         Medications:   Scheduled Meds:   atorvastatin  40 mg Oral Nightly    furosemide  40 mg IntraVENous Daily    tiotropium  2 puff Inhalation Daily RT    sodium chloride flush  5-40 mL IntraVENous 2 times per day

## 2024-12-11 ENCOUNTER — APPOINTMENT (OUTPATIENT)
Dept: GENERAL RADIOLOGY | Age: 69
DRG: 291 | End: 2024-12-11
Payer: MEDICARE

## 2024-12-11 PROBLEM — J96.21 ACUTE ON CHRONIC RESPIRATORY FAILURE WITH HYPOXIA: Status: ACTIVE | Noted: 2024-12-11

## 2024-12-11 PROBLEM — I38 VALVULAR HEART DISEASE: Status: ACTIVE | Noted: 2024-12-11

## 2024-12-11 LAB
ALBUMIN: 3.2 G/DL (ref 3.4–5)
ANION GAP SERPL CALCULATED.3IONS-SCNC: 7 MMOL/L (ref 9–17)
BUN SERPL-MCNC: 22 MG/DL (ref 7–20)
CALCIUM SERPL-MCNC: 9.4 MG/DL (ref 8.3–10.6)
CHLORIDE SERPL-SCNC: 106 MMOL/L (ref 99–110)
CO2 SERPL-SCNC: 28 MMOL/L (ref 21–32)
CREAT SERPL-MCNC: 1 MG/DL (ref 0.6–1.2)
CRP SERPL HS-MCNC: 12.3 MG/L (ref 0–5)
ERYTHROCYTE [SEDIMENTATION RATE] IN BLOOD BY WESTERGREN METHOD: 33 MM/HR (ref 0–30)
GFR, ESTIMATED: 55 ML/MIN/1.73M2
GLUCOSE SERPL-MCNC: 85 MG/DL (ref 74–99)
MAGNESIUM SERPL-MCNC: 2.4 MG/DL (ref 1.8–2.4)
PHOSPHATE SERPL-MCNC: 3.8 MG/DL (ref 2.5–4.9)
POTASSIUM SERPL-SCNC: 3.7 MMOL/L (ref 3.5–5.1)
PROCALCITONIN SERPL-MCNC: 0.04 NG/ML
SODIUM SERPL-SCNC: 141 MMOL/L (ref 136–145)

## 2024-12-11 PROCEDURE — 94640 AIRWAY INHALATION TREATMENT: CPT

## 2024-12-11 PROCEDURE — 99231 SBSQ HOSP IP/OBS SF/LOW 25: CPT | Performed by: NURSE PRACTITIONER

## 2024-12-11 PROCEDURE — 6370000000 HC RX 637 (ALT 250 FOR IP)

## 2024-12-11 PROCEDURE — 36415 COLL VENOUS BLD VENIPUNCTURE: CPT

## 2024-12-11 PROCEDURE — APPNB15 APP NON BILLABLE TIME 0-15 MINS

## 2024-12-11 PROCEDURE — 99232 SBSQ HOSP IP/OBS MODERATE 35: CPT | Performed by: INTERNAL MEDICINE

## 2024-12-11 PROCEDURE — 2140000000 HC CCU INTERMEDIATE R&B

## 2024-12-11 PROCEDURE — 87899 AGENT NOS ASSAY W/OPTIC: CPT

## 2024-12-11 PROCEDURE — 86140 C-REACTIVE PROTEIN: CPT

## 2024-12-11 PROCEDURE — 84145 PROCALCITONIN (PCT): CPT

## 2024-12-11 PROCEDURE — 6360000002 HC RX W HCPCS: Performed by: INTERNAL MEDICINE

## 2024-12-11 PROCEDURE — 6370000000 HC RX 637 (ALT 250 FOR IP): Performed by: PREVENTIVE MEDICINE

## 2024-12-11 PROCEDURE — 80069 RENAL FUNCTION PANEL: CPT

## 2024-12-11 PROCEDURE — 94761 N-INVAS EAR/PLS OXIMETRY MLT: CPT

## 2024-12-11 PROCEDURE — 2580000003 HC RX 258: Performed by: INTERNAL MEDICINE

## 2024-12-11 PROCEDURE — 87641 MR-STAPH DNA AMP PROBE: CPT

## 2024-12-11 PROCEDURE — 6370000000 HC RX 637 (ALT 250 FOR IP): Performed by: INTERNAL MEDICINE

## 2024-12-11 PROCEDURE — 71045 X-RAY EXAM CHEST 1 VIEW: CPT

## 2024-12-11 PROCEDURE — 87449 NOS EACH ORGANISM AG IA: CPT

## 2024-12-11 PROCEDURE — 83735 ASSAY OF MAGNESIUM: CPT

## 2024-12-11 PROCEDURE — 85652 RBC SED RATE AUTOMATED: CPT

## 2024-12-11 PROCEDURE — 2700000000 HC OXYGEN THERAPY PER DAY

## 2024-12-11 RX ADMIN — CARVEDILOL 25 MG: 25 TABLET, FILM COATED ORAL at 20:31

## 2024-12-11 RX ADMIN — BUDESONIDE AND FORMOTEROL FUMARATE DIHYDRATE 2 PUFF: 160; 4.5 AEROSOL RESPIRATORY (INHALATION) at 08:50

## 2024-12-11 RX ADMIN — SODIUM CHLORIDE, PRESERVATIVE FREE 10 ML: 5 INJECTION INTRAVENOUS at 20:33

## 2024-12-11 RX ADMIN — CALCIUM 500 MG: 500 TABLET ORAL at 08:43

## 2024-12-11 RX ADMIN — Medication 500 MCG: at 20:31

## 2024-12-11 RX ADMIN — ATORVASTATIN CALCIUM 40 MG: 40 TABLET, FILM COATED ORAL at 20:31

## 2024-12-11 RX ADMIN — BUDESONIDE AND FORMOTEROL FUMARATE DIHYDRATE 2 PUFF: 160; 4.5 AEROSOL RESPIRATORY (INHALATION) at 20:15

## 2024-12-11 RX ADMIN — AMLODIPINE BESYLATE 10 MG: 10 TABLET ORAL at 08:42

## 2024-12-11 RX ADMIN — EMPAGLIFLOZIN 10 MG: 10 TABLET, FILM COATED ORAL at 18:22

## 2024-12-11 RX ADMIN — TIOTROPIUM BROMIDE INHALATION SPRAY 2 PUFF: 3.12 SPRAY, METERED RESPIRATORY (INHALATION) at 08:51

## 2024-12-11 RX ADMIN — ASPIRIN 81 MG: 81 TABLET, CHEWABLE ORAL at 08:43

## 2024-12-11 RX ADMIN — Medication 1000 UNITS: at 08:42

## 2024-12-11 RX ADMIN — MELATONIN TAB 3 MG 6 MG: 3 TAB at 20:32

## 2024-12-11 RX ADMIN — ENOXAPARIN SODIUM 40 MG: 100 INJECTION SUBCUTANEOUS at 20:33

## 2024-12-11 RX ADMIN — SODIUM CHLORIDE, PRESERVATIVE FREE 10 ML: 5 INJECTION INTRAVENOUS at 08:43

## 2024-12-11 RX ADMIN — CARVEDILOL 25 MG: 25 TABLET, FILM COATED ORAL at 08:42

## 2024-12-11 RX ADMIN — FUROSEMIDE 40 MG: 10 INJECTION, SOLUTION INTRAMUSCULAR; INTRAVENOUS at 08:42

## 2024-12-11 ASSESSMENT — PAIN SCALES - GENERAL: PAINLEVEL_OUTOF10: 0

## 2024-12-11 NOTE — PROGRESS NOTES
Nephrology Progress Note  12/11/2024 7:59 AM  Subjective:     Interval History: Radha Gomez is a 69 y.o. female with doing overall better less short of breath less anxious no fevers chills waiting on cardiac evaluation and workup        Data:   Scheduled Meds:   atorvastatin  40 mg Oral Nightly    furosemide  40 mg IntraVENous Daily    tiotropium  2 puff Inhalation Daily RT    sodium chloride flush  5-40 mL IntraVENous 2 times per day    enoxaparin  40 mg SubCUTAneous Nightly    amLODIPine  10 mg Oral Daily    aspirin  81 mg Oral Daily    budesonide-formoterol  2 puff Inhalation BID    calcium elemental  500 mg Oral Daily    carvedilol  25 mg Oral BID    vitamin B-12  500 mcg Oral Daily    Vitamin D  1,000 Units Oral Daily     Continuous Infusions:   sodium chloride           CBC No results for input(s): \"WBC\", \"HGB\", \"HCT\", \"PLT\" in the last 72 hours.   BMP   Recent Labs     12/09/24  0840 12/10/24  0142 12/11/24  0240    139 141   K 4.0 4.1 3.7    105 106   CO2 25 26 28   PHOS  --   --  3.8   BUN 28* 29* 22*   CREATININE 1.1 1.2 1.0     Hepatic: No results for input(s): \"AST\", \"ALT\", \"BILITOT\", \"ALKPHOS\" in the last 72 hours.    Invalid input(s): \"ALB\"  Troponin: No results for input(s): \"TROPONINI\" in the last 72 hours.  BNP: No results for input(s): \"BNP\" in the last 72 hours.  Lipids: No results for input(s): \"CHOL\", \"HDL\" in the last 72 hours.    Invalid input(s): \"LDLCALCU\"  ABGs: No results found for: \"PHART\", \"PO2ART\", \"XWK1FBX\"  INR: No results for input(s): \"INR\" in the last 72 hours.  Renal Labs  Albumin:  No results found for: \"LABALBU\"  Calcium:    Lab Results   Component Value Date/Time    CALCIUM 9.4 12/11/2024 02:40 AM     Phosphorus:    Lab Results   Component Value Date/Time    PHOS 3.8 12/11/2024 02:40 AM     U/A:    Lab Results   Component Value Date/Time    NITRU NEGATIVE 10/29/2024 04:12 PM    COLORU Yellow 10/29/2024 04:12 PM    PHUR 5.5 10/29/2024 04:12 PM    WBCUA <1

## 2024-12-11 NOTE — PROGRESS NOTES
[facility] [place] [address]  Phone: [phone]    Fax [phone]                  Jose Taylor MD, [facility]       Richard Akers MD, [facility]  Oswald Woodard MD, [facility]    MD Sherwin Armenta MD Tariq Rizvi, MD Bilal Alam, MD Dr. Waseem Sajjad MD Melissa Kellis, APRRITA Williamson, APRRITA Polo, APRRITA Mcnair, APRN  Francis Marroquin PA-C    CARDIOLOGY  NOTE      Name:  Radha Gomez /Age/Sex: 1955  (69 y.o. female)   MRN & CSN:  [id number] & [id number] Admission Date/Time: 2024 10:27 AM   Location:  [address] [place] [address] PCP: Luis Buenrostro MD       Hospital Day: 6    - Cardiology consult is for: CHF      ASSESSMENT/ PLAN:  Acute on chronic heart failure with preserved ejection fraction  Severe aortic regurgitation  Moderate tricuspid regurgitation  History of tobacco abuse: Quit earlier this year  Pulmonary hypertension  -Baseline oxygen requirement of 4 to 6 L  -Does appear volume overloaded.  -Echo in September showing EF 65-75% with grade 2 diastolic dysfunction.  Concern for severe aortic insufficiency as well as mitral regurgitation and pulmonary hypertension  -Plan for MARY tomorrow with anesthesia.  N.p.o. after midnight  -Coreg 25 mg twice daily  -Start Jardiance 10 mg  Severe left carotid artery stenosis  -Interventional neurology following.  No plans for intervention at this time  -Aspirin, Lipitor 40 mg nightly  Hypertension  -Blood pressure mildly elevated  -Norvasc 10 mg daily  TAI on CKD 3  -Nephrology following  -Will defer diuresis.  Currently on IV Lasix 40 mg daily  Severe COPD  Lung mass with adenopathy  -Pulmonology consulted        Subjective:  Radha is a 69 y.o.year old     Patient continues to be short of breath and requiring approximately 6 L supplemental oxygen.  Discussing with me she states that her baseline is around 4 to 6 L.  However she is  oriented to person, place, and time.          Medications:    atorvastatin  40 mg Oral Nightly    furosemide  40 mg IntraVENous Daily    tiotropium  2 puff Inhalation Daily RT    sodium chloride flush  5-40 mL IntraVENous 2 times per day    enoxaparin  40 mg SubCUTAneous Nightly    amLODIPine  10 mg Oral Daily    aspirin  81 mg Oral Daily    budesonide-formoterol  2 puff Inhalation BID    calcium elemental  500 mg Oral Daily    carvedilol  25 mg Oral BID    vitamin B-12  500 mcg Oral Daily    Vitamin D  1,000 Units Oral Daily      sodium chloride       melatonin, sodium chloride flush, sodium chloride, ondansetron **OR** ondansetron, polyethylene glycol, acetaminophen **OR** acetaminophen, potassium chloride **OR** potassium alternative oral replacement **OR** potassium chloride, magnesium sulfate    Lab Data:  CBC: No results for input(s): \"WBC\", \"HGB\", \"HCT\", \"MCV\", \"PLT\" in the last 72 hours.  BMP:   Recent Labs     12/09/24  0840 12/10/24  0142 12/11/24  0240    139 141   K 4.0 4.1 3.7    105 106   CO2 25 26 28   PHOS  --   --  3.8   BUN 28* 29* 22*   CREATININE 1.1 1.2 1.0     LIVER PROFILE: No results for input(s): \"AST\", \"ALT\", \"LIPASE\", \"AMYLASE\", \"BILIDIR\", \"BILITOT\", \"ALKPHOS\" in the last 72 hours.    Invalid input(s): \"ALB\"  PT/INR: No results for input(s): \"PROTIME\", \"INR\" in the last 72 hours.  APTT: No results for input(s): \"APTT\" in the last 72 hours.  BNP:  No results for input(s): \"BNP\" in the last 72 hours.  TROPONIN: No results for input(s): \"TROPONINT\" in the last 72 hours.  Labs, consult, tests reviewed          Francis Marroquin PA-C 12/11/2024 4:13 PM     I have seen ,spoken to  and examined this patient personally, independently of the VALARIE. I have reviewed the hospital care given to date and reviewed all pertinent labs and imaging.I have spoken with patient, nursing staff and provided written and verbal instructions .The above note has been reviewed     CARDIOLOGY ATTENDING

## 2024-12-11 NOTE — PLAN OF CARE
Problem: Discharge Planning  Goal: Discharge to home or other facility with appropriate resources  Outcome: Progressing  Flowsheets (Taken 12/11/2024 0625)  Discharge to home or other facility with appropriate resources: Identify barriers to discharge with patient and caregiver     Problem: Safety - Adult  Goal: Free from fall injury  Outcome: Progressing  Flowsheets (Taken 12/11/2024 0625)  Free From Fall Injury:   Instruct family/caregiver on patient safety   Based on caregiver fall risk screen, instruct family/caregiver to ask for assistance with transferring infant if caregiver noted to have fall risk factors     Problem: Pain  Goal: Verbalizes/displays adequate comfort level or baseline comfort level  Outcome: Progressing  Flowsheets (Taken 12/11/2024 0625)  Verbalizes/displays adequate comfort level or baseline comfort level:   Encourage patient to monitor pain and request assistance   Administer analgesics based on type and severity of pain and evaluate response   Consider cultural and social influences on pain and pain management

## 2024-12-11 NOTE — PROGRESS NOTES
Vascular/Interventional Neurology Progress Note  [facility]  Patient Name: Radha Gomez     : 1955      Subjective:     The patient was seen and examined.  Patient sitting in bed.  She denies any neurological changes.  She tells me her MARY is on hold today due to increased oxygen requirements.  We discussed outpatient follow-up for carotid stenosis.  She denies further questions.  No family present at bedside    Objective:   Scheduled Meds:   atorvastatin  40 mg Oral Nightly    furosemide  40 mg IntraVENous Daily    tiotropium  2 puff Inhalation Daily RT    sodium chloride flush  5-40 mL IntraVENous 2 times per day    enoxaparin  40 mg SubCUTAneous Nightly    amLODIPine  10 mg Oral Daily    aspirin  81 mg Oral Daily    budesonide-formoterol  2 puff Inhalation BID    calcium elemental  500 mg Oral Daily    carvedilol  25 mg Oral BID    vitamin B-12  500 mcg Oral Daily    Vitamin D  1,000 Units Oral Daily     Continuous Infusions:   sodium chloride       PRN Meds:.melatonin, sodium chloride flush, sodium chloride, ondansetron **OR** ondansetron, polyethylene glycol, acetaminophen **OR** acetaminophen, potassium chloride **OR** potassium alternative oral replacement **OR** potassium chloride, magnesium sulfate    Vital Signs:  Vitals:    12/10/24 1909 12/10/24 2115 24 0830 24 1126   BP:  (!) 140/59 (!) 147/67 (!) 137/56   Pulse: 90 78 69 84   Resp: 18 17 17 17   Temp:   98.1 °F (36.7 °C) 98.1 °F (36.7 °C)   TempSrc:   Oral Oral   SpO2: (!) 87% 90% 93% 90%   Weight:       Height:            General: A&O x 4, NAD, cooperative  HEENT: NC/AT, EOMI, PERRL, mmm, neck supple  Extremities: no edema, no calf tenderness b/l    Neurological Exam:         Mental Status:  A&O to self, location, and year. NAD, speech clear, language fluent, repetition and naming intact, follows commands appropriately     Cranial Nerves:  CN II-XII intact     Sensation: intact LT/temp UE/LE's b/l     Motor: 5/5

## 2024-12-11 NOTE — PROGRESS NOTES
V2.0  [facility] Hospitalist Progress Note      Name:  Radha Gomez /Age/Sex: 1955  (69 y.o. female)   MRN & CSN:  [id number] & [id number] Encounter Date/Time: 2024 8:27 AM EST    Location:  [address] [place] [address]- PCP: Luis Buenrostro MD       Hospital Day: 6    Assessment and Plan:   Radha Gomez is a 69 y.o. female  who presents with Heart failure (HCC)    # Acute on chronic HFpEF  # Acute on chronic hypoxic respiratory failure 2/2 HFpEF exacerbation  # Severe AR  # Grade 2 diastolic dysfunction  # Moderate TR  # CAD pulmonary hypertension  -Patient with 1 week of worsening TORRES, PND and orthopnea  -baseline home O2 is 6-6.5 L (she uses 4L when she is not at home)  -Chest x-ray showing pulmonary vascular congestion and small bilateral pleural effusions  -Elevated proBNP to 1157, low procalcitonin  -Last echo from 2024 showing EF of 65 to 70%, grade 2 diastolic dysfunction, severe AR, moderate TR, concern for severe pulmonary hypertension  -Negative respiratory PCR panel  -Received IV Lasix 60 mg in the ED  -Elevated D-dimer  -CTA chest without PE  -TSH WNL  -Left lower extremity venous duplex without DVT   -received a few doses of IV lasix, patient did not feel like she was urinating as much as she normally does with Lasix  -: On 11 L of oxygen, was saturating around 90% when I walked in the room however when she started talking, she went as low as 85%  Will recheck BMP later today to make sure kidneys are okay  Strict I's and O's  Daily weights  Telemetry monitoring  Continue home GDMT  Patient states that she tends to desaturate overnight, will get overnight BiPAP eval -->patient declined evaluation  Restarted IV Lasix 40 mg daily  MARY to be done to assess AR-plan for tomorrow due to higher oxygen requirement  On HFNC    # Severe left ICA stenosis  -Patient requesting carotid duplex study be ordered as apparently has a history of carotid stenosis which has never been fixed  -Carotid duplex :  Severe stenosis of left internal carotid artery, markedly elevated external carotid artery velocities  Radiology recommending CTA for further characterization--> can do as outpatient  Neurointerventional consulted, appreciate recs  --> Due to patient's O2 requirements, recommending outpatient follow-up    # Lung mass with adenopathy  -CT chest 12/6 showing 2.0 x 2.3 cm right hilar mass along with a 1.0 x 1.8 cm left hilar lymph node and 1.2 x 2.1 cm subcarinal lymph node, right middle lobe nodule is now 1.3 cm  -Right middle lobe lung nodule was 0.7 cm on PET/CT from 11/14  -Right hilar mass was 1.2 x 1.3 cm on 11/14  Pulmonology consulted due to concern for worsening malignancy     # Elevated troponin likely in the setting of demand ischemia  -Troponin: 20--> 15  -Troponin remained flat and EKG without acute ischemic changes  Continue to monitor for signs of ACS    #TAI  -Cr 1.2 on arrival, up from 0.9 baseline  -Creatinine 1.3 today  IV fluids  Avoid nephrotoxic agents  Recheck BMP later today  Nephrology consulted to help with IV diuresis, appreciate recs     # COPD, not in exacerbation  Continue home inhalers     # Hypertension  Continue amlodipine and carvedilol     # Rheumatoid arthritis  -Takes Humira every other week     # Liver fibrosis      Diet ADULT DIET; Regular; No Added Salt (3-4 gm); 1500 ml  Diet NPO Exceptions are: Sips of Water with Meds, Ice Chips   DVT Prophylaxis [x] Lovenox, []  Heparin, [] SCDs, [] Ambulation,  [] Eliquis, [] Xarelto  [] Coumadin   Code Status Full Code   Disposition From: home  Expected Disposition: home  Estimated Date of Discharge: To be decided-likely in the next 2 days or so  Patient requires continued admission due to HF exacerbation, TAI   Surrogate Decision Maker/ POA Spouse     Subjective:     Patient states her breathing is better today.   Nursing team continuing to try to wean off oxygen.  Patient reports that at baseline she uses anywhere from 5 to 6 L/min via

## 2024-12-11 NOTE — PLAN OF CARE
Problem: Discharge Planning  Goal: Discharge to home or other facility with appropriate resources  12/11/2024 1022 by Jd Gallardo RN  Outcome: Progressing  12/11/2024 0625 by Rock Herndon RN  Outcome: Progressing  Flowsheets (Taken 12/11/2024 0625)  Discharge to home or other facility with appropriate resources: Identify barriers to discharge with patient and caregiver     Problem: Safety - Adult  Goal: Free from fall injury  12/11/2024 1022 by Jd Gallardo RN  Outcome: Progressing  12/11/2024 0625 by Rock Herndon RN  Outcome: Progressing  Flowsheets (Taken 12/11/2024 0625)  Free From Fall Injury:   Instruct family/caregiver on patient safety   Based on caregiver fall risk screen, instruct family/caregiver to ask for assistance with transferring infant if caregiver noted to have fall risk factors     Problem: Pain  Goal: Verbalizes/displays adequate comfort level or baseline comfort level  12/11/2024 1022 by Jd Gallardo RN  Outcome: Progressing  12/11/2024 0625 by Rock Herndon RN  Outcome: Progressing  Flowsheets (Taken 12/11/2024 0625)  Verbalizes/displays adequate comfort level or baseline comfort level:   Encourage patient to monitor pain and request assistance   Administer analgesics based on type and severity of pain and evaluate response   Consider cultural and social influences on pain and pain management

## 2024-12-12 ENCOUNTER — APPOINTMENT (OUTPATIENT)
Dept: NUCLEAR MEDICINE | Age: 69
DRG: 291 | End: 2024-12-12
Payer: MEDICARE

## 2024-12-12 ENCOUNTER — APPOINTMENT (OUTPATIENT)
Dept: NON INVASIVE DIAGNOSTICS | Age: 69
DRG: 291 | End: 2024-12-12
Payer: MEDICARE

## 2024-12-12 ENCOUNTER — APPOINTMENT (OUTPATIENT)
Dept: NON INVASIVE DIAGNOSTICS | Age: 69
DRG: 291 | End: 2024-12-12
Attending: INTERNAL MEDICINE
Payer: MEDICARE

## 2024-12-12 LAB
ALBUMIN: 3.2 G/DL (ref 3.4–5)
ANION GAP SERPL CALCULATED.3IONS-SCNC: 10 MMOL/L (ref 9–17)
BNP SERPL-MCNC: 781 PG/ML (ref 0–125)
BUN SERPL-MCNC: 20 MG/DL (ref 7–20)
CALCIUM SERPL-MCNC: 9.1 MG/DL (ref 8.3–10.6)
CHLORIDE SERPL-SCNC: 106 MMOL/L (ref 99–110)
CO2 SERPL-SCNC: 25 MMOL/L (ref 21–32)
CREAT SERPL-MCNC: 1.1 MG/DL (ref 0.6–1.2)
ECHO BSA: 1.88 M2
ECHO BSA: 1.88 M2
GFR, ESTIMATED: 50 ML/MIN/1.73M2
GLUCOSE SERPL-MCNC: 88 MG/DL (ref 74–99)
L PNEUMO1 AG UR QL IA.RAPID: NEGATIVE
MRSA, DNA, NASAL: NOT DETECTED
NUC STRESS EJECTION FRACTION: 60 %
PHOSPHATE SERPL-MCNC: 4.1 MG/DL (ref 2.5–4.9)
POTASSIUM SERPL-SCNC: 3.8 MMOL/L (ref 3.5–5.1)
S PNEUM AG SPEC QL: NEGATIVE
SODIUM SERPL-SCNC: 141 MMOL/L (ref 136–145)
SPECIMEN DESCRIPTION: NORMAL
SPECIMEN SOURCE: NORMAL
STRESS BASELINE DIAS BP: 46 MMHG
STRESS BASELINE HR: 78 BPM
STRESS BASELINE SYS BP: 128 MMHG
STRESS ESTIMATED WORKLOAD: 1 METS
STRESS PEAK DIAS BP: 59 MMHG
STRESS PEAK SYS BP: 151 MMHG
STRESS PERCENT HR ACHIEVED: 65 %
STRESS POST PEAK HR: 97 BPM
STRESS RATE PRESSURE PRODUCT: NORMAL BPM*MMHG
STRESS TARGET HR: 150 BPM

## 2024-12-12 PROCEDURE — 6370000000 HC RX 637 (ALT 250 FOR IP): Performed by: INTERNAL MEDICINE

## 2024-12-12 PROCEDURE — 87205 SMEAR GRAM STAIN: CPT

## 2024-12-12 PROCEDURE — 78452 HT MUSCLE IMAGE SPECT MULT: CPT

## 2024-12-12 PROCEDURE — 3430000000 HC RX DIAGNOSTIC RADIOPHARMACEUTICAL: Performed by: INTERNAL MEDICINE

## 2024-12-12 PROCEDURE — 99233 SBSQ HOSP IP/OBS HIGH 50: CPT | Performed by: INTERNAL MEDICINE

## 2024-12-12 PROCEDURE — 94761 N-INVAS EAR/PLS OXIMETRY MLT: CPT

## 2024-12-12 PROCEDURE — 93325 DOPPLER ECHO COLOR FLOW MAPG: CPT | Performed by: INTERNAL MEDICINE

## 2024-12-12 PROCEDURE — 94640 AIRWAY INHALATION TREATMENT: CPT

## 2024-12-12 PROCEDURE — A9500 TC99M SESTAMIBI: HCPCS | Performed by: INTERNAL MEDICINE

## 2024-12-12 PROCEDURE — 6370000000 HC RX 637 (ALT 250 FOR IP): Performed by: PREVENTIVE MEDICINE

## 2024-12-12 PROCEDURE — 93018 CV STRESS TEST I&R ONLY: CPT | Performed by: INTERNAL MEDICINE

## 2024-12-12 PROCEDURE — 2700000000 HC OXYGEN THERAPY PER DAY

## 2024-12-12 PROCEDURE — 99152 MOD SED SAME PHYS/QHP 5/>YRS: CPT | Performed by: INTERNAL MEDICINE

## 2024-12-12 PROCEDURE — 2580000003 HC RX 258: Performed by: INTERNAL MEDICINE

## 2024-12-12 PROCEDURE — 36415 COLL VENOUS BLD VENIPUNCTURE: CPT

## 2024-12-12 PROCEDURE — 6360000002 HC RX W HCPCS: Performed by: INTERNAL MEDICINE

## 2024-12-12 PROCEDURE — 93320 DOPPLER ECHO COMPLETE: CPT | Performed by: INTERNAL MEDICINE

## 2024-12-12 PROCEDURE — 7100000010 HC PHASE II RECOVERY - FIRST 15 MIN: Performed by: INTERNAL MEDICINE

## 2024-12-12 PROCEDURE — B24BZZ4 ULTRASONOGRAPHY OF HEART WITH AORTA, TRANSESOPHAGEAL: ICD-10-PCS | Performed by: INTERNAL MEDICINE

## 2024-12-12 PROCEDURE — 78452 HT MUSCLE IMAGE SPECT MULT: CPT | Performed by: INTERNAL MEDICINE

## 2024-12-12 PROCEDURE — 93017 CV STRESS TEST TRACING ONLY: CPT

## 2024-12-12 PROCEDURE — 6370000000 HC RX 637 (ALT 250 FOR IP)

## 2024-12-12 PROCEDURE — 2140000000 HC CCU INTERMEDIATE R&B

## 2024-12-12 PROCEDURE — 83880 ASSAY OF NATRIURETIC PEPTIDE: CPT

## 2024-12-12 PROCEDURE — 80069 RENAL FUNCTION PANEL: CPT

## 2024-12-12 PROCEDURE — 7100000011 HC PHASE II RECOVERY - ADDTL 15 MIN: Performed by: INTERNAL MEDICINE

## 2024-12-12 PROCEDURE — 93312 ECHO TRANSESOPHAGEAL: CPT | Performed by: INTERNAL MEDICINE

## 2024-12-12 PROCEDURE — 87070 CULTURE OTHR SPECIMN AEROBIC: CPT

## 2024-12-12 PROCEDURE — 93325 DOPPLER ECHO COLOR FLOW MAPG: CPT

## 2024-12-12 PROCEDURE — 93016 CV STRESS TEST SUPVJ ONLY: CPT | Performed by: INTERNAL MEDICINE

## 2024-12-12 RX ORDER — TETRAKIS(2-METHOXYISOBUTYLISOCYANIDE)COPPER(I) TETRAFLUOROBORATE 1 MG/ML
9.3 INJECTION, POWDER, LYOPHILIZED, FOR SOLUTION INTRAVENOUS
Status: COMPLETED | OUTPATIENT
Start: 2024-12-12 | End: 2024-12-12

## 2024-12-12 RX ORDER — REGADENOSON 0.08 MG/ML
0.4 INJECTION, SOLUTION INTRAVENOUS
Status: COMPLETED | OUTPATIENT
Start: 2024-12-12 | End: 2024-12-12

## 2024-12-12 RX ORDER — LIDOCAINE HYDROCHLORIDE 20 MG/ML
SOLUTION OROPHARYNGEAL PRN
Status: COMPLETED | OUTPATIENT
Start: 2024-12-12 | End: 2024-12-12

## 2024-12-12 RX ORDER — MIDAZOLAM HYDROCHLORIDE 1 MG/ML
INJECTION, SOLUTION INTRAMUSCULAR; INTRAVENOUS PRN
Status: COMPLETED | OUTPATIENT
Start: 2024-12-12 | End: 2024-12-12

## 2024-12-12 RX ORDER — TORSEMIDE 20 MG/1
20 TABLET ORAL 2 TIMES DAILY
Status: DISCONTINUED | OUTPATIENT
Start: 2024-12-12 | End: 2024-12-13 | Stop reason: HOSPADM

## 2024-12-12 RX ORDER — TETRAKIS(2-METHOXYISOBUTYLISOCYANIDE)COPPER(I) TETRAFLUOROBORATE 1 MG/ML
29.9 INJECTION, POWDER, LYOPHILIZED, FOR SOLUTION INTRAVENOUS
Status: COMPLETED | OUTPATIENT
Start: 2024-12-12 | End: 2024-12-12

## 2024-12-12 RX ADMIN — ENOXAPARIN SODIUM 40 MG: 100 INJECTION SUBCUTANEOUS at 20:33

## 2024-12-12 RX ADMIN — LIDOCAINE HYDROCHLORIDE 15 ML: 20 SOLUTION ORAL at 11:02

## 2024-12-12 RX ADMIN — Medication 500 MCG: at 20:31

## 2024-12-12 RX ADMIN — CALCIUM 500 MG: 500 TABLET ORAL at 09:52

## 2024-12-12 RX ADMIN — BUDESONIDE AND FORMOTEROL FUMARATE DIHYDRATE 2 PUFF: 160; 4.5 AEROSOL RESPIRATORY (INHALATION) at 22:07

## 2024-12-12 RX ADMIN — ATORVASTATIN CALCIUM 40 MG: 40 TABLET, FILM COATED ORAL at 20:31

## 2024-12-12 RX ADMIN — MIDAZOLAM 2 MG: 1 INJECTION INTRAMUSCULAR; INTRAVENOUS at 11:10

## 2024-12-12 RX ADMIN — TORSEMIDE 20 MG: 20 TABLET ORAL at 09:52

## 2024-12-12 RX ADMIN — EMPAGLIFLOZIN 10 MG: 10 TABLET, FILM COATED ORAL at 09:52

## 2024-12-12 RX ADMIN — CARVEDILOL 25 MG: 25 TABLET, FILM COATED ORAL at 09:52

## 2024-12-12 RX ADMIN — AMLODIPINE BESYLATE 10 MG: 10 TABLET ORAL at 09:52

## 2024-12-12 RX ADMIN — MELATONIN TAB 3 MG 6 MG: 3 TAB at 20:31

## 2024-12-12 RX ADMIN — SODIUM CHLORIDE, PRESERVATIVE FREE 10 ML: 5 INJECTION INTRAVENOUS at 09:52

## 2024-12-12 RX ADMIN — KIT FOR THE PREPARATION OF TECHNETIUM TC99M SESTAMIBI 29.9 MILLICURIE: 1 INJECTION, POWDER, LYOPHILIZED, FOR SOLUTION PARENTERAL at 14:16

## 2024-12-12 RX ADMIN — TIOTROPIUM BROMIDE INHALATION SPRAY 2 PUFF: 3.12 SPRAY, METERED RESPIRATORY (INHALATION) at 07:22

## 2024-12-12 RX ADMIN — REGADENOSON 0.4 MG: 0.08 INJECTION, SOLUTION INTRAVENOUS at 12:54

## 2024-12-12 RX ADMIN — MIDAZOLAM 2 MG: 1 INJECTION INTRAMUSCULAR; INTRAVENOUS at 11:06

## 2024-12-12 RX ADMIN — BUDESONIDE AND FORMOTEROL FUMARATE DIHYDRATE 2 PUFF: 160; 4.5 AEROSOL RESPIRATORY (INHALATION) at 07:22

## 2024-12-12 RX ADMIN — SODIUM CHLORIDE, PRESERVATIVE FREE 10 ML: 5 INJECTION INTRAVENOUS at 20:34

## 2024-12-12 RX ADMIN — CARVEDILOL 25 MG: 25 TABLET, FILM COATED ORAL at 20:31

## 2024-12-12 RX ADMIN — MIDAZOLAM 2 MG: 1 INJECTION INTRAMUSCULAR; INTRAVENOUS at 11:11

## 2024-12-12 RX ADMIN — ASPIRIN 81 MG: 81 TABLET, CHEWABLE ORAL at 09:52

## 2024-12-12 RX ADMIN — Medication 1000 UNITS: at 09:52

## 2024-12-12 RX ADMIN — KIT FOR THE PREPARATION OF TECHNETIUM TC99M SESTAMIBI 9.3 MILLICURIE: 1 INJECTION, POWDER, LYOPHILIZED, FOR SOLUTION PARENTERAL at 14:15

## 2024-12-12 RX ADMIN — ONDANSETRON 4 MG: 2 INJECTION INTRAMUSCULAR; INTRAVENOUS at 18:50

## 2024-12-12 RX ADMIN — TORSEMIDE 20 MG: 20 TABLET ORAL at 17:58

## 2024-12-12 ASSESSMENT — PAIN - FUNCTIONAL ASSESSMENT
PAIN_FUNCTIONAL_ASSESSMENT: NONE - DENIES PAIN

## 2024-12-12 NOTE — PROGRESS NOTES
Nephrology Progress Note  12/12/2024 8:26 AM  Subjective:     Interval History: Radha Gomez is a 69 y.o. female appears to be doing better overall just tired and weak      Data:   Scheduled Meds:   empagliflozin  10 mg Oral Daily    atorvastatin  40 mg Oral Nightly    furosemide  40 mg IntraVENous Daily    tiotropium  2 puff Inhalation Daily RT    sodium chloride flush  5-40 mL IntraVENous 2 times per day    enoxaparin  40 mg SubCUTAneous Nightly    amLODIPine  10 mg Oral Daily    aspirin  81 mg Oral Daily    budesonide-formoterol  2 puff Inhalation BID    calcium elemental  500 mg Oral Daily    carvedilol  25 mg Oral BID    vitamin B-12  500 mcg Oral Daily    Vitamin D  1,000 Units Oral Daily     Continuous Infusions:   sodium chloride           CBC No results for input(s): \"WBC\", \"HGB\", \"HCT\", \"PLT\" in the last 72 hours.   BMP   Recent Labs     12/10/24  0142 12/11/24  0240 12/12/24  0221    141 141   K 4.1 3.7 3.8    106 106   CO2 26 28 25   PHOS  --  3.8 4.1   BUN 29* 22* 20   CREATININE 1.2 1.0 1.1     Hepatic: No results for input(s): \"AST\", \"ALT\", \"BILITOT\", \"ALKPHOS\" in the last 72 hours.    Invalid input(s): \"ALB\"  Troponin: No results for input(s): \"TROPONINI\" in the last 72 hours.  BNP: No results for input(s): \"BNP\" in the last 72 hours.  Lipids: No results for input(s): \"CHOL\", \"HDL\" in the last 72 hours.    Invalid input(s): \"LDLCALCU\"  ABGs: No results found for: \"PHART\", \"PO2ART\", \"VBJ2ASF\"  INR: No results for input(s): \"INR\" in the last 72 hours.  Renal Labs  Albumin:  No results found for: \"LABALBU\"  Calcium:    Lab Results   Component Value Date/Time    CALCIUM 9.1 12/12/2024 02:21 AM     Phosphorus:    Lab Results   Component Value Date/Time    PHOS 4.1 12/12/2024 02:21 AM     U/A:    Lab Results   Component Value Date/Time    NITRU NEGATIVE 10/29/2024 04:12 PM    COLORU Yellow 10/29/2024 04:12 PM    PHUR 5.5 10/29/2024 04:12 PM    WBCUA <1 12/29/2020 12:00 PM    RBCUA 1 12/29/2020

## 2024-12-12 NOTE — PROGRESS NOTES
Recvd call tht pt tests were complete. Went to heart center to transport pt back to room d/t oxygen requirement.

## 2024-12-12 NOTE — PROGRESS NOTES
sodium chloride, ondansetron **OR** ondansetron, polyethylene glycol, acetaminophen **OR** acetaminophen, potassium chloride **OR** potassium alternative oral replacement **OR** potassium chloride, magnesium sulfate    Lab Data:  CBC: No results for input(s): \"WBC\", \"HGB\", \"HCT\", \"MCV\", \"PLT\" in the last 72 hours.  BMP:   Recent Labs     12/10/24  0142 12/11/24  0240 12/12/24  0221    141 141   K 4.1 3.7 3.8    106 106   CO2 26 28 25   PHOS  --  3.8 4.1   BUN 29* 22* 20   CREATININE 1.2 1.0 1.1     LIVER PROFILE: No results for input(s): \"AST\", \"ALT\", \"LIPASE\", \"AMYLASE\", \"BILIDIR\", \"BILITOT\", \"ALKPHOS\" in the last 72 hours.    Invalid input(s): \"ALB\"  PT/INR: No results for input(s): \"PROTIME\", \"INR\" in the last 72 hours.  APTT: No results for input(s): \"APTT\" in the last 72 hours.  BNP:  No results for input(s): \"BNP\" in the last 72 hours.  TROPONIN: No results for input(s): \"TROPONINI\" in the last 72 hours. No results for input(s): \"TROPONINT\" in the last 72 hours.     Labs, consult, tests reviewed                      Jose Taylor MD, PA-C 12/12/2024 11:23 AM     Please note this report has been partially produced using speech recognition software and may contain errors related to that system including errors in grammar, punctuation, and spelling, as well as words and phrases that may be inappropriate. If there are any questions or concerns please feel free to contact the dictating provider for clarification.

## 2024-12-12 NOTE — PROGRESS NOTES
Stress test today negative for ischemia.    No further ischemic cardiac workup planned at this time.    Patient to follow-up in clinic. Cardiology will sign off.    Francis Marroquin PA-C 12/12/24 3:06 PM

## 2024-12-12 NOTE — PROGRESS NOTES
V2.0  [facility] Hospitalist Progress Note      Name:  Radha Gomez /Age/Sex: 1955  (69 y.o. female)   MRN & CSN:  [id number] & [id number] Encounter Date/Time: 2024 8:27 AM EST    Location:  [address] [place] [address]- PCP: Luis Buenrostro MD       Hospital Day: 7    Assessment and Plan:   Radha Gomez is a 69 y.o. female  who presents with Heart failure (HCC)    # Acute on chronic HFpEF  # Acute on chronic hypoxic respiratory failure 2/2 HFpEF exacerbation  # Severe AR  # Grade 2 diastolic dysfunction  # Moderate TR  # CAD pulmonary hypertension  -Patient with 1 week of worsening TORRES, PND and orthopnea  -baseline home O2 is 6-6.5 L (she uses 4L when she is not at home)  -Chest x-ray showing pulmonary vascular congestion and small bilateral pleural effusions  -Elevated proBNP to 1157, low procalcitonin  -Last echo from 2024 showing EF of 65 to 70%, grade 2 diastolic dysfunction, severe AR, moderate TR, concern for severe pulmonary hypertension  -Negative respiratory PCR panel  -Received IV Lasix 60 mg in the ED  -Elevated D-dimer -CTA chest without PE  -TSH WNL  -Left lower extremity venous duplex without DVT   -received a few doses of IV lasix, patient did not feel like she was urinating as much as she normally does with Lasix  -: On 11 L of oxygen, was saturating around 90% when I walked in the room however when she started talking, she went as low as 85%  Will recheck BMP later today to make sure kidneys are okay  Strict I's and O's  Daily weights  Telemetry monitoring  Continue home GDMT  Patient states that she tends to desaturate overnight, will get overnight BiPAP eval -->patient declined evaluation  Restarted IV Lasix 40 mg daily  MARY done - moderate AR  Stress test negative for ischemia    # Severe left ICA stenosis  -Patient requesting carotid duplex study be ordered as apparently has a history of carotid stenosis which has never been fixed  -Carotid duplex : Severe stenosis of left internal

## 2024-12-13 VITALS
HEIGHT: 67 IN | RESPIRATION RATE: 17 BRPM | HEART RATE: 67 BPM | TEMPERATURE: 98.2 F | SYSTOLIC BLOOD PRESSURE: 141 MMHG | BODY MASS INDEX: 25.74 KG/M2 | OXYGEN SATURATION: 90 % | DIASTOLIC BLOOD PRESSURE: 61 MMHG | WEIGHT: 164 LBS

## 2024-12-13 PROBLEM — J96.21 ACUTE ON CHRONIC RESPIRATORY FAILURE WITH HYPOXIA: Status: RESOLVED | Noted: 2024-12-11 | Resolved: 2024-12-13

## 2024-12-13 LAB
ALBUMIN: 3.3 G/DL (ref 3.4–5)
ANION GAP SERPL CALCULATED.3IONS-SCNC: 9 MMOL/L (ref 9–17)
BUN SERPL-MCNC: 18 MG/DL (ref 7–20)
CALCIUM SERPL-MCNC: 8.9 MG/DL (ref 8.3–10.6)
CHLORIDE SERPL-SCNC: 103 MMOL/L (ref 99–110)
CO2 SERPL-SCNC: 26 MMOL/L (ref 21–32)
CREAT SERPL-MCNC: 1.2 MG/DL (ref 0.6–1.2)
GFR, ESTIMATED: 46 ML/MIN/1.73M2
GLUCOSE SERPL-MCNC: 99 MG/DL (ref 74–99)
PHOSPHATE SERPL-MCNC: 4.1 MG/DL (ref 2.5–4.9)
POTASSIUM SERPL-SCNC: 3.5 MMOL/L (ref 3.5–5.1)
SODIUM SERPL-SCNC: 138 MMOL/L (ref 136–145)

## 2024-12-13 PROCEDURE — 80069 RENAL FUNCTION PANEL: CPT

## 2024-12-13 PROCEDURE — 94640 AIRWAY INHALATION TREATMENT: CPT

## 2024-12-13 PROCEDURE — 2580000003 HC RX 258: Performed by: INTERNAL MEDICINE

## 2024-12-13 PROCEDURE — 6370000000 HC RX 637 (ALT 250 FOR IP): Performed by: INTERNAL MEDICINE

## 2024-12-13 PROCEDURE — 6370000000 HC RX 637 (ALT 250 FOR IP)

## 2024-12-13 PROCEDURE — 94761 N-INVAS EAR/PLS OXIMETRY MLT: CPT

## 2024-12-13 PROCEDURE — 36415 COLL VENOUS BLD VENIPUNCTURE: CPT

## 2024-12-13 PROCEDURE — 2700000000 HC OXYGEN THERAPY PER DAY

## 2024-12-13 RX ORDER — ATORVASTATIN CALCIUM 40 MG/1
40 TABLET, FILM COATED ORAL NIGHTLY
Qty: 30 TABLET | Refills: 3 | Status: SHIPPED | OUTPATIENT
Start: 2024-12-13

## 2024-12-13 RX ORDER — BUDESONIDE AND FORMOTEROL FUMARATE DIHYDRATE 160; 4.5 UG/1; UG/1
2 AEROSOL RESPIRATORY (INHALATION) 2 TIMES DAILY
Qty: 92.7 G | Refills: 5 | Status: SHIPPED | OUTPATIENT
Start: 2024-12-13

## 2024-12-13 RX ORDER — TORSEMIDE 20 MG/1
20 TABLET ORAL 2 TIMES DAILY
Qty: 60 TABLET | Refills: 3 | Status: SHIPPED | OUTPATIENT
Start: 2024-12-13

## 2024-12-13 RX ADMIN — TORSEMIDE 20 MG: 20 TABLET ORAL at 09:38

## 2024-12-13 RX ADMIN — TIOTROPIUM BROMIDE INHALATION SPRAY 2 PUFF: 3.12 SPRAY, METERED RESPIRATORY (INHALATION) at 11:10

## 2024-12-13 RX ADMIN — EMPAGLIFLOZIN 10 MG: 10 TABLET, FILM COATED ORAL at 09:39

## 2024-12-13 RX ADMIN — CALCIUM 500 MG: 500 TABLET ORAL at 09:38

## 2024-12-13 RX ADMIN — ASPIRIN 81 MG: 81 TABLET, CHEWABLE ORAL at 09:38

## 2024-12-13 RX ADMIN — BUDESONIDE AND FORMOTEROL FUMARATE DIHYDRATE 2 PUFF: 160; 4.5 AEROSOL RESPIRATORY (INHALATION) at 11:09

## 2024-12-13 RX ADMIN — CARVEDILOL 25 MG: 25 TABLET, FILM COATED ORAL at 09:39

## 2024-12-13 RX ADMIN — SODIUM CHLORIDE, PRESERVATIVE FREE 10 ML: 5 INJECTION INTRAVENOUS at 09:39

## 2024-12-13 RX ADMIN — Medication 1000 UNITS: at 09:39

## 2024-12-13 RX ADMIN — AMLODIPINE BESYLATE 10 MG: 10 TABLET ORAL at 09:39

## 2024-12-13 NOTE — PROGRESS NOTES
Outpatient Pharmacy Progress Note for Meds-to-Beds    Total number of Prescriptions Filled: 4    Additional Documentation:  Patient picked-up the medication(s) in the OP Pharmacy  Med Assist was able to help cover the cost of the medications to provide patient with a $0.00 co-pay.        Thank you for letting us serve your patients.  [place] [id number]    Phone: [phone]    Fax: [phone]

## 2024-12-13 NOTE — DISCHARGE SUMMARY
Discharge Summary    Name:  Radha Gomez /Age/Sex: 1955  (69 y.o. female)   MRN & CSN:  [id number] & [id number] Admission Date/Time: 2024 10:27 AM   Attending:  No att. providers found Discharging Physician: Tonia Simpson MD     Discharge diagnosis and plan:  HPI : \"Radha Gomez is a 69 y.o. female with pmh of HFpEF, COPD, RA, hypertension who presents with Worsening shortness of breath.  Patient states for about 1 week patient has been having worsening shortness of breath especially on exertion.  She does endorse both orthopnea and PND, sleeps with 2 pillows.  Gets short of breath just walking from 1 room to the other at home.  Denies chest pain, palpitations, fever, chills, nausea, vomiting and frequent cough.  Patient quit smoking in May and quit vaping in July, no alcohol or drug use. On arrival, patient was hemodynamically stable.  Initially was saturating in the 70s and was placed on 10 L, was able to wean her down to 8 L, saturating 93%. Labs are significant for troponin of 21--> 15, Pro-Phani of 0.04, proBNP of 1157, D-dimer of 5.22, normal pCO2, negative respiratory viral panel.  Chest x-ray showing emphysematous lungs, moderate pulmonary vascular congestion, small bilateral pleural effusions, no pneumothorax.  CTA chest showing no PE, right hilar mass with left hilar lymph node as well as a subcarinal lymph node.  There is a 1.3 cm nodule of right middle lobe. Patient received 60 mg of IV Lasix in the ED\"    # Acute on chronic HFpEF  # Acute on chronic hypoxic respiratory failure 2/2 HFpEF exacerbation  # Severe AR  # Grade 2 diastolic dysfunction  # Moderate TR  # CAD pulmonary hypertension  Patient with 1 week of worsening TORRES, PND and orthopnea. Baseline home O2 is 6-6.5 L (she uses 4L when she is not at home). Chest x-ray showing pulmonary vascular congestion and small bilateral pleural effusions. Elevated proBNP to 1157, low procalcitonin. Last echo from 2024 showing EF of 65 to 70%, grade      amLODIPine 10 MG tablet  Commonly known as: NORVASC     aspirin 81 MG chewable tablet     budesonide-formoterol 160-4.5 MCG/ACT Aero  Commonly known as: Breyna  Inhale 2 puffs into the lungs 2 times daily     calcium carbonate 500 MG Tabs tablet  Commonly known as: OSCAL     carvedilol 25 MG tablet  Commonly known as: COREG     [employer] GUMMY PO     Handicap Placard Misc  by Does not apply route     Humira (2 Pen) 40 MG/0.4ML Pnkt injection pen kit  Generic drug: adalimumab  Inject 40 mg into the skin every 14 days     * tiotropium 2.5 MCG/ACT Aers inhaler  Commonly known as: Spiriva Respimat  Inhale 2 puffs into the lungs daily     * Spiriva Respimat 1.25 MCG/ACT Aers inhaler  Generic drug: tiotropium  Inhale 2 puffs into the lungs daily     Vitamin B 12 500 MCG Tabs     vitamin D 25 MCG (1000 UT) Tabs tablet  Commonly known as: CHOLECALCIFEROL  Take 1 tablet by mouth daily           * This list has 2 medication(s) that are the same as other medications prescribed for you. Read the directions carefully, and ask your doctor or other care provider to review them with you.                STOP taking these medications      furosemide 40 MG tablet  Commonly known as: LASIX               Where to Get Your Medications        These medications were sent to [address] - P [phone] - F [phone]  [address] [place] [address]      Phone: [phone]   atorvastatin 40 MG tablet  budesonide-formoterol 160-4.5 MCG/ACT Aero  empagliflozin 10 MG tablet  torsemide 20 MG tablet         Objective Findings at Discharge:       BMP/CBC  Recent Labs     12/11/24  0240 12/12/24  0221 12/13/24  0136    141 138   K 3.7 3.8 3.5    106 103   CO2 28 25 26   BUN 22* 20 18   CREATININE 1.0 1.1 1.2       IMAGING:  -    Additional Information: Patient seen and examined day of discharge. For more information regarding

## 2024-12-13 NOTE — PROGRESS NOTES
Nephrology Progress Note  12/13/2024 8:43 AM  Subjective:     Interval History: Radha Gomez is a 69 y.o. female .  Doing okay overall no distress generally tired weak      Data:   Scheduled Meds:   torsemide  20 mg Oral BID    empagliflozin  10 mg Oral Daily    atorvastatin  40 mg Oral Nightly    tiotropium  2 puff Inhalation Daily RT    sodium chloride flush  5-40 mL IntraVENous 2 times per day    enoxaparin  40 mg SubCUTAneous Nightly    amLODIPine  10 mg Oral Daily    aspirin  81 mg Oral Daily    budesonide-formoterol  2 puff Inhalation BID    calcium elemental  500 mg Oral Daily    carvedilol  25 mg Oral BID    vitamin B-12  500 mcg Oral Daily    Vitamin D  1,000 Units Oral Daily     Continuous Infusions:   sodium chloride           CBC No results for input(s): \"WBC\", \"HGB\", \"HCT\", \"PLT\" in the last 72 hours.   BMP   Recent Labs     12/11/24  0240 12/12/24  0221 12/13/24  0136    141 138   K 3.7 3.8 3.5    106 103   CO2 28 25 26   PHOS 3.8 4.1 4.1   BUN 22* 20 18   CREATININE 1.0 1.1 1.2     Hepatic: No results for input(s): \"AST\", \"ALT\", \"BILITOT\", \"ALKPHOS\" in the last 72 hours.    Invalid input(s): \"ALB\"  Troponin: No results for input(s): \"TROPONINI\" in the last 72 hours.  BNP: No results for input(s): \"BNP\" in the last 72 hours.  Lipids: No results for input(s): \"CHOL\", \"HDL\" in the last 72 hours.    Invalid input(s): \"LDLCALCU\"  ABGs: No results found for: \"PHART\", \"PO2ART\", \"VAH5QCO\"  INR: No results for input(s): \"INR\" in the last 72 hours.  Renal Labs  Albumin:  No results found for: \"LABALBU\"  Calcium:    Lab Results   Component Value Date/Time    CALCIUM 8.9 12/13/2024 01:36 AM     Phosphorus:    Lab Results   Component Value Date/Time    PHOS 4.1 12/13/2024 01:36 AM     U/A:    Lab Results   Component Value Date/Time    NITRU NEGATIVE 10/29/2024 04:12 PM    COLORU Yellow 10/29/2024 04:12 PM    PHUR 5.5 10/29/2024 04:12 PM    WBCUA <1 12/29/2020 12:00 PM    RBCUA 1 12/29/2020 12:00 PM

## 2024-12-14 LAB
MICROORGANISM SPEC CULT: ABNORMAL
MICROORGANISM/AGENT SPEC: ABNORMAL
SPECIMEN DESCRIPTION: ABNORMAL

## 2024-12-15 DIAGNOSIS — M05.79 RHEUMATOID ARTHRITIS INVOLVING MULTIPLE SITES WITH POSITIVE RHEUMATOID FACTOR (HCC): ICD-10-CM

## 2024-12-16 ENCOUNTER — CARE COORDINATION (OUTPATIENT)
Dept: CASE MANAGEMENT | Age: 69
End: 2024-12-16

## 2024-12-16 ENCOUNTER — TELEPHONE (OUTPATIENT)
Age: 69
End: 2024-12-16

## 2024-12-16 DIAGNOSIS — I50.9 ACUTE ON CHRONIC HEART FAILURE, UNSPECIFIED HEART FAILURE TYPE (HCC): Primary | ICD-10-CM

## 2024-12-16 DIAGNOSIS — M05.79 RHEUMATOID ARTHRITIS INVOLVING MULTIPLE SITES WITH POSITIVE RHEUMATOID FACTOR (HCC): ICD-10-CM

## 2024-12-16 PROCEDURE — 1111F DSCHRG MED/CURRENT MED MERGE: CPT | Performed by: GENERAL PRACTICE

## 2024-12-16 NOTE — CARE COORDINATION
Care Transitions Note    Initial Call - Call within 2 business days of discharge: Yes    Patient Current Location:  Home: [address] [place] [address]    Care Transition Nurse contacted patient by telephone to perform post hospital discharge assessment, verified name and  as identifiers. Provided introduction to self, and explanation of Care Transition Nurse role.     Patient: Radha Gomez    Patient : 1955   MRN: [id number]    Reason for Admission: A/C CHF, A/C Resp Failure  Discharge Date: 24  RURS: Readmission Risk Score: 18.4  Facility: [facility] 24-24    Last Discharge Facility       Date Complaint Diagnosis Description Type Department Provider    24 Shortness of Breath Acute on chronic respiratory failure with hypoxia ... ED to Hosp-Admission (Discharged) (ADMITTED) Tonia Judd MD; Mario Ramos, ...   Was this an external facility discharge? No    Additional needs identified to be addressed with provider   Patient: Rahda Gomez    Patient : 1955   MRN: [id number]    Reason for Admission: A/C CHF, A/C Resp Failure  Facility: [facility] 24-24    Please contact for scheduling of 7 day hospital follow up/ TCM appt due by 24         Method of communication with provider: chart routing pcp/[facility] clinical pool.    Patients top risk factors for readmission: lack of knowledge about disease and medical condition-chf, ckd, copd, htn    Interventions to address risk factors:   Education: CHF  Review of patient management of conditions/medications: AVS  Communication with providers: as above    Care Summary Note:   Reviewed CHF Mgt:   Daily weights in a.m. before breakfast, after void/bm  Keep written log of weights for review  Notify MD immediately of weight gain/loss 3# or more in 1-7days  Take all rx as prescribed, keep scheduled MD appts  Low sodium diet- read labels; avoid/limit high sodium foods such as fast foods, canned/boxed/processed

## 2024-12-16 NOTE — TELEPHONE ENCOUNTER
Patient called in today regarding Humira. Pt was advised Humira was reordered. Pt states she needs it sent to [facility] and not sent to [facility]. Pt also advised she was released from Hospital Stay on 12/13/2024 and completed her injection on 12/15/2024. She is questioning if she should take it every two weeks starting from Yesterday or should she continue to inject on Tuesdays. Please resend orders and review note.

## 2024-12-18 ENCOUNTER — CARE COORDINATION (OUTPATIENT)
Dept: CASE MANAGEMENT | Age: 69
End: 2024-12-18

## 2024-12-18 NOTE — CARE COORDINATION
Registered Dietician: Declined DME Assistance: Declined     Senior Services: Declined      Smoking Cessation: Declined      Social Work: Completed    Other Interventions:              Follow Up Appointment:   Reviewed upcoming appointment(s).  Future Appointments         Provider Specialty Dept Phone    1/8/2025 11:30 AM Chrissy Randolph MD Rheumatology [phone]    1/16/2025 11:00 AM Ema Eng, APRN - CNP Gastroenterology [phone]    1/17/2025 9:00 AM [facility] PFT Pulmonary Function Testing [phone]    1/20/2025 12:00 PM Brandon Jim MD Cardiology [phone]    1/20/2025 8:30 PM [facility] IN LAB SLEEP STUDY [facility] [phone]    1/28/2025 11:15 AM Mp Choudhary MD Pulmonology [phone]            Care Transition Nurse provided contact information.  Plan for follow-up call in 6-10 days based on severity of symptoms and risk factors.  Plan for next call: f/u pcp appt--any rx changes? , cont copd/chf ed--wt/spo2?; Med Assist application?    Tika Schaffer RN

## 2024-12-31 ENCOUNTER — CARE COORDINATION (OUTPATIENT)
Dept: CASE MANAGEMENT | Age: 69
End: 2024-12-31

## 2024-12-31 NOTE — CARE COORDINATION
Care Transitions Note    Follow Up Call     Patient: Radha Gomez                         Patient : 1955   MRN: [id number]                             Reason for Admission: A/C CHF, A/C Resp Failure  Discharge Date: 24     RURS: Readmission Risk Score: 18.4  Facility: [facility] 24-24    Patient Current Location:  Home: [address] [place] [address]    Care Transition Nurse contacted patient by telephone. Verified name and  as identifiers.    Additional needs identified to be addressed with provider   No needs identified         Method of communication with provider: none.    Care Summary Note: Patient reports doing well. Reviewed CHF/COPD mgt; v/u. Denies sob, cough, wheezing, orthopnea, edema, cp, ac distress. Noted to be speaking in complete, unlabored sentences. Reports wt 155# this morning; last reported wt 153.8# 24. Reports taking Jardiance, Demadex, Coreg, Norvasc as directed. Reports adherence to chf diet restrictions. Scheduled to see cardiology next on 25. Reports using O2 at 4L w/ SPO2 93-94%. Reports has/using Breyna, Spiriva inhalers as directed. Reports non-smoker. Scheduled for PFTs 25. Has been seen by pcp since most recent hospital discharge w/ no new rx orders. Reports appetite, fluid intake good w/ b&b wnl. Continues to deny resource needs.      Plan of care updates since last contact:  Review of patient management of conditions/medications: as above     Medication Review:  No changes since last call.     Remote Patient Monitoring:  Declined on previous call as self monitoring.     Assessments:  Care Transitions Subsequent and Final Call    Schedule Follow Up Appointment with PCP: Completed  Subsequent and Final Calls  Do you have any ongoing symptoms?: No  Have your medications changed?: No  Do you have any questions related to your medications?: No  Do you currently have any active services?: No  Do you have any needs or concerns that I can

## 2025-01-08 ENCOUNTER — CARE COORDINATION (OUTPATIENT)
Dept: CASE MANAGEMENT | Age: 70
End: 2025-01-08

## 2025-01-08 NOTE — CARE COORDINATION
Care Transitions Note    Follow Up Call     Patient: Radha Gomez                         Patient : 1955   MRN: [id number]                             Reason for Admission: A/C CHF, A/C Resp Failure  Discharge Date: 24     RURS: Readmission Risk Score: 18.4  Facility: [facility] 24-24    Patient Current Location:  Home: [address] [place] [address]    Care Transition Nurse contacted patient by telephone. Verified name and  as identifiers.    Additional needs identified to be addressed with provider   No needs identified         Method of communication with provider: none.    Care Summary Note: Patient reports doing well, states \"doing just fine\". Reviewed CHF/COPD mgt briefly; v/u. Denies sob, cough, wheezing, orthopnea, edema, cp, ac distress. Noted to be speaking in complete, unlabored sentences. Reports wt steady at 155# this morning. Reports taking Jardiance, Demadex, Coreg, Norvasc as directed. Reports adherence to chf diet restrictions. Scheduled to see cardiology next on 25. Reports using O2 at 4L cont w/ SPO2 93-94%. Reports has/using Breyna, Spiriva inhalers as directed. Reports she did take a break from inhalers as she was noting mouth irritation. Advised to thoroughly rinse mouth after using inhalers as can increase risk of thrush. Advised to contact MD if noting mouth sores, erythema, white patches in mouth. Patient is non-smoker. Scheduled for PFTs 25. Reports appetite, fluid intake good w/ b&b wnl. Continues to deny resource needs.      Plan of care updates since last contact:  Review of patient management of conditions/medications: as above     Medication Review:  No changes since last call.     Remote Patient Monitoring:  Declined on previous call as self monitoring.     Assessments:  Care Transitions Subsequent and Final Call    Schedule Follow Up Appointment with PCP: Completed  Subsequent and Final Calls  Do you have any ongoing symptoms?: No  Have your

## 2025-01-13 DIAGNOSIS — M05.79 RHEUMATOID ARTHRITIS INVOLVING MULTIPLE SITES WITH POSITIVE RHEUMATOID FACTOR (HCC): ICD-10-CM

## 2025-01-14 ENCOUNTER — TELEPHONE (OUTPATIENT)
Age: 70
End: 2025-01-14

## 2025-01-14 RX ORDER — PREDNISONE 5 MG/1
10 TABLET ORAL DAILY
Qty: 120 TABLET | Refills: 0 | OUTPATIENT
Start: 2025-01-14

## 2025-01-14 NOTE — TELEPHONE ENCOUNTER
Pt states she has not had her Humira since  Dec 15th , she has not had it because the copay is $2,000 a month so she is filling out the paper work for Medicare assistance and they will not do anything until that is turned in. Pt was on prednisone and plaquenil before the Humira and had some left over so she started to take both again because she needed something for her arthritis. Pt is now out of prednisone and has no refills she had some refills on her Plaquenil so she has that but is needing the Prednisone please. Pt states she wanted you to know this because her last appt. Was canceled  so she knew you weren't aware.

## 2025-01-15 ENCOUNTER — CARE COORDINATION (OUTPATIENT)
Dept: CASE MANAGEMENT | Age: 70
End: 2025-01-15

## 2025-01-15 ENCOUNTER — HOSPITAL ENCOUNTER (OUTPATIENT)
Age: 70
Discharge: HOME | End: 2025-01-15
Payer: MEDICARE

## 2025-01-15 DIAGNOSIS — M05.79 RHEUMATOID ARTHRITIS INVOLVING MULTIPLE SITES WITH POSITIVE RHEUMATOID FACTOR (HCC): ICD-10-CM

## 2025-01-15 DIAGNOSIS — K74.60 CIRRHOSIS OF LIVER WITHOUT ASCITES, UNSPECIFIED HEPATIC CIRRHOSIS TYPE (HCC): ICD-10-CM

## 2025-01-15 DIAGNOSIS — G47.33 OSA (OBSTRUCTIVE SLEEP APNEA): ICD-10-CM

## 2025-01-15 LAB
ALBUMIN SERPL-MCNC: 3.9 G/DL (ref 3.4–5)
ALBUMIN/GLOB SERPL: 0.9 {RATIO} (ref 1.1–2.2)
ALP SERPL-CCNC: 93 U/L (ref 40–129)
ALT SERPL-CCNC: 20 U/L (ref 10–40)
AST SERPL-CCNC: 20 U/L (ref 15–37)
BILIRUB DIRECT SERPL-MCNC: <0.2 MG/DL (ref 0–0.3)
BILIRUB INDIRECT SERPL-MCNC: ABNORMAL MG/DL (ref 0–0.7)
BILIRUB SERPL-MCNC: 0.4 MG/DL (ref 0–1)
PROT SERPL-MCNC: 8 G/DL (ref 6.4–8.2)

## 2025-01-15 PROCEDURE — 80076 HEPATIC FUNCTION PANEL: CPT

## 2025-01-15 PROCEDURE — 82105 ALPHA-FETOPROTEIN SERUM: CPT

## 2025-01-15 PROCEDURE — 36415 COLL VENOUS BLD VENIPUNCTURE: CPT

## 2025-01-15 RX ORDER — PREDNISONE 5 MG/1
TABLET ORAL
Qty: 30 TABLET | Refills: 0 | Status: SHIPPED | OUTPATIENT
Start: 2025-01-15

## 2025-01-15 NOTE — CARE COORDINATION
Care Transitions Note    Follow Up Call     Patient: Radha Gomez                         Patient : 1955   MRN: [id number]                             Reason for Admission: A/C CHF, A/C Resp Failure  Discharge Date: 24     RURS: Readmission Risk Score: 18.4  Facility: [facility] 24-24    Attempted to reach patient for transitions of care follow up unsuccessful.     Outreach Attempts:   HIPAA compliant voicemail left for patient x 2.     Follow Up Appointment:   Future Appointments         Provider Specialty Dept Phone    2025 9:00 AM [facility] PFT Pulmonary Function Testing [phone]    2025 12:00 PM Brandon Jim MD Cardiology [phone]    2025 8:30 PM [facility] IN LAB SLEEP STUDY [facility] [phone]    2025 11:15 AM Mp Choudhary MD Pulmonology [phone]    2025 2:15 PM Chrissy Randolph MD Rheumatology [phone]            Plan for follow-up on next business day.  based on severity of symptoms and risk factors. Plan for next call: final call if well and no needs    Tika Schaffer RN

## 2025-01-15 NOTE — CARE COORDINATION
Care Transitions Note    Final Call     Patient: Radha Gomez                         Patient : 1955   MRN: [id number]                             Reason for Admission: A/C CHF, A/C Resp Failure  Discharge Date: 24     RURS: Readmission Risk Score: 18.4  Facility: [facility] 24-24    Patient Current Location:  [place]    Care Transition Nurse spoke with patient on her return call. Verified name and  as identifiers.    Patient graduated from Care Transitions program on 1/15/25.  Patient verbalizes confidence in the ability to self-manage at this time..      Handoff:   Patient was not referred to the ACM team due to no additional needs identified.       Care Summary Note: Patient reports doing very well. Brief re-review of chf. Denies cp, sob, orthopnea, edema, ac distress. Noted to be speaking in complete, unlabored sentences. Reports taking Jardiance, Demadex, Coreg, Norvasc as directed. Reports wt 155# today. Next cardiology appt 25. Brief re-review of copd. Denies sob, cough, wheezing. Reports SPO2 93-96% w/ O2 at 4L cont. Reports using Spiriva, Breyna inhaler as directed. Denies refill needs. Scheduled for PFTs 25, sleep study 25 and f/u w/ Dr Choudhary-Pulm 25. Reports she is unable to afford Humira (copay $2000/month). Reports she needs to complete/submit paperwork for [facility] assistance/approval; encouraged to do so asap. Reports Dr Dixon-Rheumatologist office changed her appt to 25. Reports she has resumed Plaquenil and Prednisone until can be seen however did run out of Prednisone. Reports she notified office and is awaiting call back. Advised to f/u with office if rx not called to pharmacy. Reports appetite, fluid intake good w/ b&b wnl. Continues to deny resource needs. Denies need for ongoing monitoring and agreeable to final call. Encouraged to contact [facility] with future needs.     Assessments:  Care Transitions Subsequent and Final Call    Schedule Follow Up

## 2025-01-16 LAB — AFP-TM SERPL-MCNC: 3 NG/ML (ref 0–9)

## 2025-01-17 ENCOUNTER — HOSPITAL ENCOUNTER (OUTPATIENT)
Dept: PULMONOLOGY | Age: 70
Discharge: HOME | End: 2025-01-17
Attending: INTERNAL MEDICINE
Payer: MEDICARE

## 2025-01-17 DIAGNOSIS — F17.210 CIGARETTE SMOKER: ICD-10-CM

## 2025-01-17 DIAGNOSIS — R09.02 HYPOXIA: ICD-10-CM

## 2025-01-17 PROCEDURE — 94726 PLETHYSMOGRAPHY LUNG VOLUMES: CPT

## 2025-01-17 PROCEDURE — 94060 EVALUATION OF WHEEZING: CPT

## 2025-01-17 PROCEDURE — 94729 DIFFUSING CAPACITY: CPT

## 2025-01-17 PROCEDURE — 94618 PULMONARY STRESS TESTING: CPT

## 2025-01-17 NOTE — PROGRESS NOTES
Pt was scheduled for a PFT and a Home Oxygen Evaluation today.  The Home Oxygen Evaluation had already been done and the PFT was scheduled to be completed in October 2025.  There was no test to be completed today.

## 2025-01-20 ENCOUNTER — OFFICE VISIT (OUTPATIENT)
Dept: CARDIOLOGY CLINIC | Age: 70
End: 2025-01-20

## 2025-01-20 ENCOUNTER — HOSPITAL ENCOUNTER (OUTPATIENT)
Dept: SLEEP CENTER | Age: 70
Discharge: HOME | End: 2025-01-20
Payer: MEDICARE

## 2025-01-20 VITALS
BODY MASS INDEX: 25.83 KG/M2 | HEIGHT: 67 IN | HEART RATE: 73 BPM | WEIGHT: 164.6 LBS | DIASTOLIC BLOOD PRESSURE: 60 MMHG | SYSTOLIC BLOOD PRESSURE: 138 MMHG | OXYGEN SATURATION: 95 %

## 2025-01-20 DIAGNOSIS — I38 VALVULAR HEART DISEASE: Primary | ICD-10-CM

## 2025-01-20 DIAGNOSIS — J44.9 CHRONIC OBSTRUCTIVE PULMONARY DISEASE, UNSPECIFIED COPD TYPE (HCC): ICD-10-CM

## 2025-01-20 PROCEDURE — 95810 POLYSOM 6/> YRS 4/> PARAM: CPT

## 2025-01-20 RX ORDER — HYDROXYCHLOROQUINE SULFATE 200 MG/1
TABLET, FILM COATED ORAL
COMMUNITY
Start: 2025-01-04 | End: 2025-01-24 | Stop reason: SDUPTHER

## 2025-01-20 NOTE — PATIENT INSTRUCTIONS
**It is YOUR responsibilty to bring medication bottles and/or updated medication list to EACH APPOINTMENT. This will allow us to better serve you and all your healthcare needs**    Thank you for allowing us to care for you today!   We want to ensure we can follow your treatment plan and we strive to give you the best outcomes and experience possible.   If you ever have a life threatening emergency and call 911 - for an ambulance (EMS)   Our providers can only care for you at:   [facility] or [facility].   Even if you have someone take you or you drive yourself we can only care for you in a [facility]. Our providers are not setup at the other healthcare locations!     Please be informed that if you contact our office outside of normal business hours the physician on call cannot help with any scheduling or rescheduling issues, procedure instruction questions or any type of medication issue.    We advise you for any urgent/emergency that you go to the nearest emergency room!    PLEASE CALL OUR OFFICE DURING NORMAL BUSINESS HOURS    Monday - Friday   8 am to 5 pm    [place]: [phone]    [place]: [phone]    [place]:  [phone]    We are committed to providing you the best care possible.    If you receive a survey after visiting one of our offices, please take time to share your experience concerning your physician office visit.  These surveys are confidential and no health information about you is shared.    We are eager to improve for you and we are counting on your feedback to help make that happen.

## 2025-01-23 NOTE — PROGRESS NOTES
Hematocrit 09/26/2024 36.4 (L)  37.0 - 47.0 % Final    MCV 09/26/2024 89.0  78.0 - 100.0 fL Final    MCH 09/26/2024 28.9  27.0 - 31.0 pg Final    MCHC 09/26/2024 32.4  32.0 - 36.0 g/dL Final    RDW 09/26/2024 13.5  11.7 - 14.9 % Final    Platelets 09/26/2024 180  140 - 440 k/uL Final    MPV 09/26/2024 11.3 (H)  7.5 - 11.1 fL Final    Neutrophils % 09/26/2024 87 (H)  36 - 66 % Final    Lymphocytes % 09/26/2024 9 (L)  24 - 44 % Final    Monocytes % 09/26/2024 4  0 - 4 % Final    Eosinophils % 09/26/2024 0  0 - 3 % Final    Basophils % 09/26/2024 0  0 - 1 % Final    Immature Granulocytes % 09/26/2024 1 (H)  0 % Final    Neutrophils Absolute 09/26/2024 10.82  k/uL Final    Lymphocytes Absolute 09/26/2024 1.15  k/uL Final    Monocytes Absolute 09/26/2024 0.45  k/uL Final    Eosinophils Absolute 09/26/2024 0.00  k/uL Final    Basophils Absolute 09/26/2024 0.01  k/uL Final    Immature Granulocytes Absolute 09/26/2024 0.07  k/uL Final       Assessment   Patient is a pleasant 68 yo female with RA. Prednisone improved joint stiffness, pain and swelling.  Plaquenil did not help joint pain.  PET scan was concerning for possible cirrhosis of the liver, LFTs are normal. She has been admitted x 3 for CHF exacerbation so Humira was held. Today she is doing well on prednisone and plaquenil. We will continue. DEXA scan will be repeated at her next visit and we will plan for fosamax if needed.       Rheumatoid arthritis involving multiple sites with positive rheumatoid factor (HCC)  -     predniSONE (DELTASONE) 5 MG tablet; Take 1 tablet by mouth daily  -     hydroxychloroquine (PLAQUENIL) 200 MG tablet; Take 1.5 tablets by mouth daily      Encounter for monitoring of hydroxychloroquine therapy  ARLIN  Discussed hydroxychloroquine use with the patient including dosing of medication. We discussed the most common possible side effects to include nausea and diarrhea, which often improve with time or by taking the medication with

## 2025-01-24 ENCOUNTER — OFFICE VISIT (OUTPATIENT)
Age: 70
End: 2025-01-24
Payer: MEDICARE

## 2025-01-24 VITALS
DIASTOLIC BLOOD PRESSURE: 70 MMHG | WEIGHT: 164.6 LBS | OXYGEN SATURATION: 92 % | HEART RATE: 72 BPM | SYSTOLIC BLOOD PRESSURE: 132 MMHG | BODY MASS INDEX: 25.78 KG/M2

## 2025-01-24 DIAGNOSIS — Z79.899 ENCOUNTER FOR MONITORING OF HYDROXYCHLOROQUINE THERAPY: ICD-10-CM

## 2025-01-24 DIAGNOSIS — M05.79 RHEUMATOID ARTHRITIS INVOLVING MULTIPLE SITES WITH POSITIVE RHEUMATOID FACTOR (HCC): Primary | ICD-10-CM

## 2025-01-24 DIAGNOSIS — M85.89 OSTEOPENIA OF MULTIPLE SITES: ICD-10-CM

## 2025-01-24 DIAGNOSIS — Z51.81 ENCOUNTER FOR MONITORING OF HYDROXYCHLOROQUINE THERAPY: ICD-10-CM

## 2025-01-24 PROCEDURE — 3075F SYST BP GE 130 - 139MM HG: CPT | Performed by: STUDENT IN AN ORGANIZED HEALTH CARE EDUCATION/TRAINING PROGRAM

## 2025-01-24 PROCEDURE — 3078F DIAST BP <80 MM HG: CPT | Performed by: STUDENT IN AN ORGANIZED HEALTH CARE EDUCATION/TRAINING PROGRAM

## 2025-01-24 PROCEDURE — 1123F ACP DISCUSS/DSCN MKR DOCD: CPT | Performed by: STUDENT IN AN ORGANIZED HEALTH CARE EDUCATION/TRAINING PROGRAM

## 2025-01-24 PROCEDURE — 1159F MED LIST DOCD IN RCRD: CPT | Performed by: STUDENT IN AN ORGANIZED HEALTH CARE EDUCATION/TRAINING PROGRAM

## 2025-01-24 PROCEDURE — 99215 OFFICE O/P EST HI 40 MIN: CPT | Performed by: STUDENT IN AN ORGANIZED HEALTH CARE EDUCATION/TRAINING PROGRAM

## 2025-01-24 RX ORDER — HYDROXYCHLOROQUINE SULFATE 200 MG/1
300 TABLET, FILM COATED ORAL DAILY
Qty: 135 TABLET | Refills: 0 | Status: SHIPPED | OUTPATIENT
Start: 2025-01-24

## 2025-01-24 RX ORDER — PREDNISONE 5 MG/1
5 TABLET ORAL DAILY
Qty: 60 TABLET | Refills: 0 | Status: SHIPPED | OUTPATIENT
Start: 2025-01-24

## 2025-01-24 NOTE — PATIENT INSTRUCTIONS
We are committed to providing you the best care possible.    If you receive a survey after visiting one of our offices, please take time to share your experience concerning your physician office visit.  These surveys are confidential and no health information about you is shared.    We are eager to improve for you and we are counting on your feedback to help make that happen.         Patient Instructions  Continue prednisone and plaquenil  Continue vit D and calcium OTC   We will plan for DEXA scan at your next visit  Return to clinic in 2  months

## 2025-01-28 ENCOUNTER — OFFICE VISIT (OUTPATIENT)
Dept: PULMONOLOGY | Age: 70
End: 2025-01-28
Payer: MEDICARE

## 2025-01-28 VITALS
OXYGEN SATURATION: 90 % | SYSTOLIC BLOOD PRESSURE: 148 MMHG | HEART RATE: 107 BPM | DIASTOLIC BLOOD PRESSURE: 68 MMHG | BODY MASS INDEX: 25.74 KG/M2 | WEIGHT: 164 LBS | RESPIRATION RATE: 16 BRPM | HEIGHT: 67 IN

## 2025-01-28 DIAGNOSIS — G47.33 OSA (OBSTRUCTIVE SLEEP APNEA): ICD-10-CM

## 2025-01-28 DIAGNOSIS — J44.9 CHRONIC OBSTRUCTIVE PULMONARY DISEASE, UNSPECIFIED COPD TYPE (HCC): Primary | ICD-10-CM

## 2025-01-28 DIAGNOSIS — R91.8 HILAR MASS: ICD-10-CM

## 2025-01-28 DIAGNOSIS — F17.210 CIGARETTE SMOKER: ICD-10-CM

## 2025-01-28 DIAGNOSIS — R09.02 HYPOXIA: ICD-10-CM

## 2025-01-28 DIAGNOSIS — G47.10 HYPERSOMNIA: ICD-10-CM

## 2025-01-28 PROCEDURE — 1123F ACP DISCUSS/DSCN MKR DOCD: CPT | Performed by: INTERNAL MEDICINE

## 2025-01-28 PROCEDURE — 1159F MED LIST DOCD IN RCRD: CPT | Performed by: INTERNAL MEDICINE

## 2025-01-28 PROCEDURE — 3077F SYST BP >= 140 MM HG: CPT | Performed by: INTERNAL MEDICINE

## 2025-01-28 PROCEDURE — 3078F DIAST BP <80 MM HG: CPT | Performed by: INTERNAL MEDICINE

## 2025-01-28 PROCEDURE — 99215 OFFICE O/P EST HI 40 MIN: CPT | Performed by: INTERNAL MEDICINE

## 2025-01-28 ASSESSMENT — ENCOUNTER SYMPTOMS
SHORTNESS OF BREATH: 0
ABDOMINAL DISTENTION: 0
BACK PAIN: 0
EYE ITCHING: 0
ABDOMINAL PAIN: 0
COUGH: 0
EYE DISCHARGE: 0

## 2025-01-28 NOTE — ASSESSMENT & PLAN NOTE
Advised to c/w quitting smoking  C/w Inhalers   C/w oxygen  PFT in 1 year  No flu vaccine per patient

## 2025-01-28 NOTE — PROGRESS NOTES
GUMMY PO) Take 2 each by mouth daily OTC      carvedilol (COREG) 25 MG tablet Take 1 tablet by mouth 2 times daily      amLODIPine (NORVASC) 10 MG tablet Take 1 tablet by mouth daily       No current facility-administered medications for this visit.       Allergies   Allergen Reactions    Nickel Hives, Itching and Swelling       Past Medical History:   Diagnosis Date    Heart abnormality     Hypertension     Pap smear for cervical cancer screening 2010    neg       Past Surgical History:   Procedure Laterality Date    ANKLE FRACTURE SURGERY      right     SECTION      COLONOSCOPY         Social History     Socioeconomic History    Marital status:    Tobacco Use    Smoking status: Former     Types: Cigarettes    Smokeless tobacco: Former     Quit date: 10/5/2013   Substance and Sexual Activity    Alcohol use: Yes     Comment: socially: caff - coffee 1-2 cups daily    Drug use: No    Sexual activity: Never     Social Determinants of Health     Food Insecurity: No Food Insecurity (2024)    Hunger Vital Sign     Worried About Running Out of Food in the Last Year: Never true     Ran Out of Food in the Last Year: Never true   Transportation Needs: No Transportation Needs (2024)    PRAPARE - Transportation     Lack of Transportation (Medical): No     Lack of Transportation (Non-Medical): No   Housing Stability: Low Risk  (2024)    Housing Stability Vital Sign     Unable to Pay for Housing in the Last Year: No     Number of Times Moved in the Last Year: 1     Homeless in the Last Year: No       Review of Systems   Constitutional:  Negative for fatigue.   HENT:  Negative for congestion and postnasal drip.    Eyes:  Negative for discharge and itching.   Respiratory:  Negative for cough and shortness of breath.    Cardiovascular:  Negative for chest pain and leg swelling.   Gastrointestinal:  Negative for abdominal distention and abdominal pain.   Endocrine: Negative for cold intolerance and

## 2025-01-29 DIAGNOSIS — J44.9 CHRONIC OBSTRUCTIVE PULMONARY DISEASE, UNSPECIFIED COPD TYPE (HCC): ICD-10-CM

## 2025-01-30 RX ORDER — BUDESONIDE AND FORMOTEROL FUMARATE DIHYDRATE 160; 4.5 UG/1; UG/1
2 AEROSOL RESPIRATORY (INHALATION) 2 TIMES DAILY
Qty: 3 EACH | Refills: 7 | Status: SHIPPED | OUTPATIENT
Start: 2025-01-30

## 2025-03-19 ENCOUNTER — HOSPITAL ENCOUNTER (OUTPATIENT)
Dept: CARDIAC REHAB | Age: 70
Setting detail: THERAPIES SERIES
Discharge: HOME | End: 2025-03-19

## 2025-03-22 NOTE — PROGRESS NOTES
RHEUMATOLOGY FOLLOW-UP VISIT    3/24/2025      Patient Name: Radha Gomez  : 1955  Medical Record: [id number]      CHIEF COMPLAINT    Seropositive rheumatoid arthritis, CCP +, RF +  Osteoporosis  Low back pain     Pertinent Problems  Carpal tunnel syndrome s/p release surgery  Left achilles tendonitis   Active tobacco use    HISTORY OF PRESENT ILLNESS    Radha Gomez is a 69 y.o. female who established on 3/18/2024 symptom onset began in 10/ 2022 in bilateral neck, shoulders, knees pain. There was profound neck stiffness affecting sleep, she also had entire leg pain, there was feet and heel pain. PCP started prednisone 10mg bid since 3/2023 that caused weight gain. She is no longer taking prednisone    LCV: 25  MTX was held due to emphysema and possible cirrhosis of the liver based on PET scan report  Plaquenil was added on , and stopped on 24 because it was not working   Humira was started on 24  She was admitted again for CHF exacerbation on 24   Humira was held due to recurrent CHF exacerbation       Subjective:  Today her joints are doing well today   Her last injection was on 12/15/2024  She has been taking plaquenil and prednisone 5mg daily   She is on O2 via [place]   Left elbow pain and swelling is resolved  There is no fever, hematuria, nausea, vomiting or chills  Her hands and feet are doing well and she is planned for cardiac rehab  Associated symptoms: SOB with mild exertion, chronic cough attributed to cigarettes use  Pain level today:0-1/10   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

## 2025-03-24 ENCOUNTER — HOSPITAL ENCOUNTER (OUTPATIENT)
Dept: CARDIAC REHAB | Age: 70
Setting detail: THERAPIES SERIES
Discharge: HOME | End: 2025-03-24
Payer: MEDICARE

## 2025-03-24 ENCOUNTER — OFFICE VISIT (OUTPATIENT)
Age: 70
End: 2025-03-24
Payer: MEDICARE

## 2025-03-24 VITALS
WEIGHT: 166.2 LBS | HEART RATE: 69 BPM | DIASTOLIC BLOOD PRESSURE: 88 MMHG | SYSTOLIC BLOOD PRESSURE: 132 MMHG | OXYGEN SATURATION: 92 % | BODY MASS INDEX: 26.03 KG/M2

## 2025-03-24 DIAGNOSIS — M85.89 OSTEOPENIA OF MULTIPLE SITES: ICD-10-CM

## 2025-03-24 DIAGNOSIS — Z79.52 CURRENT CHRONIC USE OF SYSTEMIC STEROIDS: ICD-10-CM

## 2025-03-24 DIAGNOSIS — M05.79 RHEUMATOID ARTHRITIS INVOLVING MULTIPLE SITES WITH POSITIVE RHEUMATOID FACTOR (HCC): Primary | ICD-10-CM

## 2025-03-24 DIAGNOSIS — Z79.899 ENCOUNTER FOR MONITORING OF HYDROXYCHLOROQUINE THERAPY: ICD-10-CM

## 2025-03-24 DIAGNOSIS — Z51.81 ENCOUNTER FOR MONITORING OF HYDROXYCHLOROQUINE THERAPY: ICD-10-CM

## 2025-03-24 PROCEDURE — 94625 PHY/QHP OP PULM RHB W/O MNTR: CPT

## 2025-03-24 PROCEDURE — 3075F SYST BP GE 130 - 139MM HG: CPT | Performed by: STUDENT IN AN ORGANIZED HEALTH CARE EDUCATION/TRAINING PROGRAM

## 2025-03-24 PROCEDURE — 3079F DIAST BP 80-89 MM HG: CPT | Performed by: STUDENT IN AN ORGANIZED HEALTH CARE EDUCATION/TRAINING PROGRAM

## 2025-03-24 PROCEDURE — 1123F ACP DISCUSS/DSCN MKR DOCD: CPT | Performed by: STUDENT IN AN ORGANIZED HEALTH CARE EDUCATION/TRAINING PROGRAM

## 2025-03-24 PROCEDURE — 1159F MED LIST DOCD IN RCRD: CPT | Performed by: STUDENT IN AN ORGANIZED HEALTH CARE EDUCATION/TRAINING PROGRAM

## 2025-03-24 PROCEDURE — 99215 OFFICE O/P EST HI 40 MIN: CPT | Performed by: STUDENT IN AN ORGANIZED HEALTH CARE EDUCATION/TRAINING PROGRAM

## 2025-03-24 NOTE — PATIENT INSTRUCTIONS
Patient Instructions  Continue  plaquenil  Reduce prednisone to 5 mg (1 pill) every other day  Continue vit D and calcium OTC   Schedule for DEXA scan  Return to clinic in 2  months

## 2025-03-27 ENCOUNTER — HOSPITAL ENCOUNTER (OUTPATIENT)
Dept: CARDIAC REHAB | Age: 70
Setting detail: THERAPIES SERIES
Discharge: HOME | End: 2025-03-27
Payer: MEDICARE

## 2025-03-27 PROCEDURE — 94625 PHY/QHP OP PULM RHB W/O MNTR: CPT

## 2025-03-31 ENCOUNTER — HOSPITAL ENCOUNTER (OUTPATIENT)
Dept: CARDIAC REHAB | Age: 70
Setting detail: THERAPIES SERIES
Discharge: HOME | End: 2025-03-31
Payer: MEDICARE

## 2025-03-31 PROCEDURE — 94625 PHY/QHP OP PULM RHB W/O MNTR: CPT

## 2025-04-03 ENCOUNTER — HOSPITAL ENCOUNTER (OUTPATIENT)
Dept: CARDIAC REHAB | Age: 70
Setting detail: THERAPIES SERIES
Discharge: HOME | End: 2025-04-03
Payer: MEDICARE

## 2025-04-03 PROCEDURE — 94625 PHY/QHP OP PULM RHB W/O MNTR: CPT

## 2025-04-07 ENCOUNTER — HOSPITAL ENCOUNTER (OUTPATIENT)
Dept: CARDIAC REHAB | Age: 70
Setting detail: THERAPIES SERIES
Discharge: HOME | End: 2025-04-07
Payer: MEDICARE

## 2025-04-07 PROCEDURE — 94625 PHY/QHP OP PULM RHB W/O MNTR: CPT

## 2025-04-10 ENCOUNTER — HOSPITAL ENCOUNTER (OUTPATIENT)
Dept: CARDIAC REHAB | Age: 70
Setting detail: THERAPIES SERIES
Discharge: HOME | End: 2025-04-10
Payer: MEDICARE

## 2025-04-10 PROCEDURE — 94625 PHY/QHP OP PULM RHB W/O MNTR: CPT

## 2025-04-14 ENCOUNTER — HOSPITAL ENCOUNTER (OUTPATIENT)
Dept: CARDIAC REHAB | Age: 70
Setting detail: THERAPIES SERIES
Discharge: HOME | End: 2025-04-14
Payer: MEDICARE

## 2025-04-14 PROCEDURE — 94625 PHY/QHP OP PULM RHB W/O MNTR: CPT

## 2025-04-17 ENCOUNTER — HOSPITAL ENCOUNTER (OUTPATIENT)
Dept: CARDIAC REHAB | Age: 70
Setting detail: THERAPIES SERIES
Discharge: HOME | End: 2025-04-17
Payer: MEDICARE

## 2025-04-17 PROCEDURE — 94625 PHY/QHP OP PULM RHB W/O MNTR: CPT

## 2025-04-21 ENCOUNTER — HOSPITAL ENCOUNTER (OUTPATIENT)
Dept: CARDIAC REHAB | Age: 70
Setting detail: THERAPIES SERIES
Discharge: HOME | End: 2025-04-21
Payer: MEDICARE

## 2025-04-21 PROCEDURE — 94625 PHY/QHP OP PULM RHB W/O MNTR: CPT

## 2025-04-22 ENCOUNTER — HOSPITAL ENCOUNTER (OUTPATIENT)
Dept: WOMENS IMAGING | Age: 70
Discharge: HOME | End: 2025-04-22
Payer: MEDICARE

## 2025-04-22 VITALS — HEIGHT: 67 IN | BODY MASS INDEX: 25.9 KG/M2 | WEIGHT: 165 LBS

## 2025-04-22 DIAGNOSIS — M85.89 OSTEOPENIA OF MULTIPLE SITES: ICD-10-CM

## 2025-04-22 DIAGNOSIS — Z12.31 SCREENING MAMMOGRAM, ENCOUNTER FOR: ICD-10-CM

## 2025-04-22 PROCEDURE — 77080 DXA BONE DENSITY AXIAL: CPT

## 2025-04-22 PROCEDURE — 77067 SCR MAMMO BI INCL CAD: CPT

## 2025-04-24 ENCOUNTER — HOSPITAL ENCOUNTER (OUTPATIENT)
Dept: CARDIAC REHAB | Age: 70
Setting detail: THERAPIES SERIES
Discharge: HOME | End: 2025-04-24
Payer: MEDICARE

## 2025-04-24 ENCOUNTER — RESULTS FOLLOW-UP (OUTPATIENT)
Age: 70
End: 2025-04-24

## 2025-04-24 PROCEDURE — 94625 PHY/QHP OP PULM RHB W/O MNTR: CPT

## 2025-04-28 ENCOUNTER — APPOINTMENT (OUTPATIENT)
Dept: CT IMAGING | Age: 70
End: 2025-04-28
Payer: MEDICARE

## 2025-04-28 ENCOUNTER — HOSPITAL ENCOUNTER (OUTPATIENT)
Age: 70
Setting detail: OBSERVATION
Discharge: LEFT BEFORE DISCHARGE | End: 2025-04-28
Attending: STUDENT IN AN ORGANIZED HEALTH CARE EDUCATION/TRAINING PROGRAM | Admitting: STUDENT IN AN ORGANIZED HEALTH CARE EDUCATION/TRAINING PROGRAM
Payer: MEDICARE

## 2025-04-28 VITALS
BODY MASS INDEX: 25.9 KG/M2 | HEIGHT: 67 IN | RESPIRATION RATE: 17 BRPM | OXYGEN SATURATION: 92 % | DIASTOLIC BLOOD PRESSURE: 61 MMHG | HEART RATE: 73 BPM | TEMPERATURE: 98.1 F | WEIGHT: 165 LBS | SYSTOLIC BLOOD PRESSURE: 158 MMHG

## 2025-04-28 DIAGNOSIS — R07.9 CHEST PAIN, UNSPECIFIED TYPE: Primary | ICD-10-CM

## 2025-04-28 DIAGNOSIS — J96.11 CHRONIC RESPIRATORY FAILURE WITH HYPOXIA (HCC): ICD-10-CM

## 2025-04-28 DIAGNOSIS — R06.02 SHORTNESS OF BREATH: ICD-10-CM

## 2025-04-28 DIAGNOSIS — R91.1 RIGHT LOWER LOBE PULMONARY NODULE: ICD-10-CM

## 2025-04-28 LAB
ALBUMIN SERPL-MCNC: 3.7 G/DL (ref 3.4–5)
ALBUMIN/GLOB SERPL: 1 {RATIO} (ref 1.1–2.2)
ALP SERPL-CCNC: 101 U/L (ref 40–129)
ALT SERPL-CCNC: 21 U/L (ref 10–40)
ANION GAP SERPL CALCULATED.3IONS-SCNC: 14 MMOL/L (ref 9–17)
ARTERIAL PATENCY WRIST A: ABNORMAL
AST SERPL-CCNC: 26 U/L (ref 15–37)
BASOPHILS # BLD: 0.1 K/UL
BASOPHILS NFR BLD: 1 % (ref 0–1)
BILIRUB SERPL-MCNC: 0.4 MG/DL (ref 0–1)
BNP SERPL-MCNC: 500 PG/ML (ref 0–125)
BODY TEMPERATURE: 37
BUN SERPL-MCNC: 24 MG/DL (ref 7–20)
CALCIUM SERPL-MCNC: 9.8 MG/DL (ref 8.3–10.6)
CHLORIDE SERPL-SCNC: 101 MMOL/L (ref 99–110)
CO2 SERPL-SCNC: 25 MMOL/L (ref 21–32)
COHGB MFR BLD: 3.2 % (ref 0.5–1.5)
CREAT SERPL-MCNC: 1.4 MG/DL (ref 0.6–1.2)
EOSINOPHIL # BLD: 0.19 K/UL
EOSINOPHILS RELATIVE PERCENT: 2 % (ref 0–3)
ERYTHROCYTE [DISTWIDTH] IN BLOOD BY AUTOMATED COUNT: 14.4 % (ref 11.7–14.9)
GFR, ESTIMATED: 38 ML/MIN/1.73M2
GLUCOSE SERPL-MCNC: 110 MG/DL (ref 74–99)
HCO3 ARTERIAL: 25.3 MMOL/L (ref 21–28)
HCT VFR BLD AUTO: 39.1 % (ref 37–47)
HGB BLD-MCNC: 13 G/DL (ref 12.5–16)
IMM GRANULOCYTES # BLD AUTO: 0.02 K/UL
IMM GRANULOCYTES NFR BLD: 0 %
LYMPHOCYTES NFR BLD: 2.49 K/UL
LYMPHOCYTES RELATIVE PERCENT: 28 % (ref 24–44)
MCH RBC QN AUTO: 28 PG (ref 27–31)
MCHC RBC AUTO-ENTMCNC: 33.2 G/DL (ref 32–36)
MCV RBC AUTO: 84.3 FL (ref 78–100)
METHEMOGLOBIN: 0.1 % (ref 0.5–1.5)
MONOCYTES NFR BLD: 0.91 K/UL
MONOCYTES NFR BLD: 10 % (ref 0–5)
NEUTROPHILS NFR BLD: 58 % (ref 36–66)
NEUTS SEG NFR BLD: 5.14 K/UL
OXYHGB MFR BLD: 85.5 %
PCO2 ARTERIAL: 33.9 MMHG (ref 32–45)
PH ARTERIAL: 7.49 (ref 7.35–7.45)
PLATELET # BLD AUTO: 214 K/UL (ref 140–440)
PMV BLD AUTO: 11 FL (ref 7.5–11.1)
PO2 ARTERIAL: 52.9 MMHG (ref 83–108)
POSITIVE BASE EXCESS, ART: 2.3 MMOL/L (ref 0–3)
POTASSIUM SERPL-SCNC: 3.6 MMOL/L (ref 3.5–5.1)
PROT SERPL-MCNC: 7.6 G/DL (ref 6.4–8.2)
RBC # BLD AUTO: 4.64 M/UL (ref 4.2–5.4)
SODIUM SERPL-SCNC: 141 MMOL/L (ref 136–145)
TROPONIN I SERPL HS-MCNC: 11 NG/L (ref 0–14)
TROPONIN I SERPL HS-MCNC: 12 NG/L (ref 0–14)
WBC OTHER # BLD: 8.9 K/UL (ref 4–10.5)

## 2025-04-28 PROCEDURE — 82805 BLOOD GASES W/O2 SATURATION: CPT

## 2025-04-28 PROCEDURE — 80053 COMPREHEN METABOLIC PANEL: CPT

## 2025-04-28 PROCEDURE — 99285 EMERGENCY DEPT VISIT HI MDM: CPT

## 2025-04-28 PROCEDURE — 71275 CT ANGIOGRAPHY CHEST: CPT

## 2025-04-28 PROCEDURE — 83880 ASSAY OF NATRIURETIC PEPTIDE: CPT

## 2025-04-28 PROCEDURE — 96374 THER/PROPH/DIAG INJ IV PUSH: CPT

## 2025-04-28 PROCEDURE — 84484 ASSAY OF TROPONIN QUANT: CPT

## 2025-04-28 PROCEDURE — 93005 ELECTROCARDIOGRAM TRACING: CPT | Performed by: STUDENT IN AN ORGANIZED HEALTH CARE EDUCATION/TRAINING PROGRAM

## 2025-04-28 PROCEDURE — 85025 COMPLETE CBC W/AUTO DIFF WBC: CPT

## 2025-04-28 PROCEDURE — G0378 HOSPITAL OBSERVATION PER HR: HCPCS

## 2025-04-28 PROCEDURE — 6360000002 HC RX W HCPCS: Performed by: STUDENT IN AN ORGANIZED HEALTH CARE EDUCATION/TRAINING PROGRAM

## 2025-04-28 PROCEDURE — 6360000004 HC RX CONTRAST MEDICATION: Performed by: STUDENT IN AN ORGANIZED HEALTH CARE EDUCATION/TRAINING PROGRAM

## 2025-04-28 RX ORDER — POLYETHYLENE GLYCOL 3350 17 G/17G
17 POWDER, FOR SOLUTION ORAL DAILY PRN
Status: DISCONTINUED | OUTPATIENT
Start: 2025-04-28 | End: 2025-04-28 | Stop reason: HOSPADM

## 2025-04-28 RX ORDER — ENOXAPARIN SODIUM 100 MG/ML
40 INJECTION SUBCUTANEOUS DAILY
Status: DISCONTINUED | OUTPATIENT
Start: 2025-04-28 | End: 2025-04-28 | Stop reason: HOSPADM

## 2025-04-28 RX ORDER — KETOROLAC TROMETHAMINE 15 MG/ML
15 INJECTION, SOLUTION INTRAMUSCULAR; INTRAVENOUS ONCE
Status: COMPLETED | OUTPATIENT
Start: 2025-04-28 | End: 2025-04-28

## 2025-04-28 RX ORDER — ACETAMINOPHEN 650 MG/1
650 SUPPOSITORY RECTAL EVERY 6 HOURS PRN
Status: DISCONTINUED | OUTPATIENT
Start: 2025-04-28 | End: 2025-04-28 | Stop reason: HOSPADM

## 2025-04-28 RX ORDER — MAGNESIUM SULFATE IN WATER 40 MG/ML
2000 INJECTION, SOLUTION INTRAVENOUS PRN
Status: DISCONTINUED | OUTPATIENT
Start: 2025-04-28 | End: 2025-04-28 | Stop reason: HOSPADM

## 2025-04-28 RX ORDER — ACETAMINOPHEN 325 MG/1
650 TABLET ORAL EVERY 6 HOURS PRN
Status: DISCONTINUED | OUTPATIENT
Start: 2025-04-28 | End: 2025-04-28 | Stop reason: HOSPADM

## 2025-04-28 RX ORDER — SODIUM CHLORIDE 0.9 % (FLUSH) 0.9 %
5-40 SYRINGE (ML) INJECTION EVERY 12 HOURS SCHEDULED
Status: DISCONTINUED | OUTPATIENT
Start: 2025-04-28 | End: 2025-04-28 | Stop reason: HOSPADM

## 2025-04-28 RX ORDER — ONDANSETRON 4 MG/1
4 TABLET, ORALLY DISINTEGRATING ORAL EVERY 8 HOURS PRN
Status: DISCONTINUED | OUTPATIENT
Start: 2025-04-28 | End: 2025-04-28 | Stop reason: HOSPADM

## 2025-04-28 RX ORDER — POTASSIUM CHLORIDE 7.45 MG/ML
10 INJECTION INTRAVENOUS PRN
Status: DISCONTINUED | OUTPATIENT
Start: 2025-04-28 | End: 2025-04-28 | Stop reason: HOSPADM

## 2025-04-28 RX ORDER — POTASSIUM CHLORIDE 1500 MG/1
40 TABLET, EXTENDED RELEASE ORAL PRN
Status: DISCONTINUED | OUTPATIENT
Start: 2025-04-28 | End: 2025-04-28 | Stop reason: HOSPADM

## 2025-04-28 RX ORDER — SODIUM CHLORIDE 9 MG/ML
INJECTION, SOLUTION INTRAVENOUS PRN
Status: DISCONTINUED | OUTPATIENT
Start: 2025-04-28 | End: 2025-04-28 | Stop reason: HOSPADM

## 2025-04-28 RX ORDER — SODIUM CHLORIDE 0.9 % (FLUSH) 0.9 %
5-40 SYRINGE (ML) INJECTION PRN
Status: DISCONTINUED | OUTPATIENT
Start: 2025-04-28 | End: 2025-04-28 | Stop reason: HOSPADM

## 2025-04-28 RX ORDER — IOPAMIDOL 755 MG/ML
75 INJECTION, SOLUTION INTRAVASCULAR
Status: COMPLETED | OUTPATIENT
Start: 2025-04-28 | End: 2025-04-28

## 2025-04-28 RX ORDER — ONDANSETRON 2 MG/ML
4 INJECTION INTRAMUSCULAR; INTRAVENOUS EVERY 6 HOURS PRN
Status: DISCONTINUED | OUTPATIENT
Start: 2025-04-28 | End: 2025-04-28 | Stop reason: HOSPADM

## 2025-04-28 RX ADMIN — IOPAMIDOL 75 ML: 755 INJECTION, SOLUTION INTRAVENOUS at 17:10

## 2025-04-28 RX ADMIN — KETOROLAC TROMETHAMINE 15 MG: 15 INJECTION, SOLUTION INTRAMUSCULAR; INTRAVENOUS at 15:44

## 2025-04-28 ASSESSMENT — PAIN SCALES - GENERAL
PAINLEVEL_OUTOF10: 10
PAINLEVEL_OUTOF10: 10

## 2025-04-28 ASSESSMENT — PAIN DESCRIPTION - LOCATION
LOCATION: CHEST
LOCATION: CHEST

## 2025-04-28 NOTE — ED NOTES
Patient requesting to leave. Patient states she is feeling better. Dr. Morris made aware via [facility]. Patient is signing out AMA.

## 2025-04-28 NOTE — ED NOTES
ED TO INPATIENT SBAR HANDOFF    Patient Name: Radha Gomez   :  1955  69 y.o.   Preferred Name  Radha   Family/Caregiver Present yes   Restraints no   C-SSRS: Risk of Suicide: No Risk  Sitter no   Sepsis Risk Score Sepsis V2 Risk Score: 13.6      Situation  Chief Complaint   Patient presents with    Chest Pain     CP and SOB     Brief Description of Patient's Condition: Patient presented to ED with complaints of chest pain and shortness of breath. Patient is Aox4 and can ambulate on her own. Patient wears 3L of O2 at baseline.   Mental Status: oriented  Arrived from: home    Imaging:   CTA PULMONARY W CONTRAST   Final Result        Abnormal labs:   Abnormal Labs Reviewed   CBC WITH AUTO DIFFERENTIAL - Abnormal; Notable for the following components:       Result Value    Monocytes % 10 (*)     All other components within normal limits   COMPREHENSIVE METABOLIC PANEL - Abnormal; Notable for the following components:    Glucose 110 (*)     BUN 24 (*)     Creatinine 1.4 (*)     Est, Glom Filt Rate 38 (*)     Albumin/Globulin Ratio 1.0 (*)     All other components within normal limits   BRAIN NATRIURETIC PEPTIDE - Abnormal; Notable for the following components:    NT Pro- (*)     All other components within normal limits   BLOOD GAS, ARTERIAL - Abnormal; Notable for the following components:    pH, Arterial 7.490 (*)     pO2, Arterial 52.9 (*)     Carboxyhemoglobin 3.2 (*)     Methemoglobin 0.1 (*)     All other components within normal limits        Background  History:   Past Medical History:   Diagnosis Date    Heart abnormality     Hypertension     Pap smear for cervical cancer screening 2010    neg       Assessment    Vitals: MEWS Score: 1  Level of Consciousness: Alert (0)   Vitals:    25 1420 25 1539 25 1541 25 1702   BP: (!) 145/60  (!) 154/63 (!) 145/59   Pulse: 77   70   Resp: 19  17 24   Temp: 98.1 °F (36.7 °C)      TempSrc: Oral      SpO2: 93%  92% 97%   Weight:  74.8

## 2025-04-28 NOTE — ED PROVIDER NOTES
disturbance/LBTB/PM, 0=Normal) 1  Age (2= >66 yo, 1=46-64, 0=<45) 2  Risk Factors (smoking, DM, HTN, HLD, Obesity, CAD, Positive family hx) 2=3 or more, 1=1-2, 0=no risk factors 1  Troponin (2=>3x normal limit, 1=1-3x normal limit, 0<1x normal limit) 0    Total: 5               EKG (if obtained): (All EKG's are interpreted by myself in the absence of a cardiologist) please see separate dictated        Physical Exam:   Patient Vitals for the past 24 hrs:   BP Temp Temp src Pulse Resp SpO2 Height Weight   04/28/25 1833 (!) 158/61 -- -- 73 17 92 % -- --   04/28/25 1702 (!) 145/59 -- -- 70 24 97 % -- --   04/28/25 1541 (!) 154/63 -- -- -- 17 92 % -- --   04/28/25 1539 -- -- -- -- -- -- 1.702 m (5' 7\") 74.8 kg (165 lb)   04/28/25 1420 (!) 145/60 98.1 °F (36.7 °C) Oral 77 19 93 % -- --       Physical Exam  Vitals reviewed.   HENT:      Head: Normocephalic and atraumatic.      Right Ear: External ear normal.      Left Ear: External ear normal.   Eyes:      General: Lids are normal.   Cardiovascular:      Rate and Rhythm: Normal rate and regular rhythm.      Pulses:           Radial pulses are 2+ on the right side and 2+ on the left side.        Dorsalis pedis pulses are 2+ on the right side and 2+ on the left side.      Heart sounds: Normal heart sounds.   Pulmonary:      Effort: No accessory muscle usage or respiratory distress.   Chest:          Comments: Tenderness to palpation to the area depicted above.  There is no step-offs or deformities throughout the anterior chest wall.  No crepitance appreciated  Abdominal:      Palpations: Abdomen is soft.      Comments: No peritoneal signs.  No localized tenderness to palpation voluntary guarding or rebound.  Negative Menchaca sign   Musculoskeletal:      Cervical back: Neck supple. No rigidity.      Comments: No asymmetric calf swelling..  No CVA tenderness bilaterally   Neurological:      Mental Status: She is alert. Mental status is at baseline.      Comments: Strength and

## 2025-04-28 NOTE — ED NOTES
12 lead EKG per my interpretation:  Ventricular rate 74 bpm, MO interval 158 ms, QRS duration 96 ms, QT/QTc 386/428 ms.  Normal sinus rhythm.  Artifact present which limits interpretation but within the limitation of this.  No STEMI criteria.       Jay Hazel DO  04/28/25 1435

## 2025-04-28 NOTE — H&P
Normal T-score  between  -1.0  and  -2.5  osteopenia T-score at or below  -2.5   osteoporosis REGION: L-spine (L1-4): Decreased by 7.4% BMD (g/cm2): 1.03 , T-score: -1.3 Left hip (Total): Decreased by 4.6% BMD (g/cm2): 0.789 , T-score: -1.7 Right hip (Total): BMD (g/cm2): 0.761 , T-score: -2.0 FRAX 10 year probability of major osteoporotic fracture is estimated at 9.5%. 10 year probability of hip fracture is estimated at 2.2%. IMPRESSION:  1.  Low bone mass (osteopenia). 2.  There has been a significant decrease in bone mineral density since the previous study.  Dictated and Electronically Signed By: Rock Huber MD 4/22/2025 21:25        [facility] KRISTAL DIGITAL SCREEN SELF REFERRAL W OR WO CAD BILATERAL  Result Date: 4/22/2025  [facility] KRISTAL DIGITAL SCREEN SELF REFERRAL W OR WO CAD BILATERAL DATE OF SERVICE:  4/22/2025 10:45 INDICATION FOR EXAMINATION: 69 years-old Female  Encounter for screening mammogram for malignant neoplasm of breast COMPARISON: Prior exams, dating back to 3/24/2017 TECHNIQUE: Bilateral 2-D and 3-D tomosynthesis images of the breast were obtained in the CC and MLO projections. Computer aided detection (CAD) was used to assist in the interpretation of the 2D mammogram. BREAST COMPOSITION: There are scattered areas of fibroglandular density. FINDINGS: There are no masses, architectural distortions, or calcifications to suggest malignancy in either breast. There has been no significant interval change in the appearance of either breast when compared to the prior exam. ASSESSMENT: BI-RADS Category 1:  Negative RECOMMENDATIONS: Bilateral Routine 3-D screening mammogram in 1 year. . . . A result letter will be sent to the patient.  Dictated and Electronically Signed By: Kaity Christensen DO 4/22/2025 13:17    Performing Facility: [facility] [address] [place], [place] [address] Phone: [phone]

## 2025-04-30 LAB
EKG ATRIAL RATE: 74 BPM
EKG DIAGNOSIS: NORMAL
EKG P AXIS: 74 DEGREES
EKG P-R INTERVAL: 158 MS
EKG Q-T INTERVAL: 386 MS
EKG QRS DURATION: 96 MS
EKG QTC CALCULATION (BAZETT): 428 MS
EKG R AXIS: 21 DEGREES
EKG T AXIS: 82 DEGREES
EKG VENTRICULAR RATE: 74 BPM

## 2025-04-30 PROCEDURE — 93010 ELECTROCARDIOGRAM REPORT: CPT | Performed by: INTERNAL MEDICINE

## 2025-05-01 ENCOUNTER — HOSPITAL ENCOUNTER (OUTPATIENT)
Dept: CARDIAC REHAB | Age: 70
Setting detail: THERAPIES SERIES
Discharge: HOME | End: 2025-05-01
Payer: MEDICARE

## 2025-05-01 PROCEDURE — 94625 PHY/QHP OP PULM RHB W/O MNTR: CPT

## 2025-05-02 DIAGNOSIS — M05.79 RHEUMATOID ARTHRITIS INVOLVING MULTIPLE SITES WITH POSITIVE RHEUMATOID FACTOR (HCC): ICD-10-CM

## 2025-05-04 RX ORDER — PREDNISONE 5 MG/1
5 TABLET ORAL EVERY OTHER DAY
Qty: 30 TABLET | Refills: 0 | Status: SHIPPED | OUTPATIENT
Start: 2025-05-04

## 2025-05-05 ENCOUNTER — HOSPITAL ENCOUNTER (OUTPATIENT)
Dept: CARDIAC REHAB | Age: 70
Setting detail: THERAPIES SERIES
Discharge: HOME | End: 2025-05-05
Payer: MEDICARE

## 2025-05-05 PROCEDURE — 94625 PHY/QHP OP PULM RHB W/O MNTR: CPT

## 2025-05-05 RX ORDER — TIOTROPIUM BROMIDE 18 UG/1
18 CAPSULE ORAL; RESPIRATORY (INHALATION) DAILY
COMMUNITY
End: 2025-05-05 | Stop reason: SDUPTHER

## 2025-05-05 RX ORDER — TIOTROPIUM BROMIDE 18 UG/1
18 CAPSULE ORAL; RESPIRATORY (INHALATION) DAILY
Qty: 30 CAPSULE | Refills: 11 | Status: SHIPPED | OUTPATIENT
Start: 2025-05-05

## 2025-05-08 ENCOUNTER — HOSPITAL ENCOUNTER (OUTPATIENT)
Dept: CARDIAC REHAB | Age: 70
Setting detail: THERAPIES SERIES
Discharge: HOME | End: 2025-05-08
Payer: MEDICARE

## 2025-05-08 PROCEDURE — 94625 PHY/QHP OP PULM RHB W/O MNTR: CPT

## 2025-05-12 ENCOUNTER — HOSPITAL ENCOUNTER (OUTPATIENT)
Dept: CARDIAC REHAB | Age: 70
Setting detail: THERAPIES SERIES
Discharge: HOME | End: 2025-05-12
Payer: MEDICARE

## 2025-05-12 PROCEDURE — 94625 PHY/QHP OP PULM RHB W/O MNTR: CPT

## 2025-05-15 ENCOUNTER — HOSPITAL ENCOUNTER (OUTPATIENT)
Dept: CARDIAC REHAB | Age: 70
Setting detail: THERAPIES SERIES
Discharge: HOME | End: 2025-05-15
Payer: MEDICARE

## 2025-05-15 PROCEDURE — 94625 PHY/QHP OP PULM RHB W/O MNTR: CPT

## 2025-05-19 ENCOUNTER — HOSPITAL ENCOUNTER (OUTPATIENT)
Dept: CARDIAC REHAB | Age: 70
Setting detail: THERAPIES SERIES
Discharge: HOME | End: 2025-05-19
Payer: MEDICARE

## 2025-05-19 PROCEDURE — 94625 PHY/QHP OP PULM RHB W/O MNTR: CPT

## 2025-05-22 ENCOUNTER — HOSPITAL ENCOUNTER (OUTPATIENT)
Dept: CARDIAC REHAB | Age: 70
Setting detail: THERAPIES SERIES
Discharge: HOME | End: 2025-05-22
Payer: MEDICARE

## 2025-05-22 PROCEDURE — 94625 PHY/QHP OP PULM RHB W/O MNTR: CPT

## 2025-05-23 ENCOUNTER — TELEPHONE (OUTPATIENT)
Dept: PULMONOLOGY | Age: 70
End: 2025-05-23

## 2025-05-25 NOTE — PROGRESS NOTES
RHEUMATOLOGY FOLLOW-UP VISIT    2025      Patient Name: Radha Gomez  : 1955  Medical Record: [id number]      CHIEF COMPLAINT    Seropositive rheumatoid arthritis, CCP +, RF +  Osteoporosis  Low back pain     Pertinent Problems  Carpal tunnel syndrome s/p release surgery  Left achilles tendonitis   Emphysema   WELCH Cirrhosis     HISTORY OF PRESENT ILLNESS    Radha Gomez is a 69 y.o. female who established on 3/18/2024 symptom onset began in 10/ 2022 in bilateral neck, shoulders, knees pain. There was profound neck stiffness affecting sleep, she also had entire leg pain, there was feet and heel pain. PCP started prednisone 10mg bid since 3/2023 that caused weight gain. She is no longer taking prednisone    LCV: 3/24/25  MTX was held due to emphysema and possible cirrhosis of the liver based on PET scan report  Plaquenil was added on , and stopped on 24 because it was not working   Humira was started on 24  She was admitted again for CHF exacerbation on 24   Humira was held due to recurrent CHF exacerbation       Subjective:  She has needed prednisone daily  There is morning stiffness and swelling lasting for hours except she takes   There is hand, wrist and knee pain  She has been taking plaquenil and prednisone 5mg daily   She is on O2 via NC   Left elbow pain and swelling is resolved  There is no fever, hematuria, nausea, vomiting or chills  Her hands and feet are doing well and she is planned for cardiac rehab  Associated symptoms: SOB with mild exertion, chronic cough attributed to cigarettes use  Pain level today: 5/10   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

## 2025-05-28 ENCOUNTER — OFFICE VISIT (OUTPATIENT)
Age: 70
End: 2025-05-28
Payer: MEDICARE

## 2025-05-28 VITALS
SYSTOLIC BLOOD PRESSURE: 150 MMHG | WEIGHT: 173.4 LBS | DIASTOLIC BLOOD PRESSURE: 82 MMHG | OXYGEN SATURATION: 93 % | HEART RATE: 73 BPM | BODY MASS INDEX: 27.16 KG/M2

## 2025-05-28 DIAGNOSIS — Z79.52 CURRENT CHRONIC USE OF SYSTEMIC STEROIDS: ICD-10-CM

## 2025-05-28 DIAGNOSIS — M85.89 OTHER SPECIFIED DISORDERS OF BONE DENSITY AND STRUCTURE, MULTIPLE SITES: Primary | ICD-10-CM

## 2025-05-28 DIAGNOSIS — Z51.81 ENCOUNTER FOR MONITORING OF HYDROXYCHLOROQUINE THERAPY: ICD-10-CM

## 2025-05-28 DIAGNOSIS — M81.8 OTHER OSTEOPOROSIS WITHOUT CURRENT PATHOLOGICAL FRACTURE: ICD-10-CM

## 2025-05-28 DIAGNOSIS — M85.89 OSTEOPENIA OF MULTIPLE SITES: ICD-10-CM

## 2025-05-28 DIAGNOSIS — Z79.899 ENCOUNTER FOR MONITORING OF HYDROXYCHLOROQUINE THERAPY: ICD-10-CM

## 2025-05-28 DIAGNOSIS — M05.79 RHEUMATOID ARTHRITIS INVOLVING MULTIPLE SITES WITH POSITIVE RHEUMATOID FACTOR (HCC): Primary | ICD-10-CM

## 2025-05-28 DIAGNOSIS — M81.8 IDIOPATHIC OSTEOPOROSIS: ICD-10-CM

## 2025-05-28 DIAGNOSIS — M05.79 RHEUMATOID ARTHRITIS WITH RHEUMATOID FACTOR OF MULTIPLE SITES WITHOUT ORGAN OR SYSTEMS INVOLVEMENT (HCC): ICD-10-CM

## 2025-05-28 PROCEDURE — 99215 OFFICE O/P EST HI 40 MIN: CPT | Performed by: STUDENT IN AN ORGANIZED HEALTH CARE EDUCATION/TRAINING PROGRAM

## 2025-05-28 PROCEDURE — 1159F MED LIST DOCD IN RCRD: CPT | Performed by: STUDENT IN AN ORGANIZED HEALTH CARE EDUCATION/TRAINING PROGRAM

## 2025-05-28 PROCEDURE — 3077F SYST BP >= 140 MM HG: CPT | Performed by: STUDENT IN AN ORGANIZED HEALTH CARE EDUCATION/TRAINING PROGRAM

## 2025-05-28 PROCEDURE — 3079F DIAST BP 80-89 MM HG: CPT | Performed by: STUDENT IN AN ORGANIZED HEALTH CARE EDUCATION/TRAINING PROGRAM

## 2025-05-28 PROCEDURE — 1123F ACP DISCUSS/DSCN MKR DOCD: CPT | Performed by: STUDENT IN AN ORGANIZED HEALTH CARE EDUCATION/TRAINING PROGRAM

## 2025-05-28 RX ORDER — FAMOTIDINE 10 MG/ML
20 INJECTION, SOLUTION INTRAVENOUS
OUTPATIENT
Start: 2025-05-28

## 2025-05-28 RX ORDER — DIPHENHYDRAMINE HYDROCHLORIDE 50 MG/ML
25 INJECTION, SOLUTION INTRAMUSCULAR; INTRAVENOUS ONCE
OUTPATIENT
Start: 2025-05-28 | End: 2025-05-28

## 2025-05-28 RX ORDER — EPINEPHRINE 1 MG/ML
0.3 INJECTION, SOLUTION INTRAMUSCULAR; SUBCUTANEOUS PRN
OUTPATIENT
Start: 2025-05-28

## 2025-05-28 RX ORDER — ACETAMINOPHEN 325 MG/1
650 TABLET ORAL ONCE
OUTPATIENT
Start: 2025-05-28 | End: 2025-05-28

## 2025-05-28 RX ORDER — PREDNISONE 5 MG/1
5 TABLET ORAL EVERY OTHER DAY
Qty: 30 TABLET | Refills: 0 | Status: SHIPPED | OUTPATIENT
Start: 2025-05-28

## 2025-05-28 RX ORDER — SODIUM CHLORIDE 9 MG/ML
INJECTION, SOLUTION INTRAVENOUS CONTINUOUS
OUTPATIENT
Start: 2025-05-28

## 2025-05-28 RX ORDER — SODIUM CHLORIDE 0.9 % (FLUSH) 0.9 %
5-40 SYRINGE (ML) INJECTION PRN
OUTPATIENT
Start: 2025-05-28

## 2025-05-28 RX ORDER — HYDROCORTISONE SODIUM SUCCINATE 100 MG/2ML
100 INJECTION INTRAMUSCULAR; INTRAVENOUS
OUTPATIENT
Start: 2025-05-28

## 2025-05-28 RX ORDER — ONDANSETRON 2 MG/ML
8 INJECTION INTRAMUSCULAR; INTRAVENOUS
OUTPATIENT
Start: 2025-05-28

## 2025-05-28 RX ORDER — ALBUTEROL SULFATE 90 UG/1
4 INHALANT RESPIRATORY (INHALATION) PRN
OUTPATIENT
Start: 2025-05-28

## 2025-05-28 RX ORDER — DIPHENHYDRAMINE HYDROCHLORIDE 50 MG/ML
50 INJECTION, SOLUTION INTRAMUSCULAR; INTRAVENOUS
OUTPATIENT
Start: 2025-05-28

## 2025-05-28 RX ORDER — ACETAMINOPHEN 325 MG/1
650 TABLET ORAL
OUTPATIENT
Start: 2025-05-28

## 2025-05-28 RX ORDER — HYDROXYCHLOROQUINE SULFATE 200 MG/1
300 TABLET, FILM COATED ORAL DAILY
Qty: 135 TABLET | Refills: 0 | Status: SHIPPED | OUTPATIENT
Start: 2025-05-28

## 2025-05-28 RX ORDER — MEPERIDINE HYDROCHLORIDE 25 MG/ML
12.5 INJECTION INTRAMUSCULAR; INTRAVENOUS; SUBCUTANEOUS PRN
OUTPATIENT
Start: 2025-05-28

## 2025-05-28 NOTE — PATIENT INSTRUCTIONS
Patient Instructions  Continue  plaquenil  Continue prednisone to 5 mg (1 pill) every other day  Continue vit D and calcium OTC   You will notified when prolia has been approved by your insurance  Rituxan infusion form has been sent to the infusion clinic for PA   Return to clinic in 2  months

## 2025-05-29 ENCOUNTER — HOSPITAL ENCOUNTER (OUTPATIENT)
Dept: CARDIAC REHAB | Age: 70
Setting detail: THERAPIES SERIES
Discharge: HOME | End: 2025-05-29
Payer: MEDICARE

## 2025-05-29 PROCEDURE — 94625 PHY/QHP OP PULM RHB W/O MNTR: CPT

## 2025-06-05 ENCOUNTER — HOSPITAL ENCOUNTER (OUTPATIENT)
Dept: CARDIAC REHAB | Age: 70
Setting detail: THERAPIES SERIES
Discharge: HOME | End: 2025-06-05
Payer: MEDICARE

## 2025-06-05 PROCEDURE — 94625 PHY/QHP OP PULM RHB W/O MNTR: CPT

## 2025-06-06 ENCOUNTER — TELEPHONE (OUTPATIENT)
Age: 70
End: 2025-06-06

## 2025-06-06 NOTE — TELEPHONE ENCOUNTER
[facility] left a vm stating they need to set up pt's Prolia delivery     Phone number: [phone]   Called back and got it set up it will be delivered June 10th

## 2025-06-10 ENCOUNTER — TELEPHONE (OUTPATIENT)
Age: 70
End: 2025-06-10

## 2025-06-11 NOTE — TELEPHONE ENCOUNTER
Pt called in saying she was informed that her Prolia was delivered to us and wanted to know when she could cone in for her injection she is booked in aug right now. Please advise.

## 2025-06-12 ENCOUNTER — HOSPITAL ENCOUNTER (OUTPATIENT)
Dept: CARDIAC REHAB | Age: 70
Setting detail: THERAPIES SERIES
Discharge: HOME | End: 2025-06-12
Payer: MEDICARE

## 2025-06-12 PROCEDURE — 94625 PHY/QHP OP PULM RHB W/O MNTR: CPT

## 2025-06-13 ENCOUNTER — TELEPHONE (OUTPATIENT)
Age: 70
End: 2025-06-13

## 2025-06-13 NOTE — TELEPHONE ENCOUNTER
Mary wilson [facility] states insurance has denied Ruxience because it is a non formulary drug Mary states Rituxan and Riabni are acceptable she would like to know how you would like to proceed.     Call back- [phone] Ext; [phone]

## 2025-06-16 ENCOUNTER — HOSPITAL ENCOUNTER (OUTPATIENT)
Dept: CARDIAC REHAB | Age: 70
Setting detail: THERAPIES SERIES
Discharge: HOME | End: 2025-06-16
Payer: MEDICARE

## 2025-06-16 PROCEDURE — 94625 PHY/QHP OP PULM RHB W/O MNTR: CPT

## 2025-06-18 ENCOUNTER — OFFICE VISIT (OUTPATIENT)
Age: 70
End: 2025-06-18
Payer: MEDICARE

## 2025-06-18 VITALS
BODY MASS INDEX: 26.63 KG/M2 | WEIGHT: 170 LBS | HEART RATE: 80 BPM | SYSTOLIC BLOOD PRESSURE: 122 MMHG | DIASTOLIC BLOOD PRESSURE: 84 MMHG

## 2025-06-18 DIAGNOSIS — Z79.52 CURRENT CHRONIC USE OF SYSTEMIC STEROIDS: ICD-10-CM

## 2025-06-18 DIAGNOSIS — Z51.81 ENCOUNTER FOR MONITORING OF HYDROXYCHLOROQUINE THERAPY: ICD-10-CM

## 2025-06-18 DIAGNOSIS — M05.79 RHEUMATOID ARTHRITIS INVOLVING MULTIPLE SITES WITH POSITIVE RHEUMATOID FACTOR (HCC): Primary | ICD-10-CM

## 2025-06-18 DIAGNOSIS — Z79.899 ENCOUNTER FOR MONITORING OF HYDROXYCHLOROQUINE THERAPY: ICD-10-CM

## 2025-06-18 DIAGNOSIS — M85.89 OSTEOPENIA OF MULTIPLE SITES: ICD-10-CM

## 2025-06-18 PROCEDURE — 3074F SYST BP LT 130 MM HG: CPT | Performed by: NURSE PRACTITIONER

## 2025-06-18 PROCEDURE — 1123F ACP DISCUSS/DSCN MKR DOCD: CPT | Performed by: NURSE PRACTITIONER

## 2025-06-18 PROCEDURE — 96372 THER/PROPH/DIAG INJ SC/IM: CPT | Performed by: NURSE PRACTITIONER

## 2025-06-18 PROCEDURE — 3079F DIAST BP 80-89 MM HG: CPT | Performed by: NURSE PRACTITIONER

## 2025-06-18 PROCEDURE — 99213 OFFICE O/P EST LOW 20 MIN: CPT | Performed by: NURSE PRACTITIONER

## 2025-06-18 PROCEDURE — 1159F MED LIST DOCD IN RCRD: CPT | Performed by: NURSE PRACTITIONER

## 2025-06-18 NOTE — PROGRESS NOTES
data to display                    REVIEW OF SYSTEMS     Constitutional:  Denies fever or chills, decreased appetite, or weight loss   Eyes:  Denies change in visual acuity or eye dryness or irritation  HENT:  Denies dry mouth or oral ulcers  Respiratory:  Denies cough , SOB+   Cardiovascular:  Denies chest pain or edema   GI:  Denies abdominal pain, nausea, vomiting, bloody stools or diarrhea   :  Denies dysuria or hematuria  Musculoskeletal:  See HPI  Integument:  Denies rash   Neurologic:  Denies headache, focal weakness or sensory changes   Endocrine:  Denies polyuria or polydipsia   Lymphatic:  Denies swollen glands   Psychiatric:  Denies depression or anxiety       PROBLEM LIST    Patient Active Problem List   Diagnosis    Chronic kidney disease, stage III (moderate) (HCC)    Carotid stenosis, asymptomatic, left    Essential hypertension    Anxiety    Bilateral carotid artery stenosis    Tobacco dependence    Calcific tendinitis of right shoulder    NAFLD (nonalcoholic fatty liver disease)    COPD exacerbation (HCC)    Nonrheumatic aortic valve insufficiency    Dyspnea    COPD (chronic obstructive pulmonary disease) (HCC)    Cigarette smoker    JAK (obstructive sleep apnea)    Hypersomnia    Hypoxia    Hilar mass    Heart failure (HCC)    Valvular heart disease    Chest pain    Other specified disorders of bone density and structure, multiple sites    Idiopathic osteoporosis    Rheumatoid arthritis with rheumatoid factor of multiple sites without organ or systems involvement (HCC)       MEDICATIONS    Current Outpatient Medications   Medication Sig Dispense Refill    denosumab (PROLIA) 60 MG/ML SOSY SC injection Inject 1 mL into the skin every 6 months 1 mL 1    predniSONE (DELTASONE) 5 MG tablet Take 1 tablet by mouth every other day 30 tablet 0    hydroxychloroquine (PLAQUENIL) 200 MG tablet Take 1.5 tablets by mouth daily 135 tablet 0    tiotropium (SPIRIVA) 18 MCG inhalation capsule Inhale 1 capsule into

## 2025-06-18 NOTE — PATIENT INSTRUCTIONS
Continue  plaquenil  Continue prednisone 5 mg (1 pill) every other day  Continue vit D and calcium OTC   Please get lab 1 week prior to your next office visit.   Rituxan infusion form has been sent to the infusion clinic for PA   Return to clinic in 2  months as scheduled with Dr. Randolph

## 2025-06-19 ENCOUNTER — HOSPITAL ENCOUNTER (OUTPATIENT)
Dept: CARDIAC REHAB | Age: 70
Setting detail: THERAPIES SERIES
Discharge: HOME | End: 2025-06-19
Payer: MEDICARE

## 2025-06-19 PROCEDURE — 94625 PHY/QHP OP PULM RHB W/O MNTR: CPT

## 2025-06-20 ENCOUNTER — TELEPHONE (OUTPATIENT)
Age: 70
End: 2025-06-20

## 2025-06-20 NOTE — TELEPHONE ENCOUNTER
Pradeep from [facility] called asking for clarification questions for pt's PA     Is pt currently taking MTX ? No ,I stated I did not see that anywhere in her chart.    2.Any allergy or issue's with MTX? I stated I did not see any record of so.    3. Pt's weight in kg and sanket in inches. Provided info     Pt states he is going to start the process now it can take up to 70 hours before we get a response but it will be emailed or faxed to us     Reference #[id number]

## 2025-06-23 ENCOUNTER — HOSPITAL ENCOUNTER (OUTPATIENT)
Dept: CARDIAC REHAB | Age: 70
Setting detail: THERAPIES SERIES
Discharge: HOME | End: 2025-06-23
Payer: MEDICARE

## 2025-06-23 PROCEDURE — 94625 PHY/QHP OP PULM RHB W/O MNTR: CPT

## 2025-06-26 ENCOUNTER — HOSPITAL ENCOUNTER (OUTPATIENT)
Dept: CARDIAC REHAB | Age: 70
Setting detail: THERAPIES SERIES
Discharge: HOME | End: 2025-06-26
Payer: MEDICARE

## 2025-06-26 PROCEDURE — 94625 PHY/QHP OP PULM RHB W/O MNTR: CPT

## 2025-06-30 ENCOUNTER — APPOINTMENT (OUTPATIENT)
Dept: GENERAL RADIOLOGY | Age: 70
DRG: 216 | End: 2025-06-30
Payer: MEDICARE

## 2025-06-30 ENCOUNTER — HOSPITAL ENCOUNTER (OUTPATIENT)
Dept: INFUSION THERAPY | Age: 70
Setting detail: INFUSION SERIES
Discharge: HOME | End: 2025-06-30
Payer: MEDICARE

## 2025-06-30 ENCOUNTER — APPOINTMENT (OUTPATIENT)
Dept: NON INVASIVE DIAGNOSTICS | Age: 70
DRG: 216 | End: 2025-06-30
Attending: STUDENT IN AN ORGANIZED HEALTH CARE EDUCATION/TRAINING PROGRAM
Payer: MEDICARE

## 2025-06-30 ENCOUNTER — APPOINTMENT (OUTPATIENT)
Dept: CT IMAGING | Age: 70
DRG: 216 | End: 2025-06-30
Payer: MEDICARE

## 2025-06-30 ENCOUNTER — HOSPITAL ENCOUNTER (INPATIENT)
Age: 70
LOS: 17 days | Discharge: NURSING, REHAB OR LONG-TERM CARE | DRG: 216 | End: 2025-07-17
Attending: STUDENT IN AN ORGANIZED HEALTH CARE EDUCATION/TRAINING PROGRAM | Admitting: THORACIC SURGERY (CARDIOTHORACIC VASCULAR SURGERY)
Payer: MEDICARE

## 2025-06-30 VITALS
SYSTOLIC BLOOD PRESSURE: 161 MMHG | OXYGEN SATURATION: 91 % | HEART RATE: 73 BPM | RESPIRATION RATE: 24 BRPM | DIASTOLIC BLOOD PRESSURE: 54 MMHG | TEMPERATURE: 98.1 F

## 2025-06-30 DIAGNOSIS — J96.00 ACUTE RESPIRATORY FAILURE, UNSPECIFIED WHETHER WITH HYPOXIA OR HYPERCAPNIA (HCC): ICD-10-CM

## 2025-06-30 DIAGNOSIS — I50.9 ACUTE ON CHRONIC CONGESTIVE HEART FAILURE, UNSPECIFIED HEART FAILURE TYPE (HCC): Primary | ICD-10-CM

## 2025-06-30 DIAGNOSIS — I10 ESSENTIAL HYPERTENSION: ICD-10-CM

## 2025-06-30 DIAGNOSIS — I35.1 NONRHEUMATIC AORTIC VALVE INSUFFICIENCY: ICD-10-CM

## 2025-06-30 DIAGNOSIS — R06.02 SHORTNESS OF BREATH: ICD-10-CM

## 2025-06-30 DIAGNOSIS — I25.10 CORONARY ARTERY DISEASE, UNSPECIFIED VESSEL OR LESION TYPE, UNSPECIFIED WHETHER ANGINA PRESENT, UNSPECIFIED WHETHER NATIVE OR TRANSPLANTED HEART: ICD-10-CM

## 2025-06-30 DIAGNOSIS — R07.9 CHEST PAIN: ICD-10-CM

## 2025-06-30 PROBLEM — J96.01 ACUTE RESPIRATORY FAILURE WITH HYPOXEMIA (HCC): Status: ACTIVE | Noted: 2025-06-30

## 2025-06-30 PROBLEM — J96.01 ACUTE RESPIRATORY FAILURE WITH HYPOXEMIA (HCC): Status: RESOLVED | Noted: 2025-06-30 | Resolved: 2025-06-30

## 2025-06-30 LAB
ALBUMIN SERPL-MCNC: 3.3 G/DL (ref 3.4–5)
ALBUMIN/GLOB SERPL: 0.8 {RATIO} (ref 1.1–2.2)
ALP SERPL-CCNC: 109 U/L (ref 40–129)
ALT SERPL-CCNC: 17 U/L (ref 10–40)
ANION GAP SERPL CALCULATED.3IONS-SCNC: 11 MMOL/L (ref 9–17)
ARTERIAL PATENCY WRIST A: ABNORMAL
AST SERPL-CCNC: 25 U/L (ref 15–37)
B PARAP IS1001 DNA NPH QL NAA+NON-PROBE: NOT DETECTED
B PERT DNA SPEC QL NAA+PROBE: NOT DETECTED
BASOPHILS # BLD: 0.1 K/UL
BASOPHILS NFR BLD: 2 % (ref 0–1)
BILIRUB SERPL-MCNC: 0.9 MG/DL (ref 0–1)
BNP SERPL-MCNC: 1090 PG/ML (ref 0–125)
BODY TEMPERATURE: 37
BUN SERPL-MCNC: 17 MG/DL (ref 7–20)
C PNEUM DNA NPH QL NAA+NON-PROBE: NOT DETECTED
CALCIUM SERPL-MCNC: 8.4 MG/DL (ref 8.3–10.6)
CHLORIDE SERPL-SCNC: 107 MMOL/L (ref 99–110)
CO2 SERPL-SCNC: 21 MMOL/L (ref 21–32)
COHGB MFR BLD: 1.7 % (ref 0.5–1.5)
CREAT SERPL-MCNC: 1.2 MG/DL (ref 0.6–1.2)
ECHO AO ROOT DIAM: 2.6 CM
ECHO AO ROOT INDEX: 1.38 CM/M2
ECHO AR MAX VEL PISA: 3.9 M/S
ECHO AV AREA PEAK VELOCITY: 2.3 CM2
ECHO AV AREA VTI: 2.3 CM2
ECHO AV AREA/BSA PEAK VELOCITY: 1.2 CM2/M2
ECHO AV AREA/BSA VTI: 1.2 CM2/M2
ECHO AV MEAN GRADIENT: 7 MMHG
ECHO AV MEAN VELOCITY: 1.2 M/S
ECHO AV PEAK GRADIENT: 11 MMHG
ECHO AV PEAK VELOCITY: 1.7 M/S
ECHO AV REGURGITANT PHT: 214 MS
ECHO AV VELOCITY RATIO: 0.71
ECHO AV VTI: 39.1 CM
ECHO BSA: 1.91 M2
ECHO EST RA PRESSURE: 3 MMHG
ECHO IVC PROX: 1.5 CM
ECHO LA AREA 4C: 13.5 CM2
ECHO LA DIAMETER INDEX: 1.8 CM/M2
ECHO LA DIAMETER: 3.4 CM
ECHO LA MAJOR AXIS: 4.9 CM
ECHO LA TO AORTIC ROOT RATIO: 1.31
ECHO LA VOL MOD A4C: 29 ML (ref 22–52)
ECHO LA VOLUME INDEX MOD A4C: 15 ML/M2 (ref 16–34)
ECHO LV E' LATERAL VELOCITY: 10.2 CM/S
ECHO LV E' SEPTAL VELOCITY: 7.3 CM/S
ECHO LV EDV A4C: 57 ML
ECHO LV EDV INDEX A4C: 30 ML/M2
ECHO LV EF PHYSICIAN: 65 %
ECHO LV EJECTION FRACTION A4C: 64 %
ECHO LV ESV A4C: 21 ML
ECHO LV ESV INDEX A4C: 11 ML/M2
ECHO LV FRACTIONAL SHORTENING: 41 % (ref 28–44)
ECHO LV INTERNAL DIMENSION DIASTOLE INDEX: 2.17 CM/M2
ECHO LV INTERNAL DIMENSION DIASTOLIC: 4.1 CM (ref 3.9–5.3)
ECHO LV INTERNAL DIMENSION SYSTOLIC INDEX: 1.27 CM/M2
ECHO LV INTERNAL DIMENSION SYSTOLIC: 2.4 CM
ECHO LV IVSD: 1.2 CM (ref 0.6–0.9)
ECHO LV MASS 2D: 171.7 G (ref 67–162)
ECHO LV MASS INDEX 2D: 90.9 G/M2 (ref 43–95)
ECHO LV POSTERIOR WALL DIASTOLIC: 1.2 CM (ref 0.6–0.9)
ECHO LV RELATIVE WALL THICKNESS RATIO: 0.59
ECHO LVOT AREA: 3.1 CM2
ECHO LVOT AV VTI INDEX: 0.73
ECHO LVOT DIAM: 2 CM
ECHO LVOT MEAN GRADIENT: 4 MMHG
ECHO LVOT PEAK GRADIENT: 6 MMHG
ECHO LVOT PEAK VELOCITY: 1.2 M/S
ECHO LVOT STROKE VOLUME INDEX: 47.3 ML/M2
ECHO LVOT SV: 89.5 ML
ECHO LVOT VTI: 28.5 CM
ECHO MV A VELOCITY: 1.18 M/S
ECHO MV E DECELERATION TIME (DT): 165 MS
ECHO MV E VELOCITY: 1.06 M/S
ECHO MV E/A RATIO: 0.9
ECHO MV E/E' LATERAL: 10.39
ECHO MV E/E' RATIO (AVERAGED): 12.46
ECHO MV E/E' SEPTAL: 14.52
ECHO RIGHT VENTRICULAR SYSTOLIC PRESSURE (RVSP): 52 MMHG
ECHO RV MID DIMENSION: 3.2 CM
ECHO TV REGURGITANT MAX VELOCITY: 3.49 M/S
ECHO TV REGURGITANT PEAK GRADIENT: 49 MMHG
EKG ATRIAL RATE: 76 BPM
EKG DIAGNOSIS: NORMAL
EKG P AXIS: 53 DEGREES
EKG P-R INTERVAL: 178 MS
EKG Q-T INTERVAL: 418 MS
EKG QRS DURATION: 110 MS
EKG QTC CALCULATION (BAZETT): 470 MS
EKG R AXIS: 19 DEGREES
EKG T AXIS: 83 DEGREES
EKG VENTRICULAR RATE: 76 BPM
EOSINOPHIL # BLD: 0.2 K/UL
EOSINOPHILS RELATIVE PERCENT: 3 % (ref 0–3)
ERYTHROCYTE [DISTWIDTH] IN BLOOD BY AUTOMATED COUNT: 14.4 % (ref 11.7–14.9)
FERRITIN SERPL-MCNC: 285 NG/ML (ref 15–150)
FLUAV RNA NPH QL NAA+NON-PROBE: NOT DETECTED
FLUBV RNA NPH QL NAA+NON-PROBE: NOT DETECTED
GFR, ESTIMATED: 46 ML/MIN/1.73M2
GLUCOSE SERPL-MCNC: 102 MG/DL (ref 74–99)
HADV DNA NPH QL NAA+NON-PROBE: NOT DETECTED
HCO3 VENOUS: 21.9 MMOL/L (ref 22–29)
HCOV 229E RNA NPH QL NAA+NON-PROBE: NOT DETECTED
HCOV HKU1 RNA NPH QL NAA+NON-PROBE: NOT DETECTED
HCOV NL63 RNA NPH QL NAA+NON-PROBE: NOT DETECTED
HCOV OC43 RNA NPH QL NAA+NON-PROBE: NOT DETECTED
HCT VFR BLD AUTO: 40.6 % (ref 37–47)
HGB BLD-MCNC: 13.1 G/DL (ref 12.5–16)
HMPV RNA NPH QL NAA+NON-PROBE: NOT DETECTED
HPIV1 RNA NPH QL NAA+NON-PROBE: NOT DETECTED
HPIV2 RNA NPH QL NAA+NON-PROBE: NOT DETECTED
HPIV3 RNA NPH QL NAA+NON-PROBE: NOT DETECTED
HPIV4 RNA NPH QL NAA+NON-PROBE: NOT DETECTED
IMM GRANULOCYTES # BLD AUTO: 0.02 K/UL
IMM GRANULOCYTES NFR BLD: 0 %
IRON SATN MFR SERPL: 13 % (ref 15–50)
IRON SERPL-MCNC: 32 UG/DL (ref 37–145)
LACTATE BLDV-SCNC: 1.5 MMOL/L (ref 0.4–2)
LYMPHOCYTES NFR BLD: 1.27 K/UL
LYMPHOCYTES RELATIVE PERCENT: 19 % (ref 24–44)
M PNEUMO DNA NPH QL NAA+NON-PROBE: NOT DETECTED
MAGNESIUM SERPL-MCNC: 2.3 MG/DL (ref 1.8–2.4)
MCH RBC QN AUTO: 27.6 PG (ref 27–31)
MCHC RBC AUTO-ENTMCNC: 32.3 G/DL (ref 32–36)
MCV RBC AUTO: 85.7 FL (ref 78–100)
METHEMOGLOBIN: 0.2 % (ref 0.5–1.5)
MONOCYTES NFR BLD: 0.63 K/UL
MONOCYTES NFR BLD: 9 % (ref 0–5)
NEGATIVE BASE EXCESS, VEN: 1.6 MMOL/L (ref 0–3)
NEUTROPHILS NFR BLD: 68 % (ref 36–66)
NEUTS SEG NFR BLD: 4.62 K/UL
OXYHGB MFR BLD: 66.5 %
PCO2 VENOUS: 33.8 MM HG (ref 38–54)
PH VENOUS: 7.43 (ref 7.32–7.43)
PLATELET # BLD AUTO: 232 K/UL (ref 140–440)
PMV BLD AUTO: 12.1 FL (ref 7.5–11.1)
PO2 VENOUS: 35 MM HG (ref 23–48)
POTASSIUM SERPL-SCNC: 3.9 MMOL/L (ref 3.5–5.1)
PROCALCITONIN SERPL-MCNC: 0.05 NG/ML
PROT SERPL-MCNC: 7.3 G/DL (ref 6.4–8.2)
RBC # BLD AUTO: 4.74 M/UL (ref 4.2–5.4)
RSV RNA NPH QL NAA+NON-PROBE: NOT DETECTED
RV+EV RNA NPH QL NAA+NON-PROBE: NOT DETECTED
SARS-COV-2 RNA NPH QL NAA+NON-PROBE: NOT DETECTED
SODIUM SERPL-SCNC: 140 MMOL/L (ref 136–145)
SPECIMEN DESCRIPTION: NORMAL
TIBC SERPL-MCNC: 244 UG/DL (ref 260–445)
TROPONIN I SERPL HS-MCNC: 10 NG/L (ref 0–14)
TSH SERPL DL<=0.05 MIU/L-ACNC: 0.46 UIU/ML (ref 0.27–4.2)
UNSATURATED IRON BINDING CAPACITY: 212 UG/DL (ref 110–370)
WBC OTHER # BLD: 6.8 K/UL (ref 4–10.5)

## 2025-06-30 PROCEDURE — 94761 N-INVAS EAR/PLS OXIMETRY MLT: CPT

## 2025-06-30 PROCEDURE — 0202U NFCT DS 22 TRGT SARS-COV-2: CPT

## 2025-06-30 PROCEDURE — 6370000000 HC RX 637 (ALT 250 FOR IP)

## 2025-06-30 PROCEDURE — 6370000000 HC RX 637 (ALT 250 FOR IP): Performed by: STUDENT IN AN ORGANIZED HEALTH CARE EDUCATION/TRAINING PROGRAM

## 2025-06-30 PROCEDURE — 93306 TTE W/DOPPLER COMPLETE: CPT

## 2025-06-30 PROCEDURE — 6360000002 HC RX W HCPCS

## 2025-06-30 PROCEDURE — 83735 ASSAY OF MAGNESIUM: CPT

## 2025-06-30 PROCEDURE — 94640 AIRWAY INHALATION TREATMENT: CPT

## 2025-06-30 PROCEDURE — 83540 ASSAY OF IRON: CPT

## 2025-06-30 PROCEDURE — 2500000003 HC RX 250 WO HCPCS

## 2025-06-30 PROCEDURE — 83605 ASSAY OF LACTIC ACID: CPT

## 2025-06-30 PROCEDURE — 84145 PROCALCITONIN (PCT): CPT

## 2025-06-30 PROCEDURE — 93010 ELECTROCARDIOGRAM REPORT: CPT | Performed by: INTERNAL MEDICINE

## 2025-06-30 PROCEDURE — 99223 1ST HOSP IP/OBS HIGH 75: CPT | Performed by: INTERNAL MEDICINE

## 2025-06-30 PROCEDURE — 83880 ASSAY OF NATRIURETIC PEPTIDE: CPT

## 2025-06-30 PROCEDURE — 99211 OFF/OP EST MAY X REQ PHY/QHP: CPT

## 2025-06-30 PROCEDURE — 70450 CT HEAD/BRAIN W/O DYE: CPT

## 2025-06-30 PROCEDURE — 2500000003 HC RX 250 WO HCPCS: Performed by: STUDENT IN AN ORGANIZED HEALTH CARE EDUCATION/TRAINING PROGRAM

## 2025-06-30 PROCEDURE — 82805 BLOOD GASES W/O2 SATURATION: CPT

## 2025-06-30 PROCEDURE — 2700000000 HC OXYGEN THERAPY PER DAY

## 2025-06-30 PROCEDURE — 6360000002 HC RX W HCPCS: Performed by: STUDENT IN AN ORGANIZED HEALTH CARE EDUCATION/TRAINING PROGRAM

## 2025-06-30 PROCEDURE — 93306 TTE W/DOPPLER COMPLETE: CPT | Performed by: INTERNAL MEDICINE

## 2025-06-30 PROCEDURE — 84443 ASSAY THYROID STIM HORMONE: CPT

## 2025-06-30 PROCEDURE — 2140000000 HC CCU INTERMEDIATE R&B

## 2025-06-30 PROCEDURE — 80053 COMPREHEN METABOLIC PANEL: CPT

## 2025-06-30 PROCEDURE — 96374 THER/PROPH/DIAG INJ IV PUSH: CPT

## 2025-06-30 PROCEDURE — 93005 ELECTROCARDIOGRAM TRACING: CPT | Performed by: STUDENT IN AN ORGANIZED HEALTH CARE EDUCATION/TRAINING PROGRAM

## 2025-06-30 PROCEDURE — 36415 COLL VENOUS BLD VENIPUNCTURE: CPT

## 2025-06-30 PROCEDURE — 85025 COMPLETE CBC W/AUTO DIFF WBC: CPT

## 2025-06-30 PROCEDURE — 84484 ASSAY OF TROPONIN QUANT: CPT

## 2025-06-30 PROCEDURE — 99285 EMERGENCY DEPT VISIT HI MDM: CPT

## 2025-06-30 PROCEDURE — 82728 ASSAY OF FERRITIN: CPT

## 2025-06-30 PROCEDURE — 83550 IRON BINDING TEST: CPT

## 2025-06-30 PROCEDURE — 71045 X-RAY EXAM CHEST 1 VIEW: CPT

## 2025-06-30 RX ORDER — FUROSEMIDE 10 MG/ML
40 INJECTION INTRAMUSCULAR; INTRAVENOUS ONCE
Status: COMPLETED | OUTPATIENT
Start: 2025-06-30 | End: 2025-06-30

## 2025-06-30 RX ORDER — AMLODIPINE BESYLATE 10 MG/1
10 TABLET ORAL DAILY
Status: DISCONTINUED | OUTPATIENT
Start: 2025-06-30 | End: 2025-07-08

## 2025-06-30 RX ORDER — ATORVASTATIN CALCIUM 40 MG/1
40 TABLET, FILM COATED ORAL NIGHTLY
Status: DISCONTINUED | OUTPATIENT
Start: 2025-06-30 | End: 2025-07-08

## 2025-06-30 RX ORDER — POTASSIUM CHLORIDE 7.45 MG/ML
10 INJECTION INTRAVENOUS PRN
Status: DISCONTINUED | OUTPATIENT
Start: 2025-06-30 | End: 2025-07-08

## 2025-06-30 RX ORDER — GUAIFENESIN 600 MG/1
600 TABLET, EXTENDED RELEASE ORAL 2 TIMES DAILY
Status: DISCONTINUED | OUTPATIENT
Start: 2025-06-30 | End: 2025-07-08

## 2025-06-30 RX ORDER — ACETAMINOPHEN 325 MG/1
650 TABLET ORAL EVERY 6 HOURS PRN
Status: DISCONTINUED | OUTPATIENT
Start: 2025-06-30 | End: 2025-07-01 | Stop reason: SDUPTHER

## 2025-06-30 RX ORDER — ACETAMINOPHEN 650 MG/1
650 SUPPOSITORY RECTAL EVERY 6 HOURS PRN
Status: DISCONTINUED | OUTPATIENT
Start: 2025-06-30 | End: 2025-07-08

## 2025-06-30 RX ORDER — SODIUM CHLORIDE 9 MG/ML
INJECTION, SOLUTION INTRAVENOUS PRN
Status: DISCONTINUED | OUTPATIENT
Start: 2025-06-30 | End: 2025-07-01 | Stop reason: SDUPTHER

## 2025-06-30 RX ORDER — POLYETHYLENE GLYCOL 3350 17 G/17G
17 POWDER, FOR SOLUTION ORAL DAILY PRN
Status: DISCONTINUED | OUTPATIENT
Start: 2025-06-30 | End: 2025-07-08

## 2025-06-30 RX ORDER — ENOXAPARIN SODIUM 100 MG/ML
40 INJECTION SUBCUTANEOUS DAILY
Status: DISCONTINUED | OUTPATIENT
Start: 2025-06-30 | End: 2025-07-07

## 2025-06-30 RX ORDER — POTASSIUM CHLORIDE 1500 MG/1
40 TABLET, EXTENDED RELEASE ORAL PRN
Status: DISCONTINUED | OUTPATIENT
Start: 2025-06-30 | End: 2025-07-08

## 2025-06-30 RX ORDER — HYDROXYCHLOROQUINE SULFATE 200 MG/1
300 TABLET, FILM COATED ORAL DAILY
Status: DISCONTINUED | OUTPATIENT
Start: 2025-06-30 | End: 2025-07-08

## 2025-06-30 RX ORDER — ONDANSETRON 4 MG/1
4 TABLET, ORALLY DISINTEGRATING ORAL EVERY 8 HOURS PRN
Status: DISCONTINUED | OUTPATIENT
Start: 2025-06-30 | End: 2025-07-08

## 2025-06-30 RX ORDER — SODIUM CHLORIDE 0.9 % (FLUSH) 0.9 %
5-40 SYRINGE (ML) INJECTION PRN
Status: DISCONTINUED | OUTPATIENT
Start: 2025-06-30 | End: 2025-07-08

## 2025-06-30 RX ORDER — IPRATROPIUM BROMIDE AND ALBUTEROL SULFATE 2.5; .5 MG/3ML; MG/3ML
1 SOLUTION RESPIRATORY (INHALATION) ONCE
Status: COMPLETED | OUTPATIENT
Start: 2025-06-30 | End: 2025-06-30

## 2025-06-30 RX ORDER — CARVEDILOL 25 MG/1
25 TABLET ORAL 2 TIMES DAILY
Status: DISPENSED | OUTPATIENT
Start: 2025-06-30 | End: 2025-07-07

## 2025-06-30 RX ORDER — FUROSEMIDE 10 MG/ML
40 INJECTION INTRAMUSCULAR; INTRAVENOUS 2 TIMES DAILY
Status: DISCONTINUED | OUTPATIENT
Start: 2025-06-30 | End: 2025-07-02

## 2025-06-30 RX ORDER — SODIUM CHLORIDE 0.9 % (FLUSH) 0.9 %
5-40 SYRINGE (ML) INJECTION EVERY 12 HOURS SCHEDULED
Status: DISCONTINUED | OUTPATIENT
Start: 2025-06-30 | End: 2025-07-08

## 2025-06-30 RX ORDER — ONDANSETRON 2 MG/ML
4 INJECTION INTRAMUSCULAR; INTRAVENOUS EVERY 6 HOURS PRN
Status: DISCONTINUED | OUTPATIENT
Start: 2025-06-30 | End: 2025-07-08

## 2025-06-30 RX ORDER — MAGNESIUM SULFATE IN WATER 40 MG/ML
2000 INJECTION, SOLUTION INTRAVENOUS PRN
Status: DISCONTINUED | OUTPATIENT
Start: 2025-06-30 | End: 2025-07-08

## 2025-06-30 RX ORDER — ASPIRIN 81 MG/1
81 TABLET, CHEWABLE ORAL DAILY
Status: DISCONTINUED | OUTPATIENT
Start: 2025-06-30 | End: 2025-07-08

## 2025-06-30 RX ORDER — PREDNISONE 5 MG/1
5 TABLET ORAL EVERY OTHER DAY
Status: DISCONTINUED | OUTPATIENT
Start: 2025-07-01 | End: 2025-07-08

## 2025-06-30 RX ADMIN — FUROSEMIDE 40 MG: 10 INJECTION, SOLUTION INTRAMUSCULAR; INTRAVENOUS at 16:38

## 2025-06-30 RX ADMIN — FUROSEMIDE 40 MG: 10 INJECTION, SOLUTION INTRAMUSCULAR; INTRAVENOUS at 12:23

## 2025-06-30 RX ADMIN — HYDROXYCHLOROQUINE SULFATE 300 MG: 200 TABLET ORAL at 14:41

## 2025-06-30 RX ADMIN — METHYLPREDNISOLONE SODIUM SUCCINATE 80 MG: 125 INJECTION, POWDER, LYOPHILIZED, FOR SOLUTION INTRAMUSCULAR; INTRAVENOUS at 09:39

## 2025-06-30 RX ADMIN — ATORVASTATIN CALCIUM 40 MG: 40 TABLET, FILM COATED ORAL at 20:34

## 2025-06-30 RX ADMIN — GUAIFENESIN 600 MG: 600 TABLET ORAL at 20:34

## 2025-06-30 RX ADMIN — GUAIFENESIN 600 MG: 600 TABLET ORAL at 09:40

## 2025-06-30 RX ADMIN — IPRATROPIUM BROMIDE AND ALBUTEROL SULFATE 1 DOSE: .5; 2.5 SOLUTION RESPIRATORY (INHALATION) at 11:17

## 2025-06-30 RX ADMIN — SODIUM CHLORIDE, PRESERVATIVE FREE 10 ML: 5 INJECTION INTRAVENOUS at 20:34

## 2025-06-30 RX ADMIN — CARVEDILOL 25 MG: 25 TABLET, FILM COATED ORAL at 20:34

## 2025-06-30 RX ADMIN — ENOXAPARIN SODIUM 40 MG: 100 INJECTION SUBCUTANEOUS at 20:34

## 2025-06-30 ASSESSMENT — PAIN SCALES - GENERAL
PAINLEVEL_OUTOF10: 0

## 2025-06-30 ASSESSMENT — PAIN - FUNCTIONAL ASSESSMENT: PAIN_FUNCTIONAL_ASSESSMENT: 0-10

## 2025-06-30 NOTE — ED TRIAGE NOTES
Patient to the ED from [facility] for c/o SOB. Patient reports that she wears 3-4 LPM/NC at baseline but is unable to maintain her O2 on that at this time. Denies sick contacts. Denies any new pain.

## 2025-06-30 NOTE — ED NOTES
ED TO INPATIENT SBAR HANDOFF    Patient Name: Radha Gomez   :  1955  69 y.o.   Preferred Name    Family/Caregiver Present yes   Restraints no   C-SSRS: Risk of Suicide: No Risk  Sitter no   Sepsis Risk Score Sepsis V2 Risk Score: 35.6    PLEASE NOTE--Encounter / Re-Admission Within 30 Days  This patient has had another encounter or admission within the last 30 days.      Readmission Risk Score: 18.4      Situation  Chief Complaint   Patient presents with    Shortness of Breath     Patient was upstairs getting RA infusion and was sent to ED d/t decrease O2 levels. Does wear 3LPM O2 at baseline      Brief Description of Patient's Condition: pt to the ED with SOB and is being admitted with acute CHF and respiratory failure. Pt was going to get her RA infusion today when it was discovered that her oxygen level was low. However, during her stay, she has taken her oxygen off several times and has to be reminded to place it back on (she is alert and oriented x4). Pt normally wears 3L at baseline and is on 5L at this time, and sometimes requires 6L depending on how compliant she is with the nasal cannula  Mental Status: oriented, alert, coherent, logical, thought processes intact, and able to concentrate and follow conversation  Arrived from: home    Imaging:   CT HEAD WO CONTRAST   Final Result      XR CHEST PORTABLE   Final Result        Abnormal labs:   Abnormal Labs Reviewed   CBC WITH AUTO DIFFERENTIAL - Abnormal; Notable for the following components:       Result Value    MPV 12.1 (*)     Neutrophils % 68 (*)     Lymphocytes % 19 (*)     Monocytes % 9 (*)     Basophils % 2 (*)     All other components within normal limits   BRAIN NATRIURETIC PEPTIDE - Abnormal; Notable for the following components:    NT Pro-BNP 1,090 (*)     All other components within normal limits   BLOOD GAS, VENOUS - Abnormal; Notable for the following components:    pCO2, Joel 33.8 (*)     HCO3, Venous 21.9 (*)     Carboxyhemoglobin 1.7

## 2025-06-30 NOTE — PROGRESS NOTES
Pt taken to room 02 via wheelchair by . Pt oriented to room, call light, bed/chair controls, TV, pt voiced understanding.  Plan of care explained to pt, pt voiced understanding. Pt sob with 5L of 02, pt states the last 2 days increasing SOB and feeling fatigued and can't get o2 @ baseline without increasing 02 to 5L from 3L baseline. Pt states cardiac rehab had problems getting o2 up 4 days ago. VS taken.

## 2025-06-30 NOTE — ED NOTES
Called [facility], spoke with Shala, and stated an SBAR is in and that his RN will be bringing the patient up

## 2025-06-30 NOTE — PROGRESS NOTES
Pt and pt  agreeable to go to ER. D/t SOB.  took pt via wheelchair. I called ER demetria torres and let her know pt is on their way.

## 2025-06-30 NOTE — ED PROVIDER NOTES
[facility] EMERGENCY DEPARTMENT  EMERGENCY DEPARTMENT ENCOUNTER        Pt Name: Radha Gomez  MRN: [id number]  Birthdate 1955  Date of evaluation: 6/30/2025  Provider: TU Forman - CNP  PCP: Luis Buernostro MD  Note Started: 8:51 AM EDT 6/30/25      VALARIE. I have evaluated this patient.        CHIEF COMPLAINT       Chief Complaint   Patient presents with    Shortness of Breath     Patient was upstairs getting RA infusion and was sent to ED d/t decrease O2 levels. Does wear 3LPM O2 at baseline        HISTORY OF PRESENT ILLNESS: 1 or more Elements     History From: Patient    Limitations to history : None    Social Determinants Significantly Affecting Health : None    Chief Complaint: Shortness of breath decreased pulse ox    Radha Gomez is a 69 y.o. female past medical history of COPD, RA, CAD, smoking, HTN, CKD stage III, chronic oxygen use of 3 L, CHF who presents to ED stating since Thursday she has had worsening of shortness of breath.  States her pulse ox has been dropping significantly.  She stated while she was at cardiac rehab she usually can complete the activities but now she is even short of breath at rest.  She states she has had to increase her oxygen demands.  Denies any chest pain.  States over the past week she has had a 3 to 4 pound weight gain.  She is taking her torsemide as prescribed.  States she has had some mild nasal congestion and a headache with a cough.  Denies any productive cough.  States she does take prednisone chronically every other day and also gets Plaquenil infusions for her RA.  Denies any abdominal pain nausea vomiting or diarrhea.  Denies any dizziness visual changes weakness or numbness.  States she just feels generally fatigued have the posterior headache when she tries to change positions but is mostly into her neck.    Nursing Notes were all reviewed and agreed with or any disagreements were addressed in the HPI.    REVIEW OF SYSTEMS

## 2025-06-30 NOTE — ED NOTES
Medication History  [facility]    Patient Name: Radha Gomez 1955     Medication history has been completed by: Yanni Ruiz, Pharmacy Intern    Source(s) of information: Patient and insurance claims    Primary Care Physician: Luis Buenrostro MD     Pharmacy: [facility]    Allergies as of 06/30/2025 - Fully Reviewed 06/30/2025   Allergen Reaction Noted    Nickel Hives, Itching, and Swelling 12/10/2024        Prior to Admission medications    Medication Sig Start Date End Date Taking? Authorizing Provider   denosumab (PROLIA) 60 MG/ML SOSY SC injection Inject 1 mL into the skin every 6 months 5/28/25  Yes Chrissy Randolph MD   predniSONE (DELTASONE) 5 MG tablet Take 1 tablet by mouth every other day 5/28/25  Yes Chrissy Randolph MD   hydroxychloroquine (PLAQUENIL) 200 MG tablet Take 1.5 tablets by mouth daily 5/28/25  Yes Chrissy Randolph MD   tiotropium (SPIRIVA) 18 MCG inhalation capsule Inhale 1 capsule into the lungs daily 5/5/25  Yes Mp Choudhary MD   atorvastatin (LIPITOR) 40 MG tablet Take 1 tablet by mouth nightly 12/13/24  Yes Tonia Simpson MD   empagliflozin (JARDIANCE) 10 MG tablet Take 1 tablet by mouth daily 12/14/24  Yes Tonia Simpson MD   torsemide (DEMADEX) 20 MG tablet Take 1 tablet by mouth in the morning and 1 tablet in the evening. 12/13/24  Yes Tonia Simpson MD   vitamin D (CHOLECALCIFEROL) 25 MCG (1000 UT) TABS tablet Take 1 tablet by mouth daily 10/8/24  Yes Chrissy Randolph MD   calcium carbonate (OSCAL) 500 MG TABS tablet Take 1 tablet by mouth daily OTC   Yes Venkat uGthrie MD   aspirin 81 MG chewable tablet Take 1 tablet by mouth daily OTC   Yes Venkat Guthrie MD   Cyanocobalamin (VITAMIN B 12) 500 MCG TABS Take 500 mcg by mouth daily OTC   Yes Venkat Guthrie MD   [place]-Vitamin C-Zinc ([place] GUMMY PO) Take 2 each by mouth daily OTC   Yes Provider, Historical,

## 2025-06-30 NOTE — H&P
V2.0  History and Physical      Name:  Radha Gomez /Age/Sex: 1955  (69 y.o. female)   MRN & CSN:  [id number] & [id number] Encounter Date/Time: 2025 12:04 PM EDT   Location:  ED15/ED-15 PCP: Luis Buenrostro MD       Hospital Day: 1    Assessment and Plan:   Radha Gomez is a 69 y.o. female with a pmh of rheumatoid arthritis, hypertension, hyperlipidemia, HFpEF, valvular heart disease, severe pulmonary hypertension, COPD, chronic respiratory failure on 3 L of oxygen at home, who presents with Acute respiratory failure with hypoxemia (HCC)    Hospital Problems           Last Modified POA    * (Principal) Acute respiratory failure with hypoxemia (HCC) 2025 Yes       # Acute on chronic hypoxic respiratory failure suspected secondary to volume overload: Presented with worsening shortness of breath, baseline O2 3 L, SpO2 on presentation 83%, VBG normal, chest x-ray pulmonary congestion, patient on torsemide held, started on IV Lasix, currently on 6 L by nasal cannula, respiratory panel negative, wean off oxygen as tolerated.    # Acute on chronic HFpEF: Elevated BNP, chest x-ray pulmonary congestion, last echocardiogram  showed EF 65 to 70%, grade 2 diastolic dysfunction, severe AR, moderate TR, severe pulmonary hypertension, obtain echocardiogram, started on IV Lasix 40 mg twice daily, held torsemide, strict ins/O's, daily weight, fluid and salt restriction, consulted cardiology follow recommendations    # Hypertension: On amlodipine, Coreg, continue  # Hyperlipidemia: On atorvastatin continue  # Rheumatoid arthritis: On prednisone and Plaquenil continue  # COPD: On inhalers continue  # Idiopathic osteoporosis: On Prolia    Comment: Please note this report has been produced using speech recognition software and may contain errors related to that system including errors in grammar, punctuation, and spelling, as well as words and phrases that may be inappropriate. If there are any questions

## 2025-07-01 ENCOUNTER — TELEPHONE (OUTPATIENT)
Age: 70
End: 2025-07-01

## 2025-07-01 LAB
ALBUMIN SERPL-MCNC: 3.6 G/DL (ref 3.4–5)
ALBUMIN/GLOB SERPL: 0.9 {RATIO} (ref 1.1–2.2)
ALP SERPL-CCNC: 115 U/L (ref 40–129)
ALT SERPL-CCNC: 17 U/L (ref 10–40)
ANION GAP SERPL CALCULATED.3IONS-SCNC: 14 MMOL/L (ref 9–17)
ARTERIAL PATENCY WRIST A: ABNORMAL
AST SERPL-CCNC: 19 U/L (ref 15–37)
BILIRUB DIRECT SERPL-MCNC: 0.3 MG/DL (ref 0–0.3)
BILIRUB INDIRECT SERPL-MCNC: 0.4 MG/DL (ref 0–0.7)
BILIRUB SERPL-MCNC: 0.7 MG/DL (ref 0–1)
BODY TEMPERATURE: 37
BUN SERPL-MCNC: 21 MG/DL (ref 7–20)
CALCIUM SERPL-MCNC: 8.8 MG/DL (ref 8.3–10.6)
CHLORIDE SERPL-SCNC: 105 MMOL/L (ref 99–110)
CHOLEST SERPL-MCNC: 154 MG/DL (ref 125–199)
CO2 SERPL-SCNC: 20 MMOL/L (ref 21–32)
COHGB MFR BLD: 0.8 % (ref 0.5–1.5)
CREAT SERPL-MCNC: 1.2 MG/DL (ref 0.6–1.2)
ECHO BSA: 1.91 M2
EST. AVERAGE GLUCOSE BLD GHB EST-MCNC: 112 MG/DL
GFR, ESTIMATED: 46 ML/MIN/1.73M2
GLUCOSE SERPL-MCNC: 125 MG/DL (ref 74–99)
HBA1C MFR BLD: 5.5 % (ref 4.2–6.3)
HCO3 ARTERIAL: 22.1 MMOL/L (ref 21–28)
HDLC SERPL-MCNC: 102 MG/DL
LDLC SERPL CALC-MCNC: 42 MG/DL
MAGNESIUM SERPL-MCNC: 2.6 MG/DL (ref 1.8–2.4)
METHEMOGLOBIN: 0.3 % (ref 0.5–1.5)
NEGATIVE BASE EXCESS, ART: 1.1 MMOL/L (ref 0–3)
OXYHGB MFR BLD: 91.4 %
PCO2 ARTERIAL: 32.4 MMHG (ref 32–45)
PH ARTERIAL: 7.45 (ref 7.35–7.45)
PO2 ARTERIAL: 67.1 MMHG (ref 83–108)
POTASSIUM SERPL-SCNC: 3.5 MMOL/L (ref 3.5–5.1)
PROT SERPL-MCNC: 7.8 G/DL (ref 6.4–8.2)
SODIUM SERPL-SCNC: 139 MMOL/L (ref 136–145)
TRIGL SERPL-MCNC: 50 MG/DL

## 2025-07-01 PROCEDURE — 2700000000 HC OXYGEN THERAPY PER DAY

## 2025-07-01 PROCEDURE — 2500000003 HC RX 250 WO HCPCS: Performed by: INTERNAL MEDICINE

## 2025-07-01 PROCEDURE — 6370000000 HC RX 637 (ALT 250 FOR IP): Performed by: STUDENT IN AN ORGANIZED HEALTH CARE EDUCATION/TRAINING PROGRAM

## 2025-07-01 PROCEDURE — B2151ZZ FLUOROSCOPY OF LEFT HEART USING LOW OSMOLAR CONTRAST: ICD-10-PCS | Performed by: THORACIC SURGERY (CARDIOTHORACIC VASCULAR SURGERY)

## 2025-07-01 PROCEDURE — 6370000000 HC RX 637 (ALT 250 FOR IP)

## 2025-07-01 PROCEDURE — 93567 NJX CAR CTH SPRVLV AORTGRPHY: CPT | Performed by: INTERNAL MEDICINE

## 2025-07-01 PROCEDURE — 2500000003 HC RX 250 WO HCPCS: Performed by: STUDENT IN AN ORGANIZED HEALTH CARE EDUCATION/TRAINING PROGRAM

## 2025-07-01 PROCEDURE — 93458 L HRT ARTERY/VENTRICLE ANGIO: CPT | Performed by: INTERNAL MEDICINE

## 2025-07-01 PROCEDURE — 94761 N-INVAS EAR/PLS OXIMETRY MLT: CPT

## 2025-07-01 PROCEDURE — 93451 RIGHT HEART CATH: CPT | Performed by: INTERNAL MEDICINE

## 2025-07-01 PROCEDURE — 99233 SBSQ HOSP IP/OBS HIGH 50: CPT | Performed by: INTERNAL MEDICINE

## 2025-07-01 PROCEDURE — B3101ZZ FLUOROSCOPY OF THORACIC AORTA USING LOW OSMOLAR CONTRAST: ICD-10-PCS | Performed by: THORACIC SURGERY (CARDIOTHORACIC VASCULAR SURGERY)

## 2025-07-01 PROCEDURE — 83036 HEMOGLOBIN GLYCOSYLATED A1C: CPT

## 2025-07-01 PROCEDURE — 94640 AIRWAY INHALATION TREATMENT: CPT

## 2025-07-01 PROCEDURE — 82805 BLOOD GASES W/O2 SATURATION: CPT

## 2025-07-01 PROCEDURE — 7100000001 HC PACU RECOVERY - ADDTL 15 MIN: Performed by: INTERNAL MEDICINE

## 2025-07-01 PROCEDURE — 2140000000 HC CCU INTERMEDIATE R&B

## 2025-07-01 PROCEDURE — 82248 BILIRUBIN DIRECT: CPT

## 2025-07-01 PROCEDURE — 80061 LIPID PANEL: CPT

## 2025-07-01 PROCEDURE — C1769 GUIDE WIRE: HCPCS | Performed by: INTERNAL MEDICINE

## 2025-07-01 PROCEDURE — 83735 ASSAY OF MAGNESIUM: CPT

## 2025-07-01 PROCEDURE — 6360000002 HC RX W HCPCS: Performed by: INTERNAL MEDICINE

## 2025-07-01 PROCEDURE — 7100000000 HC PACU RECOVERY - FIRST 15 MIN: Performed by: INTERNAL MEDICINE

## 2025-07-01 PROCEDURE — 2580000003 HC RX 258: Performed by: INTERNAL MEDICINE

## 2025-07-01 PROCEDURE — B2111ZZ FLUOROSCOPY OF MULTIPLE CORONARY ARTERIES USING LOW OSMOLAR CONTRAST: ICD-10-PCS | Performed by: THORACIC SURGERY (CARDIOTHORACIC VASCULAR SURGERY)

## 2025-07-01 PROCEDURE — 6360000004 HC RX CONTRAST MEDICATION: Performed by: INTERNAL MEDICINE

## 2025-07-01 PROCEDURE — 4A023N8 MEASUREMENT OF CARDIAC SAMPLING AND PRESSURE, BILATERAL, PERCUTANEOUS APPROACH: ICD-10-PCS | Performed by: THORACIC SURGERY (CARDIOTHORACIC VASCULAR SURGERY)

## 2025-07-01 PROCEDURE — 80053 COMPREHEN METABOLIC PANEL: CPT

## 2025-07-01 PROCEDURE — 6360000002 HC RX W HCPCS: Performed by: STUDENT IN AN ORGANIZED HEALTH CARE EDUCATION/TRAINING PROGRAM

## 2025-07-01 PROCEDURE — 2709999900 HC NON-CHARGEABLE SUPPLY: Performed by: INTERNAL MEDICINE

## 2025-07-01 PROCEDURE — 36415 COLL VENOUS BLD VENIPUNCTURE: CPT

## 2025-07-01 PROCEDURE — C1751 CATH, INF, PER/CENT/MIDLINE: HCPCS | Performed by: INTERNAL MEDICINE

## 2025-07-01 PROCEDURE — C1894 INTRO/SHEATH, NON-LASER: HCPCS | Performed by: INTERNAL MEDICINE

## 2025-07-01 PROCEDURE — 93460 R&L HRT ART/VENTRICLE ANGIO: CPT | Performed by: INTERNAL MEDICINE

## 2025-07-01 RX ORDER — IOPAMIDOL 755 MG/ML
INJECTION, SOLUTION INTRAVASCULAR PRN
Status: DISCONTINUED | OUTPATIENT
Start: 2025-07-01 | End: 2025-07-01 | Stop reason: HOSPADM

## 2025-07-01 RX ORDER — SODIUM CHLORIDE 9 MG/ML
INJECTION, SOLUTION INTRAVENOUS CONTINUOUS PRN
Status: COMPLETED | OUTPATIENT
Start: 2025-07-01 | End: 2025-07-01

## 2025-07-01 RX ORDER — SODIUM CHLORIDE 9 MG/ML
INJECTION, SOLUTION INTRAVENOUS PRN
Status: DISCONTINUED | OUTPATIENT
Start: 2025-07-01 | End: 2025-07-08

## 2025-07-01 RX ORDER — SODIUM CHLORIDE 0.9 % (FLUSH) 0.9 %
5-40 SYRINGE (ML) INJECTION PRN
Status: DISCONTINUED | OUTPATIENT
Start: 2025-07-01 | End: 2025-07-08

## 2025-07-01 RX ORDER — ACETAMINOPHEN 325 MG/1
650 TABLET ORAL EVERY 4 HOURS PRN
Status: DISCONTINUED | OUTPATIENT
Start: 2025-07-01 | End: 2025-07-08

## 2025-07-01 RX ORDER — SODIUM CHLORIDE 0.9 % (FLUSH) 0.9 %
5-40 SYRINGE (ML) INJECTION EVERY 12 HOURS SCHEDULED
Status: DISCONTINUED | OUTPATIENT
Start: 2025-07-01 | End: 2025-07-08

## 2025-07-01 RX ADMIN — GUAIFENESIN 600 MG: 600 TABLET ORAL at 10:49

## 2025-07-01 RX ADMIN — ASPIRIN 81 MG 81 MG: 81 TABLET ORAL at 10:49

## 2025-07-01 RX ADMIN — HYDROXYCHLOROQUINE SULFATE 300 MG: 200 TABLET ORAL at 10:54

## 2025-07-01 RX ADMIN — FUROSEMIDE 40 MG: 10 INJECTION, SOLUTION INTRAMUSCULAR; INTRAVENOUS at 17:28

## 2025-07-01 RX ADMIN — TIOTROPIUM BROMIDE INHALATION SPRAY 5 MCG: 3.12 SPRAY, METERED RESPIRATORY (INHALATION) at 08:01

## 2025-07-01 RX ADMIN — GUAIFENESIN 600 MG: 600 TABLET ORAL at 21:02

## 2025-07-01 RX ADMIN — ATORVASTATIN CALCIUM 40 MG: 40 TABLET, FILM COATED ORAL at 21:02

## 2025-07-01 RX ADMIN — SODIUM CHLORIDE, PRESERVATIVE FREE 10 ML: 5 INJECTION INTRAVENOUS at 10:50

## 2025-07-01 RX ADMIN — CARVEDILOL 25 MG: 25 TABLET, FILM COATED ORAL at 21:02

## 2025-07-01 RX ADMIN — EMPAGLIFLOZIN 10 MG: 10 TABLET, FILM COATED ORAL at 10:49

## 2025-07-01 RX ADMIN — PREDNISONE 5 MG: 5 TABLET ORAL at 10:49

## 2025-07-01 RX ADMIN — FUROSEMIDE 40 MG: 10 INJECTION, SOLUTION INTRAMUSCULAR; INTRAVENOUS at 10:49

## 2025-07-01 RX ADMIN — SODIUM CHLORIDE, PRESERVATIVE FREE 10 ML: 5 INJECTION INTRAVENOUS at 21:03

## 2025-07-01 RX ADMIN — AMLODIPINE BESYLATE 10 MG: 10 TABLET ORAL at 10:49

## 2025-07-01 RX ADMIN — ENOXAPARIN SODIUM 40 MG: 100 INJECTION SUBCUTANEOUS at 21:02

## 2025-07-01 RX ADMIN — CARVEDILOL 25 MG: 25 TABLET, FILM COATED ORAL at 10:49

## 2025-07-01 ASSESSMENT — PAIN SCALES - GENERAL
PAINLEVEL_OUTOF10: 0
PAINLEVEL_OUTOF10: 0

## 2025-07-01 NOTE — CARE COORDINATION
Attempted to see pt for d/c planning.  Pt is out of room. Pt is from home with her .  Will attempt to see pt at a later time.  TE

## 2025-07-02 ENCOUNTER — APPOINTMENT (OUTPATIENT)
Dept: NON INVASIVE DIAGNOSTICS | Age: 70
DRG: 216 | End: 2025-07-02
Payer: MEDICARE

## 2025-07-02 LAB
ANION GAP SERPL CALCULATED.3IONS-SCNC: 12 MMOL/L (ref 9–17)
BNP SERPL-MCNC: 882 PG/ML (ref 0–125)
BUN SERPL-MCNC: 23 MG/DL (ref 7–20)
CALCIUM SERPL-MCNC: 8.4 MG/DL (ref 8.3–10.6)
CHLORIDE SERPL-SCNC: 103 MMOL/L (ref 99–110)
CO2 SERPL-SCNC: 22 MMOL/L (ref 21–32)
CREAT SERPL-MCNC: 1.2 MG/DL (ref 0.6–1.2)
ECHO BSA: 1.89 M2
ECHO LV EF PHYSICIAN: 60 %
GFR, ESTIMATED: 44 ML/MIN/1.73M2
GLUCOSE SERPL-MCNC: 98 MG/DL (ref 74–99)
MAGNESIUM SERPL-MCNC: 2.7 MG/DL (ref 1.8–2.4)
POTASSIUM SERPL-SCNC: 3.2 MMOL/L (ref 3.5–5.1)
SODIUM SERPL-SCNC: 137 MMOL/L (ref 136–145)

## 2025-07-02 PROCEDURE — 83735 ASSAY OF MAGNESIUM: CPT

## 2025-07-02 PROCEDURE — B24BZZ4 ULTRASONOGRAPHY OF HEART WITH AORTA, TRANSESOPHAGEAL: ICD-10-PCS | Performed by: THORACIC SURGERY (CARDIOTHORACIC VASCULAR SURGERY)

## 2025-07-02 PROCEDURE — 99233 SBSQ HOSP IP/OBS HIGH 50: CPT | Performed by: INTERNAL MEDICINE

## 2025-07-02 PROCEDURE — 7100000011 HC PHASE II RECOVERY - ADDTL 15 MIN: Performed by: INTERNAL MEDICINE

## 2025-07-02 PROCEDURE — 6360000002 HC RX W HCPCS: Performed by: STUDENT IN AN ORGANIZED HEALTH CARE EDUCATION/TRAINING PROGRAM

## 2025-07-02 PROCEDURE — 6370000000 HC RX 637 (ALT 250 FOR IP): Performed by: STUDENT IN AN ORGANIZED HEALTH CARE EDUCATION/TRAINING PROGRAM

## 2025-07-02 PROCEDURE — 6370000000 HC RX 637 (ALT 250 FOR IP)

## 2025-07-02 PROCEDURE — 94761 N-INVAS EAR/PLS OXIMETRY MLT: CPT

## 2025-07-02 PROCEDURE — 6370000000 HC RX 637 (ALT 250 FOR IP): Performed by: INTERNAL MEDICINE

## 2025-07-02 PROCEDURE — 2700000000 HC OXYGEN THERAPY PER DAY

## 2025-07-02 PROCEDURE — 94640 AIRWAY INHALATION TREATMENT: CPT

## 2025-07-02 PROCEDURE — 83880 ASSAY OF NATRIURETIC PEPTIDE: CPT

## 2025-07-02 PROCEDURE — 93320 DOPPLER ECHO COMPLETE: CPT | Performed by: INTERNAL MEDICINE

## 2025-07-02 PROCEDURE — 2140000000 HC CCU INTERMEDIATE R&B

## 2025-07-02 PROCEDURE — 36415 COLL VENOUS BLD VENIPUNCTURE: CPT

## 2025-07-02 PROCEDURE — 93312 ECHO TRANSESOPHAGEAL: CPT

## 2025-07-02 PROCEDURE — 93325 DOPPLER ECHO COLOR FLOW MAPG: CPT | Performed by: INTERNAL MEDICINE

## 2025-07-02 PROCEDURE — 2500000003 HC RX 250 WO HCPCS: Performed by: INTERNAL MEDICINE

## 2025-07-02 PROCEDURE — 99152 MOD SED SAME PHYS/QHP 5/>YRS: CPT | Performed by: INTERNAL MEDICINE

## 2025-07-02 PROCEDURE — 93312 ECHO TRANSESOPHAGEAL: CPT | Performed by: INTERNAL MEDICINE

## 2025-07-02 PROCEDURE — APPNB15 APP NON BILLABLE TIME 0-15 MINS

## 2025-07-02 PROCEDURE — 7100000010 HC PHASE II RECOVERY - FIRST 15 MIN: Performed by: INTERNAL MEDICINE

## 2025-07-02 PROCEDURE — 80048 BASIC METABOLIC PNL TOTAL CA: CPT

## 2025-07-02 PROCEDURE — 2500000003 HC RX 250 WO HCPCS: Performed by: STUDENT IN AN ORGANIZED HEALTH CARE EDUCATION/TRAINING PROGRAM

## 2025-07-02 PROCEDURE — 6360000002 HC RX W HCPCS: Performed by: INTERNAL MEDICINE

## 2025-07-02 PROCEDURE — 99223 1ST HOSP IP/OBS HIGH 75: CPT | Performed by: THORACIC SURGERY (CARDIOTHORACIC VASCULAR SURGERY)

## 2025-07-02 RX ORDER — LIDOCAINE HYDROCHLORIDE 20 MG/ML
SOLUTION OROPHARYNGEAL PRN
Status: COMPLETED | OUTPATIENT
Start: 2025-07-02 | End: 2025-07-02

## 2025-07-02 RX ORDER — POTASSIUM CHLORIDE 1500 MG/1
40 TABLET, EXTENDED RELEASE ORAL ONCE
Status: COMPLETED | OUTPATIENT
Start: 2025-07-02 | End: 2025-07-02

## 2025-07-02 RX ORDER — FUROSEMIDE 10 MG/ML
40 INJECTION INTRAMUSCULAR; INTRAVENOUS 2 TIMES DAILY
Status: DISCONTINUED | OUTPATIENT
Start: 2025-07-02 | End: 2025-07-04

## 2025-07-02 RX ORDER — MIDAZOLAM HYDROCHLORIDE 1 MG/ML
INJECTION, SOLUTION INTRAMUSCULAR; INTRAVENOUS PRN
Status: COMPLETED | OUTPATIENT
Start: 2025-07-02 | End: 2025-07-02

## 2025-07-02 RX ADMIN — ASPIRIN 81 MG 81 MG: 81 TABLET ORAL at 11:49

## 2025-07-02 RX ADMIN — POLYETHYLENE GLYCOL (3350) 17 G: 17 POWDER, FOR SOLUTION ORAL at 20:54

## 2025-07-02 RX ADMIN — MIDAZOLAM 2 MG: 1 INJECTION INTRAMUSCULAR; INTRAVENOUS at 10:36

## 2025-07-02 RX ADMIN — LIDOCAINE HYDROCHLORIDE 15 ML: 20 SOLUTION ORAL at 10:31

## 2025-07-02 RX ADMIN — GUAIFENESIN 600 MG: 600 TABLET ORAL at 20:46

## 2025-07-02 RX ADMIN — HYDROXYCHLOROQUINE SULFATE 300 MG: 200 TABLET ORAL at 12:16

## 2025-07-02 RX ADMIN — MIDAZOLAM 2 MG: 1 INJECTION INTRAMUSCULAR; INTRAVENOUS at 10:34

## 2025-07-02 RX ADMIN — MIDAZOLAM 2 MG: 1 INJECTION INTRAMUSCULAR; INTRAVENOUS at 10:38

## 2025-07-02 RX ADMIN — SODIUM CHLORIDE, PRESERVATIVE FREE 10 ML: 5 INJECTION INTRAVENOUS at 11:51

## 2025-07-02 RX ADMIN — CARVEDILOL 25 MG: 25 TABLET, FILM COATED ORAL at 11:50

## 2025-07-02 RX ADMIN — ATORVASTATIN CALCIUM 40 MG: 40 TABLET, FILM COATED ORAL at 20:46

## 2025-07-02 RX ADMIN — EMPAGLIFLOZIN 10 MG: 10 TABLET, FILM COATED ORAL at 11:50

## 2025-07-02 RX ADMIN — TIOTROPIUM BROMIDE INHALATION SPRAY 5 MCG: 3.12 SPRAY, METERED RESPIRATORY (INHALATION) at 06:45

## 2025-07-02 RX ADMIN — SODIUM CHLORIDE, PRESERVATIVE FREE 10 ML: 5 INJECTION INTRAVENOUS at 20:47

## 2025-07-02 RX ADMIN — ENOXAPARIN SODIUM 40 MG: 100 INJECTION SUBCUTANEOUS at 20:47

## 2025-07-02 RX ADMIN — AMLODIPINE BESYLATE 10 MG: 10 TABLET ORAL at 11:49

## 2025-07-02 RX ADMIN — SODIUM CHLORIDE, PRESERVATIVE FREE 10 ML: 5 INJECTION INTRAVENOUS at 11:50

## 2025-07-02 RX ADMIN — CARVEDILOL 25 MG: 25 TABLET, FILM COATED ORAL at 20:47

## 2025-07-02 RX ADMIN — GUAIFENESIN 600 MG: 600 TABLET ORAL at 11:49

## 2025-07-02 RX ADMIN — POTASSIUM CHLORIDE 40 MEQ: 1500 TABLET, EXTENDED RELEASE ORAL at 16:47

## 2025-07-02 ASSESSMENT — PAIN SCALES - GENERAL
PAINLEVEL_OUTOF10: 0

## 2025-07-02 NOTE — CARE COORDINATION
.CM met with pt for d/c planning.  Introduced self, updated white board and explained role of CM. Pt lives with her  in a one level house.  He helps her with bathing and dressing. Pt  has a PCP, has insurance, and is able to afford her medication. Pt is on 3L O2 24/7 at baseline.  Spouse provides her transportation. Discussed SNF and HHC.  Pt declined both at this time. Pt declined Community Resource info.  D/c plan is home with spouse, denies needs.  Notify CM if any d/c needs arise.  TE     07/02/25 0951   Service Assessment   Patient Orientation Alert and Oriented   Cognition Alert   History Provided By Patient   Primary Caregiver Family   Support Systems Spouse/Significant Other   Patient's Healthcare Decision Maker is:   (SELF)   Prior Functional Level Assistance with the following:;Bathing;Dressing;Cooking;Housework;Mobility;Shopping   Current Functional Level Assistance with the following:;Bathing;Dressing;Cooking;Housework;Shopping;Mobility   Can patient return to prior living arrangement Yes   Ability to make needs known: Good   Family able to assist with home care needs: Yes   Financial Resources Medicare;Medicaid   Community Resources None   Social/Functional History   Lives With Spouse   Type of Home House   Home Layout One level   Bathroom Shower/Tub Tub/Shower unit   Bathroom Toilet Standard   Bathroom Equipment Shower chair   Bathroom Accessibility Accessible   Home Equipment Oxygen  (O2 CONCENTRATOR)   Receives Help From Family   Ambulation Assistance Independent   Active  No   Patient's  Info  PROVIDES HER TRANSPORTATION   Mode of Transportation Car   Discharge Planning   Type of Residence House   Living Arrangements Spouse/Significant Other   Current Services Prior To Admission Oxygen Therapy   Potential Assistance Needed Home Care   DME Ordered? No   Potential Assistance Purchasing Medications No   Type of Home Care Services None   Patient expects to be discharged to:

## 2025-07-03 LAB
ANION GAP SERPL CALCULATED.3IONS-SCNC: 9 MMOL/L (ref 9–17)
BASOPHILS # BLD: 0.08 K/UL
BASOPHILS NFR BLD: 1 % (ref 0–1)
BUN SERPL-MCNC: 18 MG/DL (ref 7–20)
CALCIUM SERPL-MCNC: 8.3 MG/DL (ref 8.3–10.6)
CHLORIDE SERPL-SCNC: 107 MMOL/L (ref 99–110)
CO2 SERPL-SCNC: 21 MMOL/L (ref 21–32)
CREAT SERPL-MCNC: 1.1 MG/DL (ref 0.6–1.2)
EOSINOPHIL # BLD: 0.33 K/UL
EOSINOPHILS RELATIVE PERCENT: 4 % (ref 0–3)
ERYTHROCYTE [DISTWIDTH] IN BLOOD BY AUTOMATED COUNT: 14.1 % (ref 11.7–14.9)
GFR, ESTIMATED: 52 ML/MIN/1.73M2
GLUCOSE SERPL-MCNC: 88 MG/DL (ref 74–99)
HCT VFR BLD AUTO: 38.1 % (ref 37–47)
HGB BLD-MCNC: 12.2 G/DL (ref 12.5–16)
IMM GRANULOCYTES # BLD AUTO: 0.02 K/UL
IMM GRANULOCYTES NFR BLD: 0 %
LYMPHOCYTES NFR BLD: 2.64 K/UL
LYMPHOCYTES RELATIVE PERCENT: 30 % (ref 24–44)
MAGNESIUM SERPL-MCNC: 2.8 MG/DL (ref 1.8–2.4)
MCH RBC QN AUTO: 27.9 PG (ref 27–31)
MCHC RBC AUTO-ENTMCNC: 32 G/DL (ref 32–36)
MCV RBC AUTO: 87 FL (ref 78–100)
MONOCYTES NFR BLD: 0.86 K/UL
MONOCYTES NFR BLD: 10 % (ref 0–5)
NEUTROPHILS NFR BLD: 55 % (ref 36–66)
NEUTS SEG NFR BLD: 4.85 K/UL
PLATELET # BLD AUTO: 188 K/UL (ref 140–440)
PMV BLD AUTO: 11.4 FL (ref 7.5–11.1)
POTASSIUM SERPL-SCNC: 4.2 MMOL/L (ref 3.5–5.1)
RBC # BLD AUTO: 4.38 M/UL (ref 4.2–5.4)
SODIUM SERPL-SCNC: 137 MMOL/L (ref 136–145)
WBC OTHER # BLD: 8.8 K/UL (ref 4–10.5)

## 2025-07-03 PROCEDURE — 99232 SBSQ HOSP IP/OBS MODERATE 35: CPT | Performed by: INTERNAL MEDICINE

## 2025-07-03 PROCEDURE — 94761 N-INVAS EAR/PLS OXIMETRY MLT: CPT

## 2025-07-03 PROCEDURE — 2500000003 HC RX 250 WO HCPCS: Performed by: STUDENT IN AN ORGANIZED HEALTH CARE EDUCATION/TRAINING PROGRAM

## 2025-07-03 PROCEDURE — 6370000000 HC RX 637 (ALT 250 FOR IP)

## 2025-07-03 PROCEDURE — 6360000002 HC RX W HCPCS: Performed by: STUDENT IN AN ORGANIZED HEALTH CARE EDUCATION/TRAINING PROGRAM

## 2025-07-03 PROCEDURE — 2500000003 HC RX 250 WO HCPCS: Performed by: INTERNAL MEDICINE

## 2025-07-03 PROCEDURE — 2140000000 HC CCU INTERMEDIATE R&B

## 2025-07-03 PROCEDURE — 6370000000 HC RX 637 (ALT 250 FOR IP): Performed by: STUDENT IN AN ORGANIZED HEALTH CARE EDUCATION/TRAINING PROGRAM

## 2025-07-03 PROCEDURE — 2700000000 HC OXYGEN THERAPY PER DAY

## 2025-07-03 PROCEDURE — 85025 COMPLETE CBC W/AUTO DIFF WBC: CPT

## 2025-07-03 PROCEDURE — 36415 COLL VENOUS BLD VENIPUNCTURE: CPT

## 2025-07-03 PROCEDURE — 94640 AIRWAY INHALATION TREATMENT: CPT

## 2025-07-03 PROCEDURE — 80048 BASIC METABOLIC PNL TOTAL CA: CPT

## 2025-07-03 PROCEDURE — 6370000000 HC RX 637 (ALT 250 FOR IP): Performed by: INTERNAL MEDICINE

## 2025-07-03 PROCEDURE — 83735 ASSAY OF MAGNESIUM: CPT

## 2025-07-03 PROCEDURE — APPNB15 APP NON BILLABLE TIME 0-15 MINS

## 2025-07-03 RX ADMIN — SODIUM CHLORIDE, PRESERVATIVE FREE 10 ML: 5 INJECTION INTRAVENOUS at 20:01

## 2025-07-03 RX ADMIN — GUAIFENESIN 600 MG: 600 TABLET ORAL at 08:33

## 2025-07-03 RX ADMIN — TIOTROPIUM BROMIDE INHALATION SPRAY 5 MCG: 3.12 SPRAY, METERED RESPIRATORY (INHALATION) at 07:47

## 2025-07-03 RX ADMIN — ASPIRIN 81 MG 81 MG: 81 TABLET ORAL at 08:33

## 2025-07-03 RX ADMIN — AMLODIPINE BESYLATE 10 MG: 10 TABLET ORAL at 08:33

## 2025-07-03 RX ADMIN — SODIUM CHLORIDE, PRESERVATIVE FREE 10 ML: 5 INJECTION INTRAVENOUS at 08:34

## 2025-07-03 RX ADMIN — SODIUM CHLORIDE, PRESERVATIVE FREE 10 ML: 5 INJECTION INTRAVENOUS at 20:17

## 2025-07-03 RX ADMIN — ATORVASTATIN CALCIUM 40 MG: 40 TABLET, FILM COATED ORAL at 20:01

## 2025-07-03 RX ADMIN — ENOXAPARIN SODIUM 40 MG: 100 INJECTION SUBCUTANEOUS at 20:01

## 2025-07-03 RX ADMIN — HYDROXYCHLOROQUINE SULFATE 300 MG: 200 TABLET ORAL at 08:39

## 2025-07-03 RX ADMIN — ACETAMINOPHEN 650 MG: 325 TABLET ORAL at 20:01

## 2025-07-03 RX ADMIN — CARVEDILOL 25 MG: 25 TABLET, FILM COATED ORAL at 08:34

## 2025-07-03 RX ADMIN — GUAIFENESIN 600 MG: 600 TABLET ORAL at 20:01

## 2025-07-03 RX ADMIN — CARVEDILOL 25 MG: 25 TABLET, FILM COATED ORAL at 20:01

## 2025-07-03 RX ADMIN — PREDNISONE 5 MG: 5 TABLET ORAL at 08:33

## 2025-07-03 RX ADMIN — EMPAGLIFLOZIN 10 MG: 10 TABLET, FILM COATED ORAL at 08:33

## 2025-07-03 ASSESSMENT — PAIN SCALES - GENERAL: PAINLEVEL_OUTOF10: 7

## 2025-07-03 ASSESSMENT — PAIN DESCRIPTION - ORIENTATION: ORIENTATION: LEFT

## 2025-07-03 ASSESSMENT — PAIN DESCRIPTION - DESCRIPTORS: DESCRIPTORS: ACHING

## 2025-07-03 ASSESSMENT — PAIN DESCRIPTION - LOCATION: LOCATION: SHOULDER

## 2025-07-04 ENCOUNTER — APPOINTMENT (OUTPATIENT)
Dept: ULTRASOUND IMAGING | Age: 70
DRG: 216 | End: 2025-07-04
Payer: MEDICARE

## 2025-07-04 ENCOUNTER — APPOINTMENT (OUTPATIENT)
Dept: GENERAL RADIOLOGY | Age: 70
DRG: 216 | End: 2025-07-04
Payer: MEDICARE

## 2025-07-04 ENCOUNTER — APPOINTMENT (OUTPATIENT)
Dept: CT IMAGING | Age: 70
DRG: 216 | End: 2025-07-04
Payer: MEDICARE

## 2025-07-04 LAB
ANION GAP SERPL CALCULATED.3IONS-SCNC: 10 MMOL/L (ref 9–17)
BNP SERPL-MCNC: 1164 PG/ML (ref 0–125)
BUN SERPL-MCNC: 17 MG/DL (ref 7–20)
CALCIUM SERPL-MCNC: 8.5 MG/DL (ref 8.3–10.6)
CHLORIDE SERPL-SCNC: 107 MMOL/L (ref 99–110)
CO2 SERPL-SCNC: 20 MMOL/L (ref 21–32)
CREAT SERPL-MCNC: 1.2 MG/DL (ref 0.6–1.2)
GFR, ESTIMATED: 46 ML/MIN/1.73M2
GLUCOSE SERPL-MCNC: 94 MG/DL (ref 74–99)
MAGNESIUM SERPL-MCNC: 2.9 MG/DL (ref 1.8–2.4)
POTASSIUM SERPL-SCNC: 3.7 MMOL/L (ref 3.5–5.1)
SODIUM SERPL-SCNC: 138 MMOL/L (ref 136–145)

## 2025-07-04 PROCEDURE — 71045 X-RAY EXAM CHEST 1 VIEW: CPT

## 2025-07-04 PROCEDURE — 6370000000 HC RX 637 (ALT 250 FOR IP): Performed by: STUDENT IN AN ORGANIZED HEALTH CARE EDUCATION/TRAINING PROGRAM

## 2025-07-04 PROCEDURE — 99233 SBSQ HOSP IP/OBS HIGH 50: CPT | Performed by: INTERNAL MEDICINE

## 2025-07-04 PROCEDURE — 6370000000 HC RX 637 (ALT 250 FOR IP): Performed by: INTERNAL MEDICINE

## 2025-07-04 PROCEDURE — 83735 ASSAY OF MAGNESIUM: CPT

## 2025-07-04 PROCEDURE — 80048 BASIC METABOLIC PNL TOTAL CA: CPT

## 2025-07-04 PROCEDURE — 6360000002 HC RX W HCPCS: Performed by: STUDENT IN AN ORGANIZED HEALTH CARE EDUCATION/TRAINING PROGRAM

## 2025-07-04 PROCEDURE — 2500000003 HC RX 250 WO HCPCS: Performed by: INTERNAL MEDICINE

## 2025-07-04 PROCEDURE — 70498 CT ANGIOGRAPHY NECK: CPT

## 2025-07-04 PROCEDURE — 2700000000 HC OXYGEN THERAPY PER DAY

## 2025-07-04 PROCEDURE — 83880 ASSAY OF NATRIURETIC PEPTIDE: CPT

## 2025-07-04 PROCEDURE — 6360000004 HC RX CONTRAST MEDICATION: Performed by: PHYSICIAN ASSISTANT

## 2025-07-04 PROCEDURE — 93970 EXTREMITY STUDY: CPT

## 2025-07-04 PROCEDURE — 6370000000 HC RX 637 (ALT 250 FOR IP)

## 2025-07-04 PROCEDURE — 94761 N-INVAS EAR/PLS OXIMETRY MLT: CPT

## 2025-07-04 PROCEDURE — 99233 SBSQ HOSP IP/OBS HIGH 50: CPT | Performed by: THORACIC SURGERY (CARDIOTHORACIC VASCULAR SURGERY)

## 2025-07-04 PROCEDURE — 2500000003 HC RX 250 WO HCPCS: Performed by: STUDENT IN AN ORGANIZED HEALTH CARE EDUCATION/TRAINING PROGRAM

## 2025-07-04 PROCEDURE — 36415 COLL VENOUS BLD VENIPUNCTURE: CPT

## 2025-07-04 PROCEDURE — 94640 AIRWAY INHALATION TREATMENT: CPT

## 2025-07-04 PROCEDURE — 2140000000 HC CCU INTERMEDIATE R&B

## 2025-07-04 RX ORDER — VALSARTAN 160 MG/1
80 TABLET ORAL DAILY
Status: DISCONTINUED | OUTPATIENT
Start: 2025-07-04 | End: 2025-07-08

## 2025-07-04 RX ORDER — POTASSIUM CHLORIDE 1500 MG/1
40 TABLET, EXTENDED RELEASE ORAL
Status: DISCONTINUED | OUTPATIENT
Start: 2025-07-04 | End: 2025-07-08

## 2025-07-04 RX ORDER — TORSEMIDE 20 MG/1
40 TABLET ORAL DAILY
Status: DISCONTINUED | OUTPATIENT
Start: 2025-07-04 | End: 2025-07-05

## 2025-07-04 RX ORDER — TORSEMIDE 20 MG/1
20 TABLET ORAL DAILY
Status: DISCONTINUED | OUTPATIENT
Start: 2025-07-04 | End: 2025-07-04

## 2025-07-04 RX ORDER — IOPAMIDOL 755 MG/ML
75 INJECTION, SOLUTION INTRAVASCULAR
Status: COMPLETED | OUTPATIENT
Start: 2025-07-04 | End: 2025-07-04

## 2025-07-04 RX ADMIN — TIOTROPIUM BROMIDE INHALATION SPRAY 5 MCG: 3.12 SPRAY, METERED RESPIRATORY (INHALATION) at 08:24

## 2025-07-04 RX ADMIN — VALSARTAN 80 MG: 40 TABLET, FILM COATED ORAL at 15:04

## 2025-07-04 RX ADMIN — ATORVASTATIN CALCIUM 40 MG: 40 TABLET, FILM COATED ORAL at 20:39

## 2025-07-04 RX ADMIN — SODIUM CHLORIDE, PRESERVATIVE FREE 10 ML: 5 INJECTION INTRAVENOUS at 08:27

## 2025-07-04 RX ADMIN — IOPAMIDOL 75 ML: 755 INJECTION, SOLUTION INTRAVENOUS at 10:26

## 2025-07-04 RX ADMIN — CARVEDILOL 25 MG: 25 TABLET, FILM COATED ORAL at 08:24

## 2025-07-04 RX ADMIN — GUAIFENESIN 600 MG: 600 TABLET ORAL at 20:39

## 2025-07-04 RX ADMIN — AMLODIPINE BESYLATE 10 MG: 10 TABLET ORAL at 08:25

## 2025-07-04 RX ADMIN — CARVEDILOL 25 MG: 25 TABLET, FILM COATED ORAL at 20:39

## 2025-07-04 RX ADMIN — POTASSIUM CHLORIDE 40 MEQ: 1500 TABLET, EXTENDED RELEASE ORAL at 13:10

## 2025-07-04 RX ADMIN — GUAIFENESIN 600 MG: 600 TABLET ORAL at 08:24

## 2025-07-04 RX ADMIN — HYDROXYCHLOROQUINE SULFATE 300 MG: 200 TABLET ORAL at 08:25

## 2025-07-04 RX ADMIN — ENOXAPARIN SODIUM 40 MG: 100 INJECTION SUBCUTANEOUS at 20:39

## 2025-07-04 RX ADMIN — SODIUM CHLORIDE, PRESERVATIVE FREE 10 ML: 5 INJECTION INTRAVENOUS at 20:40

## 2025-07-04 RX ADMIN — TORSEMIDE 40 MG: 20 TABLET ORAL at 13:01

## 2025-07-04 RX ADMIN — SODIUM CHLORIDE, PRESERVATIVE FREE 10 ML: 5 INJECTION INTRAVENOUS at 08:26

## 2025-07-04 RX ADMIN — EMPAGLIFLOZIN 10 MG: 10 TABLET, FILM COATED ORAL at 08:24

## 2025-07-04 RX ADMIN — ASPIRIN 81 MG 81 MG: 81 TABLET ORAL at 08:25

## 2025-07-04 ASSESSMENT — PAIN SCALES - GENERAL
PAINLEVEL_OUTOF10: 0
PAINLEVEL_OUTOF10: 0

## 2025-07-05 PROBLEM — I50.9 ACUTE ON CHRONIC CONGESTIVE HEART FAILURE (HCC): Status: ACTIVE | Noted: 2025-07-05

## 2025-07-05 LAB
ALBUMIN SERPL-MCNC: 3.2 G/DL (ref 3.4–5)
ALBUMIN/GLOB SERPL: 0.8 {RATIO} (ref 1.1–2.2)
ALP SERPL-CCNC: 112 U/L (ref 40–129)
ALT SERPL-CCNC: 17 U/L (ref 10–40)
ANION GAP SERPL CALCULATED.3IONS-SCNC: 11 MMOL/L (ref 9–17)
AST SERPL-CCNC: 17 U/L (ref 15–37)
BASOPHILS # BLD: 0.09 K/UL
BASOPHILS NFR BLD: 1 % (ref 0–1)
BILIRUB SERPL-MCNC: 0.6 MG/DL (ref 0–1)
BNP SERPL-MCNC: 1206 PG/ML (ref 0–125)
BUN SERPL-MCNC: 20 MG/DL (ref 7–20)
CALCIUM SERPL-MCNC: 8.7 MG/DL (ref 8.3–10.6)
CHLORIDE SERPL-SCNC: 104 MMOL/L (ref 99–110)
CO2 SERPL-SCNC: 21 MMOL/L (ref 21–32)
CREAT SERPL-MCNC: 1.6 MG/DL (ref 0.6–1.2)
EOSINOPHIL # BLD: 0.36 K/UL
EOSINOPHILS RELATIVE PERCENT: 4 % (ref 0–3)
ERYTHROCYTE [DISTWIDTH] IN BLOOD BY AUTOMATED COUNT: 14.3 % (ref 11.7–14.9)
GFR, ESTIMATED: 33 ML/MIN/1.73M2
GLUCOSE SERPL-MCNC: 81 MG/DL (ref 74–99)
HCT VFR BLD AUTO: 37.7 % (ref 37–47)
HGB BLD-MCNC: 12.1 G/DL (ref 12.5–16)
IMM GRANULOCYTES # BLD AUTO: 0.04 K/UL
IMM GRANULOCYTES NFR BLD: 1 %
LYMPHOCYTES NFR BLD: 2.69 K/UL
LYMPHOCYTES RELATIVE PERCENT: 33 % (ref 24–44)
MAGNESIUM SERPL-MCNC: 2.4 MG/DL (ref 1.8–2.4)
MCH RBC QN AUTO: 27.6 PG (ref 27–31)
MCHC RBC AUTO-ENTMCNC: 32.1 G/DL (ref 32–36)
MCV RBC AUTO: 86.1 FL (ref 78–100)
MONOCYTES NFR BLD: 0.83 K/UL
MONOCYTES NFR BLD: 10 % (ref 0–5)
NEUTROPHILS NFR BLD: 51 % (ref 36–66)
NEUTS SEG NFR BLD: 4.17 K/UL
PHOSPHATE SERPL-MCNC: 3.9 MG/DL (ref 2.5–4.9)
PLATELET # BLD AUTO: 210 K/UL (ref 140–440)
PMV BLD AUTO: 10.6 FL (ref 7.5–11.1)
POTASSIUM SERPL-SCNC: 4.1 MMOL/L (ref 3.5–5.1)
PROCALCITONIN SERPL-MCNC: 0.08 NG/ML
PROT SERPL-MCNC: 7 G/DL (ref 6.4–8.2)
RBC # BLD AUTO: 4.38 M/UL (ref 4.2–5.4)
SODIUM SERPL-SCNC: 137 MMOL/L (ref 136–145)
WBC OTHER # BLD: 8.2 K/UL (ref 4–10.5)

## 2025-07-05 PROCEDURE — 2500000003 HC RX 250 WO HCPCS: Performed by: STUDENT IN AN ORGANIZED HEALTH CARE EDUCATION/TRAINING PROGRAM

## 2025-07-05 PROCEDURE — 94010 BREATHING CAPACITY TEST: CPT

## 2025-07-05 PROCEDURE — 94150 VITAL CAPACITY TEST: CPT

## 2025-07-05 PROCEDURE — 94640 AIRWAY INHALATION TREATMENT: CPT

## 2025-07-05 PROCEDURE — 84145 PROCALCITONIN (PCT): CPT

## 2025-07-05 PROCEDURE — 2500000003 HC RX 250 WO HCPCS: Performed by: INTERNAL MEDICINE

## 2025-07-05 PROCEDURE — 6370000000 HC RX 637 (ALT 250 FOR IP)

## 2025-07-05 PROCEDURE — 97163 PT EVAL HIGH COMPLEX 45 MIN: CPT

## 2025-07-05 PROCEDURE — 6370000000 HC RX 637 (ALT 250 FOR IP): Performed by: INTERNAL MEDICINE

## 2025-07-05 PROCEDURE — APPNB30 APP NON BILLABLE TIME 0-30 MINS: Performed by: NURSE PRACTITIONER

## 2025-07-05 PROCEDURE — 97530 THERAPEUTIC ACTIVITIES: CPT

## 2025-07-05 PROCEDURE — 84100 ASSAY OF PHOSPHORUS: CPT

## 2025-07-05 PROCEDURE — 94761 N-INVAS EAR/PLS OXIMETRY MLT: CPT

## 2025-07-05 PROCEDURE — 99232 SBSQ HOSP IP/OBS MODERATE 35: CPT | Performed by: INTERNAL MEDICINE

## 2025-07-05 PROCEDURE — 97116 GAIT TRAINING THERAPY: CPT

## 2025-07-05 PROCEDURE — 94664 DEMO&/EVAL PT USE INHALER: CPT

## 2025-07-05 PROCEDURE — 2140000000 HC CCU INTERMEDIATE R&B

## 2025-07-05 PROCEDURE — 6370000000 HC RX 637 (ALT 250 FOR IP): Performed by: STUDENT IN AN ORGANIZED HEALTH CARE EDUCATION/TRAINING PROGRAM

## 2025-07-05 PROCEDURE — 6360000002 HC RX W HCPCS: Performed by: STUDENT IN AN ORGANIZED HEALTH CARE EDUCATION/TRAINING PROGRAM

## 2025-07-05 PROCEDURE — 6370000000 HC RX 637 (ALT 250 FOR IP): Performed by: NURSE PRACTITIONER

## 2025-07-05 PROCEDURE — 80053 COMPREHEN METABOLIC PANEL: CPT

## 2025-07-05 PROCEDURE — 83735 ASSAY OF MAGNESIUM: CPT

## 2025-07-05 PROCEDURE — 2700000000 HC OXYGEN THERAPY PER DAY

## 2025-07-05 PROCEDURE — 85025 COMPLETE CBC W/AUTO DIFF WBC: CPT

## 2025-07-05 PROCEDURE — 36415 COLL VENOUS BLD VENIPUNCTURE: CPT

## 2025-07-05 PROCEDURE — 83880 ASSAY OF NATRIURETIC PEPTIDE: CPT

## 2025-07-05 PROCEDURE — 99232 SBSQ HOSP IP/OBS MODERATE 35: CPT | Performed by: THORACIC SURGERY (CARDIOTHORACIC VASCULAR SURGERY)

## 2025-07-05 PROCEDURE — 97166 OT EVAL MOD COMPLEX 45 MIN: CPT

## 2025-07-05 RX ORDER — TORSEMIDE 20 MG/1
20 TABLET ORAL DAILY
Status: DISCONTINUED | OUTPATIENT
Start: 2025-07-06 | End: 2025-07-08

## 2025-07-05 RX ORDER — METHOCARBAMOL 500 MG/1
500 TABLET, FILM COATED ORAL 4 TIMES DAILY PRN
Status: DISPENSED | OUTPATIENT
Start: 2025-07-05 | End: 2025-07-06

## 2025-07-05 RX ADMIN — CARVEDILOL 25 MG: 25 TABLET, FILM COATED ORAL at 19:46

## 2025-07-05 RX ADMIN — PREDNISONE 5 MG: 5 TABLET ORAL at 08:35

## 2025-07-05 RX ADMIN — AMLODIPINE BESYLATE 10 MG: 10 TABLET ORAL at 08:36

## 2025-07-05 RX ADMIN — METHOCARBAMOL 500 MG: 500 TABLET ORAL at 23:32

## 2025-07-05 RX ADMIN — TORSEMIDE 40 MG: 20 TABLET ORAL at 08:35

## 2025-07-05 RX ADMIN — GUAIFENESIN 600 MG: 600 TABLET ORAL at 08:36

## 2025-07-05 RX ADMIN — ATORVASTATIN CALCIUM 40 MG: 40 TABLET, FILM COATED ORAL at 19:46

## 2025-07-05 RX ADMIN — MELATONIN TAB 3 MG 3 MG: 3 TAB at 21:07

## 2025-07-05 RX ADMIN — METHOCARBAMOL 500 MG: 500 TABLET ORAL at 19:45

## 2025-07-05 RX ADMIN — SODIUM CHLORIDE, PRESERVATIVE FREE 10 ML: 5 INJECTION INTRAVENOUS at 19:46

## 2025-07-05 RX ADMIN — TIOTROPIUM BROMIDE AND OLODATEROL 2 PUFF: 3.124; 2.736 SPRAY, METERED RESPIRATORY (INHALATION) at 07:05

## 2025-07-05 RX ADMIN — EMPAGLIFLOZIN 10 MG: 10 TABLET, FILM COATED ORAL at 08:35

## 2025-07-05 RX ADMIN — GUAIFENESIN 600 MG: 600 TABLET ORAL at 19:46

## 2025-07-05 RX ADMIN — ACETAMINOPHEN 650 MG: 325 TABLET ORAL at 17:19

## 2025-07-05 RX ADMIN — ASPIRIN 81 MG 81 MG: 81 TABLET ORAL at 08:35

## 2025-07-05 RX ADMIN — SODIUM CHLORIDE, PRESERVATIVE FREE 10 ML: 5 INJECTION INTRAVENOUS at 08:44

## 2025-07-05 RX ADMIN — VALSARTAN 80 MG: 40 TABLET, FILM COATED ORAL at 08:36

## 2025-07-05 RX ADMIN — POTASSIUM CHLORIDE 40 MEQ: 1500 TABLET, EXTENDED RELEASE ORAL at 08:35

## 2025-07-05 RX ADMIN — ENOXAPARIN SODIUM 40 MG: 100 INJECTION SUBCUTANEOUS at 19:46

## 2025-07-05 RX ADMIN — CARVEDILOL 25 MG: 25 TABLET, FILM COATED ORAL at 08:36

## 2025-07-05 RX ADMIN — HYDROXYCHLOROQUINE SULFATE 300 MG: 200 TABLET ORAL at 08:43

## 2025-07-05 ASSESSMENT — PAIN DESCRIPTION - ORIENTATION: ORIENTATION: RIGHT;LEFT

## 2025-07-05 ASSESSMENT — PAIN SCALES - GENERAL
PAINLEVEL_OUTOF10: 5
PAINLEVEL_OUTOF10: 6
PAINLEVEL_OUTOF10: 8

## 2025-07-05 ASSESSMENT — PAIN DESCRIPTION - LOCATION: LOCATION: LEG

## 2025-07-05 ASSESSMENT — PAIN DESCRIPTION - PAIN TYPE: TYPE: ACUTE PAIN

## 2025-07-05 ASSESSMENT — PAIN - FUNCTIONAL ASSESSMENT: PAIN_FUNCTIONAL_ASSESSMENT: ACTIVITIES ARE NOT PREVENTED

## 2025-07-05 ASSESSMENT — PAIN DESCRIPTION - ONSET: ONSET: ON-GOING

## 2025-07-05 ASSESSMENT — PAIN DESCRIPTION - FREQUENCY: FREQUENCY: CONTINUOUS

## 2025-07-05 ASSESSMENT — PAIN DESCRIPTION - DESCRIPTORS: DESCRIPTORS: ACHING;DISCOMFORT

## 2025-07-06 LAB
ANION GAP SERPL CALCULATED.3IONS-SCNC: 12 MMOL/L (ref 9–17)
BNP SERPL-MCNC: 538 PG/ML (ref 0–125)
BUN SERPL-MCNC: 26 MG/DL (ref 7–20)
CALCIUM SERPL-MCNC: 8.7 MG/DL (ref 8.3–10.6)
CHLORIDE SERPL-SCNC: 105 MMOL/L (ref 99–110)
CO2 SERPL-SCNC: 21 MMOL/L (ref 21–32)
CREAT SERPL-MCNC: 1.6 MG/DL (ref 0.6–1.2)
GFR, ESTIMATED: 33 ML/MIN/1.73M2
GLUCOSE SERPL-MCNC: 123 MG/DL (ref 74–99)
POTASSIUM SERPL-SCNC: 4.3 MMOL/L (ref 3.5–5.1)
SODIUM SERPL-SCNC: 138 MMOL/L (ref 136–145)

## 2025-07-06 PROCEDURE — 2500000003 HC RX 250 WO HCPCS: Performed by: STUDENT IN AN ORGANIZED HEALTH CARE EDUCATION/TRAINING PROGRAM

## 2025-07-06 PROCEDURE — 6370000000 HC RX 637 (ALT 250 FOR IP): Performed by: INTERNAL MEDICINE

## 2025-07-06 PROCEDURE — 99232 SBSQ HOSP IP/OBS MODERATE 35: CPT | Performed by: INTERNAL MEDICINE

## 2025-07-06 PROCEDURE — 2500000003 HC RX 250 WO HCPCS: Performed by: INTERNAL MEDICINE

## 2025-07-06 PROCEDURE — APPNB30 APP NON BILLABLE TIME 0-30 MINS: Performed by: NURSE PRACTITIONER

## 2025-07-06 PROCEDURE — 6360000002 HC RX W HCPCS: Performed by: STUDENT IN AN ORGANIZED HEALTH CARE EDUCATION/TRAINING PROGRAM

## 2025-07-06 PROCEDURE — 6370000000 HC RX 637 (ALT 250 FOR IP): Performed by: STUDENT IN AN ORGANIZED HEALTH CARE EDUCATION/TRAINING PROGRAM

## 2025-07-06 PROCEDURE — 80048 BASIC METABOLIC PNL TOTAL CA: CPT

## 2025-07-06 PROCEDURE — 97110 THERAPEUTIC EXERCISES: CPT

## 2025-07-06 PROCEDURE — 94640 AIRWAY INHALATION TREATMENT: CPT

## 2025-07-06 PROCEDURE — 36415 COLL VENOUS BLD VENIPUNCTURE: CPT

## 2025-07-06 PROCEDURE — 83880 ASSAY OF NATRIURETIC PEPTIDE: CPT

## 2025-07-06 PROCEDURE — 2140000000 HC CCU INTERMEDIATE R&B

## 2025-07-06 PROCEDURE — 94761 N-INVAS EAR/PLS OXIMETRY MLT: CPT

## 2025-07-06 PROCEDURE — 6370000000 HC RX 637 (ALT 250 FOR IP)

## 2025-07-06 PROCEDURE — 97116 GAIT TRAINING THERAPY: CPT

## 2025-07-06 PROCEDURE — 6370000000 HC RX 637 (ALT 250 FOR IP): Performed by: NURSE PRACTITIONER

## 2025-07-06 RX ADMIN — AMLODIPINE BESYLATE 10 MG: 10 TABLET ORAL at 10:17

## 2025-07-06 RX ADMIN — SODIUM CHLORIDE, PRESERVATIVE FREE 10 ML: 5 INJECTION INTRAVENOUS at 20:34

## 2025-07-06 RX ADMIN — TIOTROPIUM BROMIDE AND OLODATEROL 2 PUFF: 3.124; 2.736 SPRAY, METERED RESPIRATORY (INHALATION) at 09:25

## 2025-07-06 RX ADMIN — POLYETHYLENE GLYCOL (3350) 17 G: 17 POWDER, FOR SOLUTION ORAL at 13:45

## 2025-07-06 RX ADMIN — HYDROXYCHLOROQUINE SULFATE 300 MG: 200 TABLET ORAL at 10:25

## 2025-07-06 RX ADMIN — POTASSIUM CHLORIDE 40 MEQ: 1500 TABLET, EXTENDED RELEASE ORAL at 10:16

## 2025-07-06 RX ADMIN — GUAIFENESIN 600 MG: 600 TABLET ORAL at 10:15

## 2025-07-06 RX ADMIN — CARVEDILOL 25 MG: 25 TABLET, FILM COATED ORAL at 10:16

## 2025-07-06 RX ADMIN — ENOXAPARIN SODIUM 40 MG: 100 INJECTION SUBCUTANEOUS at 20:34

## 2025-07-06 RX ADMIN — EMPAGLIFLOZIN 10 MG: 10 TABLET, FILM COATED ORAL at 10:16

## 2025-07-06 RX ADMIN — SODIUM CHLORIDE, PRESERVATIVE FREE 10 ML: 5 INJECTION INTRAVENOUS at 10:18

## 2025-07-06 RX ADMIN — GUAIFENESIN 600 MG: 600 TABLET ORAL at 20:34

## 2025-07-06 RX ADMIN — SODIUM CHLORIDE, PRESERVATIVE FREE 10 ML: 5 INJECTION INTRAVENOUS at 10:17

## 2025-07-06 RX ADMIN — CARVEDILOL 25 MG: 25 TABLET, FILM COATED ORAL at 20:34

## 2025-07-06 RX ADMIN — ASPIRIN 81 MG 81 MG: 81 TABLET ORAL at 10:16

## 2025-07-06 RX ADMIN — ATORVASTATIN CALCIUM 40 MG: 40 TABLET, FILM COATED ORAL at 20:34

## 2025-07-06 RX ADMIN — MELATONIN TAB 3 MG 3 MG: 3 TAB at 20:34

## 2025-07-07 ENCOUNTER — TELEPHONE (OUTPATIENT)
Dept: CARDIOTHORACIC SURGERY | Age: 70
End: 2025-07-07

## 2025-07-07 ENCOUNTER — ANESTHESIA EVENT (OUTPATIENT)
Dept: OPERATING ROOM | Age: 70
End: 2025-07-07
Payer: MEDICARE

## 2025-07-07 LAB
ANION GAP SERPL CALCULATED.3IONS-SCNC: 12 MMOL/L (ref 9–17)
BASOPHILS # BLD: 0.08 K/UL
BASOPHILS NFR BLD: 1 % (ref 0–1)
BUN SERPL-MCNC: 23 MG/DL (ref 7–20)
CA-I BLD-SCNC: 1.24 MMOL/L (ref 1.15–1.33)
CALCIUM SERPL-MCNC: 9.1 MG/DL (ref 8.3–10.6)
CHLORIDE SERPL-SCNC: 105 MMOL/L (ref 99–110)
CO2 SERPL-SCNC: 20 MMOL/L (ref 21–32)
CREAT SERPL-MCNC: 1.3 MG/DL (ref 0.6–1.2)
EOSINOPHIL # BLD: 0.34 K/UL
EOSINOPHILS RELATIVE PERCENT: 5 % (ref 0–3)
ERYTHROCYTE [DISTWIDTH] IN BLOOD BY AUTOMATED COUNT: 14.6 % (ref 11.7–14.9)
GFR, ESTIMATED: 40 ML/MIN/1.73M2
GLUCOSE SERPL-MCNC: 96 MG/DL (ref 74–99)
HCT VFR BLD AUTO: 38.5 % (ref 37–47)
HGB BLD-MCNC: 12.1 G/DL (ref 12.5–16)
IMM GRANULOCYTES # BLD AUTO: 0.02 K/UL
IMM GRANULOCYTES NFR BLD: 0 %
INR PPP: 1.1
LYMPHOCYTES NFR BLD: 2.21 K/UL
LYMPHOCYTES RELATIVE PERCENT: 30 % (ref 24–44)
MAGNESIUM SERPL-MCNC: 2.7 MG/DL (ref 1.8–2.4)
MCH RBC QN AUTO: 27.9 PG (ref 27–31)
MCHC RBC AUTO-ENTMCNC: 31.4 G/DL (ref 32–36)
MCV RBC AUTO: 88.9 FL (ref 78–100)
MONOCYTES NFR BLD: 0.83 K/UL
MONOCYTES NFR BLD: 11 % (ref 0–5)
NEUTROPHILS NFR BLD: 53 % (ref 36–66)
NEUTS SEG NFR BLD: 3.85 K/UL
PARTIAL THROMBOPLASTIN TIME: 29.2 SEC (ref 25.1–37.1)
PHOSPHATE SERPL-MCNC: 3.6 MG/DL (ref 2.5–4.9)
PLATELET # BLD AUTO: 220 K/UL (ref 140–440)
PMV BLD AUTO: 11.4 FL (ref 7.5–11.1)
POTASSIUM SERPL-SCNC: 4.4 MMOL/L (ref 3.5–5.1)
PROTHROMBIN TIME: 14.8 SEC (ref 11.7–14.5)
RBC # BLD AUTO: 4.33 M/UL (ref 4.2–5.4)
SODIUM SERPL-SCNC: 137 MMOL/L (ref 136–145)
WBC OTHER # BLD: 7.3 K/UL (ref 4–10.5)

## 2025-07-07 PROCEDURE — APPNB15 APP NON BILLABLE TIME 0-15 MINS

## 2025-07-07 PROCEDURE — 80048 BASIC METABOLIC PNL TOTAL CA: CPT

## 2025-07-07 PROCEDURE — 86920 COMPATIBILITY TEST SPIN: CPT

## 2025-07-07 PROCEDURE — 2500000003 HC RX 250 WO HCPCS: Performed by: INTERNAL MEDICINE

## 2025-07-07 PROCEDURE — 6370000000 HC RX 637 (ALT 250 FOR IP): Performed by: STUDENT IN AN ORGANIZED HEALTH CARE EDUCATION/TRAINING PROGRAM

## 2025-07-07 PROCEDURE — 99232 SBSQ HOSP IP/OBS MODERATE 35: CPT | Performed by: INTERNAL MEDICINE

## 2025-07-07 PROCEDURE — 2100000000 HC CCU R&B

## 2025-07-07 PROCEDURE — 83735 ASSAY OF MAGNESIUM: CPT

## 2025-07-07 PROCEDURE — 85610 PROTHROMBIN TIME: CPT

## 2025-07-07 PROCEDURE — 6370000000 HC RX 637 (ALT 250 FOR IP): Performed by: PHYSICIAN ASSISTANT

## 2025-07-07 PROCEDURE — 2500000003 HC RX 250 WO HCPCS: Performed by: STUDENT IN AN ORGANIZED HEALTH CARE EDUCATION/TRAINING PROGRAM

## 2025-07-07 PROCEDURE — 6370000000 HC RX 637 (ALT 250 FOR IP): Performed by: NURSE PRACTITIONER

## 2025-07-07 PROCEDURE — 94640 AIRWAY INHALATION TREATMENT: CPT

## 2025-07-07 PROCEDURE — 86901 BLOOD TYPING SEROLOGIC RH(D): CPT

## 2025-07-07 PROCEDURE — 82330 ASSAY OF CALCIUM: CPT

## 2025-07-07 PROCEDURE — 6370000000 HC RX 637 (ALT 250 FOR IP): Performed by: INTERNAL MEDICINE

## 2025-07-07 PROCEDURE — 84100 ASSAY OF PHOSPHORUS: CPT

## 2025-07-07 PROCEDURE — 6370000000 HC RX 637 (ALT 250 FOR IP)

## 2025-07-07 PROCEDURE — 85730 THROMBOPLASTIN TIME PARTIAL: CPT

## 2025-07-07 PROCEDURE — 94761 N-INVAS EAR/PLS OXIMETRY MLT: CPT

## 2025-07-07 PROCEDURE — 2500000003 HC RX 250 WO HCPCS: Performed by: PHYSICIAN ASSISTANT

## 2025-07-07 PROCEDURE — 85025 COMPLETE CBC W/AUTO DIFF WBC: CPT

## 2025-07-07 PROCEDURE — 86850 RBC ANTIBODY SCREEN: CPT

## 2025-07-07 PROCEDURE — 86900 BLOOD TYPING SEROLOGIC ABO: CPT

## 2025-07-07 PROCEDURE — 36415 COLL VENOUS BLD VENIPUNCTURE: CPT

## 2025-07-07 PROCEDURE — 93005 ELECTROCARDIOGRAM TRACING: CPT | Performed by: PHYSICIAN ASSISTANT

## 2025-07-07 PROCEDURE — 2700000000 HC OXYGEN THERAPY PER DAY

## 2025-07-07 RX ORDER — AMIODARONE HYDROCHLORIDE 200 MG/1
400 TABLET ORAL 2 TIMES DAILY
Status: DISCONTINUED | OUTPATIENT
Start: 2025-07-07 | End: 2025-07-08

## 2025-07-07 RX ORDER — DEXMEDETOMIDINE HYDROCHLORIDE 4 UG/ML
.1-1.5 INJECTION, SOLUTION INTRAVENOUS
Status: COMPLETED | OUTPATIENT
Start: 2025-07-08 | End: 2025-07-08

## 2025-07-07 RX ORDER — SODIUM CHLORIDE 0.9 % (FLUSH) 0.9 %
5-40 SYRINGE (ML) INJECTION PRN
Status: DISCONTINUED | OUTPATIENT
Start: 2025-07-07 | End: 2025-07-08

## 2025-07-07 RX ORDER — CHLORHEXIDINE GLUCONATE ORAL RINSE 1.2 MG/ML
15 SOLUTION DENTAL ONCE
Status: COMPLETED | OUTPATIENT
Start: 2025-07-07 | End: 2025-07-07

## 2025-07-07 RX ORDER — METOPROLOL TARTRATE 25 MG/1
25 TABLET, FILM COATED ORAL ONCE
Status: COMPLETED | OUTPATIENT
Start: 2025-07-07 | End: 2025-07-07

## 2025-07-07 RX ORDER — MUPIROCIN 2 %
OINTMENT (GRAM) TOPICAL 2 TIMES DAILY
Status: DISCONTINUED | OUTPATIENT
Start: 2025-07-07 | End: 2025-07-08 | Stop reason: HOSPADM

## 2025-07-07 RX ORDER — BUDESONIDE 0.5 MG/2ML
0.5 INHALANT ORAL
Status: DISCONTINUED | OUTPATIENT
Start: 2025-07-07 | End: 2025-07-08

## 2025-07-07 RX ORDER — SODIUM CHLORIDE 0.9 % (FLUSH) 0.9 %
5-40 SYRINGE (ML) INJECTION EVERY 12 HOURS SCHEDULED
Status: DISCONTINUED | OUTPATIENT
Start: 2025-07-07 | End: 2025-07-08

## 2025-07-07 RX ORDER — NOREPINEPHRINE BITARTRATE 0.06 MG/ML
1-100 INJECTION, SOLUTION INTRAVENOUS
Status: COMPLETED | OUTPATIENT
Start: 2025-07-08 | End: 2025-07-08

## 2025-07-07 RX ORDER — CHLORHEXIDINE GLUCONATE 40 MG/ML
SOLUTION TOPICAL SEE ADMIN INSTRUCTIONS
Status: DISCONTINUED | OUTPATIENT
Start: 2025-07-07 | End: 2025-07-08 | Stop reason: HOSPADM

## 2025-07-07 RX ORDER — IPRATROPIUM BROMIDE AND ALBUTEROL SULFATE 2.5; .5 MG/3ML; MG/3ML
1 SOLUTION RESPIRATORY (INHALATION)
Status: DISCONTINUED | OUTPATIENT
Start: 2025-07-07 | End: 2025-07-08

## 2025-07-07 RX ORDER — SODIUM CHLORIDE 9 MG/ML
INJECTION, SOLUTION INTRAVENOUS PRN
Status: DISCONTINUED | OUTPATIENT
Start: 2025-07-07 | End: 2025-07-08

## 2025-07-07 RX ADMIN — SODIUM CHLORIDE, PRESERVATIVE FREE 10 ML: 5 INJECTION INTRAVENOUS at 08:48

## 2025-07-07 RX ADMIN — CARVEDILOL 25 MG: 25 TABLET, FILM COATED ORAL at 08:46

## 2025-07-07 RX ADMIN — AMIODARONE HYDROCHLORIDE 400 MG: 200 TABLET ORAL at 13:08

## 2025-07-07 RX ADMIN — AMLODIPINE BESYLATE 10 MG: 10 TABLET ORAL at 08:47

## 2025-07-07 RX ADMIN — Medication 15 ML: at 15:10

## 2025-07-07 RX ADMIN — HYDROXYCHLOROQUINE SULFATE 300 MG: 200 TABLET ORAL at 08:47

## 2025-07-07 RX ADMIN — SODIUM CHLORIDE, PRESERVATIVE FREE 10 ML: 5 INJECTION INTRAVENOUS at 21:00

## 2025-07-07 RX ADMIN — TIOTROPIUM BROMIDE AND OLODATEROL 2 PUFF: 3.124; 2.736 SPRAY, METERED RESPIRATORY (INHALATION) at 09:46

## 2025-07-07 RX ADMIN — GUAIFENESIN 600 MG: 600 TABLET ORAL at 21:35

## 2025-07-07 RX ADMIN — IPRATROPIUM BROMIDE AND ALBUTEROL SULFATE 1 DOSE: .5; 2.5 SOLUTION RESPIRATORY (INHALATION) at 15:20

## 2025-07-07 RX ADMIN — EMPAGLIFLOZIN 10 MG: 10 TABLET, FILM COATED ORAL at 08:46

## 2025-07-07 RX ADMIN — CARVEDILOL 25 MG: 25 TABLET, FILM COATED ORAL at 21:35

## 2025-07-07 RX ADMIN — METOPROLOL TARTRATE 25 MG: 25 TABLET, FILM COATED ORAL at 14:50

## 2025-07-07 RX ADMIN — MUPIROCIN: 20 OINTMENT TOPICAL at 21:36

## 2025-07-07 RX ADMIN — PREDNISONE 5 MG: 5 TABLET ORAL at 08:47

## 2025-07-07 RX ADMIN — AMIODARONE HYDROCHLORIDE 400 MG: 200 TABLET ORAL at 21:35

## 2025-07-07 RX ADMIN — GUAIFENESIN 600 MG: 600 TABLET ORAL at 08:46

## 2025-07-07 RX ADMIN — MELATONIN TAB 3 MG 3 MG: 3 TAB at 22:57

## 2025-07-07 RX ADMIN — CHLORHEXIDINE GLUCONATE: 4 LIQUID TOPICAL at 22:00

## 2025-07-07 RX ADMIN — ATORVASTATIN CALCIUM 40 MG: 40 TABLET, FILM COATED ORAL at 21:35

## 2025-07-07 RX ADMIN — ASPIRIN 81 MG 81 MG: 81 TABLET ORAL at 08:47

## 2025-07-07 ASSESSMENT — ENCOUNTER SYMPTOMS: SHORTNESS OF BREATH: 1

## 2025-07-07 NOTE — CARE COORDINATION
Met with pt and spouse for f/u visit.  Pt states that she is still hoping to return home after her surgery.  Pt is having CABG tomorrow.  CM will continue to follow for d/c needs post-op. TE

## 2025-07-07 NOTE — ANESTHESIA PRE PROCEDURE
IMPRESSION:  1. Approximately 30% origin stenosis of the right internal carotid artery and   55% origin stenosis of the left internal carotid artery.  2. Occlusive disease involving the proximal/mid left vertebral artery. This   finding was reported previously. The dominant right vertebral artery is patent.  3. 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  4. Severe origin stenosis of the left common carotid artery.  5. Please see above for complete discussion.  . Other Findings:         Anesthesia Plan      general     ASA 4     (Preop chartreview only. )  Induction: intravenous.  arterial line, CVP, continuous noninvasive hemodynamic monitor, central line, MARY and PA catheter                        TU Rivero - CRNA   7/7/2025

## 2025-07-08 ENCOUNTER — APPOINTMENT (OUTPATIENT)
Dept: GENERAL RADIOLOGY | Age: 70
DRG: 216 | End: 2025-07-08
Payer: MEDICARE

## 2025-07-08 ENCOUNTER — ANESTHESIA (OUTPATIENT)
Dept: OPERATING ROOM | Age: 70
End: 2025-07-08
Payer: MEDICARE

## 2025-07-08 PROBLEM — I25.10 CORONARY ARTERY DISEASE: Status: ACTIVE | Noted: 2025-07-08

## 2025-07-08 LAB
ANION GAP SERPL CALCULATED.3IONS-SCNC: 10 MMOL/L (ref 9–17)
ANION GAP SERPL CALCULATED.3IONS-SCNC: 11 MMOL/L (ref 9–17)
ANION GAP SERPL CALCULATED.3IONS-SCNC: 12 MMOL/L (ref 9–17)
ARTERIAL PATENCY WRIST A: NORMAL
BUN BLD-MCNC: 20 MG/DL (ref 7–20)
BUN BLD-MCNC: 21 MG/DL (ref 7–20)
BUN BLD-MCNC: 22 MG/DL (ref 7–20)
BUN BLD-MCNC: 23 MG/DL (ref 7–20)
BUN BLD-MCNC: 23 MG/DL (ref 7–20)
BUN BLD-MCNC: 24 MG/DL (ref 7–20)
BUN SERPL-MCNC: 20 MG/DL (ref 7–20)
BUN SERPL-MCNC: 21 MG/DL (ref 7–20)
BUN SERPL-MCNC: 22 MG/DL (ref 7–20)
CA-I BLD-SCNC: 1 MMOL/L (ref 1.15–1.33)
CA-I BLD-SCNC: 1.01 MMOL/L (ref 1.15–1.33)
CA-I BLD-SCNC: 1.01 MMOL/L (ref 1.15–1.33)
CA-I BLD-SCNC: 1.04 MMOL/L (ref 1.15–1.33)
CA-I BLD-SCNC: 1.12 MMOL/L (ref 1.15–1.33)
CA-I BLD-SCNC: 1.14 MMOL/L (ref 1.15–1.33)
CA-I BLD-SCNC: 1.15 MMOL/L (ref 1.15–1.33)
CA-I BLD-SCNC: 1.15 MMOL/L (ref 1.15–1.33)
CA-I BLD-SCNC: 1.16 MMOL/L (ref 1.15–1.33)
CA-I BLD-SCNC: 1.17 MMOL/L (ref 1.15–1.33)
CA-I BLD-SCNC: 1.17 MMOL/L (ref 1.15–1.33)
CA-I BLD-SCNC: 1.22 MMOL/L (ref 1.15–1.33)
CA-I BLD-SCNC: 1.26 MMOL/L (ref 1.15–1.33)
CA-I BLD-SCNC: 1.26 MMOL/L (ref 1.15–1.33)
CA-I BLD-SCNC: 1.27 MMOL/L (ref 1.15–1.33)
CA-I BLD-SCNC: 1.36 MMOL/L (ref 1.15–1.33)
CALCIUM SERPL-MCNC: 7.8 MG/DL (ref 8.3–10.6)
CALCIUM SERPL-MCNC: 8.1 MG/DL (ref 8.3–10.6)
CALCIUM SERPL-MCNC: 8.2 MG/DL (ref 8.3–10.6)
CHLORIDE BLD-SCNC: 109 MMOL/L (ref 99–110)
CHLORIDE BLD-SCNC: 110 MMOL/L (ref 99–110)
CHLORIDE BLD-SCNC: 111 MMOL/L (ref 99–110)
CHLORIDE BLD-SCNC: 112 MMOL/L (ref 99–110)
CHLORIDE BLD-SCNC: 114 MMOL/L (ref 99–110)
CHLORIDE BLD-SCNC: 114 MMOL/L (ref 99–110)
CHLORIDE BLD-SCNC: 115 MMOL/L (ref 99–110)
CHLORIDE BLD-SCNC: 116 MMOL/L (ref 99–110)
CHLORIDE BLD-SCNC: 117 MMOL/L (ref 99–110)
CHLORIDE BLD-SCNC: 117 MMOL/L (ref 99–110)
CHLORIDE SERPL-SCNC: 113 MMOL/L (ref 99–110)
CHLORIDE SERPL-SCNC: 114 MMOL/L (ref 99–110)
CHLORIDE SERPL-SCNC: 115 MMOL/L (ref 99–110)
CO2 SERPL-SCNC: 19 MMOL/L (ref 21–32)
CO2 SERPL-SCNC: 21 MMOL/L (ref 21–32)
CO2 SERPL-SCNC: 22 MMOL/L (ref 21–32)
CREAT BLD-MCNC: 1.1 MG/DL (ref 0.5–1.2)
CREAT BLD-MCNC: 1.1 MG/DL (ref 0.5–1.2)
CREAT BLD-MCNC: 1.2 MG/DL (ref 0.5–1.2)
CREAT BLD-MCNC: 1.3 MG/DL (ref 0.5–1.2)
CREAT BLD-MCNC: 1.4 MG/DL (ref 0.5–1.2)
CREAT SERPL-MCNC: 1.3 MG/DL (ref 0.6–1.2)
CREAT SERPL-MCNC: 1.4 MG/DL (ref 0.6–1.2)
CREAT SERPL-MCNC: 1.4 MG/DL (ref 0.6–1.2)
EGFR, POC: 40 ML/MIN/1.73M2
EGFR, POC: 44 ML/MIN/1.73M2
EGFR, POC: 49 ML/MIN/1.73M2
EGFR, POC: 54 ML/MIN/1.73M2
EGFR, POC: 54 ML/MIN/1.73M2
EKG ATRIAL RATE: 72 BPM
EKG ATRIAL RATE: 76 BPM
EKG DIAGNOSIS: NORMAL
EKG DIAGNOSIS: NORMAL
EKG P AXIS: 63 DEGREES
EKG P AXIS: 83 DEGREES
EKG P-R INTERVAL: 204 MS
EKG Q-T INTERVAL: 388 MS
EKG Q-T INTERVAL: 492 MS
EKG QRS DURATION: 100 MS
EKG QRS DURATION: 168 MS
EKG QTC CALCULATION (BAZETT): 436 MS
EKG QTC CALCULATION (BAZETT): 567 MS
EKG R AXIS: -66 DEGREES
EKG R AXIS: 38 DEGREES
EKG T AXIS: 72 DEGREES
EKG T AXIS: 94 DEGREES
EKG VENTRICULAR RATE: 76 BPM
EKG VENTRICULAR RATE: 80 BPM
ERYTHROCYTE [DISTWIDTH] IN BLOOD BY AUTOMATED COUNT: 14.3 % (ref 11.7–14.9)
ERYTHROCYTE [DISTWIDTH] IN BLOOD BY AUTOMATED COUNT: 14.6 % (ref 11.7–14.9)
FIBRINOGEN PPP-MCNC: 206 MG/DL (ref 170–540)
GFR, ESTIMATED: 38 ML/MIN/1.73M2
GFR, ESTIMATED: 39 ML/MIN/1.73M2
GFR, ESTIMATED: 40 ML/MIN/1.73M2
GLUCOSE BLD-MCNC: 103 MG/DL (ref 74–99)
GLUCOSE BLD-MCNC: 106 MG/DL (ref 74–99)
GLUCOSE BLD-MCNC: 111 MG/DL (ref 74–99)
GLUCOSE BLD-MCNC: 111 MG/DL (ref 74–99)
GLUCOSE BLD-MCNC: 113 MG/DL (ref 74–99)
GLUCOSE BLD-MCNC: 117 MG/DL (ref 74–99)
GLUCOSE BLD-MCNC: 117 MG/DL (ref 74–99)
GLUCOSE BLD-MCNC: 119 MG/DL (ref 74–99)
GLUCOSE BLD-MCNC: 123 MG/DL (ref 74–99)
GLUCOSE BLD-MCNC: 124 MG/DL (ref 74–99)
GLUCOSE BLD-MCNC: 125 MG/DL (ref 74–99)
GLUCOSE BLD-MCNC: 127 MG/DL (ref 74–99)
GLUCOSE BLD-MCNC: 134 MG/DL (ref 74–99)
GLUCOSE BLD-MCNC: 135 MG/DL (ref 74–99)
GLUCOSE BLD-MCNC: 140 MG/DL (ref 74–99)
GLUCOSE BLD-MCNC: 140 MG/DL (ref 74–99)
GLUCOSE BLD-MCNC: 141 MG/DL (ref 74–99)
GLUCOSE BLD-MCNC: 144 MG/DL (ref 74–99)
GLUCOSE BLD-MCNC: 184 MG/DL (ref 74–99)
GLUCOSE BLD-MCNC: 189 MG/DL (ref 74–99)
GLUCOSE SERPL-MCNC: 105 MG/DL (ref 74–99)
GLUCOSE SERPL-MCNC: 117 MG/DL (ref 74–99)
GLUCOSE SERPL-MCNC: 132 MG/DL (ref 74–99)
HCT VFR BLD AUTO: 18 % (ref 38–51)
HCT VFR BLD AUTO: 21 % (ref 38–51)
HCT VFR BLD AUTO: 23 % (ref 38–51)
HCT VFR BLD AUTO: 24 % (ref 38–51)
HCT VFR BLD AUTO: 24 % (ref 38–51)
HCT VFR BLD AUTO: 25 % (ref 38–51)
HCT VFR BLD AUTO: 25 % (ref 38–51)
HCT VFR BLD AUTO: 26 % (ref 38–51)
HCT VFR BLD AUTO: 27 % (ref 38–51)
HCT VFR BLD AUTO: 27 % (ref 38–51)
HCT VFR BLD AUTO: 27.9 % (ref 37–47)
HCT VFR BLD AUTO: 28 % (ref 38–51)
HCT VFR BLD AUTO: 30.5 % (ref 37–47)
HCT VFR BLD AUTO: 33 % (ref 38–51)
HCT VFR BLD AUTO: 38 % (ref 38–51)
HGB BLD-MCNC: 8.8 G/DL (ref 12.5–16)
HGB BLD-MCNC: 9.4 G/DL (ref 12.5–16)
INR PPP: 1.9
INR PPP: 2.1
MAGNESIUM SERPL-MCNC: 3.6 MG/DL (ref 1.8–2.4)
MAGNESIUM SERPL-MCNC: 4.4 MG/DL (ref 1.8–2.4)
MCH RBC QN AUTO: 27.5 PG (ref 27–31)
MCH RBC QN AUTO: 27.8 PG (ref 27–31)
MCHC RBC AUTO-ENTMCNC: 30.8 G/DL (ref 32–36)
MCHC RBC AUTO-ENTMCNC: 31.5 G/DL (ref 32–36)
MCV RBC AUTO: 88 FL (ref 78–100)
MCV RBC AUTO: 89.2 FL (ref 78–100)
MRSA, DNA, NASAL: NOT DETECTED
NEGATIVE BASE EXCESS, ART: 0 MMOL/L (ref 0–3)
NEGATIVE BASE EXCESS, ART: 1.2 MMOL/L (ref 0–3)
NEGATIVE BASE EXCESS, ART: 1.3 MMOL/L (ref 0–3)
NEGATIVE BASE EXCESS, ART: 1.4 MMOL/L (ref 0–3)
NEGATIVE BASE EXCESS, ART: 1.5 MMOL/L (ref 0–3)
NEGATIVE BASE EXCESS, ART: 2 MMOL/L (ref 0–3)
NEGATIVE BASE EXCESS, ART: 2.4 MMOL/L (ref 0–3)
NEGATIVE BASE EXCESS, ART: 5.6 MMOL/L (ref 0–3)
NEGATIVE BASE EXCESS, ART: 6.3 MMOL/L (ref 0–3)
NEGATIVE BASE EXCESS, ART: 7.5 MMOL/L (ref 0–3)
NEGATIVE BASE EXCESS, ART: 8 MMOL/L (ref 0–3)
NEGATIVE BASE EXCESS, ART: 8.1 MMOL/L (ref 0–3)
NEGATIVE BASE EXCESS, ART: 8.7 MMOL/L (ref 0–3)
OXYHGB MFR BLD: 60.3 %
PARTIAL THROMBOPLASTIN TIME: 42.7 SEC (ref 25.1–37.1)
PHOSPHATE SERPL-MCNC: 4.1 MG/DL (ref 2.5–4.9)
PLATELET # BLD AUTO: 114 K/UL (ref 140–440)
PLATELET # BLD AUTO: 141 K/UL (ref 140–440)
PMV BLD AUTO: 10.3 FL (ref 7.5–11.1)
PMV BLD AUTO: 11 FL (ref 7.5–11.1)
POC ANION GAP: 10 MMOL/L (ref 7–16)
POC ANION GAP: 12 MMOL/L (ref 7–16)
POC ANION GAP: 14 MMOL/L (ref 7–16)
POC ANION GAP: 14 MMOL/L (ref 7–16)
POC ANION GAP: 15 MMOL/L (ref 7–16)
POC ANION GAP: 16 MMOL/L (ref 7–16)
POC ANION GAP: 4 MMOL/L (ref 7–16)
POC ANION GAP: 5 MMOL/L (ref 7–16)
POC ANION GAP: 6 MMOL/L (ref 7–16)
POC ANION GAP: 7 MMOL/L (ref 7–16)
POC ANION GAP: 8 MMOL/L (ref 7–16)
POC ANION GAP: 9 MMOL/L (ref 7–16)
POC HCO3: 19.1 MMOL/L (ref 21–28)
POC HCO3: 19.2 MMOL/L (ref 21–28)
POC HCO3: 19.5 MMOL/L (ref 21–28)
POC HCO3: 19.7 MMOL/L (ref 21–28)
POC HCO3: 20 MMOL/L (ref 21–28)
POC HCO3: 21.8 MMOL/L (ref 21–28)
POC HCO3: 21.8 MMOL/L (ref 21–28)
POC HCO3: 22.5 MMOL/L (ref 21–28)
POC HCO3: 23.5 MMOL/L (ref 21–28)
POC HCO3: 23.5 MMOL/L (ref 21–28)
POC HCO3: 23.6 MMOL/L (ref 21–28)
POC HCO3: 24.3 MMOL/L (ref 21–28)
POC HCO3: 24.3 MMOL/L (ref 21–28)
POC HCO3: 24.5 MMOL/L (ref 21–28)
POC HCO3: 25.2 MMOL/L (ref 21–28)
POC HCO3: 25.6 MMOL/L (ref 21–28)
POC HCO3: 26.6 MMOL/L (ref 21–28)
POC HCO3: 26.7 MMOL/L (ref 21–28)
POC HCO3: 27.3 MMOL/L (ref 21–28)
POC HCO3: 30.3 MMOL/L (ref 21–28)
POC HEMOGLOBIN (CALC): 11.3 G/DL (ref 12–17)
POC HEMOGLOBIN (CALC): 13.1 G/DL (ref 12–17)
POC HEMOGLOBIN (CALC): 6.3 G/DL (ref 12–17)
POC HEMOGLOBIN (CALC): 7.1 G/DL (ref 12–17)
POC HEMOGLOBIN (CALC): 7.2 G/DL (ref 12–17)
POC HEMOGLOBIN (CALC): 7.2 G/DL (ref 12–17)
POC HEMOGLOBIN (CALC): 8 G/DL (ref 12–17)
POC HEMOGLOBIN (CALC): 8.1 G/DL (ref 12–17)
POC HEMOGLOBIN (CALC): 8.3 G/DL (ref 12–17)
POC HEMOGLOBIN (CALC): 8.4 G/DL (ref 12–17)
POC HEMOGLOBIN (CALC): 8.7 G/DL (ref 12–17)
POC HEMOGLOBIN (CALC): 8.8 G/DL (ref 12–17)
POC HEMOGLOBIN (CALC): 8.8 G/DL (ref 12–17)
POC HEMOGLOBIN (CALC): 8.9 G/DL (ref 12–17)
POC HEMOGLOBIN (CALC): 9 G/DL (ref 12–17)
POC HEMOGLOBIN (CALC): 9.1 G/DL (ref 12–17)
POC HEMOGLOBIN (CALC): 9.1 G/DL (ref 12–17)
POC HEMOGLOBIN (CALC): 9.5 G/DL (ref 12–17)
POC HEMOGLOBIN (CALC): 9.6 G/DL (ref 12–17)
POC HEMOGLOBIN (CALC): 9.6 G/DL (ref 12–17)
POC O2 SATURATION: 86.8 % (ref 94–98)
POC O2 SATURATION: 88.7 % (ref 94–98)
POC O2 SATURATION: 89.2 % (ref 94–98)
POC O2 SATURATION: 90 % (ref 94–98)
POC O2 SATURATION: 90 % (ref 94–98)
POC O2 SATURATION: 91.2 % (ref 94–98)
POC O2 SATURATION: 93.1 % (ref 94–98)
POC O2 SATURATION: 94.1 % (ref 94–98)
POC O2 SATURATION: 94.5 % (ref 94–98)
POC O2 SATURATION: 95.2 % (ref 94–98)
POC O2 SATURATION: 96.7 % (ref 94–98)
POC O2 SATURATION: 96.8 % (ref 94–98)
POC O2 SATURATION: 98.2 % (ref 94–98)
POC O2 SATURATION: 99.2 % (ref 94–98)
POC O2 SATURATION: 99.4 % (ref 94–98)
POC O2 SATURATION: 99.6 % (ref 94–98)
POC O2 SATURATION: 99.6 % (ref 94–98)
POC O2 SATURATION: 99.8 % (ref 94–98)
POC O2 SATURATION: 99.8 % (ref 94–98)
POC O2 SATURATION: 99.9 % (ref 94–98)
POC PCO2: 28.9 MM HG (ref 35–48)
POC PCO2: 32.6 MM HG (ref 35–48)
POC PCO2: 37 MM HG (ref 35–48)
POC PCO2: 37.8 MM HG (ref 35–48)
POC PCO2: 39 MM HG (ref 35–48)
POC PCO2: 39.6 MM HG (ref 35–48)
POC PCO2: 40 MM HG (ref 35–48)
POC PCO2: 40.7 MM HG (ref 35–48)
POC PCO2: 42.7 MM HG (ref 35–48)
POC PCO2: 42.8 MM HG (ref 35–48)
POC PCO2: 43 MM HG (ref 35–48)
POC PCO2: 43.3 MM HG (ref 35–48)
POC PCO2: 43.5 MM HG (ref 35–48)
POC PCO2: 43.5 MM HG (ref 35–48)
POC PCO2: 44.4 MM HG (ref 35–48)
POC PCO2: 47.7 MM HG (ref 35–48)
POC PCO2: 48.7 MM HG (ref 35–48)
POC PCO2: 49 MM HG (ref 35–48)
POC PCO2: 49.5 MM HG (ref 35–48)
POC PCO2: 51.2 MM HG (ref 35–48)
POC PH: 7.18 (ref 7.35–7.45)
POC PH: 7.21 (ref 7.35–7.45)
POC PH: 7.21 (ref 7.35–7.45)
POC PH: 7.25 (ref 7.35–7.45)
POC PH: 7.26 (ref 7.35–7.45)
POC PH: 7.27 (ref 7.35–7.45)
POC PH: 7.35 (ref 7.35–7.45)
POC PH: 7.36 (ref 7.35–7.45)
POC PH: 7.38 (ref 7.35–7.45)
POC PH: 7.39 (ref 7.35–7.45)
POC PH: 7.4 (ref 7.35–7.45)
POC PH: 7.41 (ref 7.35–7.45)
POC PH: 7.41 (ref 7.35–7.45)
POC PH: 7.43 (ref 7.35–7.45)
POC PH: 7.45 (ref 7.35–7.45)
POC PH: 7.48 (ref 7.35–7.45)
POC PH: 7.49 (ref 7.35–7.45)
POC PO2: 122.1 MM HG (ref 83–108)
POC PO2: 166.8 MM HG (ref 83–108)
POC PO2: 172 MM HG (ref 83–108)
POC PO2: 181.8 MM HG (ref 83–108)
POC PO2: 189.3 MM HG (ref 83–108)
POC PO2: 205 MM HG (ref 83–108)
POC PO2: 208.2 MM HG (ref 83–108)
POC PO2: 359.1 MM HG (ref 83–108)
POC PO2: 55.3 MM HG (ref 83–108)
POC PO2: 55.7 MM HG (ref 83–108)
POC PO2: 56.7 MM HG (ref 83–108)
POC PO2: 62.7 MM HG (ref 83–108)
POC PO2: 68.4 MM HG (ref 83–108)
POC PO2: 69.9 MM HG (ref 83–108)
POC PO2: 71.4 MM HG (ref 83–108)
POC PO2: 72.6 MM HG (ref 83–108)
POC PO2: 73.5 MM HG (ref 83–108)
POC PO2: 88.7 MM HG (ref 83–108)
POC PO2: 89.6 MM HG (ref 83–108)
POC PO2: 90.7 MM HG (ref 83–108)
POSITIVE BASE EXCESS, ART: 0.2 MMOL/L (ref 0–3)
POSITIVE BASE EXCESS, ART: 0.8 MMOL/L (ref 0–3)
POSITIVE BASE EXCESS, ART: 1 MMOL/L (ref 0–3)
POSITIVE BASE EXCESS, ART: 1.5 MMOL/L (ref 0–3)
POSITIVE BASE EXCESS, ART: 1.5 MMOL/L (ref 0–3)
POSITIVE BASE EXCESS, ART: 3.2 MMOL/L (ref 0–3)
POSITIVE BASE EXCESS, ART: 5 MMOL/L (ref 0–3)
POTASSIUM BLD-SCNC: 3.9 MMOL/L (ref 3.5–5.1)
POTASSIUM BLD-SCNC: 4.2 MMOL/L (ref 3.5–5.1)
POTASSIUM BLD-SCNC: 4.2 MMOL/L (ref 3.5–5.1)
POTASSIUM BLD-SCNC: 4.3 MMOL/L (ref 3.5–5.1)
POTASSIUM BLD-SCNC: 4.3 MMOL/L (ref 3.5–5.1)
POTASSIUM BLD-SCNC: 4.4 MMOL/L (ref 3.5–5.1)
POTASSIUM BLD-SCNC: 4.5 MMOL/L (ref 3.5–5.1)
POTASSIUM BLD-SCNC: 4.8 MMOL/L (ref 3.5–5.1)
POTASSIUM BLD-SCNC: 4.9 MMOL/L (ref 3.5–5.1)
POTASSIUM BLD-SCNC: 4.9 MMOL/L (ref 3.5–5.1)
POTASSIUM BLD-SCNC: 5 MMOL/L (ref 3.5–5.1)
POTASSIUM BLD-SCNC: 5 MMOL/L (ref 3.5–5.1)
POTASSIUM BLD-SCNC: 5.1 MMOL/L (ref 3.5–5.1)
POTASSIUM BLD-SCNC: 5.1 MMOL/L (ref 3.5–5.1)
POTASSIUM BLD-SCNC: 5.2 MMOL/L (ref 3.5–5.1)
POTASSIUM BLD-SCNC: 6.2 MMOL/L (ref 3.5–5.1)
POTASSIUM SERPL-SCNC: 4.6 MMOL/L (ref 3.5–5.1)
POTASSIUM SERPL-SCNC: 5.2 MMOL/L (ref 3.5–5.1)
POTASSIUM SERPL-SCNC: 5.3 MMOL/L (ref 3.5–5.1)
PROTHROMBIN TIME: 21.2 SEC (ref 11.7–14.5)
PROTHROMBIN TIME: 22.9 SEC (ref 11.7–14.5)
RBC # BLD AUTO: 3.17 M/UL (ref 4.2–5.4)
RBC # BLD AUTO: 3.42 M/UL (ref 4.2–5.4)
SODIUM BLD-SCNC: 142 MMOL/L (ref 136–145)
SODIUM BLD-SCNC: 143 MMOL/L (ref 136–145)
SODIUM BLD-SCNC: 144 MMOL/L (ref 136–145)
SODIUM BLD-SCNC: 144 MMOL/L (ref 136–145)
SODIUM BLD-SCNC: 145 MMOL/L (ref 136–145)
SODIUM BLD-SCNC: 146 MMOL/L (ref 136–145)
SODIUM BLD-SCNC: 147 MMOL/L (ref 136–145)
SODIUM BLD-SCNC: 148 MMOL/L (ref 136–145)
SODIUM BLD-SCNC: 149 MMOL/L (ref 136–145)
SODIUM BLD-SCNC: 149 MMOL/L (ref 136–145)
SODIUM BLD-SCNC: 150 MMOL/L (ref 136–145)
SODIUM BLD-SCNC: 150 MMOL/L (ref 136–145)
SODIUM SERPL-SCNC: 143 MMOL/L (ref 136–145)
SODIUM SERPL-SCNC: 147 MMOL/L (ref 136–145)
SODIUM SERPL-SCNC: 147 MMOL/L (ref 136–145)
SPECIMEN DESCRIPTION: NORMAL
TCO2 (CALC), ART: 20 MMOL/L (ref 21–32)
TCO2 (CALC), ART: 21 MMOL/L (ref 21–32)
TCO2 (CALC), ART: 23 MMOL/L (ref 21–32)
TCO2 (CALC), ART: 23 MMOL/L (ref 21–32)
TCO2 (CALC), ART: 24 MMOL/L (ref 21–32)
TCO2 (CALC), ART: 25 MMOL/L (ref 21–32)
TCO2 (CALC), ART: 26 MMOL/L (ref 21–32)
TCO2 (CALC), ART: 26 MMOL/L (ref 21–32)
TCO2 (CALC), ART: 27 MMOL/L (ref 21–32)
TCO2 (CALC), ART: 27 MMOL/L (ref 21–32)
TCO2 (CALC), ART: 28 MMOL/L (ref 21–32)
TCO2 (CALC), ART: 28 MMOL/L (ref 21–32)
TCO2 (CALC), ART: 29 MMOL/L (ref 21–32)
TCO2 (CALC), ART: 32 MMOL/L (ref 21–32)
TEXT FOR RESPIRATORY: NORMAL
WBC OTHER # BLD: 15.4 K/UL (ref 4–10.5)
WBC OTHER # BLD: 18 K/UL (ref 4–10.5)

## 2025-07-08 PROCEDURE — 6370000000 HC RX 637 (ALT 250 FOR IP): Performed by: PHYSICIAN ASSISTANT

## 2025-07-08 PROCEDURE — 85384 FIBRINOGEN ACTIVITY: CPT

## 2025-07-08 PROCEDURE — 37799 UNLISTED PX VASCULAR SURGERY: CPT

## 2025-07-08 PROCEDURE — 82805 BLOOD GASES W/O2 SATURATION: CPT

## 2025-07-08 PROCEDURE — 99233 SBSQ HOSP IP/OBS HIGH 50: CPT | Performed by: INTERNAL MEDICINE

## 2025-07-08 PROCEDURE — 82330 ASSAY OF CALCIUM: CPT

## 2025-07-08 PROCEDURE — 93005 ELECTROCARDIOGRAM TRACING: CPT | Performed by: THORACIC SURGERY (CARDIOTHORACIC VASCULAR SURGERY)

## 2025-07-08 PROCEDURE — 85730 THROMBOPLASTIN TIME PARTIAL: CPT

## 2025-07-08 PROCEDURE — 6370000000 HC RX 637 (ALT 250 FOR IP): Performed by: INTERNAL MEDICINE

## 2025-07-08 PROCEDURE — C1729 CATH, DRAINAGE: HCPCS | Performed by: THORACIC SURGERY (CARDIOTHORACIC VASCULAR SURGERY)

## 2025-07-08 PROCEDURE — 80048 BASIC METABOLIC PNL TOTAL CA: CPT

## 2025-07-08 PROCEDURE — 6370000000 HC RX 637 (ALT 250 FOR IP): Performed by: THORACIC SURGERY (CARDIOTHORACIC VASCULAR SURGERY)

## 2025-07-08 PROCEDURE — 33268 EXCL LAA OPN OTH PX ANY METH: CPT | Performed by: THORACIC SURGERY (CARDIOTHORACIC VASCULAR SURGERY)

## 2025-07-08 PROCEDURE — 93005 ELECTROCARDIOGRAM TRACING: CPT | Performed by: PHYSICIAN ASSISTANT

## 2025-07-08 PROCEDURE — 2709999900 HC NON-CHARGEABLE SUPPLY: Performed by: THORACIC SURGERY (CARDIOTHORACIC VASCULAR SURGERY)

## 2025-07-08 PROCEDURE — B24BZZ4 ULTRASONOGRAPHY OF HEART WITH AORTA, TRANSESOPHAGEAL: ICD-10-PCS | Performed by: THORACIC SURGERY (CARDIOTHORACIC VASCULAR SURGERY)

## 2025-07-08 PROCEDURE — 80047 BASIC METABLC PNL IONIZED CA: CPT

## 2025-07-08 PROCEDURE — 33405 REPLACEMENT AORTIC VALVE OPN: CPT | Performed by: THORACIC SURGERY (CARDIOTHORACIC VASCULAR SURGERY)

## 2025-07-08 PROCEDURE — 2720000010 HC SURG SUPPLY STERILE: Performed by: THORACIC SURGERY (CARDIOTHORACIC VASCULAR SURGERY)

## 2025-07-08 PROCEDURE — 6360000002 HC RX W HCPCS: Performed by: INTERNAL MEDICINE

## 2025-07-08 PROCEDURE — 2500000003 HC RX 250 WO HCPCS: Performed by: THORACIC SURGERY (CARDIOTHORACIC VASCULAR SURGERY)

## 2025-07-08 PROCEDURE — 0211093 BYPASS CORONARY ARTERY, TWO ARTERIES FROM CORONARY ARTERY WITH AUTOLOGOUS VENOUS TISSUE, OPEN APPROACH: ICD-10-PCS | Performed by: THORACIC SURGERY (CARDIOTHORACIC VASCULAR SURGERY)

## 2025-07-08 PROCEDURE — P9047 ALBUMIN (HUMAN), 25%, 50ML: HCPCS

## 2025-07-08 PROCEDURE — 3600000008 HC SURGERY OHS BASE: Performed by: THORACIC SURGERY (CARDIOTHORACIC VASCULAR SURGERY)

## 2025-07-08 PROCEDURE — 94761 N-INVAS EAR/PLS OXIMETRY MLT: CPT

## 2025-07-08 PROCEDURE — 82803 BLOOD GASES ANY COMBINATION: CPT

## 2025-07-08 PROCEDURE — 85347 COAGULATION TIME ACTIVATED: CPT

## 2025-07-08 PROCEDURE — 6360000002 HC RX W HCPCS

## 2025-07-08 PROCEDURE — 5A1221Z PERFORMANCE OF CARDIAC OUTPUT, CONTINUOUS: ICD-10-PCS | Performed by: THORACIC SURGERY (CARDIOTHORACIC VASCULAR SURGERY)

## 2025-07-08 PROCEDURE — 02RF08Z REPLACEMENT OF AORTIC VALVE WITH ZOOPLASTIC TISSUE, OPEN APPROACH: ICD-10-PCS | Performed by: THORACIC SURGERY (CARDIOTHORACIC VASCULAR SURGERY)

## 2025-07-08 PROCEDURE — 85610 PROTHROMBIN TIME: CPT

## 2025-07-08 PROCEDURE — 2500000003 HC RX 250 WO HCPCS

## 2025-07-08 PROCEDURE — 2580000003 HC RX 258: Performed by: THORACIC SURGERY (CARDIOTHORACIC VASCULAR SURGERY)

## 2025-07-08 PROCEDURE — 02L70CK OCCLUSION OF LEFT ATRIAL APPENDAGE WITH EXTRALUMINAL DEVICE, OPEN APPROACH: ICD-10-PCS | Performed by: THORACIC SURGERY (CARDIOTHORACIC VASCULAR SURGERY)

## 2025-07-08 PROCEDURE — 3700000000 HC ANESTHESIA ATTENDED CARE: Performed by: THORACIC SURGERY (CARDIOTHORACIC VASCULAR SURGERY)

## 2025-07-08 PROCEDURE — 71045 X-RAY EXAM CHEST 1 VIEW: CPT

## 2025-07-08 PROCEDURE — 83735 ASSAY OF MAGNESIUM: CPT

## 2025-07-08 PROCEDURE — 2700000000 HC OXYGEN THERAPY PER DAY

## 2025-07-08 PROCEDURE — P9045 ALBUMIN (HUMAN), 5%, 250 ML: HCPCS

## 2025-07-08 PROCEDURE — P9045 ALBUMIN (HUMAN), 5%, 250 ML: HCPCS | Performed by: PHYSICIAN ASSISTANT

## 2025-07-08 PROCEDURE — C1768 GRAFT, VASCULAR: HCPCS | Performed by: THORACIC SURGERY (CARDIOTHORACIC VASCULAR SURGERY)

## 2025-07-08 PROCEDURE — 85014 HEMATOCRIT: CPT

## 2025-07-08 PROCEDURE — A4648 IMPLANTABLE TISSUE MARKER: HCPCS | Performed by: THORACIC SURGERY (CARDIOTHORACIC VASCULAR SURGERY)

## 2025-07-08 PROCEDURE — 6360000002 HC RX W HCPCS: Performed by: THORACIC SURGERY (CARDIOTHORACIC VASCULAR SURGERY)

## 2025-07-08 PROCEDURE — 33511 CABG VEIN TWO: CPT | Performed by: THORACIC SURGERY (CARDIOTHORACIC VASCULAR SURGERY)

## 2025-07-08 PROCEDURE — 06BQ4ZZ EXCISION OF LEFT SAPHENOUS VEIN, PERCUTANEOUS ENDOSCOPIC APPROACH: ICD-10-PCS | Performed by: THORACIC SURGERY (CARDIOTHORACIC VASCULAR SURGERY)

## 2025-07-08 PROCEDURE — 2580000003 HC RX 258: Performed by: PHYSICIAN ASSISTANT

## 2025-07-08 PROCEDURE — 33508 ENDOSCOPIC VEIN HARVEST: CPT | Performed by: THORACIC SURGERY (CARDIOTHORACIC VASCULAR SURGERY)

## 2025-07-08 PROCEDURE — C1713 ANCHOR/SCREW BN/BN,TIS/BN: HCPCS | Performed by: THORACIC SURGERY (CARDIOTHORACIC VASCULAR SURGERY)

## 2025-07-08 PROCEDURE — 06BP4ZZ EXCISION OF RIGHT SAPHENOUS VEIN, PERCUTANEOUS ENDOSCOPIC APPROACH: ICD-10-PCS | Performed by: THORACIC SURGERY (CARDIOTHORACIC VASCULAR SURGERY)

## 2025-07-08 PROCEDURE — C1889 IMPLANT/INSERT DEVICE, NOC: HCPCS | Performed by: THORACIC SURGERY (CARDIOTHORACIC VASCULAR SURGERY)

## 2025-07-08 PROCEDURE — 3600000018 HC SURGERY OHS ADDTL 15MIN: Performed by: THORACIC SURGERY (CARDIOTHORACIC VASCULAR SURGERY)

## 2025-07-08 PROCEDURE — 93010 ELECTROCARDIOGRAM REPORT: CPT | Performed by: INTERNAL MEDICINE

## 2025-07-08 PROCEDURE — 3700000001 HC ADD 15 MINUTES (ANESTHESIA): Performed by: THORACIC SURGERY (CARDIOTHORACIC VASCULAR SURGERY)

## 2025-07-08 PROCEDURE — 2500000003 HC RX 250 WO HCPCS: Performed by: INTERNAL MEDICINE

## 2025-07-08 PROCEDURE — 85027 COMPLETE CBC AUTOMATED: CPT

## 2025-07-08 PROCEDURE — 7100000000 HC PACU RECOVERY - FIRST 15 MIN

## 2025-07-08 PROCEDURE — C1894 INTRO/SHEATH, NON-LASER: HCPCS | Performed by: THORACIC SURGERY (CARDIOTHORACIC VASCULAR SURGERY)

## 2025-07-08 PROCEDURE — 2500000003 HC RX 250 WO HCPCS: Performed by: PHYSICIAN ASSISTANT

## 2025-07-08 PROCEDURE — 87641 MR-STAPH DNA AMP PROBE: CPT

## 2025-07-08 PROCEDURE — C1751 CATH, INF, PER/CENT/MIDLINE: HCPCS | Performed by: THORACIC SURGERY (CARDIOTHORACIC VASCULAR SURGERY)

## 2025-07-08 PROCEDURE — 94640 AIRWAY INHALATION TREATMENT: CPT

## 2025-07-08 PROCEDURE — 6360000002 HC RX W HCPCS: Performed by: PHYSICIAN ASSISTANT

## 2025-07-08 PROCEDURE — 2100000000 HC CCU R&B

## 2025-07-08 PROCEDURE — 88305 TISSUE EXAM BY PATHOLOGIST: CPT | Performed by: PATHOLOGY

## 2025-07-08 PROCEDURE — 84100 ASSAY OF PHOSPHORUS: CPT

## 2025-07-08 PROCEDURE — 94002 VENT MGMT INPAT INIT DAY: CPT

## 2025-07-08 DEVICE — CLIP MED SUTURE LESS 50 MM SYS 1 HND STRL ATRICLIP FLX V: Type: IMPLANTABLE DEVICE | Site: HEART | Status: FUNCTIONAL

## 2025-07-08 DEVICE — STERNAL ZIPFIX WITH NEEDLE STERILE
Type: IMPLANTABLE DEVICE | Site: STERNUM | Status: FUNCTIONAL
Brand: ZIPFIX

## 2025-07-08 DEVICE — INSPIRIS RESILIA AORTIC VALVE
Type: IMPLANTABLE DEVICE | Site: AORTIC VALVE | Status: FUNCTIONAL
Brand: INSPIRIS RESILIA AORTIC VALVE

## 2025-07-08 RX ORDER — ONDANSETRON 4 MG/1
4 TABLET, ORALLY DISINTEGRATING ORAL EVERY 8 HOURS PRN
Status: DISCONTINUED | OUTPATIENT
Start: 2025-07-08 | End: 2025-07-17 | Stop reason: HOSPADM

## 2025-07-08 RX ORDER — CHLORHEXIDINE GLUCONATE ORAL RINSE 1.2 MG/ML
15 SOLUTION DENTAL 2 TIMES DAILY
Status: DISCONTINUED | OUTPATIENT
Start: 2025-07-08 | End: 2025-07-17 | Stop reason: HOSPADM

## 2025-07-08 RX ORDER — MAGNESIUM SULFATE IN WATER 40 MG/ML
2000 INJECTION, SOLUTION INTRAVENOUS PRN
Status: DISCONTINUED | OUTPATIENT
Start: 2025-07-08 | End: 2025-07-17 | Stop reason: HOSPADM

## 2025-07-08 RX ORDER — POTASSIUM CHLORIDE 29.8 MG/ML
20 INJECTION INTRAVENOUS PRN
Status: DISCONTINUED | OUTPATIENT
Start: 2025-07-08 | End: 2025-07-17 | Stop reason: HOSPADM

## 2025-07-08 RX ORDER — ETOMIDATE 2 MG/ML
INJECTION INTRAVENOUS
Status: DISCONTINUED | OUTPATIENT
Start: 2025-07-08 | End: 2025-07-08 | Stop reason: SDUPTHER

## 2025-07-08 RX ORDER — ASPIRIN 300 MG/1
300 SUPPOSITORY RECTAL ONCE
Status: COMPLETED | OUTPATIENT
Start: 2025-07-08 | End: 2025-07-08

## 2025-07-08 RX ORDER — IPRATROPIUM BROMIDE AND ALBUTEROL SULFATE 2.5; .5 MG/3ML; MG/3ML
1 SOLUTION RESPIRATORY (INHALATION)
Status: DISCONTINUED | OUTPATIENT
Start: 2025-07-08 | End: 2025-07-09

## 2025-07-08 RX ORDER — POTASSIUM CHLORIDE 7.45 MG/ML
10 INJECTION INTRAVENOUS PRN
Status: DISCONTINUED | OUTPATIENT
Start: 2025-07-08 | End: 2025-07-17 | Stop reason: HOSPADM

## 2025-07-08 RX ORDER — MUPIROCIN 2 %
OINTMENT (GRAM) TOPICAL 2 TIMES DAILY
Status: COMPLETED | OUTPATIENT
Start: 2025-07-08 | End: 2025-07-12

## 2025-07-08 RX ORDER — ALBUMIN HUMAN 50 G/1000ML
12.5 SOLUTION INTRAVENOUS PRN
Status: DISCONTINUED | OUTPATIENT
Start: 2025-07-08 | End: 2025-07-17 | Stop reason: HOSPADM

## 2025-07-08 RX ORDER — ATORVASTATIN CALCIUM 40 MG/1
40 TABLET, FILM COATED ORAL NIGHTLY
Status: DISCONTINUED | OUTPATIENT
Start: 2025-07-09 | End: 2025-07-17 | Stop reason: HOSPADM

## 2025-07-08 RX ORDER — BISACODYL 10 MG
10 SUPPOSITORY, RECTAL RECTAL DAILY PRN
Status: DISCONTINUED | OUTPATIENT
Start: 2025-07-08 | End: 2025-07-17 | Stop reason: HOSPADM

## 2025-07-08 RX ORDER — SODIUM CHLORIDE 0.9 % (FLUSH) 0.9 %
5-40 SYRINGE (ML) INJECTION EVERY 12 HOURS SCHEDULED
Status: DISCONTINUED | OUTPATIENT
Start: 2025-07-08 | End: 2025-07-17 | Stop reason: HOSPADM

## 2025-07-08 RX ORDER — INDOMETHACIN 25 MG/1
50 CAPSULE ORAL EVERY 30 MIN PRN
Status: DISPENSED | OUTPATIENT
Start: 2025-07-08 | End: 2025-07-09

## 2025-07-08 RX ORDER — HEPARIN SODIUM 1000 [USP'U]/ML
INJECTION, SOLUTION INTRAVENOUS; SUBCUTANEOUS
Status: DISCONTINUED | OUTPATIENT
Start: 2025-07-08 | End: 2025-07-08 | Stop reason: SDUPTHER

## 2025-07-08 RX ORDER — FAMOTIDINE 20 MG/1
20 TABLET, FILM COATED ORAL 2 TIMES DAILY
Status: DISCONTINUED | OUTPATIENT
Start: 2025-07-08 | End: 2025-07-11

## 2025-07-08 RX ORDER — FENTANYL CITRATE 50 UG/ML
INJECTION, SOLUTION INTRAMUSCULAR; INTRAVENOUS
Status: DISCONTINUED | OUTPATIENT
Start: 2025-07-08 | End: 2025-07-08 | Stop reason: SDUPTHER

## 2025-07-08 RX ORDER — FENTANYL CITRATE 50 UG/ML
25 INJECTION, SOLUTION INTRAMUSCULAR; INTRAVENOUS
Status: DISPENSED | OUTPATIENT
Start: 2025-07-08 | End: 2025-07-09

## 2025-07-08 RX ORDER — ALBUMIN HUMAN 50 G/1000ML
SOLUTION INTRAVENOUS
Status: DISCONTINUED | OUTPATIENT
Start: 2025-07-08 | End: 2025-07-08 | Stop reason: SDUPTHER

## 2025-07-08 RX ORDER — CLOPIDOGREL BISULFATE 75 MG/1
75 TABLET ORAL DAILY
Status: DISCONTINUED | OUTPATIENT
Start: 2025-07-09 | End: 2025-07-17 | Stop reason: HOSPADM

## 2025-07-08 RX ORDER — POTASSIUM CHLORIDE 1500 MG/1
40 TABLET, EXTENDED RELEASE ORAL PRN
Status: DISCONTINUED | OUTPATIENT
Start: 2025-07-08 | End: 2025-07-17 | Stop reason: HOSPADM

## 2025-07-08 RX ORDER — POLYETHYLENE GLYCOL 3350 17 G/17G
17 POWDER, FOR SOLUTION ORAL DAILY
Status: DISCONTINUED | OUTPATIENT
Start: 2025-07-08 | End: 2025-07-17 | Stop reason: HOSPADM

## 2025-07-08 RX ORDER — TRAMADOL HYDROCHLORIDE 50 MG/1
50 TABLET ORAL EVERY 6 HOURS PRN
Status: DISCONTINUED | OUTPATIENT
Start: 2025-07-08 | End: 2025-07-17 | Stop reason: HOSPADM

## 2025-07-08 RX ORDER — DEXAMETHASONE SODIUM PHOSPHATE 4 MG/ML
INJECTION, SOLUTION INTRA-ARTICULAR; INTRALESIONAL; INTRAMUSCULAR; INTRAVENOUS; SOFT TISSUE
Status: DISCONTINUED | OUTPATIENT
Start: 2025-07-08 | End: 2025-07-08 | Stop reason: SDUPTHER

## 2025-07-08 RX ORDER — SODIUM CHLORIDE 0.9 % (FLUSH) 0.9 %
5-40 SYRINGE (ML) INJECTION PRN
Status: DISCONTINUED | OUTPATIENT
Start: 2025-07-08 | End: 2025-07-17 | Stop reason: HOSPADM

## 2025-07-08 RX ORDER — ACETAMINOPHEN 650 MG
TABLET, EXTENDED RELEASE ORAL
Status: DISCONTINUED | OUTPATIENT
Start: 2025-07-08 | End: 2025-07-08 | Stop reason: ALTCHOICE

## 2025-07-08 RX ORDER — ROCURONIUM BROMIDE 10 MG/ML
INJECTION, SOLUTION INTRAVENOUS
Status: DISCONTINUED | OUTPATIENT
Start: 2025-07-08 | End: 2025-07-08 | Stop reason: SDUPTHER

## 2025-07-08 RX ORDER — ONDANSETRON 2 MG/ML
4 INJECTION INTRAMUSCULAR; INTRAVENOUS EVERY 6 HOURS PRN
Status: DISCONTINUED | OUTPATIENT
Start: 2025-07-08 | End: 2025-07-17 | Stop reason: HOSPADM

## 2025-07-08 RX ORDER — VASOPRESSIN 20 [USP'U]/ML
INJECTION, SOLUTION INTRAVENOUS
Status: DISCONTINUED | OUTPATIENT
Start: 2025-07-08 | End: 2025-07-08 | Stop reason: SDUPTHER

## 2025-07-08 RX ORDER — FENTANYL CITRATE 50 UG/ML
25 INJECTION, SOLUTION INTRAMUSCULAR; INTRAVENOUS
Status: COMPLETED | OUTPATIENT
Start: 2025-07-08 | End: 2025-07-08

## 2025-07-08 RX ORDER — GLUCAGON 1 MG/ML
1 KIT INJECTION PRN
Status: DISCONTINUED | OUTPATIENT
Start: 2025-07-08 | End: 2025-07-17 | Stop reason: HOSPADM

## 2025-07-08 RX ORDER — NOREPINEPHRINE BITARTRATE 0.06 MG/ML
1-10 INJECTION, SOLUTION INTRAVENOUS CONTINUOUS
Status: DISCONTINUED | OUTPATIENT
Start: 2025-07-09 | End: 2025-07-10

## 2025-07-08 RX ORDER — CHLORHEXIDINE GLUCONATE ORAL RINSE 1.2 MG/ML
15 SOLUTION DENTAL ONCE
Status: COMPLETED | OUTPATIENT
Start: 2025-07-08 | End: 2025-07-08

## 2025-07-08 RX ORDER — ASPIRIN 81 MG/1
81 TABLET, CHEWABLE ORAL DAILY
Status: DISCONTINUED | OUTPATIENT
Start: 2025-07-09 | End: 2025-07-17 | Stop reason: HOSPADM

## 2025-07-08 RX ORDER — SODIUM CHLORIDE 9 MG/ML
INJECTION, SOLUTION INTRAVENOUS PRN
Status: DISCONTINUED | OUTPATIENT
Start: 2025-07-08 | End: 2025-07-17 | Stop reason: HOSPADM

## 2025-07-08 RX ORDER — GLYCOPYRROLATE 0.2 MG/ML
INJECTION INTRAMUSCULAR; INTRAVENOUS
Status: DISCONTINUED | OUTPATIENT
Start: 2025-07-08 | End: 2025-07-08 | Stop reason: SDUPTHER

## 2025-07-08 RX ORDER — DEXMEDETOMIDINE HYDROCHLORIDE 4 UG/ML
.1-1.5 INJECTION, SOLUTION INTRAVENOUS CONTINUOUS
Status: DISCONTINUED | OUTPATIENT
Start: 2025-07-08 | End: 2025-07-10

## 2025-07-08 RX ORDER — NOREPINEPHRINE BITARTRATE 0.03 MG/ML
1-10 INJECTION, SOLUTION INTRAVENOUS CONTINUOUS PRN
Status: DISCONTINUED | OUTPATIENT
Start: 2025-07-08 | End: 2025-07-08 | Stop reason: CLARIF

## 2025-07-08 RX ORDER — FAMOTIDINE 10 MG/ML
20 INJECTION, SOLUTION INTRAVENOUS 2 TIMES DAILY
Status: DISCONTINUED | OUTPATIENT
Start: 2025-07-08 | End: 2025-07-11

## 2025-07-08 RX ORDER — LIDOCAINE HYDROCHLORIDE 20 MG/ML
INJECTION, SOLUTION EPIDURAL; INFILTRATION; INTRACAUDAL; PERINEURAL
Status: DISCONTINUED | OUTPATIENT
Start: 2025-07-08 | End: 2025-07-08 | Stop reason: SDUPTHER

## 2025-07-08 RX ORDER — ENOXAPARIN SODIUM 100 MG/ML
40 INJECTION SUBCUTANEOUS DAILY
Status: DISCONTINUED | OUTPATIENT
Start: 2025-07-09 | End: 2025-07-17 | Stop reason: HOSPADM

## 2025-07-08 RX ORDER — M-VIT,TX,IRON,MINS/CALC/FOLIC 27MG-0.4MG
1 TABLET ORAL
Status: DISCONTINUED | OUTPATIENT
Start: 2025-07-09 | End: 2025-07-17 | Stop reason: HOSPADM

## 2025-07-08 RX ORDER — GINSENG 100 MG
CAPSULE ORAL 2 TIMES DAILY
Status: DISCONTINUED | OUTPATIENT
Start: 2025-07-10 | End: 2025-07-17 | Stop reason: HOSPADM

## 2025-07-08 RX ORDER — AMIODARONE HYDROCHLORIDE 200 MG/1
200 TABLET ORAL 2 TIMES DAILY
Status: DISCONTINUED | OUTPATIENT
Start: 2025-07-09 | End: 2025-07-17 | Stop reason: HOSPADM

## 2025-07-08 RX ORDER — SODIUM CHLORIDE 9 MG/ML
INJECTION, SOLUTION INTRAVENOUS CONTINUOUS
Status: DISCONTINUED | OUTPATIENT
Start: 2025-07-08 | End: 2025-07-10

## 2025-07-08 RX ORDER — PROTAMINE SULFATE 10 MG/ML
50 INJECTION, SOLUTION INTRAVENOUS
Status: DISCONTINUED | OUTPATIENT
Start: 2025-07-08 | End: 2025-07-17 | Stop reason: HOSPADM

## 2025-07-08 RX ORDER — SENNA AND DOCUSATE SODIUM 50; 8.6 MG/1; MG/1
1 TABLET, FILM COATED ORAL 2 TIMES DAILY
Status: DISCONTINUED | OUTPATIENT
Start: 2025-07-08 | End: 2025-07-17 | Stop reason: HOSPADM

## 2025-07-08 RX ORDER — HYDRALAZINE HYDROCHLORIDE 20 MG/ML
10 INJECTION INTRAMUSCULAR; INTRAVENOUS EVERY 5 MIN PRN
Status: DISCONTINUED | OUTPATIENT
Start: 2025-07-08 | End: 2025-07-17 | Stop reason: HOSPADM

## 2025-07-08 RX ORDER — DEXTROSE MONOHYDRATE 100 MG/ML
INJECTION, SOLUTION INTRAVENOUS CONTINUOUS PRN
Status: DISCONTINUED | OUTPATIENT
Start: 2025-07-08 | End: 2025-07-17 | Stop reason: HOSPADM

## 2025-07-08 RX ORDER — PROPOFOL 10 MG/ML
INJECTION, EMULSION INTRAVENOUS
Status: DISCONTINUED | OUTPATIENT
Start: 2025-07-08 | End: 2025-07-08 | Stop reason: SDUPTHER

## 2025-07-08 RX ORDER — PROTAMINE SULFATE 10 MG/ML
INJECTION, SOLUTION INTRAVENOUS
Status: DISCONTINUED | OUTPATIENT
Start: 2025-07-08 | End: 2025-07-08 | Stop reason: SDUPTHER

## 2025-07-08 RX ORDER — MIDAZOLAM HYDROCHLORIDE 1 MG/ML
INJECTION, SOLUTION INTRAMUSCULAR; INTRAVENOUS
Status: DISCONTINUED | OUTPATIENT
Start: 2025-07-08 | End: 2025-07-08 | Stop reason: SDUPTHER

## 2025-07-08 RX ORDER — MILRINONE LACTATE 0.2 MG/ML
0.12 INJECTION, SOLUTION INTRAVENOUS CONTINUOUS
Status: DISCONTINUED | OUTPATIENT
Start: 2025-07-08 | End: 2025-07-10

## 2025-07-08 RX ORDER — ACETAMINOPHEN 325 MG/1
650 TABLET ORAL EVERY 6 HOURS
Status: DISCONTINUED | OUTPATIENT
Start: 2025-07-08 | End: 2025-07-17 | Stop reason: HOSPADM

## 2025-07-08 RX ORDER — GINSENG 100 MG
CAPSULE ORAL
Status: DISCONTINUED | OUTPATIENT
Start: 2025-07-08 | End: 2025-07-08 | Stop reason: ALTCHOICE

## 2025-07-08 RX ORDER — ONDANSETRON 2 MG/ML
INJECTION INTRAMUSCULAR; INTRAVENOUS
Status: DISCONTINUED | OUTPATIENT
Start: 2025-07-08 | End: 2025-07-08 | Stop reason: SDUPTHER

## 2025-07-08 RX ADMIN — FENTANYL CITRATE 50 MCG: 50 INJECTION, SOLUTION INTRAMUSCULAR; INTRAVENOUS at 08:13

## 2025-07-08 RX ADMIN — FAMOTIDINE 20 MG: 10 INJECTION, SOLUTION INTRAVENOUS at 21:27

## 2025-07-08 RX ADMIN — CHLORHEXIDINE GLUCONATE 0.12% ORAL RINSE 15 ML: 1.2 LIQUID ORAL at 15:44

## 2025-07-08 RX ADMIN — MUPIROCIN: 20 OINTMENT TOPICAL at 21:27

## 2025-07-08 RX ADMIN — Medication 5 MCG: at 09:29

## 2025-07-08 RX ADMIN — CHLORHEXIDINE GLUCONATE 0.12% ORAL RINSE 15 ML: 1.2 LIQUID ORAL at 06:25

## 2025-07-08 RX ADMIN — Medication 10 MCG: at 09:31

## 2025-07-08 RX ADMIN — Medication 10 MCG: at 09:47

## 2025-07-08 RX ADMIN — ROCURONIUM BROMIDE 50 MG: 10 INJECTION, SOLUTION INTRAVENOUS at 09:24

## 2025-07-08 RX ADMIN — CEFAZOLIN 2000 MG: 2 INJECTION, POWDER, FOR SOLUTION INTRAMUSCULAR; INTRAVENOUS at 08:27

## 2025-07-08 RX ADMIN — IPRATROPIUM BROMIDE AND ALBUTEROL SULFATE 1 DOSE: 2.5; .5 SOLUTION RESPIRATORY (INHALATION) at 14:15

## 2025-07-08 RX ADMIN — SODIUM CHLORIDE 0.8 UNITS/HR: 9 INJECTION, SOLUTION INTRAVENOUS at 15:05

## 2025-07-08 RX ADMIN — FENTANYL CITRATE 25 MCG: 50 INJECTION INTRAMUSCULAR; INTRAVENOUS at 17:29

## 2025-07-08 RX ADMIN — NOREPINEPHRINE BITARTRATE 12 MCG: 64 SOLUTION INTRAVENOUS at 12:18

## 2025-07-08 RX ADMIN — VASOPRESSIN 1.5 UNITS: 20 INJECTION INTRAVENOUS at 12:25

## 2025-07-08 RX ADMIN — CEFAZOLIN 2000 MG: 2 INJECTION, POWDER, FOR SOLUTION INTRAMUSCULAR; INTRAVENOUS at 12:13

## 2025-07-08 RX ADMIN — NOREPINEPHRINE BITARTRATE 1 MCG/MIN: 64 SOLUTION INTRAVENOUS at 08:58

## 2025-07-08 RX ADMIN — ETOMIDATE INJECTION 14 MG: 2 SOLUTION INTRAVENOUS at 07:59

## 2025-07-08 RX ADMIN — PROPOFOL 30 MG: 10 INJECTION, EMULSION INTRAVENOUS at 07:59

## 2025-07-08 RX ADMIN — SODIUM CHLORIDE, PRESERVATIVE FREE 10 ML: 5 INJECTION INTRAVENOUS at 21:30

## 2025-07-08 RX ADMIN — FENTANYL CITRATE 25 MCG: 50 INJECTION INTRAMUSCULAR; INTRAVENOUS at 23:40

## 2025-07-08 RX ADMIN — NOREPINEPHRINE BITARTRATE 12 MCG: 64 SOLUTION INTRAVENOUS at 09:39

## 2025-07-08 RX ADMIN — MIDAZOLAM 2 MG: 1 INJECTION INTRAMUSCULAR; INTRAVENOUS at 07:58

## 2025-07-08 RX ADMIN — FAMOTIDINE 20 MG: 10 INJECTION, SOLUTION INTRAVENOUS at 15:03

## 2025-07-08 RX ADMIN — ALBUMIN (HUMAN) 12.5 G: 12.5 INJECTION, SOLUTION INTRAVENOUS at 15:15

## 2025-07-08 RX ADMIN — WATER 2000 MG: 1 INJECTION INTRAMUSCULAR; INTRAVENOUS; SUBCUTANEOUS at 21:27

## 2025-07-08 RX ADMIN — SODIUM CHLORIDE 2 UNITS/HR: 9 INJECTION, SOLUTION INTRAVENOUS at 09:25

## 2025-07-08 RX ADMIN — FENTANYL CITRATE 50 MCG: 50 INJECTION, SOLUTION INTRAMUSCULAR; INTRAVENOUS at 08:28

## 2025-07-08 RX ADMIN — Medication 20 MCG: at 09:49

## 2025-07-08 RX ADMIN — FENTANYL CITRATE 25 MCG: 50 INJECTION INTRAMUSCULAR; INTRAVENOUS at 19:53

## 2025-07-08 RX ADMIN — GLYCOPYRROLATE 0.2 MG: 0.2 INJECTION INTRAMUSCULAR; INTRAVENOUS at 09:49

## 2025-07-08 RX ADMIN — SODIUM CHLORIDE: 0.9 INJECTION, SOLUTION INTRAVENOUS at 13:38

## 2025-07-08 RX ADMIN — SODIUM BICARBONATE 50 MEQ: 84 INJECTION, SOLUTION INTRAVENOUS at 13:26

## 2025-07-08 RX ADMIN — VASOPRESSIN 0.5 UNITS: 20 INJECTION INTRAVENOUS at 12:23

## 2025-07-08 RX ADMIN — IPRATROPIUM BROMIDE AND ALBUTEROL SULFATE 1 DOSE: .5; 2.5 SOLUTION RESPIRATORY (INHALATION) at 07:21

## 2025-07-08 RX ADMIN — DEXMEDETOMIDINE HYDROCHLORIDE 0.4 MCG/KG/HR: 400 INJECTION, SOLUTION INTRAVENOUS at 13:36

## 2025-07-08 RX ADMIN — AMINOCAPROIC ACID 5000 MG/HR: 250 INJECTION, SOLUTION INTRAVENOUS at 09:00

## 2025-07-08 RX ADMIN — WATER 60 MG: 1 INJECTION INTRAMUSCULAR; INTRAVENOUS; SUBCUTANEOUS at 05:13

## 2025-07-08 RX ADMIN — NOREPINEPHRINE BITARTRATE 6 MCG: 64 SOLUTION INTRAVENOUS at 09:17

## 2025-07-08 RX ADMIN — FENTANYL CITRATE 25 MCG: 50 INJECTION INTRAMUSCULAR; INTRAVENOUS at 21:46

## 2025-07-08 RX ADMIN — VANCOMYCIN HYDROCHLORIDE 1000 MG: 1 INJECTION, POWDER, LYOPHILIZED, FOR SOLUTION INTRAVENOUS at 21:33

## 2025-07-08 RX ADMIN — GLYCOPYRROLATE 0.2 MG: 0.2 INJECTION INTRAMUSCULAR; INTRAVENOUS at 09:10

## 2025-07-08 RX ADMIN — DEXAMETHASONE SODIUM PHOSPHATE 8 MG: 4 INJECTION, SOLUTION INTRAMUSCULAR; INTRAVENOUS at 08:27

## 2025-07-08 RX ADMIN — ASPIRIN 300 MG: 300 SUPPOSITORY RECTAL at 16:27

## 2025-07-08 RX ADMIN — ALBUMIN (HUMAN) 12.5 G: 12.5 INJECTION, SOLUTION INTRAVENOUS at 12:14

## 2025-07-08 RX ADMIN — Medication 2 MCG/MIN: at 09:39

## 2025-07-08 RX ADMIN — MIDAZOLAM 2 MG: 1 INJECTION INTRAMUSCULAR; INTRAVENOUS at 12:35

## 2025-07-08 RX ADMIN — PROTAMINE SULFATE 350 MG: 10 INJECTION, SOLUTION INTRAVENOUS at 12:06

## 2025-07-08 RX ADMIN — ONDANSETRON 4 MG: 2 INJECTION INTRAMUSCULAR; INTRAVENOUS at 08:27

## 2025-07-08 RX ADMIN — FENTANYL CITRATE 50 MCG: 50 INJECTION, SOLUTION INTRAMUSCULAR; INTRAVENOUS at 07:56

## 2025-07-08 RX ADMIN — SUGAMMADEX 200 MG: 100 INJECTION, SOLUTION INTRAVENOUS at 13:10

## 2025-07-08 RX ADMIN — ALBUMIN (HUMAN) 12.5 G: 12.5 INJECTION, SOLUTION INTRAVENOUS at 12:22

## 2025-07-08 RX ADMIN — FENTANYL CITRATE 25 MCG: 50 INJECTION INTRAMUSCULAR; INTRAVENOUS at 18:50

## 2025-07-08 RX ADMIN — EPINEPHRINE 5 MCG/MIN: 1 INJECTION INTRAMUSCULAR; INTRAVENOUS; SUBCUTANEOUS at 13:44

## 2025-07-08 RX ADMIN — SODIUM CHLORIDE: 9 INJECTION, SOLUTION INTRAVENOUS at 12:38

## 2025-07-08 RX ADMIN — Medication 10 MCG: at 09:37

## 2025-07-08 RX ADMIN — SODIUM BICARBONATE 50 MEQ: 84 INJECTION, SOLUTION INTRAVENOUS at 13:41

## 2025-07-08 RX ADMIN — Medication 10 MCG: at 09:43

## 2025-07-08 RX ADMIN — ALBUMIN (HUMAN) 12.5 G: 12.5 INJECTION, SOLUTION INTRAVENOUS at 17:02

## 2025-07-08 RX ADMIN — Medication 10 MCG: at 12:28

## 2025-07-08 RX ADMIN — ROCURONIUM BROMIDE 100 MG: 10 INJECTION, SOLUTION INTRAVENOUS at 07:59

## 2025-07-08 RX ADMIN — Medication 5 MCG: at 09:19

## 2025-07-08 RX ADMIN — NOREPINEPHRINE BITARTRATE 5 MCG/MIN: 0.06 INJECTION, SOLUTION INTRAVENOUS at 13:46

## 2025-07-08 RX ADMIN — MUPIROCIN: 20 OINTMENT TOPICAL at 15:03

## 2025-07-08 RX ADMIN — VANCOMYCIN HYDROCHLORIDE 1000 MG: 1 INJECTION, POWDER, LYOPHILIZED, FOR SOLUTION INTRAVENOUS at 08:28

## 2025-07-08 RX ADMIN — FENTANYL CITRATE 50 MCG: 50 INJECTION, SOLUTION INTRAMUSCULAR; INTRAVENOUS at 08:44

## 2025-07-08 RX ADMIN — LIDOCAINE HYDROCHLORIDE 100 MG: 20 INJECTION, SOLUTION EPIDURAL; INFILTRATION; INTRACAUDAL; PERINEURAL at 07:59

## 2025-07-08 RX ADMIN — ROCURONIUM BROMIDE 50 MG: 10 INJECTION, SOLUTION INTRAVENOUS at 11:48

## 2025-07-08 RX ADMIN — IPRATROPIUM BROMIDE AND ALBUTEROL SULFATE 1 DOSE: 2.5; .5 SOLUTION RESPIRATORY (INHALATION) at 19:40

## 2025-07-08 RX ADMIN — SODIUM CHLORIDE: 9 INJECTION, SOLUTION INTRAVENOUS at 07:46

## 2025-07-08 RX ADMIN — DEXMEDETOMIDINE HYDROCHLORIDE 0.4 MCG/KG/HR: 400 INJECTION, SOLUTION INTRAVENOUS at 08:45

## 2025-07-08 RX ADMIN — Medication 10 MCG: at 12:27

## 2025-07-08 RX ADMIN — IPRATROPIUM BROMIDE AND ALBUTEROL SULFATE 1 DOSE: 2.5; .5 SOLUTION RESPIRATORY (INHALATION) at 17:18

## 2025-07-08 RX ADMIN — CHLORHEXIDINE GLUCONATE 0.12% ORAL RINSE 15 ML: 1.2 LIQUID ORAL at 21:28

## 2025-07-08 RX ADMIN — HEPARIN SODIUM 35000 UNITS: 1000 INJECTION INTRAVENOUS; SUBCUTANEOUS at 09:16

## 2025-07-08 RX ADMIN — MIDAZOLAM 2 MG: 1 INJECTION INTRAMUSCULAR; INTRAVENOUS at 07:56

## 2025-07-08 ASSESSMENT — PULMONARY FUNCTION TESTS
PIF_VALUE: 17
PIF_VALUE: 28
PIF_VALUE: 15
PIF_VALUE: 26
PIF_VALUE: 23
PIF_VALUE: 14
PIF_VALUE: 14
PIF_VALUE: 24
PIF_VALUE: 25
PIF_VALUE: 22
PIF_VALUE: 17
PIF_VALUE: 17
PIF_VALUE: 27
PIF_VALUE: 18
PIF_VALUE: 14
PIF_VALUE: 26
PIF_VALUE: 24
PIF_VALUE: 23
PIF_VALUE: 20
PIF_VALUE: 14
PIF_VALUE: 24
PIF_VALUE: 22
PIF_VALUE: 19
PIF_VALUE: 23
PIF_VALUE: 14
PIF_VALUE: 15
PIF_VALUE: 22
PIF_VALUE: 23
PIF_VALUE: 25
PIF_VALUE: 19
PIF_VALUE: 22
PIF_VALUE: 14
PIF_VALUE: 15
PIF_VALUE: 24
PIF_VALUE: 25
PIF_VALUE: 15
PIF_VALUE: 19
PIF_VALUE: 18
PIF_VALUE: 21
PIF_VALUE: 14
PIF_VALUE: 21
PIF_VALUE: 25
PIF_VALUE: 21
PIF_VALUE: 25

## 2025-07-08 ASSESSMENT — PAIN DESCRIPTION - ORIENTATION
ORIENTATION: MID

## 2025-07-08 ASSESSMENT — PAIN DESCRIPTION - FREQUENCY
FREQUENCY: CONTINUOUS

## 2025-07-08 ASSESSMENT — PAIN DESCRIPTION - PAIN TYPE
TYPE: SURGICAL PAIN

## 2025-07-08 ASSESSMENT — PAIN - FUNCTIONAL ASSESSMENT
PAIN_FUNCTIONAL_ASSESSMENT: ACTIVITIES ARE NOT PREVENTED

## 2025-07-08 ASSESSMENT — PAIN DESCRIPTION - LOCATION
LOCATION: CHEST
LOCATION: CHEST
LOCATION: CHEST;THROAT

## 2025-07-08 ASSESSMENT — PAIN DESCRIPTION - ONSET
ONSET: ON-GOING

## 2025-07-08 ASSESSMENT — PAIN SCALES - WONG BAKER
WONGBAKER_NUMERICALRESPONSE: HURTS A LITTLE BIT
WONGBAKER_NUMERICALRESPONSE: HURTS A LITTLE BIT

## 2025-07-08 ASSESSMENT — PAIN DESCRIPTION - DIRECTION
RADIATING_TOWARDS: NO WHERE

## 2025-07-08 ASSESSMENT — PAIN DESCRIPTION - DESCRIPTORS
DESCRIPTORS: ACHING

## 2025-07-08 NOTE — ANESTHESIA PROCEDURE NOTES
Arterial Line:    An arterial line was placed using surface landmarks, in the OR for the following indication(s): continuous blood pressure monitoring.    A 20 gauge (size), 4.45 cm (length), Arrow (type) catheter was placed, Seldinger technique used, into the left radial artery, secured by Tegaderm.  Anesthesia type: General    Events:  patient tolerated procedure well with no complications.7/8/2025 7:53 AM7/8/2025 7:58 AM  Anesthesiologist: Erich Abreu MD  Resident/CRNA: Erich Guevara APRN - RONNIE  Other anesthesia staff: Nieves Tai  Performed: Other staff   Preanesthetic Checklist  Completed: patient identified, IV checked, site marked, risks and benefits discussed, surgical/procedural consents, equipment checked, pre-op evaluation, timeout performed, anesthesia consent given, oxygen available, monitors applied/VS acknowledged, fire risk safety assessment completed and verbalized and blood product R/B/A discussed and consented

## 2025-07-08 NOTE — OP NOTE
Operative Note      Patient: Radha Gomez  YOB: 1955  MRN: [id number]    Date of Procedure: 7/8/2025    Pre-Op Diagnosis Codes:      * Coronary artery disease, unspecified vessel or lesion type, unspecified whether angina present, unspecified whether native or transplanted heart [I25.10]  Moderate to severe aortic insufficiency  NYHA class 3 heartfailure    Post-Op Diagnosis: Same       Procedure(s):  CORONARY ARTERY BYPASS GRAFT x 2, SVG-RCA, SVG-DIAG, AORTIC VALVE REPLACEMENT UTILIZING A 21MM REYES INSPIRIS TISSUE VALVE; LEFT ATRIAL APPENDAGE LIGATION UTILIZING A 50MM ATRICLIP; BILATERAL ENDOSCOPIC GREATER SAPHENOUS VEIN HARVEST; INTRAOPERATIVE TRANSESOPHAGEAL ECHOCARDIOGRAM    Surgeon(s):  Vasu Huntley MD    First assistant:  Physician Assistant: Virgilio Dumont PA. Our first assistant performed all necessary activities including exposure, first assistant role and assisting with the closure throughout the entire procedure.     Assistant:   Surgical Assistant: Jackelin Campos  Anesthesia: General    Estimated Blood Loss (mL): 300     Complications: None    Specimens:   ID Type Source Tests Collected by Time Destination   A : AORTIC VALVE LEAFLETS Tissue Tissue SURGICAL PATHOLOGY Vasu Huntley MD 7/8/2025 1045        Implants:  Implant Name Type Inv. Item Serial No.  Lot No. LRB No. Used Action   VALVE AORT 21MM REPL RESILIENT BOV PERICARD CO CHROMIUM ALLY - [id number] Aortic valves VALVE AORT 21MM REPL RESILIENT BOV PERICARD CO CHROMIUM ALLY [id number] [employer]-  N/A 1 Implanted   CLIP MED SUTURE LESS 50 MM SYS 1 HND STRL ATRICLIP FLX V - [id number]  CLIP MED SUTURE LESS 50 MM SYS 1 HND STRL ATRICLIP FLX V  [employer]-WD [id number] N/A 1 Implanted   IMPLANT STRNL CLSR PEEK W NDL ZIPFIX - [id number]  IMPLANT STRNL CLSR PEEK W NDL ZIPFIX  [employer]-  N/A 4 Implanted         Drains:   Urinary Catheter 07/08/25 Pate-Temperature (Active)   $

## 2025-07-08 NOTE — ANESTHESIA POSTPROCEDURE EVALUATION
Department of Anesthesiology  Postprocedure Note    Patient: Radha Gomez  MRN: [id number]  YOB: 1955  Date of evaluation: 7/8/2025    Procedure Summary       Date: 07/08/25 Room / Location: [address]    Anesthesia Start: 0746 Anesthesia Stop: 1331    Procedure: CORONARY ARTERY BYPASS GRAFT x 2, SVG-RCA, SVG-DIAG, AORTIC VALVE REPLACEMENT UTILIZING A 21MM REYES INSPIRIS TISSUE VALVE; LEFT ATRIAL APPENDAGE LIGATION UTILIZING A 50MM ATRICLIP; BILATERAL ENDOSCOPIC GREATER SAPHENOUS VEIN HARVEST; INTRAOPERATIVE TRANSESOPHAGEAL ECHOCARDIOGRAM (Chest) Diagnosis:       Coronary artery disease, unspecified vessel or lesion type, unspecified whether angina present, unspecified whether native or transplanted heart      (Coronary artery disease, unspecified vessel or lesion type, unspecified whether angina present, unspecified whether native or transplanted heart [I25.10])    Surgeons: Vasu Huntley MD Responsible Provider: Erich Abreu MD    Anesthesia Type: General ASA Status: 4            Anesthesia Type: General    Ric Phase I: Ric Score: 9    Ric Phase II: Ric Score: 10    Anesthesia Post Evaluation    Patient location during evaluation: ICU  Patient participation: complete - patient participated  Level of consciousness: sedated and ventilated  Airway patency: patent  Nausea & Vomiting: no nausea and no vomiting  Cardiovascular status: hemodynamically stable  Respiratory status: acceptable and ventilator  Hydration status: stable  Pain management: adequate    No notable events documented.

## 2025-07-08 NOTE — CARE COORDINATION
Chart reviewed. In OR at this time for CABG. Will need PT/OT post surgery. CM following for post surgical needs.

## 2025-07-08 NOTE — ANESTHESIA PROCEDURE NOTES
Central Venous Line:    A central venous line was placed using ultrasound guidance, in the OR for the following indication(s): central venous access and CVP monitoring.7/8/2025 8:15 AM7/8/2025 8:21 AM    Sterility preparation included the following: provider used sterile gloves, gown, hat and mask, hand hygiene performed prior to central venous catheter insertion, sterile gel and probe cover used in ultrasound-guided central venous catheter insertion and maximum sterile barriers used during central venous catheter insertion.    The patient was placed in Trendelenburg position.The right internal jugular vein was prepped.    The site was prepped with Chloraprep.  A 9 Fr (size), 15 (length), introducer     During the procedure, the following specific steps were taken: target vein identified, needle advanced into vein and blood aspirated and guidewire advanced into vein.    Intravenous verification was obtained by ultrasound, venous blood return, MARY, MARY and manometry.    Post insertion care included: all ports aspirated, all ports flushed easily, guidewire removed intact, Biopatch applied, line sutured in place and dressing applied.    During the procedure the patient experienced: patient tolerated procedure well with no complications.      Outcomes: uncomplicatedNo  Anesthesia type: generalright internal jugular vein.  The PAC placement was confirmed by pressure tracing changes and MARY.  The patient experienced the following events during the procedure: patient tolerated procedure well with no complications.PA Cath placed?: Yes  Staffing  Performed: Other   Anesthesiologist: Erich Abreu MD  Resident/CRNA: Erich Guevara APRN - CRNA  Other anesthesia staff: Nieves Tai  Performed by: Erich Guevara APRN - CRNA  Authorized by: Erich Abreu MD    Preanesthetic Checklist  Completed: patient identified, IV checked, site marked, risks and benefits discussed, surgical/procedural consents, equipment

## 2025-07-09 ENCOUNTER — APPOINTMENT (OUTPATIENT)
Dept: GENERAL RADIOLOGY | Age: 70
DRG: 216 | End: 2025-07-09
Payer: MEDICARE

## 2025-07-09 LAB
ANION GAP SERPL CALCULATED.3IONS-SCNC: 11 MMOL/L (ref 9–17)
ANION GAP SERPL CALCULATED.3IONS-SCNC: 11 MMOL/L (ref 9–17)
ANION GAP SERPL CALCULATED.3IONS-SCNC: 12 MMOL/L (ref 9–17)
ANION GAP SERPL CALCULATED.3IONS-SCNC: 12 MMOL/L (ref 9–17)
BUN BLD-MCNC: 20 MG/DL (ref 7–20)
BUN BLD-MCNC: 21 MG/DL (ref 7–20)
BUN BLD-MCNC: 22 MG/DL (ref 7–20)
BUN BLD-MCNC: 23 MG/DL (ref 7–20)
BUN BLD-MCNC: 23 MG/DL (ref 7–20)
BUN SERPL-MCNC: 21 MG/DL (ref 7–20)
CA-I BLD-SCNC: 1.03 MMOL/L (ref 1.15–1.33)
CA-I BLD-SCNC: 1.05 MMOL/L (ref 1.15–1.33)
CA-I BLD-SCNC: 1.06 MMOL/L (ref 1.15–1.33)
CA-I BLD-SCNC: 1.06 MMOL/L (ref 1.15–1.33)
CA-I BLD-SCNC: 1.07 MMOL/L (ref 1.15–1.33)
CA-I BLD-SCNC: 1.07 MMOL/L (ref 1.15–1.33)
CA-I BLD-SCNC: 1.08 MMOL/L (ref 1.15–1.33)
CA-I BLD-SCNC: 1.08 MMOL/L (ref 1.15–1.33)
CA-I BLD-SCNC: 1.09 MMOL/L (ref 1.15–1.33)
CA-I BLD-SCNC: 1.09 MMOL/L (ref 1.15–1.33)
CA-I BLD-SCNC: 1.1 MMOL/L (ref 1.15–1.33)
CA-I BLD-SCNC: 1.1 MMOL/L (ref 1.15–1.33)
CA-I BLD-SCNC: 1.11 MMOL/L (ref 1.15–1.33)
CA-I BLD-SCNC: 1.11 MMOL/L (ref 1.15–1.33)
CA-I BLD-SCNC: 1.13 MMOL/L (ref 1.15–1.33)
CA-I BLD-SCNC: 1.14 MMOL/L (ref 1.15–1.33)
CA-I BLD-SCNC: 1.15 MMOL/L (ref 1.15–1.33)
CA-I BLD-SCNC: 1.17 MMOL/L (ref 1.15–1.33)
CA-I BLD-SCNC: 1.19 MMOL/L (ref 1.15–1.33)
CALCIUM SERPL-MCNC: 7.3 MG/DL (ref 8.3–10.6)
CALCIUM SERPL-MCNC: 8.4 MG/DL (ref 8.3–10.6)
CHLORIDE BLD-SCNC: 114 MMOL/L (ref 99–110)
CHLORIDE BLD-SCNC: 115 MMOL/L (ref 99–110)
CHLORIDE BLD-SCNC: 116 MMOL/L (ref 99–110)
CHLORIDE BLD-SCNC: 116 MMOL/L (ref 99–110)
CHLORIDE BLD-SCNC: 117 MMOL/L (ref 99–110)
CHLORIDE BLD-SCNC: 118 MMOL/L (ref 99–110)
CHLORIDE BLD-SCNC: 118 MMOL/L (ref 99–110)
CHLORIDE BLD-SCNC: 119 MMOL/L (ref 99–110)
CHLORIDE SERPL-SCNC: 115 MMOL/L (ref 99–110)
CHLORIDE SERPL-SCNC: 116 MMOL/L (ref 99–110)
CO2 SERPL-SCNC: 18 MMOL/L (ref 21–32)
CO2 SERPL-SCNC: 19 MMOL/L (ref 21–32)
CREAT BLD-MCNC: 1.2 MG/DL (ref 0.5–1.2)
CREAT BLD-MCNC: 1.2 MG/DL (ref 0.5–1.2)
CREAT BLD-MCNC: 1.3 MG/DL (ref 0.5–1.2)
CREAT BLD-MCNC: 1.4 MG/DL (ref 0.5–1.2)
CREAT BLD-MCNC: 1.5 MG/DL (ref 0.5–1.2)
CREAT BLD-MCNC: 1.6 MG/DL (ref 0.5–1.2)
CREAT SERPL-MCNC: 1.2 MG/DL (ref 0.6–1.2)
CREAT SERPL-MCNC: 1.3 MG/DL (ref 0.6–1.2)
EGFR, POC: 34 ML/MIN/1.73M2
EGFR, POC: 37 ML/MIN/1.73M2
EGFR, POC: 40 ML/MIN/1.73M2
EGFR, POC: 44 ML/MIN/1.73M2
EGFR, POC: 49 ML/MIN/1.73M2
EGFR, POC: 49 ML/MIN/1.73M2
EKG ATRIAL RATE: 71 BPM
EKG DIAGNOSIS: NORMAL
EKG P AXIS: 80 DEGREES
EKG P-R INTERVAL: 216 MS
EKG Q-T INTERVAL: 450 MS
EKG QRS DURATION: 94 MS
EKG QTC CALCULATION (BAZETT): 489 MS
EKG R AXIS: 33 DEGREES
EKG T AXIS: 78 DEGREES
EKG VENTRICULAR RATE: 71 BPM
ERYTHROCYTE [DISTWIDTH] IN BLOOD BY AUTOMATED COUNT: 14.7 % (ref 11.7–14.9)
ERYTHROCYTE [DISTWIDTH] IN BLOOD BY AUTOMATED COUNT: 14.7 % (ref 11.7–14.9)
ERYTHROCYTE [DISTWIDTH] IN BLOOD BY AUTOMATED COUNT: 14.8 % (ref 11.7–14.9)
FIBRINOGEN PPP-MCNC: 312 MG/DL (ref 170–540)
GFR, ESTIMATED: 39 ML/MIN/1.73M2
GFR, ESTIMATED: 43 ML/MIN/1.73M2
GFR, ESTIMATED: 45 ML/MIN/1.73M2
GFR, ESTIMATED: 45 ML/MIN/1.73M2
GLUCOSE BLD-MCNC: 104 MG/DL (ref 74–99)
GLUCOSE BLD-MCNC: 105 MG/DL (ref 74–99)
GLUCOSE BLD-MCNC: 106 MG/DL (ref 74–99)
GLUCOSE BLD-MCNC: 107 MG/DL (ref 74–99)
GLUCOSE BLD-MCNC: 108 MG/DL (ref 74–99)
GLUCOSE BLD-MCNC: 112 MG/DL (ref 74–99)
GLUCOSE BLD-MCNC: 116 MG/DL (ref 74–99)
GLUCOSE BLD-MCNC: 116 MG/DL (ref 74–99)
GLUCOSE BLD-MCNC: 118 MG/DL (ref 74–99)
GLUCOSE BLD-MCNC: 120 MG/DL (ref 74–99)
GLUCOSE BLD-MCNC: 123 MG/DL (ref 74–99)
GLUCOSE BLD-MCNC: 123 MG/DL (ref 74–99)
GLUCOSE BLD-MCNC: 124 MG/DL (ref 74–99)
GLUCOSE BLD-MCNC: 127 MG/DL (ref 74–99)
GLUCOSE BLD-MCNC: 131 MG/DL (ref 74–99)
GLUCOSE BLD-MCNC: 135 MG/DL (ref 74–99)
GLUCOSE BLD-MCNC: 140 MG/DL (ref 74–99)
GLUCOSE BLD-MCNC: 142 MG/DL (ref 74–99)
GLUCOSE BLD-MCNC: 98 MG/DL (ref 74–99)
GLUCOSE SERPL-MCNC: 108 MG/DL (ref 74–99)
GLUCOSE SERPL-MCNC: 110 MG/DL (ref 74–99)
GLUCOSE SERPL-MCNC: 114 MG/DL (ref 74–99)
GLUCOSE SERPL-MCNC: 120 MG/DL (ref 74–99)
HCT VFR BLD AUTO: 22 % (ref 38–51)
HCT VFR BLD AUTO: 23 % (ref 38–51)
HCT VFR BLD AUTO: 24 % (ref 38–51)
HCT VFR BLD AUTO: 25 % (ref 38–51)
HCT VFR BLD AUTO: 26 % (ref 38–51)
HCT VFR BLD AUTO: 27 % (ref 38–51)
HCT VFR BLD AUTO: 27.6 % (ref 37–47)
HCT VFR BLD AUTO: 27.9 % (ref 37–47)
HCT VFR BLD AUTO: 28.3 % (ref 37–47)
HGB BLD-MCNC: 8.6 G/DL (ref 12.5–16)
HGB BLD-MCNC: 8.7 G/DL (ref 12.5–16)
HGB BLD-MCNC: 8.9 G/DL (ref 12.5–16)
INR PPP: 1.5
MAGNESIUM SERPL-MCNC: 2.9 MG/DL (ref 1.8–2.4)
MAGNESIUM SERPL-MCNC: 3.4 MG/DL (ref 1.8–2.4)
MAGNESIUM SERPL-MCNC: 3.5 MG/DL (ref 1.8–2.4)
MAGNESIUM SERPL-MCNC: 3.6 MG/DL (ref 1.8–2.4)
MCH RBC QN AUTO: 27.6 PG (ref 27–31)
MCH RBC QN AUTO: 27.7 PG (ref 27–31)
MCH RBC QN AUTO: 28.2 PG (ref 27–31)
MCHC RBC AUTO-ENTMCNC: 31.2 G/DL (ref 32–36)
MCHC RBC AUTO-ENTMCNC: 31.2 G/DL (ref 32–36)
MCHC RBC AUTO-ENTMCNC: 31.4 G/DL (ref 32–36)
MCV RBC AUTO: 88.6 FL (ref 78–100)
MCV RBC AUTO: 88.7 FL (ref 78–100)
MCV RBC AUTO: 89.6 FL (ref 78–100)
NEGATIVE BASE EXCESS, ART: 1.1 MMOL/L (ref 0–3)
NEGATIVE BASE EXCESS, ART: 1.3 MMOL/L (ref 0–3)
NEGATIVE BASE EXCESS, ART: 1.7 MMOL/L (ref 0–3)
NEGATIVE BASE EXCESS, ART: 1.8 MMOL/L (ref 0–3)
NEGATIVE BASE EXCESS, ART: 2.1 MMOL/L (ref 0–3)
NEGATIVE BASE EXCESS, ART: 2.1 MMOL/L (ref 0–3)
NEGATIVE BASE EXCESS, ART: 2.3 MMOL/L (ref 0–3)
NEGATIVE BASE EXCESS, ART: 2.8 MMOL/L (ref 0–3)
NEGATIVE BASE EXCESS, ART: 2.9 MMOL/L (ref 0–3)
NEGATIVE BASE EXCESS, ART: 3.2 MMOL/L (ref 0–3)
NEGATIVE BASE EXCESS, ART: 3.7 MMOL/L (ref 0–3)
NEGATIVE BASE EXCESS, ART: 4.3 MMOL/L (ref 0–3)
NEGATIVE BASE EXCESS, ART: 5.5 MMOL/L (ref 0–3)
PARTIAL THROMBOPLASTIN TIME: 31.1 SEC (ref 25.1–37.1)
PHOSPHATE SERPL-MCNC: 3.5 MG/DL (ref 2.5–4.9)
PHOSPHATE SERPL-MCNC: 3.5 MG/DL (ref 2.5–4.9)
PLATELET, FLUORESCENCE: 132 K/UL (ref 140–440)
PLATELET, FLUORESCENCE: 135 K/UL (ref 140–440)
PLATELET, FLUORESCENCE: 145 K/UL (ref 140–440)
PMV BLD AUTO: 10.7 FL (ref 7.5–11.1)
PMV BLD AUTO: 10.7 FL (ref 7.5–11.1)
PMV BLD AUTO: 11.1 FL (ref 7.5–11.1)
POC ANION GAP: 10 MMOL/L (ref 7–16)
POC ANION GAP: 11 MMOL/L (ref 7–16)
POC ANION GAP: 13 MMOL/L (ref 7–16)
POC ANION GAP: 14 MMOL/L (ref 7–16)
POC ANION GAP: 5 MMOL/L (ref 7–16)
POC ANION GAP: 6 MMOL/L (ref 7–16)
POC ANION GAP: 7 MMOL/L (ref 7–16)
POC ANION GAP: 8 MMOL/L (ref 7–16)
POC ANION GAP: 9 MMOL/L (ref 7–16)
POC ANION GAP: 9 MMOL/L (ref 7–16)
POC HCO3: 18.4 MMOL/L (ref 21–28)
POC HCO3: 19.2 MMOL/L (ref 21–28)
POC HCO3: 20.9 MMOL/L (ref 21–28)
POC HCO3: 21.2 MMOL/L (ref 21–28)
POC HCO3: 21.3 MMOL/L (ref 21–28)
POC HCO3: 21.3 MMOL/L (ref 21–28)
POC HCO3: 22.1 MMOL/L (ref 21–28)
POC HCO3: 22.2 MMOL/L (ref 21–28)
POC HCO3: 22.2 MMOL/L (ref 21–28)
POC HCO3: 22.5 MMOL/L (ref 21–28)
POC HCO3: 22.6 MMOL/L (ref 21–28)
POC HCO3: 22.7 MMOL/L (ref 21–28)
POC HCO3: 24 MMOL/L (ref 21–28)
POC HCO3: 24 MMOL/L (ref 21–28)
POC HCO3: 24.3 MMOL/L (ref 21–28)
POC HEMOGLOBIN (CALC): 7.5 G/DL (ref 12–17)
POC HEMOGLOBIN (CALC): 7.7 G/DL (ref 12–17)
POC HEMOGLOBIN (CALC): 8.1 G/DL (ref 12–17)
POC HEMOGLOBIN (CALC): 8.1 G/DL (ref 12–17)
POC HEMOGLOBIN (CALC): 8.2 G/DL (ref 12–17)
POC HEMOGLOBIN (CALC): 8.2 G/DL (ref 12–17)
POC HEMOGLOBIN (CALC): 8.3 G/DL (ref 12–17)
POC HEMOGLOBIN (CALC): 8.3 G/DL (ref 12–17)
POC HEMOGLOBIN (CALC): 8.4 G/DL (ref 12–17)
POC HEMOGLOBIN (CALC): 8.7 G/DL (ref 12–17)
POC HEMOGLOBIN (CALC): 8.7 G/DL (ref 12–17)
POC HEMOGLOBIN (CALC): 8.9 G/DL (ref 12–17)
POC HEMOGLOBIN (CALC): 8.9 G/DL (ref 12–17)
POC HEMOGLOBIN (CALC): 9 G/DL (ref 12–17)
POC HEMOGLOBIN (CALC): 9.2 G/DL (ref 12–17)
POC O2 SATURATION: 84.6 % (ref 94–98)
POC O2 SATURATION: 86.7 % (ref 94–98)
POC O2 SATURATION: 87.6 % (ref 94–98)
POC O2 SATURATION: 87.8 % (ref 94–98)
POC O2 SATURATION: 88.1 % (ref 94–98)
POC O2 SATURATION: 88.9 % (ref 94–98)
POC O2 SATURATION: 89.2 % (ref 94–98)
POC O2 SATURATION: 89.5 % (ref 94–98)
POC O2 SATURATION: 89.6 % (ref 94–98)
POC O2 SATURATION: 90.2 % (ref 94–98)
POC O2 SATURATION: 90.4 % (ref 94–98)
POC O2 SATURATION: 90.5 % (ref 94–98)
POC O2 SATURATION: 90.7 % (ref 94–98)
POC O2 SATURATION: 90.7 % (ref 94–98)
POC O2 SATURATION: 91.3 % (ref 94–98)
POC PCO2: 28.7 MM HG (ref 35–48)
POC PCO2: 29.1 MM HG (ref 35–48)
POC PCO2: 30.3 MM HG (ref 35–48)
POC PCO2: 31.8 MM HG (ref 35–48)
POC PCO2: 32.5 MM HG (ref 35–48)
POC PCO2: 33.5 MM HG (ref 35–48)
POC PCO2: 34.9 MM HG (ref 35–48)
POC PCO2: 35.1 MM HG (ref 35–48)
POC PCO2: 35.2 MM HG (ref 35–48)
POC PCO2: 36 MM HG (ref 35–48)
POC PCO2: 37 MM HG (ref 35–48)
POC PCO2: 37 MM HG (ref 35–48)
POC PCO2: 37.2 MM HG (ref 35–48)
POC PCO2: 38 MM HG (ref 35–48)
POC PCO2: 40.8 MM HG (ref 35–48)
POC PH: 7.37 (ref 7.35–7.45)
POC PH: 7.37 (ref 7.35–7.45)
POC PH: 7.38 (ref 7.35–7.45)
POC PH: 7.39 (ref 7.35–7.45)
POC PH: 7.39 (ref 7.35–7.45)
POC PH: 7.41 (ref 7.35–7.45)
POC PH: 7.43 (ref 7.35–7.45)
POC PH: 7.44 (ref 7.35–7.45)
POC PH: 7.45 (ref 7.35–7.45)
POC PO2: 46.8 MM HG (ref 83–108)
POC PO2: 49.8 MM HG (ref 83–108)
POC PO2: 52.1 MM HG (ref 83–108)
POC PO2: 53.2 MM HG (ref 83–108)
POC PO2: 53.3 MM HG (ref 83–108)
POC PO2: 53.7 MM HG (ref 83–108)
POC PO2: 55.9 MM HG (ref 83–108)
POC PO2: 56.2 MM HG (ref 83–108)
POC PO2: 56.7 MM HG (ref 83–108)
POC PO2: 57.8 MM HG (ref 83–108)
POC PO2: 58.4 MM HG (ref 83–108)
POC PO2: 59.7 MM HG (ref 83–108)
POC PO2: 60.4 MM HG (ref 83–108)
POC PO2: 60.7 MM HG (ref 83–108)
POC PO2: 61.6 MM HG (ref 83–108)
POSITIVE BASE EXCESS, ART: 0 MMOL/L (ref 0–3)
POSITIVE BASE EXCESS, ART: 0 MMOL/L (ref 0–3)
POTASSIUM BLD-SCNC: 4 MMOL/L (ref 3.5–5.1)
POTASSIUM BLD-SCNC: 4.1 MMOL/L (ref 3.5–5.1)
POTASSIUM BLD-SCNC: 4.2 MMOL/L (ref 3.5–5.1)
POTASSIUM BLD-SCNC: 4.3 MMOL/L (ref 3.5–5.1)
POTASSIUM BLD-SCNC: 4.3 MMOL/L (ref 3.5–5.1)
POTASSIUM BLD-SCNC: 4.4 MMOL/L (ref 3.5–5.1)
POTASSIUM BLD-SCNC: 4.5 MMOL/L (ref 3.5–5.1)
POTASSIUM BLD-SCNC: 4.7 MMOL/L (ref 3.5–5.1)
POTASSIUM BLD-SCNC: 4.9 MMOL/L (ref 3.5–5.1)
POTASSIUM BLD-SCNC: 5.1 MMOL/L (ref 3.5–5.1)
POTASSIUM SERPL-SCNC: 4.2 MMOL/L (ref 3.5–5.1)
POTASSIUM SERPL-SCNC: 4.4 MMOL/L (ref 3.5–5.1)
POTASSIUM SERPL-SCNC: 4.5 MMOL/L (ref 3.5–5.1)
POTASSIUM SERPL-SCNC: 4.7 MMOL/L (ref 3.5–5.1)
PROTHROMBIN TIME: 17.9 SEC (ref 11.7–14.5)
RBC # BLD AUTO: 3.11 M/UL (ref 4.2–5.4)
RBC # BLD AUTO: 3.15 M/UL (ref 4.2–5.4)
RBC # BLD AUTO: 3.16 M/UL (ref 4.2–5.4)
SODIUM BLD-SCNC: 143 MMOL/L (ref 136–145)
SODIUM BLD-SCNC: 144 MMOL/L (ref 136–145)
SODIUM BLD-SCNC: 145 MMOL/L (ref 136–145)
SODIUM BLD-SCNC: 146 MMOL/L (ref 136–145)
SODIUM BLD-SCNC: 147 MMOL/L (ref 136–145)
SODIUM BLD-SCNC: 147 MMOL/L (ref 136–145)
SODIUM BLD-SCNC: 148 MMOL/L (ref 136–145)
SODIUM BLD-SCNC: 149 MMOL/L (ref 136–145)
SODIUM BLD-SCNC: 149 MMOL/L (ref 136–145)
SODIUM BLD-SCNC: 150 MMOL/L (ref 136–145)
SODIUM BLD-SCNC: 152 MMOL/L (ref 136–145)
SODIUM SERPL-SCNC: 145 MMOL/L (ref 136–145)
SODIUM SERPL-SCNC: 145 MMOL/L (ref 136–145)
SODIUM SERPL-SCNC: 146 MMOL/L (ref 136–145)
SODIUM SERPL-SCNC: 146 MMOL/L (ref 136–145)
SURGICAL PATHOLOGY REPORT: NORMAL
TCO2 (CALC), ART: 19 MMOL/L (ref 21–32)
TCO2 (CALC), ART: 20 MMOL/L (ref 21–32)
TCO2 (CALC), ART: 22 MMOL/L (ref 21–32)
TCO2 (CALC), ART: 23 MMOL/L (ref 21–32)
TCO2 (CALC), ART: 24 MMOL/L (ref 21–32)
TCO2 (CALC), ART: 25 MMOL/L (ref 21–32)
WBC OTHER # BLD: 14.1 K/UL (ref 4–10.5)
WBC OTHER # BLD: 14.6 K/UL (ref 4–10.5)
WBC OTHER # BLD: 15.3 K/UL (ref 4–10.5)

## 2025-07-09 PROCEDURE — 80047 BASIC METABLC PNL IONIZED CA: CPT

## 2025-07-09 PROCEDURE — 2100000000 HC CCU R&B

## 2025-07-09 PROCEDURE — 97530 THERAPEUTIC ACTIVITIES: CPT

## 2025-07-09 PROCEDURE — 93010 ELECTROCARDIOGRAM REPORT: CPT | Performed by: INTERNAL MEDICINE

## 2025-07-09 PROCEDURE — 94640 AIRWAY INHALATION TREATMENT: CPT

## 2025-07-09 PROCEDURE — 6370000000 HC RX 637 (ALT 250 FOR IP): Performed by: STUDENT IN AN ORGANIZED HEALTH CARE EDUCATION/TRAINING PROGRAM

## 2025-07-09 PROCEDURE — 6360000002 HC RX W HCPCS: Performed by: PHYSICIAN ASSISTANT

## 2025-07-09 PROCEDURE — 99233 SBSQ HOSP IP/OBS HIGH 50: CPT | Performed by: INTERNAL MEDICINE

## 2025-07-09 PROCEDURE — 82962 GLUCOSE BLOOD TEST: CPT

## 2025-07-09 PROCEDURE — 6370000000 HC RX 637 (ALT 250 FOR IP): Performed by: PHYSICIAN ASSISTANT

## 2025-07-09 PROCEDURE — 2700000000 HC OXYGEN THERAPY PER DAY

## 2025-07-09 PROCEDURE — 71045 X-RAY EXAM CHEST 1 VIEW: CPT

## 2025-07-09 PROCEDURE — 85027 COMPLETE CBC AUTOMATED: CPT

## 2025-07-09 PROCEDURE — 97116 GAIT TRAINING THERAPY: CPT

## 2025-07-09 PROCEDURE — 2500000003 HC RX 250 WO HCPCS: Performed by: PHYSICIAN ASSISTANT

## 2025-07-09 PROCEDURE — 82330 ASSAY OF CALCIUM: CPT

## 2025-07-09 PROCEDURE — 80048 BASIC METABOLIC PNL TOTAL CA: CPT

## 2025-07-09 PROCEDURE — 94761 N-INVAS EAR/PLS OXIMETRY MLT: CPT

## 2025-07-09 PROCEDURE — 83735 ASSAY OF MAGNESIUM: CPT

## 2025-07-09 PROCEDURE — 2580000003 HC RX 258: Performed by: PHYSICIAN ASSISTANT

## 2025-07-09 PROCEDURE — 97164 PT RE-EVAL EST PLAN CARE: CPT

## 2025-07-09 PROCEDURE — 85610 PROTHROMBIN TIME: CPT

## 2025-07-09 PROCEDURE — 6370000000 HC RX 637 (ALT 250 FOR IP): Performed by: THORACIC SURGERY (CARDIOTHORACIC VASCULAR SURGERY)

## 2025-07-09 PROCEDURE — 6360000002 HC RX W HCPCS: Performed by: THORACIC SURGERY (CARDIOTHORACIC VASCULAR SURGERY)

## 2025-07-09 PROCEDURE — 84100 ASSAY OF PHOSPHORUS: CPT

## 2025-07-09 PROCEDURE — 85384 FIBRINOGEN ACTIVITY: CPT

## 2025-07-09 PROCEDURE — P9045 ALBUMIN (HUMAN), 5%, 250 ML: HCPCS | Performed by: PHYSICIAN ASSISTANT

## 2025-07-09 PROCEDURE — 93005 ELECTROCARDIOGRAM TRACING: CPT | Performed by: PHYSICIAN ASSISTANT

## 2025-07-09 PROCEDURE — 97168 OT RE-EVAL EST PLAN CARE: CPT

## 2025-07-09 PROCEDURE — 85730 THROMBOPLASTIN TIME PARTIAL: CPT

## 2025-07-09 RX ORDER — CARVEDILOL 6.25 MG/1
6.25 TABLET ORAL 2 TIMES DAILY WITH MEALS
Status: DISCONTINUED | OUTPATIENT
Start: 2025-07-09 | End: 2025-07-17

## 2025-07-09 RX ORDER — HYDROXYCHLOROQUINE SULFATE 200 MG/1
300 TABLET, FILM COATED ORAL DAILY
Status: DISCONTINUED | OUTPATIENT
Start: 2025-07-09 | End: 2025-07-17 | Stop reason: HOSPADM

## 2025-07-09 RX ORDER — FENTANYL CITRATE 50 UG/ML
25 INJECTION, SOLUTION INTRAMUSCULAR; INTRAVENOUS EVERY 4 HOURS PRN
Status: DISPENSED | OUTPATIENT
Start: 2025-07-09 | End: 2025-07-11

## 2025-07-09 RX ORDER — FUROSEMIDE 10 MG/ML
40 INJECTION INTRAMUSCULAR; INTRAVENOUS ONCE
Status: COMPLETED | OUTPATIENT
Start: 2025-07-09 | End: 2025-07-09

## 2025-07-09 RX ORDER — PREDNISONE 5 MG/1
5 TABLET ORAL EVERY OTHER DAY
Status: DISCONTINUED | OUTPATIENT
Start: 2025-07-09 | End: 2025-07-11

## 2025-07-09 RX ORDER — INDOMETHACIN 25 MG/1
50 CAPSULE ORAL ONCE
Status: COMPLETED | OUTPATIENT
Start: 2025-07-09 | End: 2025-07-09

## 2025-07-09 RX ORDER — FUROSEMIDE 10 MG/ML
20 INJECTION INTRAMUSCULAR; INTRAVENOUS ONCE
Status: COMPLETED | OUTPATIENT
Start: 2025-07-09 | End: 2025-07-09

## 2025-07-09 RX ORDER — BUDESONIDE AND FORMOTEROL FUMARATE DIHYDRATE 160; 4.5 UG/1; UG/1
2 AEROSOL RESPIRATORY (INHALATION)
Status: DISCONTINUED | OUTPATIENT
Start: 2025-07-09 | End: 2025-07-17 | Stop reason: HOSPADM

## 2025-07-09 RX ORDER — INSULIN LISPRO 100 [IU]/ML
0-8 INJECTION, SOLUTION INTRAVENOUS; SUBCUTANEOUS EVERY 4 HOURS
Status: DISCONTINUED | OUTPATIENT
Start: 2025-07-09 | End: 2025-07-10

## 2025-07-09 RX ORDER — CARVEDILOL 6.25 MG/1
6.25 TABLET ORAL
Status: COMPLETED | OUTPATIENT
Start: 2025-07-09 | End: 2025-07-09

## 2025-07-09 RX ORDER — IPRATROPIUM BROMIDE AND ALBUTEROL SULFATE 2.5; .5 MG/3ML; MG/3ML
1 SOLUTION RESPIRATORY (INHALATION)
Status: DISCONTINUED | OUTPATIENT
Start: 2025-07-09 | End: 2025-07-12

## 2025-07-09 RX ADMIN — FAMOTIDINE 20 MG: 10 INJECTION, SOLUTION INTRAVENOUS at 20:24

## 2025-07-09 RX ADMIN — CLOPIDOGREL BISULFATE 75 MG: 75 TABLET, FILM COATED ORAL at 09:32

## 2025-07-09 RX ADMIN — ATORVASTATIN CALCIUM 40 MG: 40 TABLET, FILM COATED ORAL at 20:25

## 2025-07-09 RX ADMIN — WATER 2000 MG: 1 INJECTION INTRAMUSCULAR; INTRAVENOUS; SUBCUTANEOUS at 03:34

## 2025-07-09 RX ADMIN — ASPIRIN 81 MG 81 MG: 81 TABLET ORAL at 09:32

## 2025-07-09 RX ADMIN — FENTANYL CITRATE 25 MCG: 50 INJECTION INTRAMUSCULAR; INTRAVENOUS at 08:09

## 2025-07-09 RX ADMIN — WATER 2000 MG: 1 INJECTION INTRAMUSCULAR; INTRAVENOUS; SUBCUTANEOUS at 10:07

## 2025-07-09 RX ADMIN — IPRATROPIUM BROMIDE AND ALBUTEROL SULFATE 1 DOSE: 2.5; .5 SOLUTION RESPIRATORY (INHALATION) at 07:52

## 2025-07-09 RX ADMIN — FENTANYL CITRATE 25 MCG: 50 INJECTION INTRAMUSCULAR; INTRAVENOUS at 04:28

## 2025-07-09 RX ADMIN — FENTANYL CITRATE 25 MCG: 50 INJECTION INTRAMUSCULAR; INTRAVENOUS at 02:14

## 2025-07-09 RX ADMIN — WATER 2000 MG: 1 INJECTION INTRAMUSCULAR; INTRAVENOUS; SUBCUTANEOUS at 20:23

## 2025-07-09 RX ADMIN — HYDROMORPHONE HYDROCHLORIDE 0.25 MG: 1 INJECTION, SOLUTION INTRAMUSCULAR; INTRAVENOUS; SUBCUTANEOUS at 11:49

## 2025-07-09 RX ADMIN — ACETAMINOPHEN 650 MG: 325 TABLET ORAL at 09:31

## 2025-07-09 RX ADMIN — CHLORHEXIDINE GLUCONATE 0.12% ORAL RINSE 15 ML: 1.2 LIQUID ORAL at 20:26

## 2025-07-09 RX ADMIN — FUROSEMIDE 40 MG: 10 INJECTION, SOLUTION INTRAMUSCULAR; INTRAVENOUS at 22:45

## 2025-07-09 RX ADMIN — Medication 1 TABLET: at 09:33

## 2025-07-09 RX ADMIN — SENNOSIDES, DOCUSATE SODIUM 1 TABLET: 50; 8.6 TABLET, FILM COATED ORAL at 09:32

## 2025-07-09 RX ADMIN — SENNOSIDES, DOCUSATE SODIUM 1 TABLET: 50; 8.6 TABLET, FILM COATED ORAL at 20:25

## 2025-07-09 RX ADMIN — HYDROXYCHLOROQUINE SULFATE 300 MG: 200 TABLET ORAL at 16:15

## 2025-07-09 RX ADMIN — FENTANYL CITRATE 25 MCG: 50 INJECTION INTRAMUSCULAR; INTRAVENOUS at 01:11

## 2025-07-09 RX ADMIN — CALCIUM CHLORIDE 1000 MG: 100 INJECTION, SOLUTION INTRAVENOUS at 08:17

## 2025-07-09 RX ADMIN — FUROSEMIDE 20 MG: 10 INJECTION, SOLUTION INTRAMUSCULAR; INTRAVENOUS at 08:10

## 2025-07-09 RX ADMIN — VANCOMYCIN HYDROCHLORIDE 1000 MG: 1 INJECTION, POWDER, LYOPHILIZED, FOR SOLUTION INTRAVENOUS at 09:43

## 2025-07-09 RX ADMIN — ONDANSETRON 4 MG: 2 INJECTION INTRAMUSCULAR; INTRAVENOUS at 10:07

## 2025-07-09 RX ADMIN — AMIODARONE HYDROCHLORIDE 200 MG: 200 TABLET ORAL at 09:32

## 2025-07-09 RX ADMIN — TRAMADOL HYDROCHLORIDE 50 MG: 50 TABLET, COATED ORAL at 18:00

## 2025-07-09 RX ADMIN — IPRATROPIUM BROMIDE AND ALBUTEROL SULFATE 1 DOSE: 2.5; .5 SOLUTION RESPIRATORY (INHALATION) at 19:43

## 2025-07-09 RX ADMIN — SODIUM BICARBONATE 50 MEQ: 84 INJECTION, SOLUTION INTRAVENOUS at 20:24

## 2025-07-09 RX ADMIN — ENOXAPARIN SODIUM 40 MG: 100 INJECTION SUBCUTANEOUS at 09:32

## 2025-07-09 RX ADMIN — FENTANYL CITRATE 25 MCG: 0.05 INJECTION, SOLUTION INTRAMUSCULAR; INTRAVENOUS at 20:24

## 2025-07-09 RX ADMIN — PREDNISONE 5 MG: 5 TABLET ORAL at 16:15

## 2025-07-09 RX ADMIN — FAMOTIDINE 20 MG: 20 TABLET, FILM COATED ORAL at 09:32

## 2025-07-09 RX ADMIN — FENTANYL CITRATE 25 MCG: 0.05 INJECTION, SOLUTION INTRAMUSCULAR; INTRAVENOUS at 09:55

## 2025-07-09 RX ADMIN — INSULIN LISPRO 2 UNITS: 100 INJECTION, SOLUTION INTRAVENOUS; SUBCUTANEOUS at 19:32

## 2025-07-09 RX ADMIN — SODIUM CHLORIDE, PRESERVATIVE FREE 10 ML: 5 INJECTION INTRAVENOUS at 08:10

## 2025-07-09 RX ADMIN — ACETAMINOPHEN 650 MG: 325 TABLET ORAL at 20:25

## 2025-07-09 RX ADMIN — POLYETHYLENE GLYCOL (3350) 17 G: 17 POWDER, FOR SOLUTION ORAL at 09:31

## 2025-07-09 RX ADMIN — ACETAMINOPHEN 650 MG: 325 TABLET ORAL at 12:55

## 2025-07-09 RX ADMIN — CARVEDILOL 6.25 MG: 6.25 TABLET, FILM COATED ORAL at 22:45

## 2025-07-09 RX ADMIN — FENTANYL CITRATE 25 MCG: 50 INJECTION INTRAMUSCULAR; INTRAVENOUS at 06:43

## 2025-07-09 RX ADMIN — MUPIROCIN: 20 OINTMENT TOPICAL at 21:30

## 2025-07-09 RX ADMIN — BUDESONIDE AND FORMOTEROL FUMARATE DIHYDRATE 2 PUFF: 160; 4.5 AEROSOL RESPIRATORY (INHALATION) at 12:09

## 2025-07-09 RX ADMIN — CARVEDILOL 6.25 MG: 6.25 TABLET, FILM COATED ORAL at 11:50

## 2025-07-09 RX ADMIN — FENTANYL CITRATE 25 MCG: 50 INJECTION INTRAMUSCULAR; INTRAVENOUS at 03:34

## 2025-07-09 RX ADMIN — TRAMADOL HYDROCHLORIDE 50 MG: 50 TABLET, COATED ORAL at 09:33

## 2025-07-09 RX ADMIN — SODIUM CHLORIDE: 0.9 INJECTION, SOLUTION INTRAVENOUS at 11:11

## 2025-07-09 RX ADMIN — HYDRALAZINE HYDROCHLORIDE 10 MG: 20 INJECTION INTRAMUSCULAR; INTRAVENOUS at 10:34

## 2025-07-09 RX ADMIN — VANCOMYCIN HYDROCHLORIDE 1000 MG: 1 INJECTION, POWDER, LYOPHILIZED, FOR SOLUTION INTRAVENOUS at 20:36

## 2025-07-09 RX ADMIN — HYDRALAZINE HYDROCHLORIDE 10 MG: 20 INJECTION INTRAMUSCULAR; INTRAVENOUS at 11:11

## 2025-07-09 RX ADMIN — MUPIROCIN: 20 OINTMENT TOPICAL at 09:44

## 2025-07-09 RX ADMIN — AMIODARONE HYDROCHLORIDE 200 MG: 200 TABLET ORAL at 20:25

## 2025-07-09 RX ADMIN — ALBUMIN (HUMAN) 12.5 G: 12.5 INJECTION, SOLUTION INTRAVENOUS at 17:24

## 2025-07-09 RX ADMIN — SODIUM CHLORIDE, PRESERVATIVE FREE 10 ML: 5 INJECTION INTRAVENOUS at 20:26

## 2025-07-09 RX ADMIN — FENTANYL CITRATE 25 MCG: 0.05 INJECTION, SOLUTION INTRAMUSCULAR; INTRAVENOUS at 14:57

## 2025-07-09 RX ADMIN — IPRATROPIUM BROMIDE AND ALBUTEROL SULFATE 1 DOSE: 2.5; .5 SOLUTION RESPIRATORY (INHALATION) at 12:10

## 2025-07-09 RX ADMIN — POTASSIUM CHLORIDE 20 MEQ: 29.8 INJECTION, SOLUTION INTRAVENOUS at 12:54

## 2025-07-09 RX ADMIN — CHLORHEXIDINE GLUCONATE 0.12% ORAL RINSE 15 ML: 1.2 LIQUID ORAL at 09:31

## 2025-07-09 RX ADMIN — IPRATROPIUM BROMIDE AND ALBUTEROL SULFATE 1 DOSE: 2.5; .5 SOLUTION RESPIRATORY (INHALATION) at 16:43

## 2025-07-09 RX ADMIN — BUDESONIDE AND FORMOTEROL FUMARATE DIHYDRATE 2 PUFF: 160; 4.5 AEROSOL RESPIRATORY (INHALATION) at 19:56

## 2025-07-09 ASSESSMENT — PAIN DESCRIPTION - LOCATION
LOCATION: CHEST
LOCATION: STERNUM
LOCATION: CHEST
LOCATION: CHEST;THROAT
LOCATION: STERNUM

## 2025-07-09 ASSESSMENT — PAIN DESCRIPTION - DESCRIPTORS
DESCRIPTORS: ACHING
DESCRIPTORS: ACHING;DISCOMFORT
DESCRIPTORS: ACHING
DESCRIPTORS: ACHING
DESCRIPTORS: CRUSHING;DISCOMFORT
DESCRIPTORS: ACHING
DESCRIPTORS: ACHING
DESCRIPTORS: ACHING;DISCOMFORT
DESCRIPTORS: ACHING
DESCRIPTORS: CRUSHING;DISCOMFORT
DESCRIPTORS: ACHING

## 2025-07-09 ASSESSMENT — PAIN SCALES - GENERAL
PAINLEVEL_OUTOF10: 5
PAINLEVEL_OUTOF10: 6
PAINLEVEL_OUTOF10: 3
PAINLEVEL_OUTOF10: 10
PAINLEVEL_OUTOF10: 3
PAINLEVEL_OUTOF10: 7
PAINLEVEL_OUTOF10: 4
PAINLEVEL_OUTOF10: 6
PAINLEVEL_OUTOF10: 9

## 2025-07-09 ASSESSMENT — PAIN DESCRIPTION - FREQUENCY
FREQUENCY: CONTINUOUS

## 2025-07-09 ASSESSMENT — PAIN - FUNCTIONAL ASSESSMENT
PAIN_FUNCTIONAL_ASSESSMENT: ACTIVITIES ARE NOT PREVENTED
PAIN_FUNCTIONAL_ASSESSMENT: PREVENTS OR INTERFERES SOME ACTIVE ACTIVITIES AND ADLS
PAIN_FUNCTIONAL_ASSESSMENT: ACTIVITIES ARE NOT PREVENTED
PAIN_FUNCTIONAL_ASSESSMENT: PREVENTS OR INTERFERES WITH ALL ACTIVE AND SOME PASSIVE ACTIVITIES
PAIN_FUNCTIONAL_ASSESSMENT: ACTIVITIES ARE NOT PREVENTED
PAIN_FUNCTIONAL_ASSESSMENT: ACTIVITIES ARE NOT PREVENTED

## 2025-07-09 ASSESSMENT — PAIN DESCRIPTION - DIRECTION
RADIATING_TOWARDS: NO WHERE
RADIATING_TOWARDS: CHEST
RADIATING_TOWARDS: CHEST
RADIATING_TOWARDS: NO WHERE

## 2025-07-09 ASSESSMENT — PAIN DESCRIPTION - ONSET
ONSET: ON-GOING

## 2025-07-09 ASSESSMENT — PAIN DESCRIPTION - PAIN TYPE
TYPE: SURGICAL PAIN
TYPE: SURGICAL PAIN;ACUTE PAIN
TYPE: SURGICAL PAIN
TYPE: ACUTE PAIN;SURGICAL PAIN
TYPE: SURGICAL PAIN
TYPE: SURGICAL PAIN

## 2025-07-09 ASSESSMENT — PULMONARY FUNCTION TESTS
PIF_VALUE: 14
PIF_VALUE: 14
PIF_VALUE: 11
PIF_VALUE: 14
PIF_VALUE: 11
PIF_VALUE: 11
PIF_VALUE: 14
PIF_VALUE: 14
PIF_VALUE: 11
PIF_VALUE: 14
PIF_VALUE: 30
PIF_VALUE: 11
PIF_VALUE: 11
PIF_VALUE: 14
PIF_VALUE: 14
PIF_VALUE: 0

## 2025-07-09 ASSESSMENT — PAIN DESCRIPTION - ORIENTATION
ORIENTATION: RIGHT;LEFT
ORIENTATION: RIGHT;LEFT
ORIENTATION: MID
ORIENTATION: RIGHT;LEFT
ORIENTATION: MID
ORIENTATION: MID
ORIENTATION: RIGHT;LEFT
ORIENTATION: MID

## 2025-07-09 ASSESSMENT — PAIN SCALES - WONG BAKER
WONGBAKER_NUMERICALRESPONSE: HURTS LITTLE MORE
WONGBAKER_NUMERICALRESPONSE: HURTS A LITTLE BIT
WONGBAKER_NUMERICALRESPONSE: HURTS A LITTLE BIT

## 2025-07-09 NOTE — BRIEF OP NOTE
Color Yellow 07/09/25 0700   Urine Appearance Clear 07/09/25 0700   Urine Odor Malodorous 07/09/25 0700   Collection Container Standard 07/09/25 0700   Securement Method Securing device (Describe) 07/09/25 0700   Catheter Care  Chlorhexidine Wipes 07/08/25 1308   Catheter Best Practices  Catheter secured to thigh;Drainage tube clipped to bed;Bag below bladder;Tamper seal intact;Bag not on floor;Drainage bag less than half full;Lack of dependent loop in tubing 07/09/25 0700   Status Draining;Patent 07/09/25 0700   Output (mL) 75 mL 07/09/25 0700       Y CHEST TUBE 1 AND 2 (Active)   Chest Tube Airleak No 07/09/25 0400   Status Continuous Suction 07/09/25 0400   Suction -20 cm H2O 07/09/25 0400   Drainage Description 1 Dark red 07/09/25 0400   Chest Tube Dressing 1 Dry 07/08/25 1900   Dressing Status 1 Clean, dry & intact 07/09/25 0400   Site Assessment 1 Clean, dry & intact 07/09/25 0400   Dressing Status 2 Clean, dry & intact 07/09/25 0400   Site Assessment 2 Clean, dry & intact 07/09/25 0400   Surrounding Skin 2 Clean, dry & intact 07/09/25 0400   Output (ml) 20 ml 07/09/25 0700       Findings:  Infection Present At Time Of Surgery (PATOS) (choose all levels that have infection present):  No infection present  Other Findings:     Electronically signed by Felix Zimmerman PA-C on 7/9/2025 at 9:07 AM

## 2025-07-09 NOTE — CARE COORDINATION
Reviewed medical record at this time. Pt is POD #1 CABG. Pt still on vent with pacing wires and CT. Will need PT/OT recs when appropriate. Per notes, pt's plan pre op was to try to go home. CM following.

## 2025-07-10 ENCOUNTER — HOSPITAL ENCOUNTER (OUTPATIENT)
Dept: CARDIAC REHAB | Age: 70
Setting detail: THERAPIES SERIES
Discharge: HOME | End: 2025-07-10

## 2025-07-10 ENCOUNTER — APPOINTMENT (OUTPATIENT)
Dept: GENERAL RADIOLOGY | Age: 70
DRG: 216 | End: 2025-07-10
Payer: MEDICARE

## 2025-07-10 LAB
ANION GAP SERPL CALCULATED.3IONS-SCNC: 11 MMOL/L (ref 9–17)
ANION GAP SERPL CALCULATED.3IONS-SCNC: 12 MMOL/L (ref 9–17)
BUN BLD-MCNC: 22 MG/DL (ref 7–20)
BUN BLD-MCNC: 24 MG/DL (ref 7–20)
BUN BLD-MCNC: 25 MG/DL (ref 7–20)
BUN BLD-MCNC: 26 MG/DL (ref 7–20)
BUN BLD-MCNC: 26 MG/DL (ref 7–20)
BUN BLD-MCNC: 27 MG/DL (ref 7–20)
BUN SERPL-MCNC: 25 MG/DL (ref 7–20)
BUN SERPL-MCNC: 27 MG/DL (ref 7–20)
CA-I BLD-SCNC: 1.01 MMOL/L (ref 1.15–1.33)
CA-I BLD-SCNC: 1.02 MMOL/L (ref 1.15–1.33)
CA-I BLD-SCNC: 1.03 MMOL/L (ref 1.15–1.33)
CA-I BLD-SCNC: 1.06 MMOL/L (ref 1.15–1.33)
CA-I BLD-SCNC: 1.06 MMOL/L (ref 1.15–1.33)
CA-I BLD-SCNC: 1.07 MMOL/L (ref 1.15–1.33)
CA-I BLD-SCNC: 1.07 MMOL/L (ref 1.15–1.33)
CA-I BLD-SCNC: 1.15 MMOL/L (ref 1.15–1.33)
CALCIUM SERPL-MCNC: 6.9 MG/DL (ref 8.3–10.6)
CALCIUM SERPL-MCNC: 7.5 MG/DL (ref 8.3–10.6)
CHLORIDE BLD-SCNC: 109 MMOL/L (ref 99–110)
CHLORIDE BLD-SCNC: 112 MMOL/L (ref 99–110)
CHLORIDE BLD-SCNC: 113 MMOL/L (ref 99–110)
CHLORIDE BLD-SCNC: 113 MMOL/L (ref 99–110)
CHLORIDE BLD-SCNC: 114 MMOL/L (ref 99–110)
CHLORIDE BLD-SCNC: 115 MMOL/L (ref 99–110)
CHLORIDE SERPL-SCNC: 109 MMOL/L (ref 99–110)
CHLORIDE SERPL-SCNC: 111 MMOL/L (ref 99–110)
CO2 SERPL-SCNC: 19 MMOL/L (ref 21–32)
CO2 SERPL-SCNC: 19 MMOL/L (ref 21–32)
CREAT BLD-MCNC: 1.5 MG/DL (ref 0.5–1.2)
CREAT BLD-MCNC: 1.6 MG/DL (ref 0.5–1.2)
CREAT BLD-MCNC: 1.6 MG/DL (ref 0.5–1.2)
CREAT BLD-MCNC: 1.7 MG/DL (ref 0.5–1.2)
CREAT BLD-MCNC: 1.7 MG/DL (ref 0.5–1.2)
CREAT BLD-MCNC: 2 MG/DL (ref 0.5–1.2)
CREAT SERPL-MCNC: 1.6 MG/DL (ref 0.6–1.2)
CREAT SERPL-MCNC: 1.6 MG/DL (ref 0.6–1.2)
EGFR, POC: 26 ML/MIN/1.73M2
EGFR, POC: 32 ML/MIN/1.73M2
EGFR, POC: 32 ML/MIN/1.73M2
EGFR, POC: 34 ML/MIN/1.73M2
EGFR, POC: 34 ML/MIN/1.73M2
EGFR, POC: 37 ML/MIN/1.73M2
EKG ATRIAL RATE: 75 BPM
EKG DIAGNOSIS: NORMAL
EKG P AXIS: 81 DEGREES
EKG P-R INTERVAL: 184 MS
EKG Q-T INTERVAL: 438 MS
EKG QRS DURATION: 94 MS
EKG QTC CALCULATION (BAZETT): 489 MS
EKG R AXIS: 22 DEGREES
EKG T AXIS: 40 DEGREES
EKG VENTRICULAR RATE: 75 BPM
ERYTHROCYTE [DISTWIDTH] IN BLOOD BY AUTOMATED COUNT: 15.7 % (ref 11.7–14.9)
GFR, ESTIMATED: 32 ML/MIN/1.73M2
GFR, ESTIMATED: 32 ML/MIN/1.73M2
GLUCOSE BLD-MCNC: 132 MG/DL (ref 74–99)
GLUCOSE BLD-MCNC: 132 MG/DL (ref 74–99)
GLUCOSE BLD-MCNC: 136 MG/DL (ref 74–99)
GLUCOSE BLD-MCNC: 150 MG/DL (ref 74–99)
GLUCOSE BLD-MCNC: 150 MG/DL (ref 74–99)
GLUCOSE BLD-MCNC: 153 MG/DL (ref 74–99)
GLUCOSE BLD-MCNC: 160 MG/DL (ref 74–99)
GLUCOSE BLD-MCNC: 163 MG/DL (ref 74–99)
GLUCOSE BLD-MCNC: 174 MG/DL (ref 74–99)
GLUCOSE SERPL-MCNC: 138 MG/DL (ref 74–99)
GLUCOSE SERPL-MCNC: 150 MG/DL (ref 74–99)
HCO3 VENOUS: 21.9 MMOL/L (ref 22–29)
HCO3 VENOUS: 22.5 MMOL/L (ref 22–29)
HCT VFR BLD AUTO: 21 % (ref 38–51)
HCT VFR BLD AUTO: 22 % (ref 38–51)
HCT VFR BLD AUTO: 23 % (ref 38–51)
HCT VFR BLD AUTO: 24 % (ref 38–51)
HCT VFR BLD AUTO: 25.7 % (ref 37–47)
HCT VFR BLD AUTO: 26 % (ref 38–51)
HCT VFR BLD AUTO: 33 % (ref 38–51)
HGB BLD-MCNC: 7.9 G/DL (ref 12.5–16)
MAGNESIUM SERPL-MCNC: 2.9 MG/DL (ref 1.8–2.4)
MAGNESIUM SERPL-MCNC: 2.9 MG/DL (ref 1.8–2.4)
MAGNESIUM SERPL-MCNC: 3 MG/DL (ref 1.8–2.4)
MCH RBC QN AUTO: 27.6 PG (ref 27–31)
MCHC RBC AUTO-ENTMCNC: 30.7 G/DL (ref 32–36)
MCV RBC AUTO: 89.9 FL (ref 78–100)
NEGATIVE BASE EXCESS, ART: 1.4 MMOL/L (ref 0–3)
NEGATIVE BASE EXCESS, ART: 2.6 MMOL/L (ref 0–3)
NEGATIVE BASE EXCESS, ART: 4 MMOL/L (ref 0–3)
NEGATIVE BASE EXCESS, VEN: 1.9 MMOL/L (ref 0–3)
NEGATIVE BASE EXCESS, VEN: 3.8 MMOL/L (ref 0–3)
O2 SAT, VEN: 52.9 % (ref 34–95)
O2 SAT, VEN: 53.6 % (ref 34–95)
PCO2 VENOUS: 35.6 MM HG (ref 41–51)
PCO2 VENOUS: 42 MM HG (ref 41–51)
PH VENOUS: 7.33 (ref 7.32–7.43)
PH VENOUS: 7.41 (ref 7.32–7.43)
PHOSPHATE SERPL-MCNC: 3.3 MG/DL (ref 2.5–4.9)
PLATELET, FLUORESCENCE: 115 K/UL (ref 140–440)
PMV BLD AUTO: 11.4 FL (ref 7.5–11.1)
PO2 VENOUS: 27.6 MM HG (ref 28–48)
PO2 VENOUS: 30.5 MM HG (ref 28–48)
POC ANION GAP: 1 MMOL/L (ref 7–16)
POC ANION GAP: 10 MMOL/L (ref 7–16)
POC ANION GAP: 10 MMOL/L (ref 7–16)
POC ANION GAP: 11 MMOL/L (ref 7–16)
POC ANION GAP: 11 MMOL/L (ref 7–16)
POC ANION GAP: 6 MMOL/L (ref 7–16)
POC HCO3: 20.4 MMOL/L (ref 21–28)
POC HCO3: 21.8 MMOL/L (ref 21–28)
POC HCO3: 22.5 MMOL/L (ref 21–28)
POC HCO3: 23.7 MMOL/L (ref 21–28)
POC HEMOGLOBIN (CALC): 11.2 G/DL (ref 12–17)
POC HEMOGLOBIN (CALC): 7.2 G/DL (ref 12–17)
POC HEMOGLOBIN (CALC): 7.4 G/DL (ref 12–17)
POC HEMOGLOBIN (CALC): 7.7 G/DL (ref 12–17)
POC HEMOGLOBIN (CALC): 8.2 G/DL (ref 12–17)
POC HEMOGLOBIN (CALC): 8.7 G/DL (ref 12–17)
POC O2 SATURATION: 80.8 % (ref 94–98)
POC O2 SATURATION: 83.7 % (ref 94–98)
POC O2 SATURATION: 95.8 % (ref 94–98)
POC O2 SATURATION: 99.3 % (ref 94–98)
POC PCO2: 32.6 MM HG (ref 35–48)
POC PCO2: 33.6 MM HG (ref 35–48)
POC PCO2: 34.2 MM HG (ref 35–48)
POC PCO2: 34.9 MM HG (ref 35–48)
POC PH: 7.39 (ref 7.35–7.45)
POC PH: 7.4 (ref 7.35–7.45)
POC PH: 7.43 (ref 7.35–7.45)
POC PH: 7.47 (ref 7.35–7.45)
POC PO2: 146.7 MM HG (ref 83–108)
POC PO2: 43.5 MM HG (ref 83–108)
POC PO2: 44.6 MM HG (ref 83–108)
POC PO2: 80.4 MM HG (ref 83–108)
POSITIVE BASE EXCESS, ART: 0.1 MMOL/L (ref 0–3)
POTASSIUM BLD-SCNC: 3.9 MMOL/L (ref 3.5–5.1)
POTASSIUM BLD-SCNC: 4.1 MMOL/L (ref 3.5–5.1)
POTASSIUM BLD-SCNC: 4.2 MMOL/L (ref 3.5–5.1)
POTASSIUM BLD-SCNC: 4.2 MMOL/L (ref 3.5–5.1)
POTASSIUM BLD-SCNC: 4.4 MMOL/L (ref 3.5–5.1)
POTASSIUM BLD-SCNC: 4.6 MMOL/L (ref 3.5–5.1)
POTASSIUM SERPL-SCNC: 4.2 MMOL/L (ref 3.5–5.1)
POTASSIUM SERPL-SCNC: 4.7 MMOL/L (ref 3.5–5.1)
RBC # BLD AUTO: 2.86 M/UL (ref 4.2–5.4)
SODIUM BLD-SCNC: 140 MMOL/L (ref 136–145)
SODIUM BLD-SCNC: 142 MMOL/L (ref 136–145)
SODIUM BLD-SCNC: 143 MMOL/L (ref 136–145)
SODIUM BLD-SCNC: 143 MMOL/L (ref 136–145)
SODIUM BLD-SCNC: 145 MMOL/L (ref 136–145)
SODIUM BLD-SCNC: 146 MMOL/L (ref 136–145)
SODIUM SERPL-SCNC: 138 MMOL/L (ref 136–145)
SODIUM SERPL-SCNC: 142 MMOL/L (ref 136–145)
TCO2 (CALC), ART: 22 MMOL/L (ref 21–32)
TCO2 (CALC), ART: 23 MMOL/L (ref 21–32)
TCO2 (CALC), ART: 24 MMOL/L (ref 21–32)
TCO2 (CALC), ART: 25 MMOL/L (ref 21–32)
TCO2 CALC VENOUS: 23 MMOL/L (ref 21–32)
TCO2 CALC VENOUS: 24 MMOL/L (ref 21–32)
WBC OTHER # BLD: 18.8 K/UL (ref 4–10.5)

## 2025-07-10 PROCEDURE — 83735 ASSAY OF MAGNESIUM: CPT

## 2025-07-10 PROCEDURE — 82803 BLOOD GASES ANY COMBINATION: CPT

## 2025-07-10 PROCEDURE — 6370000000 HC RX 637 (ALT 250 FOR IP): Performed by: STUDENT IN AN ORGANIZED HEALTH CARE EDUCATION/TRAINING PROGRAM

## 2025-07-10 PROCEDURE — 82330 ASSAY OF CALCIUM: CPT

## 2025-07-10 PROCEDURE — 76937 US GUIDE VASCULAR ACCESS: CPT

## 2025-07-10 PROCEDURE — 2580000003 HC RX 258: Performed by: PHYSICIAN ASSISTANT

## 2025-07-10 PROCEDURE — 84100 ASSAY OF PHOSPHORUS: CPT

## 2025-07-10 PROCEDURE — 80047 BASIC METABLC PNL IONIZED CA: CPT

## 2025-07-10 PROCEDURE — 82962 GLUCOSE BLOOD TEST: CPT

## 2025-07-10 PROCEDURE — 2500000003 HC RX 250 WO HCPCS: Performed by: PHYSICIAN ASSISTANT

## 2025-07-10 PROCEDURE — 71045 X-RAY EXAM CHEST 1 VIEW: CPT

## 2025-07-10 PROCEDURE — 94150 VITAL CAPACITY TEST: CPT

## 2025-07-10 PROCEDURE — 97530 THERAPEUTIC ACTIVITIES: CPT

## 2025-07-10 PROCEDURE — 94660 CPAP INITIATION&MGMT: CPT

## 2025-07-10 PROCEDURE — 2709999900 HC NON-CHARGEABLE SUPPLY

## 2025-07-10 PROCEDURE — 99233 SBSQ HOSP IP/OBS HIGH 50: CPT | Performed by: INTERNAL MEDICINE

## 2025-07-10 PROCEDURE — 97116 GAIT TRAINING THERAPY: CPT

## 2025-07-10 PROCEDURE — 80048 BASIC METABOLIC PNL TOTAL CA: CPT

## 2025-07-10 PROCEDURE — 85027 COMPLETE CBC AUTOMATED: CPT

## 2025-07-10 PROCEDURE — 2700000000 HC OXYGEN THERAPY PER DAY

## 2025-07-10 PROCEDURE — 6370000000 HC RX 637 (ALT 250 FOR IP): Performed by: PHYSICIAN ASSISTANT

## 2025-07-10 PROCEDURE — 2100000000 HC CCU R&B

## 2025-07-10 PROCEDURE — 94761 N-INVAS EAR/PLS OXIMETRY MLT: CPT

## 2025-07-10 PROCEDURE — 36569 INSJ PICC 5 YR+ W/O IMAGING: CPT

## 2025-07-10 PROCEDURE — 6370000000 HC RX 637 (ALT 250 FOR IP): Performed by: THORACIC SURGERY (CARDIOTHORACIC VASCULAR SURGERY)

## 2025-07-10 PROCEDURE — 93010 ELECTROCARDIOGRAM REPORT: CPT | Performed by: INTERNAL MEDICINE

## 2025-07-10 PROCEDURE — 94640 AIRWAY INHALATION TREATMENT: CPT

## 2025-07-10 PROCEDURE — 6360000002 HC RX W HCPCS: Performed by: PHYSICIAN ASSISTANT

## 2025-07-10 PROCEDURE — C1751 CATH, INF, PER/CENT/MIDLINE: HCPCS

## 2025-07-10 PROCEDURE — 85014 HEMATOCRIT: CPT

## 2025-07-10 PROCEDURE — 6360000002 HC RX W HCPCS: Performed by: THORACIC SURGERY (CARDIOTHORACIC VASCULAR SURGERY)

## 2025-07-10 PROCEDURE — 02HV33Z INSERTION OF INFUSION DEVICE INTO SUPERIOR VENA CAVA, PERCUTANEOUS APPROACH: ICD-10-PCS | Performed by: THORACIC SURGERY (CARDIOTHORACIC VASCULAR SURGERY)

## 2025-07-10 RX ORDER — FUROSEMIDE 10 MG/ML
40 INJECTION INTRAMUSCULAR; INTRAVENOUS 2 TIMES DAILY
Status: DISCONTINUED | OUTPATIENT
Start: 2025-07-10 | End: 2025-07-12

## 2025-07-10 RX ORDER — INSULIN LISPRO 100 [IU]/ML
0-8 INJECTION, SOLUTION INTRAVENOUS; SUBCUTANEOUS
Status: DISCONTINUED | OUTPATIENT
Start: 2025-07-10 | End: 2025-07-17 | Stop reason: HOSPADM

## 2025-07-10 RX ORDER — POTASSIUM CHLORIDE 1500 MG/1
20 TABLET, EXTENDED RELEASE ORAL 2 TIMES DAILY WITH MEALS
Status: DISCONTINUED | OUTPATIENT
Start: 2025-07-10 | End: 2025-07-12

## 2025-07-10 RX ADMIN — HYDROXYCHLOROQUINE SULFATE 300 MG: 200 TABLET ORAL at 09:33

## 2025-07-10 RX ADMIN — MUPIROCIN: 20 OINTMENT TOPICAL at 11:34

## 2025-07-10 RX ADMIN — IPRATROPIUM BROMIDE AND ALBUTEROL SULFATE 1 DOSE: 2.5; .5 SOLUTION RESPIRATORY (INHALATION) at 19:46

## 2025-07-10 RX ADMIN — BACITRACIN: 500 OINTMENT TOPICAL at 13:00

## 2025-07-10 RX ADMIN — SENNOSIDES, DOCUSATE SODIUM 1 TABLET: 50; 8.6 TABLET, FILM COATED ORAL at 20:08

## 2025-07-10 RX ADMIN — BUDESONIDE AND FORMOTEROL FUMARATE DIHYDRATE 2 PUFF: 160; 4.5 AEROSOL RESPIRATORY (INHALATION) at 07:42

## 2025-07-10 RX ADMIN — IPRATROPIUM BROMIDE AND ALBUTEROL SULFATE 1 DOSE: 2.5; .5 SOLUTION RESPIRATORY (INHALATION) at 03:52

## 2025-07-10 RX ADMIN — SENNOSIDES, DOCUSATE SODIUM 1 TABLET: 50; 8.6 TABLET, FILM COATED ORAL at 09:32

## 2025-07-10 RX ADMIN — IPRATROPIUM BROMIDE AND ALBUTEROL SULFATE 1 DOSE: 2.5; .5 SOLUTION RESPIRATORY (INHALATION) at 23:12

## 2025-07-10 RX ADMIN — CHLORHEXIDINE GLUCONATE 0.12% ORAL RINSE 15 ML: 1.2 LIQUID ORAL at 20:07

## 2025-07-10 RX ADMIN — ACETAMINOPHEN 650 MG: 325 TABLET ORAL at 14:35

## 2025-07-10 RX ADMIN — ACETAMINOPHEN 650 MG: 325 TABLET ORAL at 09:32

## 2025-07-10 RX ADMIN — CHLORHEXIDINE GLUCONATE 0.12% ORAL RINSE 15 ML: 1.2 LIQUID ORAL at 09:35

## 2025-07-10 RX ADMIN — INSULIN LISPRO 1 UNITS: 100 INJECTION, SOLUTION INTRAVENOUS; SUBCUTANEOUS at 11:31

## 2025-07-10 RX ADMIN — SODIUM CHLORIDE, PRESERVATIVE FREE 10 ML: 5 INJECTION INTRAVENOUS at 20:21

## 2025-07-10 RX ADMIN — IPRATROPIUM BROMIDE AND ALBUTEROL SULFATE 1 DOSE: 2.5; .5 SOLUTION RESPIRATORY (INHALATION) at 00:47

## 2025-07-10 RX ADMIN — IPRATROPIUM BROMIDE AND ALBUTEROL SULFATE 1 DOSE: 2.5; .5 SOLUTION RESPIRATORY (INHALATION) at 11:16

## 2025-07-10 RX ADMIN — IPRATROPIUM BROMIDE AND ALBUTEROL SULFATE 1 DOSE: 2.5; .5 SOLUTION RESPIRATORY (INHALATION) at 15:11

## 2025-07-10 RX ADMIN — INSULIN LISPRO 1 UNITS: 100 INJECTION, SOLUTION INTRAVENOUS; SUBCUTANEOUS at 16:47

## 2025-07-10 RX ADMIN — CALCIUM CHLORIDE 1000 MG: 100 INJECTION, SOLUTION INTRAVENOUS at 15:31

## 2025-07-10 RX ADMIN — SODIUM CHLORIDE, PRESERVATIVE FREE 10 ML: 5 INJECTION INTRAVENOUS at 11:31

## 2025-07-10 RX ADMIN — MUPIROCIN: 20 OINTMENT TOPICAL at 20:19

## 2025-07-10 RX ADMIN — AMIODARONE HYDROCHLORIDE 200 MG: 200 TABLET ORAL at 09:34

## 2025-07-10 RX ADMIN — ENOXAPARIN SODIUM 40 MG: 100 INJECTION SUBCUTANEOUS at 09:34

## 2025-07-10 RX ADMIN — WATER 2000 MG: 1 INJECTION INTRAMUSCULAR; INTRAVENOUS; SUBCUTANEOUS at 04:18

## 2025-07-10 RX ADMIN — CARVEDILOL 6.25 MG: 6.25 TABLET, FILM COATED ORAL at 09:32

## 2025-07-10 RX ADMIN — IPRATROPIUM BROMIDE AND ALBUTEROL SULFATE 1 DOSE: 2.5; .5 SOLUTION RESPIRATORY (INHALATION) at 07:42

## 2025-07-10 RX ADMIN — FENTANYL CITRATE 25 MCG: 0.05 INJECTION, SOLUTION INTRAMUSCULAR; INTRAVENOUS at 20:10

## 2025-07-10 RX ADMIN — BACITRACIN: 500 OINTMENT TOPICAL at 20:20

## 2025-07-10 RX ADMIN — ACETAMINOPHEN 650 MG: 325 TABLET ORAL at 02:29

## 2025-07-10 RX ADMIN — ACETAMINOPHEN 650 MG: 325 TABLET ORAL at 20:10

## 2025-07-10 RX ADMIN — CARVEDILOL 6.25 MG: 6.25 TABLET, FILM COATED ORAL at 16:48

## 2025-07-10 RX ADMIN — TRAMADOL HYDROCHLORIDE 50 MG: 50 TABLET, COATED ORAL at 16:00

## 2025-07-10 RX ADMIN — FUROSEMIDE 40 MG: 10 INJECTION, SOLUTION INTRAMUSCULAR; INTRAVENOUS at 09:33

## 2025-07-10 RX ADMIN — CLOPIDOGREL BISULFATE 75 MG: 75 TABLET, FILM COATED ORAL at 09:33

## 2025-07-10 RX ADMIN — INSULIN LISPRO 1 UNITS: 100 INJECTION, SOLUTION INTRAVENOUS; SUBCUTANEOUS at 20:07

## 2025-07-10 RX ADMIN — ASPIRIN 81 MG 81 MG: 81 TABLET ORAL at 09:34

## 2025-07-10 RX ADMIN — INSULIN LISPRO 1 UNITS: 100 INJECTION, SOLUTION INTRAVENOUS; SUBCUTANEOUS at 04:17

## 2025-07-10 RX ADMIN — POTASSIUM CHLORIDE 20 MEQ: 1500 TABLET, EXTENDED RELEASE ORAL at 09:33

## 2025-07-10 RX ADMIN — FENTANYL CITRATE 25 MCG: 0.05 INJECTION, SOLUTION INTRAMUSCULAR; INTRAVENOUS at 02:28

## 2025-07-10 RX ADMIN — FUROSEMIDE 40 MG: 10 INJECTION, SOLUTION INTRAMUSCULAR; INTRAVENOUS at 16:48

## 2025-07-10 RX ADMIN — INSULIN LISPRO 2 UNITS: 100 INJECTION, SOLUTION INTRAVENOUS; SUBCUTANEOUS at 00:26

## 2025-07-10 RX ADMIN — POTASSIUM CHLORIDE 20 MEQ: 1500 TABLET, EXTENDED RELEASE ORAL at 15:33

## 2025-07-10 RX ADMIN — ATORVASTATIN CALCIUM 40 MG: 40 TABLET, FILM COATED ORAL at 20:09

## 2025-07-10 RX ADMIN — FAMOTIDINE 20 MG: 20 TABLET, FILM COATED ORAL at 09:33

## 2025-07-10 RX ADMIN — BUDESONIDE AND FORMOTEROL FUMARATE DIHYDRATE 2 PUFF: 160; 4.5 AEROSOL RESPIRATORY (INHALATION) at 19:47

## 2025-07-10 RX ADMIN — FAMOTIDINE 20 MG: 20 TABLET, FILM COATED ORAL at 20:09

## 2025-07-10 RX ADMIN — POTASSIUM CHLORIDE 20 MEQ: 1500 TABLET, EXTENDED RELEASE ORAL at 16:49

## 2025-07-10 RX ADMIN — TRAMADOL HYDROCHLORIDE 50 MG: 50 TABLET, COATED ORAL at 00:58

## 2025-07-10 RX ADMIN — AMIODARONE HYDROCHLORIDE 200 MG: 200 TABLET ORAL at 20:08

## 2025-07-10 RX ADMIN — Medication 1 TABLET: at 09:33

## 2025-07-10 RX ADMIN — POLYETHYLENE GLYCOL (3350) 17 G: 17 POWDER, FOR SOLUTION ORAL at 09:33

## 2025-07-10 RX ADMIN — FENTANYL CITRATE 25 MCG: 0.05 INJECTION, SOLUTION INTRAMUSCULAR; INTRAVENOUS at 09:33

## 2025-07-10 ASSESSMENT — PAIN SCALES - GENERAL
PAINLEVEL_OUTOF10: 6
PAINLEVEL_OUTOF10: 2
PAINLEVEL_OUTOF10: 3
PAINLEVEL_OUTOF10: 4
PAINLEVEL_OUTOF10: 2
PAINLEVEL_OUTOF10: 7
PAINLEVEL_OUTOF10: 5
PAINLEVEL_OUTOF10: 4

## 2025-07-10 ASSESSMENT — PAIN DESCRIPTION - DESCRIPTORS
DESCRIPTORS: ACHING

## 2025-07-10 ASSESSMENT — PAIN - FUNCTIONAL ASSESSMENT
PAIN_FUNCTIONAL_ASSESSMENT: ACTIVITIES ARE NOT PREVENTED

## 2025-07-10 ASSESSMENT — PAIN DESCRIPTION - DIRECTION
RADIATING_TOWARDS: NO WHERE
RADIATING_TOWARDS: NO WHERE
RADIATING_TOWARDS: CHEST

## 2025-07-10 ASSESSMENT — PAIN DESCRIPTION - PAIN TYPE
TYPE: SURGICAL PAIN
TYPE: ACUTE PAIN;SURGICAL PAIN
TYPE: SURGICAL PAIN

## 2025-07-10 ASSESSMENT — PAIN DESCRIPTION - FREQUENCY
FREQUENCY: INTERMITTENT
FREQUENCY: CONTINUOUS
FREQUENCY: CONTINUOUS

## 2025-07-10 ASSESSMENT — PAIN DESCRIPTION - LOCATION
LOCATION: CHEST
LOCATION: CHEST
LOCATION: BACK;CHEST
LOCATION: CHEST

## 2025-07-10 ASSESSMENT — PAIN DESCRIPTION - ORIENTATION
ORIENTATION: RIGHT;LEFT
ORIENTATION: MID
ORIENTATION: MID
ORIENTATION: RIGHT
ORIENTATION: MID

## 2025-07-10 ASSESSMENT — PAIN DESCRIPTION - ONSET
ONSET: ON-GOING
ONSET: GRADUAL
ONSET: ON-GOING

## 2025-07-10 NOTE — CARE COORDINATION
Received referral for ARU.  Reviewed patients clinicals and PT/OT notes.  Patient is not ARU appropriate at this time d/t not having a 40% bed available.     CMS guidelines require all inpatient rehab programs to comply with the 60/40 rule, mandating that 60% of all rehab admissions fall into one of 13 diagnostic categories.   Unfortunately, at this time we do not currently have a bed for this patient given her rehab diagnosis.  This selectivity is necessary to help ensure our program remains compliant with this CMS requirement.  Thank you for this referral, we will communicate if anything changes.

## 2025-07-10 NOTE — CARE COORDINATION
Follow up visit with pt. Pt is up in chair with HHF 02 in place. Cm discussed with pt PT/OT rec for rehab at discharge. Pt's goal has previously been indicated for home at discharge. Pt has home 02, Nebulizer and shower bench for home use. CM spoke with CTS PA who advised that pt will be ready for discharge at the earliest Mon 7/14. Cm discussed with pt that if she is able to return directly home that MD will order HC follow up and that CM will make those arrangements for her. Cm advised pt that if she is still having difficulty with ambulation/strength/endurance etc and rehab is needed CM will assist with those arrangements as well. Pt given List of HC agencies and SNFs should that be needed.   Cm attempted to contact ARU admissions with referral to screen chart to see if ARU would be an option for pt discharge if needed based on bed availability, likelihood of insurance approval and the 60/40 rule. Cm to follow.

## 2025-07-11 ENCOUNTER — TELEPHONE (OUTPATIENT)
Age: 70
End: 2025-07-11

## 2025-07-11 ENCOUNTER — APPOINTMENT (OUTPATIENT)
Dept: GENERAL RADIOLOGY | Age: 70
DRG: 216 | End: 2025-07-11
Payer: MEDICARE

## 2025-07-11 LAB
ABO/RH: NORMAL
ANION GAP SERPL CALCULATED.3IONS-SCNC: 9 MMOL/L (ref 9–17)
ANTIBODY SCREEN: NEGATIVE
BLOOD BANK DISPENSE STATUS: NORMAL
BLOOD BANK SAMPLE EXPIRATION: NORMAL
BPU ID: NORMAL
BUN SERPL-MCNC: 28 MG/DL (ref 7–20)
CA-I BLD-SCNC: 1.09 MMOL/L (ref 1.15–1.33)
CA-I BLD-SCNC: 1.17 MMOL/L (ref 1.15–1.33)
CALCIUM SERPL-MCNC: 7.5 MG/DL (ref 8.3–10.6)
CHLORIDE SERPL-SCNC: 108 MMOL/L (ref 99–110)
CO2 SERPL-SCNC: 20 MMOL/L (ref 21–32)
COMPONENT: NORMAL
CREAT SERPL-MCNC: 1.6 MG/DL (ref 0.6–1.2)
CROSSMATCH RESULT: NORMAL
EKG ATRIAL RATE: 81 BPM
EKG DIAGNOSIS: NORMAL
EKG P AXIS: 90 DEGREES
EKG P-R INTERVAL: 144 MS
EKG Q-T INTERVAL: 390 MS
EKG QRS DURATION: 94 MS
EKG QTC CALCULATION (BAZETT): 453 MS
EKG R AXIS: 25 DEGREES
EKG T AXIS: 33 DEGREES
EKG VENTRICULAR RATE: 81 BPM
ERYTHROCYTE [DISTWIDTH] IN BLOOD BY AUTOMATED COUNT: 15.8 % (ref 11.7–14.9)
GFR, ESTIMATED: 33 ML/MIN/1.73M2
GLUCOSE BLD-MCNC: 131 MG/DL (ref 74–99)
GLUCOSE BLD-MCNC: 143 MG/DL (ref 74–99)
GLUCOSE BLD-MCNC: 177 MG/DL (ref 74–99)
GLUCOSE BLD-MCNC: 190 MG/DL (ref 74–99)
GLUCOSE SERPL-MCNC: 121 MG/DL (ref 74–99)
HCT VFR BLD AUTO: 23.6 % (ref 37–47)
HGB BLD-MCNC: 7.2 G/DL (ref 12.5–16)
MAGNESIUM SERPL-MCNC: 3.2 MG/DL (ref 1.8–2.4)
MCH RBC QN AUTO: 27.8 PG (ref 27–31)
MCHC RBC AUTO-ENTMCNC: 30.5 G/DL (ref 32–36)
MCV RBC AUTO: 91.1 FL (ref 78–100)
PHOSPHATE SERPL-MCNC: 2 MG/DL (ref 2.5–4.9)
PHOSPHATE SERPL-MCNC: 3.2 MG/DL (ref 2.5–4.9)
PLATELET, FLUORESCENCE: 120 K/UL (ref 140–440)
PMV BLD AUTO: 10.9 FL (ref 7.5–11.1)
POTASSIUM SERPL-SCNC: 4.7 MMOL/L (ref 3.5–5.1)
RBC # BLD AUTO: 2.59 M/UL (ref 4.2–5.4)
SODIUM SERPL-SCNC: 137 MMOL/L (ref 136–145)
TRANSFUSION STATUS: NORMAL
UNIT DIVISION: 0
WBC OTHER # BLD: 16.3 K/UL (ref 4–10.5)

## 2025-07-11 PROCEDURE — 2100000000 HC CCU R&B

## 2025-07-11 PROCEDURE — 82962 GLUCOSE BLOOD TEST: CPT

## 2025-07-11 PROCEDURE — 83735 ASSAY OF MAGNESIUM: CPT

## 2025-07-11 PROCEDURE — 2500000003 HC RX 250 WO HCPCS: Performed by: INTERNAL MEDICINE

## 2025-07-11 PROCEDURE — 94150 VITAL CAPACITY TEST: CPT

## 2025-07-11 PROCEDURE — 6370000000 HC RX 637 (ALT 250 FOR IP): Performed by: PHYSICIAN ASSISTANT

## 2025-07-11 PROCEDURE — 80048 BASIC METABOLIC PNL TOTAL CA: CPT

## 2025-07-11 PROCEDURE — 71045 X-RAY EXAM CHEST 1 VIEW: CPT

## 2025-07-11 PROCEDURE — 6360000002 HC RX W HCPCS: Performed by: THORACIC SURGERY (CARDIOTHORACIC VASCULAR SURGERY)

## 2025-07-11 PROCEDURE — 2580000003 HC RX 258: Performed by: PHYSICIAN ASSISTANT

## 2025-07-11 PROCEDURE — 2500000003 HC RX 250 WO HCPCS: Performed by: PHYSICIAN ASSISTANT

## 2025-07-11 PROCEDURE — 93005 ELECTROCARDIOGRAM TRACING: CPT | Performed by: PHYSICIAN ASSISTANT

## 2025-07-11 PROCEDURE — 93010 ELECTROCARDIOGRAM REPORT: CPT | Performed by: INTERNAL MEDICINE

## 2025-07-11 PROCEDURE — 6370000000 HC RX 637 (ALT 250 FOR IP): Performed by: STUDENT IN AN ORGANIZED HEALTH CARE EDUCATION/TRAINING PROGRAM

## 2025-07-11 PROCEDURE — 99233 SBSQ HOSP IP/OBS HIGH 50: CPT | Performed by: INTERNAL MEDICINE

## 2025-07-11 PROCEDURE — 6370000000 HC RX 637 (ALT 250 FOR IP): Performed by: THORACIC SURGERY (CARDIOTHORACIC VASCULAR SURGERY)

## 2025-07-11 PROCEDURE — 6360000002 HC RX W HCPCS: Performed by: PHYSICIAN ASSISTANT

## 2025-07-11 PROCEDURE — 2700000000 HC OXYGEN THERAPY PER DAY

## 2025-07-11 PROCEDURE — 97116 GAIT TRAINING THERAPY: CPT

## 2025-07-11 PROCEDURE — 6360000002 HC RX W HCPCS: Performed by: INTERNAL MEDICINE

## 2025-07-11 PROCEDURE — 97530 THERAPEUTIC ACTIVITIES: CPT

## 2025-07-11 PROCEDURE — 82330 ASSAY OF CALCIUM: CPT

## 2025-07-11 PROCEDURE — 84100 ASSAY OF PHOSPHORUS: CPT

## 2025-07-11 PROCEDURE — 94761 N-INVAS EAR/PLS OXIMETRY MLT: CPT

## 2025-07-11 PROCEDURE — 85027 COMPLETE CBC AUTOMATED: CPT

## 2025-07-11 PROCEDURE — 94640 AIRWAY INHALATION TREATMENT: CPT

## 2025-07-11 RX ORDER — FAMOTIDINE 20 MG/1
20 TABLET, FILM COATED ORAL DAILY
Status: DISCONTINUED | OUTPATIENT
Start: 2025-07-11 | End: 2025-07-17 | Stop reason: HOSPADM

## 2025-07-11 RX ORDER — FAMOTIDINE 10 MG/ML
20 INJECTION, SOLUTION INTRAVENOUS DAILY
Status: DISCONTINUED | OUTPATIENT
Start: 2025-07-11 | End: 2025-07-17 | Stop reason: HOSPADM

## 2025-07-11 RX ADMIN — ACETAMINOPHEN 650 MG: 325 TABLET ORAL at 20:51

## 2025-07-11 RX ADMIN — CLOPIDOGREL BISULFATE 75 MG: 75 TABLET, FILM COATED ORAL at 08:58

## 2025-07-11 RX ADMIN — BUDESONIDE AND FORMOTEROL FUMARATE DIHYDRATE 2 PUFF: 160; 4.5 AEROSOL RESPIRATORY (INHALATION) at 07:35

## 2025-07-11 RX ADMIN — ATORVASTATIN CALCIUM 40 MG: 40 TABLET, FILM COATED ORAL at 20:52

## 2025-07-11 RX ADMIN — SODIUM CHLORIDE, PRESERVATIVE FREE 10 ML: 5 INJECTION INTRAVENOUS at 20:53

## 2025-07-11 RX ADMIN — INSULIN LISPRO 1 UNITS: 100 INJECTION, SOLUTION INTRAVENOUS; SUBCUTANEOUS at 17:25

## 2025-07-11 RX ADMIN — IPRATROPIUM BROMIDE AND ALBUTEROL SULFATE 1 DOSE: 2.5; .5 SOLUTION RESPIRATORY (INHALATION) at 03:25

## 2025-07-11 RX ADMIN — IPRATROPIUM BROMIDE AND ALBUTEROL SULFATE 1 DOSE: 2.5; .5 SOLUTION RESPIRATORY (INHALATION) at 23:47

## 2025-07-11 RX ADMIN — SODIUM CHLORIDE, PRESERVATIVE FREE 10 ML: 5 INJECTION INTRAVENOUS at 20:54

## 2025-07-11 RX ADMIN — MUPIROCIN: 20 OINTMENT TOPICAL at 08:57

## 2025-07-11 RX ADMIN — FAMOTIDINE 20 MG: 20 TABLET, FILM COATED ORAL at 08:59

## 2025-07-11 RX ADMIN — INSULIN LISPRO 2 UNITS: 100 INJECTION, SOLUTION INTRAVENOUS; SUBCUTANEOUS at 20:48

## 2025-07-11 RX ADMIN — INSULIN LISPRO 1 UNITS: 100 INJECTION, SOLUTION INTRAVENOUS; SUBCUTANEOUS at 05:23

## 2025-07-11 RX ADMIN — ACETAMINOPHEN 650 MG: 325 TABLET ORAL at 15:09

## 2025-07-11 RX ADMIN — SODIUM CHLORIDE, PRESERVATIVE FREE 10 ML: 5 INJECTION INTRAVENOUS at 08:59

## 2025-07-11 RX ADMIN — FUROSEMIDE 40 MG: 10 INJECTION, SOLUTION INTRAMUSCULAR; INTRAVENOUS at 17:26

## 2025-07-11 RX ADMIN — AMIODARONE HYDROCHLORIDE 200 MG: 200 TABLET ORAL at 20:51

## 2025-07-11 RX ADMIN — Medication 1 TABLET: at 08:58

## 2025-07-11 RX ADMIN — IPRATROPIUM BROMIDE AND ALBUTEROL SULFATE 1 DOSE: 2.5; .5 SOLUTION RESPIRATORY (INHALATION) at 16:14

## 2025-07-11 RX ADMIN — MUPIROCIN: 20 OINTMENT TOPICAL at 20:59

## 2025-07-11 RX ADMIN — BACITRACIN: 500 OINTMENT TOPICAL at 08:57

## 2025-07-11 RX ADMIN — IPRATROPIUM BROMIDE AND ALBUTEROL SULFATE 1 DOSE: 2.5; .5 SOLUTION RESPIRATORY (INHALATION) at 19:54

## 2025-07-11 RX ADMIN — IPRATROPIUM BROMIDE AND ALBUTEROL SULFATE 1 DOSE: 2.5; .5 SOLUTION RESPIRATORY (INHALATION) at 12:41

## 2025-07-11 RX ADMIN — POTASSIUM CHLORIDE 20 MEQ: 1500 TABLET, EXTENDED RELEASE ORAL at 17:25

## 2025-07-11 RX ADMIN — SENNOSIDES, DOCUSATE SODIUM 1 TABLET: 50; 8.6 TABLET, FILM COATED ORAL at 08:58

## 2025-07-11 RX ADMIN — TRAMADOL HYDROCHLORIDE 50 MG: 50 TABLET, COATED ORAL at 05:26

## 2025-07-11 RX ADMIN — BUDESONIDE AND FORMOTEROL FUMARATE DIHYDRATE 2 PUFF: 160; 4.5 AEROSOL RESPIRATORY (INHALATION) at 19:55

## 2025-07-11 RX ADMIN — INSULIN LISPRO 1 UNITS: 100 INJECTION, SOLUTION INTRAVENOUS; SUBCUTANEOUS at 11:59

## 2025-07-11 RX ADMIN — SENNOSIDES, DOCUSATE SODIUM 1 TABLET: 50; 8.6 TABLET, FILM COATED ORAL at 20:51

## 2025-07-11 RX ADMIN — CARVEDILOL 6.25 MG: 6.25 TABLET, FILM COATED ORAL at 21:13

## 2025-07-11 RX ADMIN — PREDNISONE 5 MG: 5 TABLET ORAL at 09:07

## 2025-07-11 RX ADMIN — POLYETHYLENE GLYCOL (3350) 17 G: 17 POWDER, FOR SOLUTION ORAL at 08:59

## 2025-07-11 RX ADMIN — CARVEDILOL 6.25 MG: 6.25 TABLET, FILM COATED ORAL at 08:58

## 2025-07-11 RX ADMIN — IPRATROPIUM BROMIDE AND ALBUTEROL SULFATE 1 DOSE: 2.5; .5 SOLUTION RESPIRATORY (INHALATION) at 07:33

## 2025-07-11 RX ADMIN — ACETAMINOPHEN 650 MG: 325 TABLET ORAL at 08:58

## 2025-07-11 RX ADMIN — ASPIRIN 81 MG 81 MG: 81 TABLET ORAL at 08:59

## 2025-07-11 RX ADMIN — HYDROXYCHLOROQUINE SULFATE 300 MG: 200 TABLET ORAL at 08:58

## 2025-07-11 RX ADMIN — POTASSIUM CHLORIDE 20 MEQ: 1500 TABLET, EXTENDED RELEASE ORAL at 08:58

## 2025-07-11 RX ADMIN — AMIODARONE HYDROCHLORIDE 200 MG: 200 TABLET ORAL at 08:58

## 2025-07-11 RX ADMIN — CHLORHEXIDINE GLUCONATE 0.12% ORAL RINSE 15 ML: 1.2 LIQUID ORAL at 08:58

## 2025-07-11 RX ADMIN — CHLORHEXIDINE GLUCONATE 0.12% ORAL RINSE 15 ML: 1.2 LIQUID ORAL at 20:53

## 2025-07-11 RX ADMIN — METHYLPREDNISOLONE SODIUM SUCCINATE 40 MG: 40 INJECTION, POWDER, LYOPHILIZED, FOR SOLUTION INTRAMUSCULAR; INTRAVENOUS at 15:09

## 2025-07-11 RX ADMIN — SODIUM PHOSPHATE, MONOBASIC, MONOHYDRATE AND SODIUM PHOSPHATE, DIBASIC, ANHYDROUS 15 MMOL: 142; 276 INJECTION, SOLUTION INTRAVENOUS at 09:24

## 2025-07-11 RX ADMIN — CALCIUM CHLORIDE 1000 MG: 100 INJECTION, SOLUTION INTRAVENOUS at 09:25

## 2025-07-11 RX ADMIN — BACITRACIN: 500 OINTMENT TOPICAL at 20:59

## 2025-07-11 RX ADMIN — ENOXAPARIN SODIUM 40 MG: 100 INJECTION SUBCUTANEOUS at 08:59

## 2025-07-11 RX ADMIN — TRAMADOL HYDROCHLORIDE 50 MG: 50 TABLET, COATED ORAL at 11:59

## 2025-07-11 RX ADMIN — FUROSEMIDE 40 MG: 10 INJECTION, SOLUTION INTRAMUSCULAR; INTRAVENOUS at 08:59

## 2025-07-11 ASSESSMENT — PAIN - FUNCTIONAL ASSESSMENT
PAIN_FUNCTIONAL_ASSESSMENT: PREVENTS OR INTERFERES SOME ACTIVE ACTIVITIES AND ADLS
PAIN_FUNCTIONAL_ASSESSMENT: ACTIVITIES ARE NOT PREVENTED

## 2025-07-11 ASSESSMENT — PAIN DESCRIPTION - ORIENTATION
ORIENTATION: MID

## 2025-07-11 ASSESSMENT — PAIN DESCRIPTION - PAIN TYPE
TYPE: SURGICAL PAIN

## 2025-07-11 ASSESSMENT — PAIN DESCRIPTION - LOCATION
LOCATION: STERNUM
LOCATION: CHEST
LOCATION: STERNUM
LOCATION: STERNUM

## 2025-07-11 ASSESSMENT — PAIN SCALES - GENERAL
PAINLEVEL_OUTOF10: 5
PAINLEVEL_OUTOF10: 6
PAINLEVEL_OUTOF10: 9
PAINLEVEL_OUTOF10: 4
PAINLEVEL_OUTOF10: 0
PAINLEVEL_OUTOF10: 3

## 2025-07-11 ASSESSMENT — PAIN DESCRIPTION - FREQUENCY
FREQUENCY: CONTINUOUS
FREQUENCY: INTERMITTENT

## 2025-07-11 ASSESSMENT — PAIN DESCRIPTION - ONSET
ONSET: ON-GOING
ONSET: GRADUAL
ONSET: ON-GOING
ONSET: ON-GOING

## 2025-07-11 ASSESSMENT — PAIN DESCRIPTION - DESCRIPTORS
DESCRIPTORS: ACHING;SORE
DESCRIPTORS: ACHING

## 2025-07-11 NOTE — TELEPHONE ENCOUNTER
----- Message from Dr. Chrissy Randolph MD sent at 7/10/2025  6:21 PM EDT -----  Call patient and check on her following heart procedure that she recently had to see how she is feeling. She did not complete second dose of Rituxan and all her appointments are presently canceled.  We need to have a plan for her when she feels better to prevent rheumatoid arthritis from flaring.

## 2025-07-11 NOTE — CARE COORDINATION
Follow up visit with pt this morning. No family present. Pt remains on HHF 02 and thus far has not been able to make any further than from bed to chair. Cm re-stressed the anticipated need for SNF at discharge and pt indicates that she is now thinking that way as well. Cm will return later today to try to see pt's  and if he is not in today Cm will contact pt's  by ph# re; discharge planning.     1345 Cm rounded by pt's room and no family present. Cm contacted pt's  to discuss planning. Cm updated hsb re; concern that pt likely will need Short term SNF rehab upon discharge. Cm encouraged pt and  to review list of SNF and select a few options that would be acceptable to pt for a couple weeks of rehab if needed. Cm did stress that plans can be changed at any time pending pt progress but goal is to be prepared for discharge before it occurs to ensure smooth transition and that pt needs are best met. Cm to follow.

## 2025-07-11 NOTE — TELEPHONE ENCOUNTER
Patient called back and stated that she is still in the hospital and will most likely be there for another week. Patient states hospital is trying to get her oxygen regulated. Patient states there is no plan for infusion at this time. Patient states nurses from infusion clinic stated that the patient will need to be completely healed before she can start the infusions back up. Patient states she is currently getting prednisone and plaquenil at this time

## 2025-07-11 NOTE — TELEPHONE ENCOUNTER
Left vm for pt to call back. Pt's appts. were canceled because she had to have heart surgery and they were waiting to resume I'm pretty sure but I am waiting for pt to call back so I can know more.

## 2025-07-11 NOTE — CONSULTS
[facility] [address] [facility] [place] [id number]                              CONSULTATION      PATIENT NAME: ROSALIND EDWARDS                  : 1955  MED REC NO: [id number]                      ROOM: 3110  ACCOUNT NO: [id number]                       ADMIT DATE: 2025  PROVIDER: Papito Veras MD      CONSULT DATE: 2025    HISTORY OF PRESENT ILLNESS:  The patient is a 69-year-old lady with multiple medical problems including rheumatoid arthritis, hypertension, hyperlipidemia, heart failure with preserved ejection fraction, valvular heart disease, severe pulmonary hypertension, COPD, chronic respiratory failure, on home oxygen; who was admitted through the emergency room with increasing shortness of breath after she was sent to the ER from [facility] where she was receiving infusion for her rheumatoid arthritis.  She was complaining of shortness of breath on exertion.  She was complaining of occasional cough.  She denied any hemoptysis.  She denied any fever or chills.  She denied any nausea or vomiting.    PAST MEDICAL HISTORY:  Significant for COPD; chronic respiratory failure, on home oxygen; rheumatoid arthritis; valvular heart disease; congestive heart failure.    PAST SURGICAL HISTORY:  Noncontributory.    FAMILY HISTORY:  Noncontributory.    SOCIAL HISTORY:  Reveals that she quit smoking but has history of smoking in the past.  She drinks alcohol, but no history of drug abuse.    MEDICATIONS:  Reviewed.    ALLERGIES:  SHE IS ALLERGIC TO NICKEL.      REVIEW OF SYSTEMS:  10- to 14-point review of systems were reviewed and are negative except for what is mentioned in History Of Present Illness.    PHYSICAL EXAMINATION:  GENERAL:  The patient is alert and oriented x3.  No acute respiratory distress.  VITAL SIGNS:  Her blood pressure is 171/69 mmHg, pulse of 73 per minute, and respiratory rate of 17 per minute.  She is 
  ADVANCED NEPHROLOGY CONSULT NOTE  Dr. Uri Mercado and Dr Alphonso Carrillo                Assessment    1.  acute on chronic hypoxic respiratory failure fluid overload   #2 CHF exacerbation EF 65 to 70% with diastolic heart failure with aortic regurgitation and tricuspid regurgitation and pulmonary hypertension   #3 hypertension   #4 rheumatoid arthritis   #5 COPD   #6 acute renal failure from cardiorenal syndrome        Plan   1.  initially presents with weakness fatigue shortness of breath looking for other source of infection likely feels related to heart failure and valvular heart disease with plan for surgical intervention with worsening renal function diuretics were held shortness of breath appears holding stable for now but in case needs increased diuresis wanted nephrology involved   #2 with valvular heart disease in the above setting plan is for surgical intervention with CABG and valve surgery tomorrow   #3 blood pressure variable monitor for now likely decrease postsurgery   #4 seeing rheumatoid arthritis doctor plan was for  possible Rituxan outpatient has been on steroids in the past monitor pain control no NSAIDs   #5 oxygenation monitor stable   #6 acute renal failure in the setting of diuresis now improving with holding diuretics.  Will need diuresis again likely will need postsurgery patient is aware that there is a risk renal function can worsen post cardiac surgery and she is aware of risk and benefits and wants to proceed we will monitor renal function accordingly renal function is improved    Date:7/7/2025        Patient Name:Radha Gomez     YOB: 1955     Age:69 y.o.        Chief Complaint     Reason for Consult:   acute renal failure    History Obtained From   patient, electronic medical record    History of Present Illness   The patient is a 69 y.o. female who presents with  weakness fatigue shortness of breath in setting Motrin arthritis hypertension hyperlipidemia congestive 
24 hour central line rounding completed. No dressing change indicated. Dressing is clean, dry, and intact. Patient continues to meet the following criteria for central vascular access: Hemodynamically unstable, requiring monitoring lines, vasopressors, or volume resuscitation    Consult the Vascular Access Team for questions, concerns, or change in patient's condition.   
Dict [id number]  
PICC Consult complete.  Indication for line: CVP Monitoring, downgrade introducer.  Education regarding PICC insertion discussed and risks and benefits assessed with patient.  Patient verbalized understanding.  Consent signed by patient via verbal consent, 2 witnesses.  Time out done @ 09:40.   PICC inserted in FINN Brachial Vessel without difficulty per protocol.  Placement verified via VPSG4 monitoring system.  Good blood return and flushes easily on both ports.  Patient tolerated well.  Education pamphlets left in chart  Ultrasound photos of vein diameter and doppler signature placed in chart.   Please consult IV/PICC team if patient's needs change.    PICC OK to use.        
CARDIAC CATH LAB     SECTION      COLONOSCOPY          Medications Prior to Admission:     Prior to Admission medications    Medication Sig Start Date End Date Taking? Authorizing Provider   denosumab (PROLIA) 60 MG/ML SOSY SC injection Inject 1 mL into the skin every 6 months 25  Yes Chrissy Randolph MD   predniSONE (DELTASONE) 5 MG tablet Take 1 tablet by mouth every other day 25  Yes Chrissy Randolph MD   hydroxychloroquine (PLAQUENIL) 200 MG tablet Take 1.5 tablets by mouth daily 25  Yes Chrissy Randolph MD   tiotropium (SPIRIVA) 18 MCG inhalation capsule Inhale 1 capsule into the lungs daily 25  Yes Mp Choudhary MD   atorvastatin (LIPITOR) 40 MG tablet Take 1 tablet by mouth nightly 24  Yes Tonia Simpson MD   empagliflozin (JARDIANCE) 10 MG tablet Take 1 tablet by mouth daily 24  Yes Tonia Simpson MD   torsemide (DEMADEX) 20 MG tablet Take 1 tablet by mouth in the morning and 1 tablet in the evening. 24  Yes Tonia Simpson MD   vitamin D (CHOLECALCIFEROL) 25 MCG (1000 UT) TABS tablet Take 1 tablet by mouth daily 10/8/24  Yes Chrissy Randolph MD   calcium carbonate (OSCAL) 500 MG TABS tablet Take 1 tablet by mouth daily OTC   Yes Venkat Guthrie MD   aspirin 81 MG chewable tablet Take 1 tablet by mouth daily OTC   Yes Venkat Guthrie MD   Cyanocobalamin (VITAMIN B 12) 500 MCG TABS Take 500 mcg by mouth daily OTC   Yes Venkat Guthrie MD   Elderberry-Vitamin C-Zinc (Metrasens GUMMY PO) Take 2 each by mouth daily OTC   Yes Venkat Guthrie MD   carvedilol (COREG) 25 MG tablet Take 1 tablet by mouth 2 times daily 7/3/24  Yes Venkat Guthrie MD   amLODIPine (NORVASC) 10 MG tablet Take 1 tablet by mouth daily   Yes Venkat Guthrie MD   Handicap Placard MISC by Does not apply route 10/1/24   Nguyen Adkins APRN - CNP        Allergies:     Nickel    Social History:     Tobacco: 
(H) 74 - 99 mg/dL    BUN 17 7 - 20 mg/dL    Creatinine 1.2 0.6 - 1.2 mg/dL    Est, Glom Filt Rate 46 (L) >60 mL/min/1.73m2    Calcium 8.4 8.3 - 10.6 mg/dL    Total Protein 7.3 6.4 - 8.2 g/dL    Albumin 3.3 (L) 3.4 - 5.0 g/dL    Albumin/Globulin Ratio 0.8 (L) 1.1 - 2.2    Total Bilirubin 0.9 0.0 - 1.0 mg/dL    Alkaline Phosphatase 109 40 - 129 U/L    ALT 17 10 - 40 U/L    AST 25 15 - 37 U/L   Magnesium    Collection Time: 06/30/25  9:49 AM   Result Value Ref Range    Magnesium 2.3 1.8 - 2.4 mg/dL   Procalcitonin    Collection Time: 06/30/25  9:49 AM   Result Value Ref Range    Procalcitonin 0.05 ng/mL   Troponin    Collection Time: 06/30/25 10:56 AM   Result Value Ref Range    Troponin, High Sensitivity 10 0 - 14 ng/L       The 10-year ASCVD risk score (Ana Paula SEGOVIA, et al., 2019) is: 12.5%    Values used to calculate the score:      Age: 69 years      Sex: Female      Is Non- : Yes      Diabetic: No      Tobacco smoker: No      Systolic Blood Pressure: 135 mmHg      Is BP treated: Yes      HDL Cholesterol: 71 mg/dL      Total Cholesterol: 183 mg/dL     Assessment/Recommendations:      - Congestive heart failure has HFpEF we will continue to diurese and monitor  Patient gets readmitted again with symptoms of congestive heart failure  Will proceed with cardiac catheterization right and left for further evaluation  -Severe pulmonary hypertension pulmonary has been consulted for further management  -hypertension on amlodipine and carvedilol  -BNP is elevated suggestive of fluid overload since 1090  -Also has valvular heart disease with severe aortic insufficiency, transesophageal echo showed moderate aortic degree of aortic insufficiency with the vena contract of about 0.4 cm   -Carotid artery disease being followed by interventional neurology  - Cardiolite was unremarkable           Jose Taylor MD, 6/30/2025 1:33 PM       Please note this report has been partially produced using speech recognition 
adjustment of the mA and/or kV according to patient size) were used to limit patient radiation dose. Measurements and estimates of internal carotid artery stenosis are based on the NASCET criteria. Limitations: None. FINDINGS: Soft Tissues: Mediastinal adenopathy with the largest lymph node conglomerate located in the AP window measuring 2.1 x 1.1 cm. These findings are nonspecific. Osseous structures: The visualized paranasal sinuses and mastoid air cells are aerated. Osseous structures appear intact. There are degenerative changes in the  spine. Upper Lungs: Fibrotic changes in both lungs with biapical pleural thickening. Aortic Arch: Classic origins. Severe stenosis of the prevertebral left subclavian artery and left common carotid artery origins. Brachiocephalic artery  is mildly narrowed atherosclerosis. Right subclavian artery origin is severely stenotic. Right Cervical Carotid: Atherosclerotic changes at the bifurcation with approximately 30% origin stenosis of the right internal carotid artery. Left Cervical Carotid: Atherosclerosis at the left carotid bifurcation results in approximately 55% stenosis of the proximal left internal carotid artery.. Vertebrals: The dominant right vertebral artery appears to be patent throughout its course. The proximal and mid left vertebral artery are occluded. The opacified portion is likely retrograde field. These findings were reported previously. IMPRESSION: 1. Approximately 30% origin stenosis of the right internal carotid artery and 55% origin stenosis of the left internal carotid artery. 2. Occlusive disease involving the proximal/mid left vertebral artery. This finding was reported previously. The dominant right vertebral artery is patent. 3. 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 4. Severe origin stenosis of the left common carotid artery. 5. Please see above for complete discussion. EXAM INTERPRETED UTILIZING THE FOLLOWING GRADING:

## 2025-07-12 ENCOUNTER — APPOINTMENT (OUTPATIENT)
Dept: GENERAL RADIOLOGY | Age: 70
DRG: 216 | End: 2025-07-12
Payer: MEDICARE

## 2025-07-12 LAB
ANION GAP SERPL CALCULATED.3IONS-SCNC: 10 MMOL/L (ref 9–17)
ANION GAP SERPL CALCULATED.3IONS-SCNC: 14 MMOL/L (ref 9–17)
BUN SERPL-MCNC: 31 MG/DL (ref 7–20)
BUN SERPL-MCNC: 37 MG/DL (ref 7–20)
CA-I BLD-SCNC: 1.19 MMOL/L (ref 1.15–1.33)
CA-I BLD-SCNC: 1.19 MMOL/L (ref 1.15–1.33)
CALCIUM SERPL-MCNC: 8.2 MG/DL (ref 8.3–10.6)
CALCIUM SERPL-MCNC: 8.7 MG/DL (ref 8.3–10.6)
CHLORIDE SERPL-SCNC: 101 MMOL/L (ref 99–110)
CHLORIDE SERPL-SCNC: 106 MMOL/L (ref 99–110)
CO2 SERPL-SCNC: 19 MMOL/L (ref 21–32)
CO2 SERPL-SCNC: 20 MMOL/L (ref 21–32)
CREAT SERPL-MCNC: 1.6 MG/DL (ref 0.6–1.2)
CREAT SERPL-MCNC: 1.8 MG/DL (ref 0.6–1.2)
ERYTHROCYTE [DISTWIDTH] IN BLOOD BY AUTOMATED COUNT: 15.8 % (ref 11.7–14.9)
GFR, ESTIMATED: 29 ML/MIN/1.73M2
GFR, ESTIMATED: 33 ML/MIN/1.73M2
GLUCOSE BLD-MCNC: 142 MG/DL (ref 74–99)
GLUCOSE BLD-MCNC: 143 MG/DL (ref 74–99)
GLUCOSE BLD-MCNC: 165 MG/DL (ref 74–99)
GLUCOSE BLD-MCNC: 176 MG/DL (ref 74–99)
GLUCOSE SERPL-MCNC: 128 MG/DL (ref 74–99)
GLUCOSE SERPL-MCNC: 158 MG/DL (ref 74–99)
HCT VFR BLD AUTO: 22 % (ref 37–47)
HCT VFR BLD AUTO: 27.4 % (ref 37–47)
HGB BLD-MCNC: 6.8 G/DL (ref 12.5–16)
HGB BLD-MCNC: 8.7 G/DL (ref 12.5–16)
MAGNESIUM SERPL-MCNC: 3.2 MG/DL (ref 1.8–2.4)
MAGNESIUM SERPL-MCNC: 3.3 MG/DL (ref 1.8–2.4)
MCH RBC QN AUTO: 27.6 PG (ref 27–31)
MCHC RBC AUTO-ENTMCNC: 30.9 G/DL (ref 32–36)
MCV RBC AUTO: 89.4 FL (ref 78–100)
PHOSPHATE SERPL-MCNC: 2.6 MG/DL (ref 2.5–4.9)
PHOSPHATE SERPL-MCNC: 3.3 MG/DL (ref 2.5–4.9)
PLATELET # BLD AUTO: 141 K/UL (ref 140–440)
PMV BLD AUTO: 11.2 FL (ref 7.5–11.1)
POTASSIUM SERPL-SCNC: 4.2 MMOL/L (ref 3.5–5.1)
POTASSIUM SERPL-SCNC: 5 MMOL/L (ref 3.5–5.1)
RBC # BLD AUTO: 2.46 M/UL (ref 4.2–5.4)
SODIUM SERPL-SCNC: 135 MMOL/L (ref 136–145)
SODIUM SERPL-SCNC: 135 MMOL/L (ref 136–145)
WBC OTHER # BLD: 12.5 K/UL (ref 4–10.5)

## 2025-07-12 PROCEDURE — 2100000000 HC CCU R&B

## 2025-07-12 PROCEDURE — 2500000003 HC RX 250 WO HCPCS: Performed by: PHYSICIAN ASSISTANT

## 2025-07-12 PROCEDURE — 94660 CPAP INITIATION&MGMT: CPT

## 2025-07-12 PROCEDURE — 85014 HEMATOCRIT: CPT

## 2025-07-12 PROCEDURE — 30243N1 TRANSFUSION OF NONAUTOLOGOUS RED BLOOD CELLS INTO CENTRAL VEIN, PERCUTANEOUS APPROACH: ICD-10-PCS | Performed by: THORACIC SURGERY (CARDIOTHORACIC VASCULAR SURGERY)

## 2025-07-12 PROCEDURE — 6370000000 HC RX 637 (ALT 250 FOR IP): Performed by: PHYSICIAN ASSISTANT

## 2025-07-12 PROCEDURE — 36592 COLLECT BLOOD FROM PICC: CPT

## 2025-07-12 PROCEDURE — 86850 RBC ANTIBODY SCREEN: CPT

## 2025-07-12 PROCEDURE — 2580000003 HC RX 258: Performed by: INTERNAL MEDICINE

## 2025-07-12 PROCEDURE — 6370000000 HC RX 637 (ALT 250 FOR IP): Performed by: STUDENT IN AN ORGANIZED HEALTH CARE EDUCATION/TRAINING PROGRAM

## 2025-07-12 PROCEDURE — 36430 TRANSFUSION BLD/BLD COMPNT: CPT

## 2025-07-12 PROCEDURE — 85018 HEMOGLOBIN: CPT

## 2025-07-12 PROCEDURE — 99233 SBSQ HOSP IP/OBS HIGH 50: CPT | Performed by: INTERNAL MEDICINE

## 2025-07-12 PROCEDURE — 86901 BLOOD TYPING SEROLOGIC RH(D): CPT

## 2025-07-12 PROCEDURE — 6370000000 HC RX 637 (ALT 250 FOR IP): Performed by: INTERNAL MEDICINE

## 2025-07-12 PROCEDURE — 6360000002 HC RX W HCPCS: Performed by: PHYSICIAN ASSISTANT

## 2025-07-12 PROCEDURE — 6360000002 HC RX W HCPCS: Performed by: INTERNAL MEDICINE

## 2025-07-12 PROCEDURE — 86923 COMPATIBILITY TEST ELECTRIC: CPT

## 2025-07-12 PROCEDURE — 82330 ASSAY OF CALCIUM: CPT

## 2025-07-12 PROCEDURE — 82962 GLUCOSE BLOOD TEST: CPT

## 2025-07-12 PROCEDURE — 94640 AIRWAY INHALATION TREATMENT: CPT

## 2025-07-12 PROCEDURE — 84100 ASSAY OF PHOSPHORUS: CPT

## 2025-07-12 PROCEDURE — 2700000000 HC OXYGEN THERAPY PER DAY

## 2025-07-12 PROCEDURE — 71045 X-RAY EXAM CHEST 1 VIEW: CPT

## 2025-07-12 PROCEDURE — 6360000002 HC RX W HCPCS: Performed by: NURSE PRACTITIONER

## 2025-07-12 PROCEDURE — 94668 MNPJ CHEST WALL SBSQ: CPT

## 2025-07-12 PROCEDURE — 94761 N-INVAS EAR/PLS OXIMETRY MLT: CPT

## 2025-07-12 PROCEDURE — 36415 COLL VENOUS BLD VENIPUNCTURE: CPT

## 2025-07-12 PROCEDURE — 6360000002 HC RX W HCPCS: Performed by: THORACIC SURGERY (CARDIOTHORACIC VASCULAR SURGERY)

## 2025-07-12 PROCEDURE — P9016 RBC LEUKOCYTES REDUCED: HCPCS

## 2025-07-12 PROCEDURE — 94150 VITAL CAPACITY TEST: CPT

## 2025-07-12 PROCEDURE — 80048 BASIC METABOLIC PNL TOTAL CA: CPT

## 2025-07-12 PROCEDURE — 86900 BLOOD TYPING SEROLOGIC ABO: CPT

## 2025-07-12 PROCEDURE — 94667 MNPJ CHEST WALL 1ST: CPT

## 2025-07-12 PROCEDURE — 85027 COMPLETE CBC AUTOMATED: CPT

## 2025-07-12 PROCEDURE — 2500000003 HC RX 250 WO HCPCS: Performed by: INTERNAL MEDICINE

## 2025-07-12 PROCEDURE — 83735 ASSAY OF MAGNESIUM: CPT

## 2025-07-12 RX ORDER — IPRATROPIUM BROMIDE AND ALBUTEROL SULFATE 2.5; .5 MG/3ML; MG/3ML
1 SOLUTION RESPIRATORY (INHALATION)
Status: DISCONTINUED | OUTPATIENT
Start: 2025-07-12 | End: 2025-07-17 | Stop reason: HOSPADM

## 2025-07-12 RX ORDER — FUROSEMIDE 10 MG/ML
80 INJECTION INTRAMUSCULAR; INTRAVENOUS 3 TIMES DAILY
Status: DISCONTINUED | OUTPATIENT
Start: 2025-07-12 | End: 2025-07-15

## 2025-07-12 RX ORDER — FUROSEMIDE 10 MG/ML
40 INJECTION INTRAMUSCULAR; INTRAVENOUS ONCE
Status: COMPLETED | OUTPATIENT
Start: 2025-07-12 | End: 2025-07-12

## 2025-07-12 RX ORDER — SODIUM CHLORIDE 9 MG/ML
INJECTION, SOLUTION INTRAVENOUS PRN
Status: DISCONTINUED | OUTPATIENT
Start: 2025-07-12 | End: 2025-07-17 | Stop reason: HOSPADM

## 2025-07-12 RX ORDER — IPRATROPIUM BROMIDE AND ALBUTEROL SULFATE 2.5; .5 MG/3ML; MG/3ML
1 SOLUTION RESPIRATORY (INHALATION) EVERY 4 HOURS PRN
Status: DISCONTINUED | OUTPATIENT
Start: 2025-07-12 | End: 2025-07-17 | Stop reason: HOSPADM

## 2025-07-12 RX ADMIN — TRAMADOL HYDROCHLORIDE 50 MG: 50 TABLET, COATED ORAL at 08:12

## 2025-07-12 RX ADMIN — Medication 1 TABLET: at 08:16

## 2025-07-12 RX ADMIN — CHLOROTHIAZIDE SODIUM 500 MG: 500 INJECTION, POWDER, LYOPHILIZED, FOR SOLUTION INTRAVENOUS at 11:46

## 2025-07-12 RX ADMIN — AMIODARONE HYDROCHLORIDE 200 MG: 200 TABLET ORAL at 08:12

## 2025-07-12 RX ADMIN — CLOPIDOGREL BISULFATE 75 MG: 75 TABLET, FILM COATED ORAL at 08:13

## 2025-07-12 RX ADMIN — CHLOROTHIAZIDE SODIUM 500 MG: 500 INJECTION, POWDER, LYOPHILIZED, FOR SOLUTION INTRAVENOUS at 23:22

## 2025-07-12 RX ADMIN — IPRATROPIUM BROMIDE AND ALBUTEROL SULFATE 1 DOSE: 2.5; .5 SOLUTION RESPIRATORY (INHALATION) at 16:21

## 2025-07-12 RX ADMIN — ASPIRIN 81 MG 81 MG: 81 TABLET ORAL at 08:12

## 2025-07-12 RX ADMIN — AMIODARONE HYDROCHLORIDE 200 MG: 200 TABLET ORAL at 21:24

## 2025-07-12 RX ADMIN — INSULIN LISPRO 1 UNITS: 100 INJECTION, SOLUTION INTRAVENOUS; SUBCUTANEOUS at 12:21

## 2025-07-12 RX ADMIN — FUROSEMIDE 40 MG: 10 INJECTION, SOLUTION INTRAMUSCULAR; INTRAVENOUS at 12:22

## 2025-07-12 RX ADMIN — SODIUM CHLORIDE, PRESERVATIVE FREE 10 ML: 5 INJECTION INTRAVENOUS at 21:24

## 2025-07-12 RX ADMIN — INSULIN LISPRO 1 UNITS: 100 INJECTION, SOLUTION INTRAVENOUS; SUBCUTANEOUS at 17:42

## 2025-07-12 RX ADMIN — METHYLPREDNISOLONE SODIUM SUCCINATE 40 MG: 40 INJECTION, POWDER, LYOPHILIZED, FOR SOLUTION INTRAMUSCULAR; INTRAVENOUS at 08:15

## 2025-07-12 RX ADMIN — MUPIROCIN: 20 OINTMENT TOPICAL at 21:26

## 2025-07-12 RX ADMIN — BACITRACIN: 500 OINTMENT TOPICAL at 21:25

## 2025-07-12 RX ADMIN — SENNOSIDES, DOCUSATE SODIUM 1 TABLET: 50; 8.6 TABLET, FILM COATED ORAL at 21:24

## 2025-07-12 RX ADMIN — IPRATROPIUM BROMIDE AND ALBUTEROL SULFATE 1 DOSE: .5; 2.5 SOLUTION RESPIRATORY (INHALATION) at 19:16

## 2025-07-12 RX ADMIN — FUROSEMIDE 40 MG: 10 INJECTION, SOLUTION INTRAMUSCULAR; INTRAVENOUS at 08:14

## 2025-07-12 RX ADMIN — HYDROXYCHLOROQUINE SULFATE 300 MG: 200 TABLET ORAL at 08:12

## 2025-07-12 RX ADMIN — IPRATROPIUM BROMIDE AND ALBUTEROL SULFATE 1 DOSE: 2.5; .5 SOLUTION RESPIRATORY (INHALATION) at 12:43

## 2025-07-12 RX ADMIN — CHLORHEXIDINE GLUCONATE 0.12% ORAL RINSE 15 ML: 1.2 LIQUID ORAL at 21:23

## 2025-07-12 RX ADMIN — CARVEDILOL 6.25 MG: 6.25 TABLET, FILM COATED ORAL at 17:43

## 2025-07-12 RX ADMIN — ACETAMINOPHEN 650 MG: 325 TABLET ORAL at 14:54

## 2025-07-12 RX ADMIN — ENOXAPARIN SODIUM 40 MG: 100 INJECTION SUBCUTANEOUS at 08:15

## 2025-07-12 RX ADMIN — IRON SUCROSE 200 MG: 20 INJECTION, SOLUTION INTRAVENOUS at 12:26

## 2025-07-12 RX ADMIN — INSULIN LISPRO 1 UNITS: 100 INJECTION, SOLUTION INTRAVENOUS; SUBCUTANEOUS at 21:23

## 2025-07-12 RX ADMIN — CARVEDILOL 6.25 MG: 6.25 TABLET, FILM COATED ORAL at 08:13

## 2025-07-12 RX ADMIN — BACITRACIN: 500 OINTMENT TOPICAL at 08:16

## 2025-07-12 RX ADMIN — POTASSIUM CHLORIDE 20 MEQ: 1500 TABLET, EXTENDED RELEASE ORAL at 18:51

## 2025-07-12 RX ADMIN — ATORVASTATIN CALCIUM 40 MG: 40 TABLET, FILM COATED ORAL at 21:23

## 2025-07-12 RX ADMIN — MUPIROCIN: 20 OINTMENT TOPICAL at 08:16

## 2025-07-12 RX ADMIN — BUDESONIDE AND FORMOTEROL FUMARATE DIHYDRATE 2 PUFF: 160; 4.5 AEROSOL RESPIRATORY (INHALATION) at 19:17

## 2025-07-12 RX ADMIN — CHLORHEXIDINE GLUCONATE 0.12% ORAL RINSE 15 ML: 1.2 LIQUID ORAL at 08:15

## 2025-07-12 RX ADMIN — IPRATROPIUM BROMIDE AND ALBUTEROL SULFATE 1 DOSE: 2.5; .5 SOLUTION RESPIRATORY (INHALATION) at 08:55

## 2025-07-12 RX ADMIN — ACETAMINOPHEN 650 MG: 325 TABLET ORAL at 02:06

## 2025-07-12 RX ADMIN — POLYETHYLENE GLYCOL (3350) 17 G: 17 POWDER, FOR SOLUTION ORAL at 08:11

## 2025-07-12 RX ADMIN — TRAMADOL HYDROCHLORIDE 50 MG: 50 TABLET, COATED ORAL at 21:24

## 2025-07-12 RX ADMIN — FAMOTIDINE 20 MG: 20 TABLET, FILM COATED ORAL at 08:14

## 2025-07-12 RX ADMIN — SODIUM CHLORIDE, PRESERVATIVE FREE 10 ML: 5 INJECTION INTRAVENOUS at 21:26

## 2025-07-12 RX ADMIN — BUDESONIDE AND FORMOTEROL FUMARATE DIHYDRATE 2 PUFF: 160; 4.5 AEROSOL RESPIRATORY (INHALATION) at 08:57

## 2025-07-12 RX ADMIN — ACETAMINOPHEN 650 MG: 325 TABLET ORAL at 08:13

## 2025-07-12 RX ADMIN — FUROSEMIDE 80 MG: 10 INJECTION, SOLUTION INTRAMUSCULAR; INTRAVENOUS at 21:28

## 2025-07-12 RX ADMIN — ACETAMINOPHEN 650 MG: 325 TABLET ORAL at 21:24

## 2025-07-12 RX ADMIN — SODIUM CHLORIDE, PRESERVATIVE FREE 10 ML: 5 INJECTION INTRAVENOUS at 08:17

## 2025-07-12 RX ADMIN — FUROSEMIDE 80 MG: 10 INJECTION, SOLUTION INTRAMUSCULAR; INTRAVENOUS at 14:54

## 2025-07-12 RX ADMIN — SENNOSIDES, DOCUSATE SODIUM 1 TABLET: 50; 8.6 TABLET, FILM COATED ORAL at 08:13

## 2025-07-12 ASSESSMENT — PAIN - FUNCTIONAL ASSESSMENT: PAIN_FUNCTIONAL_ASSESSMENT: PREVENTS OR INTERFERES SOME ACTIVE ACTIVITIES AND ADLS

## 2025-07-12 ASSESSMENT — PAIN SCALES - GENERAL
PAINLEVEL_OUTOF10: 1
PAINLEVEL_OUTOF10: 5
PAINLEVEL_OUTOF10: 5
PAINLEVEL_OUTOF10: 3

## 2025-07-12 ASSESSMENT — PAIN DESCRIPTION - FREQUENCY: FREQUENCY: INTERMITTENT

## 2025-07-12 ASSESSMENT — PAIN DESCRIPTION - ONSET: ONSET: ON-GOING

## 2025-07-12 ASSESSMENT — PAIN DESCRIPTION - DESCRIPTORS
DESCRIPTORS: ACHING;SORE
DESCRIPTORS: ACHING;SPASM;SORE

## 2025-07-12 ASSESSMENT — PAIN DESCRIPTION - LOCATION
LOCATION: CHEST
LOCATION: CHEST

## 2025-07-12 ASSESSMENT — PAIN DESCRIPTION - PAIN TYPE: TYPE: SURGICAL PAIN

## 2025-07-12 ASSESSMENT — PAIN DESCRIPTION - ORIENTATION
ORIENTATION: ANTERIOR
ORIENTATION: ANTERIOR

## 2025-07-12 NOTE — RT PROTOCOL NOTE
RT Inhaler-Nebulizer Bronchodilator Protocol Note    There is a bronchodilator order in the chart from a provider indicating to follow the RT Bronchodilator Protocol and there is an “Initiate RT Inhaler-Nebulizer Bronchodilator Protocol” order as well (see protocol at bottom of note).    CXR Findings:  No results found.    The findings from the last RT Protocol Assessment were as follows:   History Pulmonary Disease: Smoker 15 pack years or more  Respiratory Pattern: Dyspnea on exertion or RR 21-25 bpm  Breath Sounds: Intermittent or unilateral wheezes  Cough: Strong, productive  Indication for Bronchodilator Therapy:    Bronchodilator Assessment Score: 8    Aerosolized bronchodilator medication orders have been revised according to the RT Inhaler-Nebulizer Bronchodilator Protocol below.    Respiratory Therapist to perform RT Therapy Protocol Assessment initially then follow the protocol.  Repeat RT Therapy Protocol Assessment PRN for score 0-3 or on second treatment, BID, and PRN for scores above 3.    No Indications - adjust the frequency to every 6 hours PRN wheezing or bronchospasm, if no treatments needed after 48 hours then discontinue using Per Protocol order mode.     If indication present, adjust the RT bronchodilator orders based on the Bronchodilator Assessment Score as indicated below.  Use Inhaler orders unless patient has one or more of the following: on home nebulizer, not able to hold breath for 10 seconds, is not alert and oriented, cannot activate and use MDI correctly, or respiratory rate 25 breaths per minute or more, then use the equivalent nebulizer order(s) with same Frequency and PRN reasons based on the score.  If a patient is on this medication at home then do not decrease Frequency below that used at home.    0-3 - enter or revise RT bronchodilator order(s) to equivalent RT Bronchodilator order with Frequency of every 4 hours PRN for wheezing or increased work of breathing using Per

## 2025-07-12 NOTE — CONSENT
Informed Consent for Blood Component Transfusion Note    I have discussed with the patient the rationale for blood component transfusion; its benefits in treating or preventing fatigue, organ damage, or death; and its risk which includes mild transfusion reactions, rare risk of blood borne infection, or more serious but rare reactions. I have discussed the alternatives to transfusion, including the risk and consequences of not receiving transfusion. The patient had an opportunity to ask questions and had agreed to proceed with transfusion of blood components.    Electronically signed by TU Acosta CNP on 7/12/25 at 9:43 AM EDT

## 2025-07-13 ENCOUNTER — APPOINTMENT (OUTPATIENT)
Dept: GENERAL RADIOLOGY | Age: 70
DRG: 216 | End: 2025-07-13
Payer: MEDICARE

## 2025-07-13 LAB
ABO/RH: NORMAL
ALBUMIN SERPL-MCNC: 3.6 G/DL (ref 3.4–5)
ALBUMIN/GLOB SERPL: 1.2 {RATIO} (ref 1.1–2.2)
ALP SERPL-CCNC: 128 U/L (ref 40–129)
ALT SERPL-CCNC: 9 U/L (ref 10–40)
ANION GAP SERPL CALCULATED.3IONS-SCNC: 11 MMOL/L (ref 9–17)
ANTIBODY SCREEN: NEGATIVE
AST SERPL-CCNC: 19 U/L (ref 15–37)
BILIRUB SERPL-MCNC: 0.7 MG/DL (ref 0–1)
BLOOD BANK BLOOD PRODUCT EXPIRATION DATE: NORMAL
BLOOD BANK DISPENSE STATUS: NORMAL
BLOOD BANK ISBT PRODUCT BLOOD TYPE: 6200
BLOOD BANK PRODUCT CODE: NORMAL
BLOOD BANK SAMPLE EXPIRATION: NORMAL
BLOOD BANK UNIT TYPE AND RH: NORMAL
BNP SERPL-MCNC: 4783 PG/ML (ref 0–125)
BPU ID: NORMAL
BUN SERPL-MCNC: 42 MG/DL (ref 7–20)
CA-I BLD-SCNC: 1.18 MMOL/L (ref 1.15–1.33)
CALCIUM SERPL-MCNC: 8.9 MG/DL (ref 8.3–10.6)
CHLORIDE SERPL-SCNC: 97 MMOL/L (ref 99–110)
CO2 SERPL-SCNC: 25 MMOL/L (ref 21–32)
COMPONENT: NORMAL
CREAT SERPL-MCNC: 1.7 MG/DL (ref 0.6–1.2)
CROSSMATCH RESULT: NORMAL
CRP SERPL HS-MCNC: 99.9 MG/L (ref 0–5)
ERYTHROCYTE [DISTWIDTH] IN BLOOD BY AUTOMATED COUNT: 15.1 % (ref 11.7–14.9)
GFR, ESTIMATED: 31 ML/MIN/1.73M2
GLUCOSE BLD-MCNC: 117 MG/DL (ref 74–99)
GLUCOSE BLD-MCNC: 144 MG/DL (ref 74–99)
GLUCOSE BLD-MCNC: 151 MG/DL (ref 74–99)
GLUCOSE BLD-MCNC: 165 MG/DL (ref 74–99)
GLUCOSE SERPL-MCNC: 116 MG/DL (ref 74–99)
HCT VFR BLD AUTO: 25.9 % (ref 37–47)
HGB BLD-MCNC: 8.4 G/DL (ref 12.5–16)
MAGNESIUM SERPL-MCNC: 3 MG/DL (ref 1.8–2.4)
MCH RBC QN AUTO: 28.2 PG (ref 27–31)
MCHC RBC AUTO-ENTMCNC: 32.4 G/DL (ref 32–36)
MCV RBC AUTO: 86.9 FL (ref 78–100)
PHOSPHATE SERPL-MCNC: 3.6 MG/DL (ref 2.5–4.9)
PLATELET # BLD AUTO: 184 K/UL (ref 140–440)
PMV BLD AUTO: 10.5 FL (ref 7.5–11.1)
POTASSIUM SERPL-SCNC: 3.6 MMOL/L (ref 3.5–5.1)
PROCALCITONIN SERPL-MCNC: 0.7 NG/ML
PROT SERPL-MCNC: 6.5 G/DL (ref 6.4–8.2)
RBC # BLD AUTO: 2.98 M/UL (ref 4.2–5.4)
SODIUM SERPL-SCNC: 133 MMOL/L (ref 136–145)
TRANSFUSION STATUS: NORMAL
UNIT DIVISION: 0
UNIT ISSUE DATE/TIME: NORMAL
WBC OTHER # BLD: 15.7 K/UL (ref 4–10.5)

## 2025-07-13 PROCEDURE — 6370000000 HC RX 637 (ALT 250 FOR IP): Performed by: NURSE PRACTITIONER

## 2025-07-13 PROCEDURE — 2500000003 HC RX 250 WO HCPCS: Performed by: PHYSICIAN ASSISTANT

## 2025-07-13 PROCEDURE — 80053 COMPREHEN METABOLIC PANEL: CPT

## 2025-07-13 PROCEDURE — 82330 ASSAY OF CALCIUM: CPT

## 2025-07-13 PROCEDURE — 6370000000 HC RX 637 (ALT 250 FOR IP): Performed by: PHYSICIAN ASSISTANT

## 2025-07-13 PROCEDURE — 97530 THERAPEUTIC ACTIVITIES: CPT

## 2025-07-13 PROCEDURE — 2580000003 HC RX 258: Performed by: INTERNAL MEDICINE

## 2025-07-13 PROCEDURE — 36415 COLL VENOUS BLD VENIPUNCTURE: CPT

## 2025-07-13 PROCEDURE — 97116 GAIT TRAINING THERAPY: CPT

## 2025-07-13 PROCEDURE — 6360000002 HC RX W HCPCS: Performed by: INTERNAL MEDICINE

## 2025-07-13 PROCEDURE — 83735 ASSAY OF MAGNESIUM: CPT

## 2025-07-13 PROCEDURE — 85027 COMPLETE CBC AUTOMATED: CPT

## 2025-07-13 PROCEDURE — 2700000000 HC OXYGEN THERAPY PER DAY

## 2025-07-13 PROCEDURE — 94761 N-INVAS EAR/PLS OXIMETRY MLT: CPT

## 2025-07-13 PROCEDURE — 94150 VITAL CAPACITY TEST: CPT

## 2025-07-13 PROCEDURE — 6370000000 HC RX 637 (ALT 250 FOR IP): Performed by: INTERNAL MEDICINE

## 2025-07-13 PROCEDURE — 83880 ASSAY OF NATRIURETIC PEPTIDE: CPT

## 2025-07-13 PROCEDURE — 97110 THERAPEUTIC EXERCISES: CPT

## 2025-07-13 PROCEDURE — 2500000003 HC RX 250 WO HCPCS: Performed by: INTERNAL MEDICINE

## 2025-07-13 PROCEDURE — 99233 SBSQ HOSP IP/OBS HIGH 50: CPT | Performed by: INTERNAL MEDICINE

## 2025-07-13 PROCEDURE — 71045 X-RAY EXAM CHEST 1 VIEW: CPT

## 2025-07-13 PROCEDURE — 6360000002 HC RX W HCPCS: Performed by: PHYSICIAN ASSISTANT

## 2025-07-13 PROCEDURE — 82962 GLUCOSE BLOOD TEST: CPT

## 2025-07-13 PROCEDURE — 94640 AIRWAY INHALATION TREATMENT: CPT

## 2025-07-13 PROCEDURE — 84100 ASSAY OF PHOSPHORUS: CPT

## 2025-07-13 PROCEDURE — 86140 C-REACTIVE PROTEIN: CPT

## 2025-07-13 PROCEDURE — 2100000000 HC CCU R&B

## 2025-07-13 PROCEDURE — 94668 MNPJ CHEST WALL SBSQ: CPT

## 2025-07-13 PROCEDURE — 84145 PROCALCITONIN (PCT): CPT

## 2025-07-13 PROCEDURE — 6370000000 HC RX 637 (ALT 250 FOR IP): Performed by: STUDENT IN AN ORGANIZED HEALTH CARE EDUCATION/TRAINING PROGRAM

## 2025-07-13 RX ORDER — AMLODIPINE BESYLATE 5 MG/1
2.5 TABLET ORAL DAILY
Status: DISCONTINUED | OUTPATIENT
Start: 2025-07-13 | End: 2025-07-15

## 2025-07-13 RX ORDER — POTASSIUM CHLORIDE 1500 MG/1
40 TABLET, EXTENDED RELEASE ORAL 2 TIMES DAILY WITH MEALS
Status: COMPLETED | OUTPATIENT
Start: 2025-07-13 | End: 2025-07-13

## 2025-07-13 RX ORDER — LIDOCAINE 4 G/G
1 PATCH TOPICAL DAILY
Status: DISCONTINUED | OUTPATIENT
Start: 2025-07-13 | End: 2025-07-17 | Stop reason: HOSPADM

## 2025-07-13 RX ADMIN — CHLORHEXIDINE GLUCONATE 0.12% ORAL RINSE 15 ML: 1.2 LIQUID ORAL at 09:14

## 2025-07-13 RX ADMIN — CHLORHEXIDINE GLUCONATE 0.12% ORAL RINSE 15 ML: 1.2 LIQUID ORAL at 21:09

## 2025-07-13 RX ADMIN — BACITRACIN: 500 OINTMENT TOPICAL at 09:14

## 2025-07-13 RX ADMIN — POLYETHYLENE GLYCOL (3350) 17 G: 17 POWDER, FOR SOLUTION ORAL at 09:14

## 2025-07-13 RX ADMIN — AMIODARONE HYDROCHLORIDE 200 MG: 200 TABLET ORAL at 21:09

## 2025-07-13 RX ADMIN — ASPIRIN 81 MG 81 MG: 81 TABLET ORAL at 09:15

## 2025-07-13 RX ADMIN — BUDESONIDE AND FORMOTEROL FUMARATE DIHYDRATE 2 PUFF: 160; 4.5 AEROSOL RESPIRATORY (INHALATION) at 07:02

## 2025-07-13 RX ADMIN — IPRATROPIUM BROMIDE AND ALBUTEROL SULFATE 1 DOSE: .5; 2.5 SOLUTION RESPIRATORY (INHALATION) at 11:10

## 2025-07-13 RX ADMIN — AMLODIPINE BESYLATE 2.5 MG: 5 TABLET ORAL at 09:15

## 2025-07-13 RX ADMIN — POTASSIUM CHLORIDE 40 MEQ: 1500 TABLET, EXTENDED RELEASE ORAL at 17:33

## 2025-07-13 RX ADMIN — ACETAMINOPHEN 650 MG: 325 TABLET ORAL at 09:15

## 2025-07-13 RX ADMIN — METHYLPREDNISOLONE SODIUM SUCCINATE 40 MG: 40 INJECTION, POWDER, LYOPHILIZED, FOR SOLUTION INTRAMUSCULAR; INTRAVENOUS at 09:15

## 2025-07-13 RX ADMIN — CARVEDILOL 6.25 MG: 6.25 TABLET, FILM COATED ORAL at 09:16

## 2025-07-13 RX ADMIN — POTASSIUM CHLORIDE 40 MEQ: 1500 TABLET, EXTENDED RELEASE ORAL at 09:15

## 2025-07-13 RX ADMIN — FAMOTIDINE 20 MG: 20 TABLET, FILM COATED ORAL at 09:16

## 2025-07-13 RX ADMIN — IPRATROPIUM BROMIDE AND ALBUTEROL SULFATE 1 DOSE: .5; 2.5 SOLUTION RESPIRATORY (INHALATION) at 07:04

## 2025-07-13 RX ADMIN — TRAMADOL HYDROCHLORIDE 50 MG: 50 TABLET, COATED ORAL at 23:35

## 2025-07-13 RX ADMIN — IPRATROPIUM BROMIDE AND ALBUTEROL SULFATE 1 DOSE: .5; 2.5 SOLUTION RESPIRATORY (INHALATION) at 15:11

## 2025-07-13 RX ADMIN — SODIUM CHLORIDE, PRESERVATIVE FREE 10 ML: 5 INJECTION INTRAVENOUS at 09:17

## 2025-07-13 RX ADMIN — IRON SUCROSE 200 MG: 20 INJECTION, SOLUTION INTRAVENOUS at 11:45

## 2025-07-13 RX ADMIN — FUROSEMIDE 80 MG: 10 INJECTION, SOLUTION INTRAMUSCULAR; INTRAVENOUS at 21:08

## 2025-07-13 RX ADMIN — SODIUM CHLORIDE, PRESERVATIVE FREE 10 ML: 5 INJECTION INTRAVENOUS at 21:10

## 2025-07-13 RX ADMIN — INSULIN LISPRO 1 UNITS: 100 INJECTION, SOLUTION INTRAVENOUS; SUBCUTANEOUS at 17:33

## 2025-07-13 RX ADMIN — ATORVASTATIN CALCIUM 40 MG: 40 TABLET, FILM COATED ORAL at 21:09

## 2025-07-13 RX ADMIN — ACETAMINOPHEN 650 MG: 325 TABLET ORAL at 21:09

## 2025-07-13 RX ADMIN — AMIODARONE HYDROCHLORIDE 200 MG: 200 TABLET ORAL at 09:15

## 2025-07-13 RX ADMIN — CLOPIDOGREL BISULFATE 75 MG: 75 TABLET, FILM COATED ORAL at 09:16

## 2025-07-13 RX ADMIN — SODIUM CHLORIDE, PRESERVATIVE FREE 10 ML: 5 INJECTION INTRAVENOUS at 21:09

## 2025-07-13 RX ADMIN — FUROSEMIDE 80 MG: 10 INJECTION, SOLUTION INTRAMUSCULAR; INTRAVENOUS at 09:15

## 2025-07-13 RX ADMIN — POTASSIUM CHLORIDE 40 MEQ: 1500 TABLET, EXTENDED RELEASE ORAL at 12:14

## 2025-07-13 RX ADMIN — IPRATROPIUM BROMIDE AND ALBUTEROL SULFATE 1 DOSE: .5; 2.5 SOLUTION RESPIRATORY (INHALATION) at 19:22

## 2025-07-13 RX ADMIN — FUROSEMIDE 80 MG: 10 INJECTION, SOLUTION INTRAMUSCULAR; INTRAVENOUS at 14:11

## 2025-07-13 RX ADMIN — INSULIN LISPRO 1 UNITS: 100 INJECTION, SOLUTION INTRAVENOUS; SUBCUTANEOUS at 21:08

## 2025-07-13 RX ADMIN — ACETAMINOPHEN 650 MG: 325 TABLET ORAL at 02:28

## 2025-07-13 RX ADMIN — INSULIN LISPRO 1 UNITS: 100 INJECTION, SOLUTION INTRAVENOUS; SUBCUTANEOUS at 12:14

## 2025-07-13 RX ADMIN — CARVEDILOL 6.25 MG: 6.25 TABLET, FILM COATED ORAL at 17:33

## 2025-07-13 RX ADMIN — SENNOSIDES, DOCUSATE SODIUM 1 TABLET: 50; 8.6 TABLET, FILM COATED ORAL at 09:17

## 2025-07-13 RX ADMIN — TRAMADOL HYDROCHLORIDE 50 MG: 50 TABLET, COATED ORAL at 09:15

## 2025-07-13 RX ADMIN — CHLOROTHIAZIDE SODIUM 500 MG: 500 INJECTION, POWDER, LYOPHILIZED, FOR SOLUTION INTRAVENOUS at 23:40

## 2025-07-13 RX ADMIN — BACITRACIN: 500 OINTMENT TOPICAL at 21:26

## 2025-07-13 RX ADMIN — ENOXAPARIN SODIUM 40 MG: 100 INJECTION SUBCUTANEOUS at 09:14

## 2025-07-13 RX ADMIN — HYDROXYCHLOROQUINE SULFATE 300 MG: 200 TABLET ORAL at 09:15

## 2025-07-13 RX ADMIN — Medication 1 TABLET: at 09:16

## 2025-07-13 RX ADMIN — ACETAMINOPHEN 650 MG: 325 TABLET ORAL at 14:11

## 2025-07-13 RX ADMIN — BUDESONIDE AND FORMOTEROL FUMARATE DIHYDRATE 2 PUFF: 160; 4.5 AEROSOL RESPIRATORY (INHALATION) at 19:22

## 2025-07-13 ASSESSMENT — PAIN DESCRIPTION - ORIENTATION
ORIENTATION: ANTERIOR
ORIENTATION: MID
ORIENTATION: ANTERIOR

## 2025-07-13 ASSESSMENT — PAIN DESCRIPTION - ONSET
ONSET: GRADUAL
ONSET: ON-GOING

## 2025-07-13 ASSESSMENT — PAIN DESCRIPTION - DESCRIPTORS
DESCRIPTORS: ACHING;SORE;SQUEEZING
DESCRIPTORS: ACHING;SORE;SQUEEZING
DESCRIPTORS: ACHING;SORE

## 2025-07-13 ASSESSMENT — PAIN DESCRIPTION - LOCATION
LOCATION: STERNUM
LOCATION: CHEST
LOCATION: CHEST

## 2025-07-13 ASSESSMENT — PAIN DESCRIPTION - FREQUENCY
FREQUENCY: CONTINUOUS
FREQUENCY: INTERMITTENT

## 2025-07-13 ASSESSMENT — PAIN SCALES - GENERAL
PAINLEVEL_OUTOF10: 8
PAINLEVEL_OUTOF10: 0
PAINLEVEL_OUTOF10: 3
PAINLEVEL_OUTOF10: 8
PAINLEVEL_OUTOF10: 4

## 2025-07-13 ASSESSMENT — PAIN - FUNCTIONAL ASSESSMENT
PAIN_FUNCTIONAL_ASSESSMENT: PREVENTS OR INTERFERES SOME ACTIVE ACTIVITIES AND ADLS
PAIN_FUNCTIONAL_ASSESSMENT: PREVENTS OR INTERFERES SOME ACTIVE ACTIVITIES AND ADLS

## 2025-07-13 ASSESSMENT — PAIN DESCRIPTION - PAIN TYPE
TYPE: SURGICAL PAIN
TYPE: SURGICAL PAIN

## 2025-07-14 ENCOUNTER — HOSPITAL ENCOUNTER (OUTPATIENT)
Dept: INFUSION THERAPY | Age: 70
Setting detail: INFUSION SERIES
Discharge: HOME | End: 2025-07-14

## 2025-07-14 ENCOUNTER — APPOINTMENT (OUTPATIENT)
Dept: GENERAL RADIOLOGY | Age: 70
DRG: 216 | End: 2025-07-14
Payer: MEDICARE

## 2025-07-14 LAB
ALBUMIN SERPL-MCNC: 3.5 G/DL (ref 3.4–5)
ALBUMIN/GLOB SERPL: 1.2 {RATIO} (ref 1.1–2.2)
ALP SERPL-CCNC: 126 U/L (ref 40–129)
ALT SERPL-CCNC: 11 U/L (ref 10–40)
ANION GAP SERPL CALCULATED.3IONS-SCNC: 13 MMOL/L (ref 9–17)
AST SERPL-CCNC: 22 U/L (ref 15–37)
BILIRUB SERPL-MCNC: 0.7 MG/DL (ref 0–1)
BUN SERPL-MCNC: 47 MG/DL (ref 7–20)
CA-I BLD-SCNC: 1.18 MMOL/L (ref 1.15–1.33)
CALCIUM SERPL-MCNC: 9.6 MG/DL (ref 8.3–10.6)
CHLORIDE SERPL-SCNC: 98 MMOL/L (ref 99–110)
CO2 SERPL-SCNC: 26 MMOL/L (ref 21–32)
CREAT SERPL-MCNC: 1.6 MG/DL (ref 0.6–1.2)
ERYTHROCYTE [DISTWIDTH] IN BLOOD BY AUTOMATED COUNT: 15.3 % (ref 11.7–14.9)
GFR, ESTIMATED: 32 ML/MIN/1.73M2
GLUCOSE BLD-MCNC: 124 MG/DL (ref 74–99)
GLUCOSE BLD-MCNC: 126 MG/DL (ref 74–99)
GLUCOSE BLD-MCNC: 133 MG/DL (ref 74–99)
GLUCOSE BLD-MCNC: 165 MG/DL (ref 74–99)
GLUCOSE SERPL-MCNC: 111 MG/DL (ref 74–99)
HCT VFR BLD AUTO: 26.4 % (ref 37–47)
HGB BLD-MCNC: 8.5 G/DL (ref 12.5–16)
MAGNESIUM SERPL-MCNC: 2.5 MG/DL (ref 1.8–2.4)
MCH RBC QN AUTO: 28.2 PG (ref 27–31)
MCHC RBC AUTO-ENTMCNC: 32.2 G/DL (ref 32–36)
MCV RBC AUTO: 87.7 FL (ref 78–100)
PHOSPHATE SERPL-MCNC: 4 MG/DL (ref 2.5–4.9)
PLATELET # BLD AUTO: 231 K/UL (ref 140–440)
PMV BLD AUTO: 10.2 FL (ref 7.5–11.1)
POTASSIUM SERPL-SCNC: 3.9 MMOL/L (ref 3.5–5.1)
PROT SERPL-MCNC: 6.4 G/DL (ref 6.4–8.2)
RBC # BLD AUTO: 3.01 M/UL (ref 4.2–5.4)
SODIUM SERPL-SCNC: 138 MMOL/L (ref 136–145)
WBC OTHER # BLD: 17.1 K/UL (ref 4–10.5)

## 2025-07-14 PROCEDURE — 82962 GLUCOSE BLOOD TEST: CPT

## 2025-07-14 PROCEDURE — 94669 MECHANICAL CHEST WALL OSCILL: CPT

## 2025-07-14 PROCEDURE — 94664 DEMO&/EVAL PT USE INHALER: CPT

## 2025-07-14 PROCEDURE — 97116 GAIT TRAINING THERAPY: CPT

## 2025-07-14 PROCEDURE — 6370000000 HC RX 637 (ALT 250 FOR IP): Performed by: STUDENT IN AN ORGANIZED HEALTH CARE EDUCATION/TRAINING PROGRAM

## 2025-07-14 PROCEDURE — 84100 ASSAY OF PHOSPHORUS: CPT

## 2025-07-14 PROCEDURE — 99233 SBSQ HOSP IP/OBS HIGH 50: CPT | Performed by: INTERNAL MEDICINE

## 2025-07-14 PROCEDURE — 6370000000 HC RX 637 (ALT 250 FOR IP): Performed by: PHYSICIAN ASSISTANT

## 2025-07-14 PROCEDURE — 2500000003 HC RX 250 WO HCPCS: Performed by: PHYSICIAN ASSISTANT

## 2025-07-14 PROCEDURE — 2580000003 HC RX 258: Performed by: INTERNAL MEDICINE

## 2025-07-14 PROCEDURE — 82330 ASSAY OF CALCIUM: CPT

## 2025-07-14 PROCEDURE — 2700000000 HC OXYGEN THERAPY PER DAY

## 2025-07-14 PROCEDURE — 83735 ASSAY OF MAGNESIUM: CPT

## 2025-07-14 PROCEDURE — 6360000002 HC RX W HCPCS: Performed by: INTERNAL MEDICINE

## 2025-07-14 PROCEDURE — 94150 VITAL CAPACITY TEST: CPT

## 2025-07-14 PROCEDURE — 94668 MNPJ CHEST WALL SBSQ: CPT

## 2025-07-14 PROCEDURE — 6370000000 HC RX 637 (ALT 250 FOR IP): Performed by: INTERNAL MEDICINE

## 2025-07-14 PROCEDURE — 94660 CPAP INITIATION&MGMT: CPT

## 2025-07-14 PROCEDURE — 2500000003 HC RX 250 WO HCPCS: Performed by: INTERNAL MEDICINE

## 2025-07-14 PROCEDURE — 2100000000 HC CCU R&B

## 2025-07-14 PROCEDURE — 97530 THERAPEUTIC ACTIVITIES: CPT

## 2025-07-14 PROCEDURE — 85027 COMPLETE CBC AUTOMATED: CPT

## 2025-07-14 PROCEDURE — 80053 COMPREHEN METABOLIC PANEL: CPT

## 2025-07-14 PROCEDURE — 94761 N-INVAS EAR/PLS OXIMETRY MLT: CPT

## 2025-07-14 PROCEDURE — 6370000000 HC RX 637 (ALT 250 FOR IP): Performed by: NURSE PRACTITIONER

## 2025-07-14 PROCEDURE — 6360000002 HC RX W HCPCS: Performed by: PHYSICIAN ASSISTANT

## 2025-07-14 PROCEDURE — 94640 AIRWAY INHALATION TREATMENT: CPT

## 2025-07-14 PROCEDURE — 71045 X-RAY EXAM CHEST 1 VIEW: CPT

## 2025-07-14 RX ORDER — PREDNISONE 20 MG/1
20 TABLET ORAL DAILY
Status: DISCONTINUED | OUTPATIENT
Start: 2025-07-19 | End: 2025-07-17 | Stop reason: HOSPADM

## 2025-07-14 RX ORDER — PREDNISONE 20 MG/1
40 TABLET ORAL DAILY
Status: DISPENSED | OUTPATIENT
Start: 2025-07-15 | End: 2025-07-17

## 2025-07-14 RX ORDER — PREDNISONE 10 MG/1
10 TABLET ORAL DAILY
Status: DISCONTINUED | OUTPATIENT
Start: 2025-07-21 | End: 2025-07-17 | Stop reason: HOSPADM

## 2025-07-14 RX ORDER — PREDNISONE 10 MG/1
10 TABLET ORAL DAILY
Status: DISCONTINUED | OUTPATIENT
Start: 2025-07-15 | End: 2025-07-14 | Stop reason: SDUPTHER

## 2025-07-14 RX ADMIN — ASPIRIN 81 MG 81 MG: 81 TABLET ORAL at 08:08

## 2025-07-14 RX ADMIN — CHLORHEXIDINE GLUCONATE 0.12% ORAL RINSE 15 ML: 1.2 LIQUID ORAL at 21:37

## 2025-07-14 RX ADMIN — ACETAMINOPHEN 650 MG: 325 TABLET ORAL at 14:42

## 2025-07-14 RX ADMIN — INSULIN LISPRO 1 UNITS: 100 INJECTION, SOLUTION INTRAVENOUS; SUBCUTANEOUS at 16:30

## 2025-07-14 RX ADMIN — FUROSEMIDE 80 MG: 10 INJECTION, SOLUTION INTRAMUSCULAR; INTRAVENOUS at 21:37

## 2025-07-14 RX ADMIN — METHYLPREDNISOLONE SODIUM SUCCINATE 40 MG: 40 INJECTION, POWDER, LYOPHILIZED, FOR SOLUTION INTRAMUSCULAR; INTRAVENOUS at 08:14

## 2025-07-14 RX ADMIN — SODIUM CHLORIDE, PRESERVATIVE FREE 10 ML: 5 INJECTION INTRAVENOUS at 08:14

## 2025-07-14 RX ADMIN — AMLODIPINE BESYLATE 2.5 MG: 5 TABLET ORAL at 08:11

## 2025-07-14 RX ADMIN — SENNOSIDES, DOCUSATE SODIUM 1 TABLET: 50; 8.6 TABLET, FILM COATED ORAL at 21:37

## 2025-07-14 RX ADMIN — IPRATROPIUM BROMIDE AND ALBUTEROL SULFATE 1 DOSE: .5; 2.5 SOLUTION RESPIRATORY (INHALATION) at 07:42

## 2025-07-14 RX ADMIN — CHLOROTHIAZIDE SODIUM 500 MG: 500 INJECTION, POWDER, LYOPHILIZED, FOR SOLUTION INTRAVENOUS at 21:45

## 2025-07-14 RX ADMIN — AMIODARONE HYDROCHLORIDE 200 MG: 200 TABLET ORAL at 21:37

## 2025-07-14 RX ADMIN — INSULIN LISPRO 1 UNITS: 100 INJECTION, SOLUTION INTRAVENOUS; SUBCUTANEOUS at 11:27

## 2025-07-14 RX ADMIN — CARVEDILOL 6.25 MG: 6.25 TABLET, FILM COATED ORAL at 16:31

## 2025-07-14 RX ADMIN — FUROSEMIDE 80 MG: 10 INJECTION, SOLUTION INTRAMUSCULAR; INTRAVENOUS at 14:42

## 2025-07-14 RX ADMIN — BACITRACIN: 500 OINTMENT TOPICAL at 08:16

## 2025-07-14 RX ADMIN — FAMOTIDINE 20 MG: 20 TABLET, FILM COATED ORAL at 08:16

## 2025-07-14 RX ADMIN — IPRATROPIUM BROMIDE AND ALBUTEROL SULFATE 1 DOSE: .5; 2.5 SOLUTION RESPIRATORY (INHALATION) at 11:14

## 2025-07-14 RX ADMIN — IPRATROPIUM BROMIDE AND ALBUTEROL SULFATE 1 DOSE: .5; 2.5 SOLUTION RESPIRATORY (INHALATION) at 15:25

## 2025-07-14 RX ADMIN — IPRATROPIUM BROMIDE AND ALBUTEROL SULFATE 1 DOSE: .5; 2.5 SOLUTION RESPIRATORY (INHALATION) at 21:04

## 2025-07-14 RX ADMIN — HYDROXYCHLOROQUINE SULFATE 300 MG: 200 TABLET ORAL at 08:11

## 2025-07-14 RX ADMIN — IRON SUCROSE 200 MG: 20 INJECTION, SOLUTION INTRAVENOUS at 10:44

## 2025-07-14 RX ADMIN — CARVEDILOL 6.25 MG: 6.25 TABLET, FILM COATED ORAL at 08:10

## 2025-07-14 RX ADMIN — ACETAMINOPHEN 650 MG: 325 TABLET ORAL at 21:37

## 2025-07-14 RX ADMIN — FUROSEMIDE 80 MG: 10 INJECTION, SOLUTION INTRAMUSCULAR; INTRAVENOUS at 08:14

## 2025-07-14 RX ADMIN — AMIODARONE HYDROCHLORIDE 200 MG: 200 TABLET ORAL at 08:10

## 2025-07-14 RX ADMIN — CLOPIDOGREL BISULFATE 75 MG: 75 TABLET, FILM COATED ORAL at 08:10

## 2025-07-14 RX ADMIN — Medication 1 TABLET: at 08:13

## 2025-07-14 RX ADMIN — BUDESONIDE AND FORMOTEROL FUMARATE DIHYDRATE 2 PUFF: 160; 4.5 AEROSOL RESPIRATORY (INHALATION) at 07:46

## 2025-07-14 RX ADMIN — POTASSIUM CHLORIDE 40 MEQ: 1500 TABLET, EXTENDED RELEASE ORAL at 10:44

## 2025-07-14 RX ADMIN — ENOXAPARIN SODIUM 40 MG: 100 INJECTION SUBCUTANEOUS at 08:15

## 2025-07-14 RX ADMIN — BACITRACIN: 500 OINTMENT TOPICAL at 21:39

## 2025-07-14 RX ADMIN — CHLORHEXIDINE GLUCONATE 0.12% ORAL RINSE 15 ML: 1.2 LIQUID ORAL at 08:15

## 2025-07-14 RX ADMIN — ACETAMINOPHEN 650 MG: 325 TABLET ORAL at 08:07

## 2025-07-14 RX ADMIN — BUDESONIDE AND FORMOTEROL FUMARATE DIHYDRATE 2 PUFF: 160; 4.5 AEROSOL RESPIRATORY (INHALATION) at 21:04

## 2025-07-14 RX ADMIN — SODIUM CHLORIDE, PRESERVATIVE FREE 10 ML: 5 INJECTION INTRAVENOUS at 21:38

## 2025-07-14 RX ADMIN — TRAMADOL HYDROCHLORIDE 50 MG: 50 TABLET, COATED ORAL at 21:48

## 2025-07-14 RX ADMIN — ATORVASTATIN CALCIUM 40 MG: 40 TABLET, FILM COATED ORAL at 21:36

## 2025-07-14 ASSESSMENT — PAIN SCALES - GENERAL
PAINLEVEL_OUTOF10: 6
PAINLEVEL_OUTOF10: 0
PAINLEVEL_OUTOF10: 0
PAINLEVEL_OUTOF10: 3
PAINLEVEL_OUTOF10: 0
PAINLEVEL_OUTOF10: 3

## 2025-07-14 ASSESSMENT — PAIN DESCRIPTION - ONSET: ONSET: GRADUAL

## 2025-07-14 ASSESSMENT — PAIN DESCRIPTION - ORIENTATION: ORIENTATION: MID

## 2025-07-14 ASSESSMENT — PAIN - FUNCTIONAL ASSESSMENT: PAIN_FUNCTIONAL_ASSESSMENT: ACTIVITIES ARE NOT PREVENTED

## 2025-07-14 ASSESSMENT — PAIN DESCRIPTION - FREQUENCY: FREQUENCY: INTERMITTENT

## 2025-07-14 ASSESSMENT — PAIN DESCRIPTION - DESCRIPTORS: DESCRIPTORS: ACHING

## 2025-07-14 ASSESSMENT — PAIN DESCRIPTION - LOCATION: LOCATION: CHEST

## 2025-07-14 ASSESSMENT — PAIN DESCRIPTION - PAIN TYPE: TYPE: SURGICAL PAIN

## 2025-07-14 NOTE — CARE COORDINATION
Follow up visit with pt. Cm reviewed with pt why pt is unable to be accepted to ARU. Pt would like referral to [facility]. Pt is friend's with Nadine in admissions and her family member works in PT. Referral sent to [facility] via [employer].

## 2025-07-15 ENCOUNTER — TELEPHONE (OUTPATIENT)
Dept: CARDIOTHORACIC SURGERY | Age: 70
End: 2025-07-15

## 2025-07-15 ENCOUNTER — TELEPHONE (OUTPATIENT)
Age: 70
End: 2025-07-15

## 2025-07-15 ENCOUNTER — APPOINTMENT (OUTPATIENT)
Dept: CT IMAGING | Age: 70
DRG: 216 | End: 2025-07-15
Payer: MEDICARE

## 2025-07-15 ENCOUNTER — APPOINTMENT (OUTPATIENT)
Dept: GENERAL RADIOLOGY | Age: 70
DRG: 216 | End: 2025-07-15
Payer: MEDICARE

## 2025-07-15 LAB
ACINETOBACTER CALCOACETICUS-BAUMANNII BY PCR: NOT DETECTED
ADENOVIRUS: NOT DETECTED
ALBUMIN SERPL-MCNC: 3.6 G/DL (ref 3.4–5)
ALBUMIN/GLOB SERPL: 1.2 {RATIO} (ref 1.1–2.2)
ALP SERPL-CCNC: 120 U/L (ref 40–129)
ALT SERPL-CCNC: 16 U/L (ref 10–40)
ANION GAP SERPL CALCULATED.3IONS-SCNC: 14 MMOL/L (ref 9–17)
AST SERPL-CCNC: 23 U/L (ref 15–37)
BILIRUB SERPL-MCNC: 0.9 MG/DL (ref 0–1)
BLACTX-M ISLT/SPM QL: NOT DETECTED
BLAIMP ISLT/SPM QL: NOT DETECTED
BLAOXA-48-LIKE ISLT/SPM QL: NOT DETECTED
BLAVIM ISLT/SPM QL: NOT DETECTED
BUN SERPL-MCNC: 50 MG/DL (ref 7–20)
CA-I BLD-SCNC: 1.16 MMOL/L (ref 1.15–1.33)
CALCIUM SERPL-MCNC: 9.3 MG/DL (ref 8.3–10.6)
CHLAMYDIA PNEUMONIAE BY PCR: NOT DETECTED
CHLORIDE SERPL-SCNC: 96 MMOL/L (ref 99–110)
CO2 SERPL-SCNC: 28 MMOL/L (ref 21–32)
CORONAVIRUS PCR: NOT DETECTED
CREAT SERPL-MCNC: 1.6 MG/DL (ref 0.6–1.2)
CRP SERPL HS-MCNC: 39.2 MG/L (ref 0–5)
ENTEROBACTER CLOACAE COMPLEX BY PCR: ABNORMAL
ERYTHROCYTE [DISTWIDTH] IN BLOOD BY AUTOMATED COUNT: 15.5 % (ref 11.7–14.9)
ESCHERICHIA COLI BY PCR: NOT DETECTED
GFR, ESTIMATED: 33 ML/MIN/1.73M2
GLUCOSE BLD-MCNC: 120 MG/DL (ref 74–99)
GLUCOSE BLD-MCNC: 150 MG/DL (ref 74–99)
GLUCOSE BLD-MCNC: 180 MG/DL (ref 74–99)
GLUCOSE SERPL-MCNC: 103 MG/DL (ref 74–99)
GP B STREP DNA SPT NAA+NON-PRB-NCNCRNG: NOT DETECTED
HAEMOPHILUS INFLUENZAE BY PCR: NOT DETECTED
HCT VFR BLD AUTO: 27.9 % (ref 37–47)
HGB BLD-MCNC: 8.8 G/DL (ref 12.5–16)
HUMAN RHINOVIRUS/ENTEROVIRUS BY PCR: NOT DETECTED
INFLUENZA A BY PCR: NOT DETECTED
INFLUENZA B BY PCR: NOT DETECTED
K AEROGENES DNA BAL NAA+NON-PRB-NCNCRNG: NOT DETECTED
K OXYTOCA DNA SPT NAA+NON-PRB-NCNCRNG: ABNORMAL
K PNEU GRP DNA SPT NAA+NON-PRB-NCNCRNG: NOT DETECTED
LEGIONELLA PNEUMOPHILIA BY PCR: NOT DETECTED
M CATARRHALIS DNA BAL NAA+NON-PRB-NCNCRNG: NOT DETECTED
MAGNESIUM SERPL-MCNC: 2.6 MG/DL (ref 1.8–2.4)
MCH RBC QN AUTO: 28 PG (ref 27–31)
MCHC RBC AUTO-ENTMCNC: 31.5 G/DL (ref 32–36)
MCV RBC AUTO: 88.9 FL (ref 78–100)
METAPNEUMOVIRUS BY PCR: NOT DETECTED
MYCOPLASMA PNEUMONIAE PCR: NOT DETECTED
P AERUGINOSA DNA SPT NAA+NON-PRB-NCNCRNG: ABNORMAL
PARAINFLUENZA VIRUS BY PCR: NOT DETECTED
PHOSPHATE SERPL-MCNC: 3.9 MG/DL (ref 2.5–4.9)
PLATELET # BLD AUTO: 277 K/UL (ref 140–440)
PMV BLD AUTO: 10.3 FL (ref 7.5–11.1)
POTASSIUM SERPL-SCNC: 3.5 MMOL/L (ref 3.5–5.1)
PROT SERPL-MCNC: 6.7 G/DL (ref 6.4–8.2)
PROTEUS SP DNA BAL NAA+NON-PRB-NCNCRNG: NOT DETECTED
RBC # BLD AUTO: 3.14 M/UL (ref 4.2–5.4)
RESISTANT GENE KPC BY PCR: NOT DETECTED
RESISTANT GENE NDM BY PCR: NOT DETECTED
RSV BY PCR: NOT DETECTED
S AUREUS DNA SPT NAA+NON-PRB-NCNCRNG: NOT DETECTED
S MARCESCENS DNA SPT NAA+NON-PRB-NCNCRNG: NOT DETECTED
S PNEUM DNA BAL NAA+NON-PRB-NCNCRNG: NOT DETECTED
S PYO DNA SPT NAA+NON-PRB-NCNCRNG: NOT DETECTED
SODIUM SERPL-SCNC: 137 MMOL/L (ref 136–145)
SOURCE: ABNORMAL
WBC OTHER # BLD: 17.9 K/UL (ref 4–10.5)

## 2025-07-15 PROCEDURE — 83735 ASSAY OF MAGNESIUM: CPT

## 2025-07-15 PROCEDURE — 87070 CULTURE OTHR SPECIMN AEROBIC: CPT

## 2025-07-15 PROCEDURE — 82962 GLUCOSE BLOOD TEST: CPT

## 2025-07-15 PROCEDURE — 6370000000 HC RX 637 (ALT 250 FOR IP): Performed by: NURSE PRACTITIONER

## 2025-07-15 PROCEDURE — 71045 X-RAY EXAM CHEST 1 VIEW: CPT

## 2025-07-15 PROCEDURE — 6370000000 HC RX 637 (ALT 250 FOR IP): Performed by: STUDENT IN AN ORGANIZED HEALTH CARE EDUCATION/TRAINING PROGRAM

## 2025-07-15 PROCEDURE — 94640 AIRWAY INHALATION TREATMENT: CPT

## 2025-07-15 PROCEDURE — 97530 THERAPEUTIC ACTIVITIES: CPT

## 2025-07-15 PROCEDURE — 87186 SC STD MICRODIL/AGAR DIL: CPT

## 2025-07-15 PROCEDURE — 6370000000 HC RX 637 (ALT 250 FOR IP): Performed by: PHYSICIAN ASSISTANT

## 2025-07-15 PROCEDURE — 85027 COMPLETE CBC AUTOMATED: CPT

## 2025-07-15 PROCEDURE — 2580000003 HC RX 258: Performed by: INTERNAL MEDICINE

## 2025-07-15 PROCEDURE — 94668 MNPJ CHEST WALL SBSQ: CPT

## 2025-07-15 PROCEDURE — 94660 CPAP INITIATION&MGMT: CPT

## 2025-07-15 PROCEDURE — 94150 VITAL CAPACITY TEST: CPT

## 2025-07-15 PROCEDURE — 6360000002 HC RX W HCPCS: Performed by: INTERNAL MEDICINE

## 2025-07-15 PROCEDURE — 2700000000 HC OXYGEN THERAPY PER DAY

## 2025-07-15 PROCEDURE — 87633 RESP VIRUS 12-25 TARGETS: CPT

## 2025-07-15 PROCEDURE — 99233 SBSQ HOSP IP/OBS HIGH 50: CPT | Performed by: INTERNAL MEDICINE

## 2025-07-15 PROCEDURE — 87077 CULTURE AEROBIC IDENTIFY: CPT

## 2025-07-15 PROCEDURE — 2060000000 HC ICU INTERMEDIATE R&B

## 2025-07-15 PROCEDURE — 82330 ASSAY OF CALCIUM: CPT

## 2025-07-15 PROCEDURE — 84100 ASSAY OF PHOSPHORUS: CPT

## 2025-07-15 PROCEDURE — 2100000000 HC CCU R&B

## 2025-07-15 PROCEDURE — 2500000003 HC RX 250 WO HCPCS: Performed by: PHYSICIAN ASSISTANT

## 2025-07-15 PROCEDURE — 86140 C-REACTIVE PROTEIN: CPT

## 2025-07-15 PROCEDURE — 6360000002 HC RX W HCPCS: Performed by: PHYSICIAN ASSISTANT

## 2025-07-15 PROCEDURE — 6370000000 HC RX 637 (ALT 250 FOR IP): Performed by: INTERNAL MEDICINE

## 2025-07-15 PROCEDURE — 94669 MECHANICAL CHEST WALL OSCILL: CPT

## 2025-07-15 PROCEDURE — 87205 SMEAR GRAM STAIN: CPT

## 2025-07-15 PROCEDURE — 97116 GAIT TRAINING THERAPY: CPT

## 2025-07-15 PROCEDURE — 97535 SELF CARE MNGMENT TRAINING: CPT

## 2025-07-15 PROCEDURE — 71250 CT THORAX DX C-: CPT

## 2025-07-15 PROCEDURE — 2500000003 HC RX 250 WO HCPCS: Performed by: INTERNAL MEDICINE

## 2025-07-15 PROCEDURE — 80053 COMPREHEN METABOLIC PANEL: CPT

## 2025-07-15 PROCEDURE — 94761 N-INVAS EAR/PLS OXIMETRY MLT: CPT

## 2025-07-15 RX ORDER — AMLODIPINE BESYLATE 5 MG/1
5 TABLET ORAL DAILY
Status: DISCONTINUED | OUTPATIENT
Start: 2025-07-16 | End: 2025-07-16

## 2025-07-15 RX ORDER — METOLAZONE 2.5 MG/1
2.5 TABLET ORAL DAILY
Status: DISCONTINUED | OUTPATIENT
Start: 2025-07-15 | End: 2025-07-17 | Stop reason: HOSPADM

## 2025-07-15 RX ORDER — BUMETANIDE 0.5 MG/1
1 TABLET ORAL 2 TIMES DAILY
Status: DISCONTINUED | OUTPATIENT
Start: 2025-07-15 | End: 2025-07-17 | Stop reason: HOSPADM

## 2025-07-15 RX ADMIN — ASPIRIN 81 MG 81 MG: 81 TABLET ORAL at 08:00

## 2025-07-15 RX ADMIN — IPRATROPIUM BROMIDE AND ALBUTEROL SULFATE 1 DOSE: .5; 2.5 SOLUTION RESPIRATORY (INHALATION) at 08:42

## 2025-07-15 RX ADMIN — AMIODARONE HYDROCHLORIDE 200 MG: 200 TABLET ORAL at 20:13

## 2025-07-15 RX ADMIN — BACITRACIN: 500 OINTMENT TOPICAL at 08:25

## 2025-07-15 RX ADMIN — IPRATROPIUM BROMIDE AND ALBUTEROL SULFATE 1 DOSE: .5; 2.5 SOLUTION RESPIRATORY (INHALATION) at 21:16

## 2025-07-15 RX ADMIN — CLOPIDOGREL BISULFATE 75 MG: 75 TABLET, FILM COATED ORAL at 07:59

## 2025-07-15 RX ADMIN — BUDESONIDE AND FORMOTEROL FUMARATE DIHYDRATE 2 PUFF: 160; 4.5 AEROSOL RESPIRATORY (INHALATION) at 21:16

## 2025-07-15 RX ADMIN — FAMOTIDINE 20 MG: 20 TABLET, FILM COATED ORAL at 08:00

## 2025-07-15 RX ADMIN — POTASSIUM CHLORIDE 40 MEQ: 1500 TABLET, EXTENDED RELEASE ORAL at 06:31

## 2025-07-15 RX ADMIN — INSULIN LISPRO 1 UNITS: 100 INJECTION, SOLUTION INTRAVENOUS; SUBCUTANEOUS at 20:12

## 2025-07-15 RX ADMIN — ENOXAPARIN SODIUM 40 MG: 100 INJECTION SUBCUTANEOUS at 08:01

## 2025-07-15 RX ADMIN — IPRATROPIUM BROMIDE AND ALBUTEROL SULFATE 1 DOSE: .5; 2.5 SOLUTION RESPIRATORY (INHALATION) at 12:28

## 2025-07-15 RX ADMIN — BUDESONIDE AND FORMOTEROL FUMARATE DIHYDRATE 2 PUFF: 160; 4.5 AEROSOL RESPIRATORY (INHALATION) at 08:42

## 2025-07-15 RX ADMIN — BACITRACIN: 500 OINTMENT TOPICAL at 20:15

## 2025-07-15 RX ADMIN — ACETAMINOPHEN 650 MG: 325 TABLET ORAL at 15:23

## 2025-07-15 RX ADMIN — ACETAMINOPHEN 650 MG: 325 TABLET ORAL at 07:59

## 2025-07-15 RX ADMIN — CHLORHEXIDINE GLUCONATE 0.12% ORAL RINSE 15 ML: 1.2 LIQUID ORAL at 08:01

## 2025-07-15 RX ADMIN — POLYETHYLENE GLYCOL (3350) 17 G: 17 POWDER, FOR SOLUTION ORAL at 08:01

## 2025-07-15 RX ADMIN — SENNOSIDES, DOCUSATE SODIUM 1 TABLET: 50; 8.6 TABLET, FILM COATED ORAL at 07:59

## 2025-07-15 RX ADMIN — IRON SUCROSE 200 MG: 20 INJECTION, SOLUTION INTRAVENOUS at 10:38

## 2025-07-15 RX ADMIN — CARVEDILOL 6.25 MG: 6.25 TABLET, FILM COATED ORAL at 08:00

## 2025-07-15 RX ADMIN — AMIODARONE HYDROCHLORIDE 200 MG: 200 TABLET ORAL at 08:01

## 2025-07-15 RX ADMIN — CARVEDILOL 6.25 MG: 6.25 TABLET, FILM COATED ORAL at 16:09

## 2025-07-15 RX ADMIN — BUMETANIDE 1 MG: 0.5 TABLET ORAL at 16:09

## 2025-07-15 RX ADMIN — METOLAZONE 2.5 MG: 2.5 TABLET ORAL at 09:52

## 2025-07-15 RX ADMIN — ATORVASTATIN CALCIUM 40 MG: 40 TABLET, FILM COATED ORAL at 20:13

## 2025-07-15 RX ADMIN — IPRATROPIUM BROMIDE AND ALBUTEROL SULFATE 1 DOSE: .5; 2.5 SOLUTION RESPIRATORY (INHALATION) at 16:08

## 2025-07-15 RX ADMIN — SODIUM CHLORIDE, PRESERVATIVE FREE 10 ML: 5 INJECTION INTRAVENOUS at 09:52

## 2025-07-15 RX ADMIN — INSULIN LISPRO 2 UNITS: 100 INJECTION, SOLUTION INTRAVENOUS; SUBCUTANEOUS at 16:09

## 2025-07-15 RX ADMIN — SODIUM CHLORIDE, PRESERVATIVE FREE 10 ML: 5 INJECTION INTRAVENOUS at 20:14

## 2025-07-15 RX ADMIN — AMLODIPINE BESYLATE 2.5 MG: 5 TABLET ORAL at 08:00

## 2025-07-15 RX ADMIN — CHLORHEXIDINE GLUCONATE 0.12% ORAL RINSE 15 ML: 1.2 LIQUID ORAL at 20:12

## 2025-07-15 RX ADMIN — FUROSEMIDE 80 MG: 10 INJECTION, SOLUTION INTRAMUSCULAR; INTRAVENOUS at 08:00

## 2025-07-15 RX ADMIN — ACETAMINOPHEN 650 MG: 325 TABLET ORAL at 20:13

## 2025-07-15 RX ADMIN — Medication 1 TABLET: at 07:59

## 2025-07-15 RX ADMIN — METHYLPREDNISOLONE SODIUM SUCCINATE 40 MG: 40 INJECTION, POWDER, LYOPHILIZED, FOR SOLUTION INTRAMUSCULAR; INTRAVENOUS at 08:00

## 2025-07-15 RX ADMIN — HYDROXYCHLOROQUINE SULFATE 300 MG: 200 TABLET ORAL at 07:59

## 2025-07-15 ASSESSMENT — PAIN SCALES - GENERAL
PAINLEVEL_OUTOF10: 2
PAINLEVEL_OUTOF10: 0

## 2025-07-15 ASSESSMENT — PAIN SCALES - WONG BAKER: WONGBAKER_NUMERICALRESPONSE: NO HURT

## 2025-07-15 ASSESSMENT — PAIN DESCRIPTION - LOCATION: LOCATION: CHEST

## 2025-07-15 ASSESSMENT — PAIN DESCRIPTION - DIRECTION: RADIATING_TOWARDS: CHEST

## 2025-07-15 ASSESSMENT — PAIN DESCRIPTION - FREQUENCY: FREQUENCY: INTERMITTENT

## 2025-07-15 ASSESSMENT — PAIN - FUNCTIONAL ASSESSMENT: PAIN_FUNCTIONAL_ASSESSMENT: ACTIVITIES ARE NOT PREVENTED

## 2025-07-15 ASSESSMENT — PAIN DESCRIPTION - ONSET: ONSET: GRADUAL

## 2025-07-15 ASSESSMENT — PAIN DESCRIPTION - PAIN TYPE: TYPE: SURGICAL PAIN

## 2025-07-15 ASSESSMENT — PAIN DESCRIPTION - DESCRIPTORS: DESCRIPTORS: DISCOMFORT

## 2025-07-15 NOTE — CARE COORDINATION
Reviewed medical record and met with patient at bedside. Updated her that [facility] has accepted her and pre cert is pending.    1349: Received message through [facility] that pt will need to be off of high flow nasal cannula before they can take her. Currently documentation shows pt is on 5L HFNC.

## 2025-07-15 NOTE — TELEPHONE ENCOUNTER
Spoke with patient, going home today of tomorrow. Patient is progressing well of feels good. She is continuing prednisone and plaquenil. Infusion center told her she cannot resume until completely recovered. Patient has OV 8/6 and hope to see us.

## 2025-07-15 NOTE — DISCHARGE INSTR - COC
Continuity of Care Form    Patient Name: Radha Gomez   :  1955  MRN:  [id number]    Admit date:  2025  Discharge date:  25    Code Status Order: Full Code   Advance Directives:     Admitting Physician:  No admitting provider for patient encounter.  PCP: Luis Buenrostro MD    Discharging Nurse: Chelsey Lorenz RN  Discharging Hospital Unit/Room#:   Discharging Unit Phone Number: [phone]    Emergency Contact:   Extended Emergency Contact Information  Primary Emergency Contact: Virgilio Gomez  Address: [address] [place] [address]  Home Phone: [phone]  Relation: Spouse    Past Surgical History:  Past Surgical History:   Procedure Laterality Date    ANKLE FRACTURE SURGERY      right    CARDIAC PROCEDURE N/A 2025    Left heart cath / coronary angiography performed by Jose A Guzman MD at [facility] CARDIAC CATH LAB    CARDIAC PROCEDURE N/A 2025    Right heart cath performed by Jose A Guzman MD at [facility] CARDIAC CATH LAB     SECTION      COLONOSCOPY      CORONARY ARTERY BYPASS GRAFT N/A 2025    CORONARY ARTERY BYPASS GRAFT x 2, SVG-RCA, SVG-DIAG, AORTIC VALVE REPLACEMENT UTILIZING A 21MM REYES INSPIRIS TISSUE VALVE; LEFT ATRIAL APPENDAGE LIGATION UTILIZING A 50MM ATRICLIP; BILATERAL ENDOSCOPIC GREATER SAPHENOUS VEIN HARVEST; INTRAOPERATIVE TRANSESOPHAGEAL ECHOCARDIOGRAM performed by Vasu Huntley MD at [facility] OR       Immunization History:   Immunization History   Administered Date(s) Administered    COVID-19, J&J, (age 18y+), IM, 0.5 mL 2021, 2021       Active Problems:  Patient Active Problem List   Diagnosis Code    Chronic kidney disease, stage III (moderate) ([facility]) N18.30    Carotid stenosis, asymptomatic, left I65.22    Essential hypertension I10    Anxiety F41.9    Bilateral carotid artery stenosis I65.23    Tobacco dependence F17.200    Calcific tendinitis of right shoulder M75.31

## 2025-07-15 NOTE — TELEPHONE ENCOUNTER
----- Message from YESENIA ESCOBAR MA sent at 7/15/2025 11:48 AM EDT -----    ----- Message -----  From: Chrissy Randolph MD  Sent: 7/10/2025   6:22 PM EDT  To: Jina Romero MA    Call patient and check on her following heart procedure that she recently had to see how she is feeling. She did not complete second dose of Rituxan and all her appointments are presently canceled.  We need to have a plan for her when she feels better to prevent rheumatoid arthritis from flaring.

## 2025-07-15 NOTE — TELEPHONE ENCOUNTER
Per Felix  needs f/u appointment in 2 weeks for post op visit.  She will be going to [facility]      Appt scheduled for 7-

## 2025-07-16 ENCOUNTER — APPOINTMENT (OUTPATIENT)
Dept: GENERAL RADIOLOGY | Age: 70
DRG: 216 | End: 2025-07-16
Payer: MEDICARE

## 2025-07-16 LAB
ALBUMIN SERPL-MCNC: 3.6 G/DL (ref 3.4–5)
ALBUMIN/GLOB SERPL: 1.2 {RATIO} (ref 1.1–2.2)
ALP SERPL-CCNC: 111 U/L (ref 40–129)
ALT SERPL-CCNC: 18 U/L (ref 10–40)
ANION GAP SERPL CALCULATED.3IONS-SCNC: 12 MMOL/L (ref 9–17)
AST SERPL-CCNC: 22 U/L (ref 15–37)
BASOPHILS # BLD: 0.02 K/UL
BASOPHILS NFR BLD: 0 % (ref 0–1)
BILIRUB SERPL-MCNC: 0.9 MG/DL (ref 0–1)
BUN SERPL-MCNC: 45 MG/DL (ref 7–20)
CA-I BLD-SCNC: 1.2 MMOL/L (ref 1.15–1.33)
CALCIUM SERPL-MCNC: 9.3 MG/DL (ref 8.3–10.6)
CHLORIDE SERPL-SCNC: 96 MMOL/L (ref 99–110)
CO2 SERPL-SCNC: 30 MMOL/L (ref 21–32)
CREAT SERPL-MCNC: 1.5 MG/DL (ref 0.6–1.2)
CRP SERPL HS-MCNC: 28.6 MG/L (ref 0–5)
EKG ATRIAL RATE: 73 BPM
EKG DIAGNOSIS: NORMAL
EKG P AXIS: 79 DEGREES
EKG P-R INTERVAL: 184 MS
EKG Q-T INTERVAL: 418 MS
EKG QRS DURATION: 96 MS
EKG QTC CALCULATION (BAZETT): 460 MS
EKG R AXIS: 23 DEGREES
EKG T AXIS: 39 DEGREES
EKG VENTRICULAR RATE: 73 BPM
EOSINOPHIL # BLD: 0.09 K/UL
EOSINOPHILS RELATIVE PERCENT: 1 % (ref 0–3)
ERYTHROCYTE [DISTWIDTH] IN BLOOD BY AUTOMATED COUNT: 17 % (ref 11.7–14.9)
GFR, ESTIMATED: 34 ML/MIN/1.73M2
GLUCOSE BLD-MCNC: 127 MG/DL (ref 74–99)
GLUCOSE BLD-MCNC: 144 MG/DL (ref 74–99)
GLUCOSE BLD-MCNC: 149 MG/DL (ref 74–99)
GLUCOSE BLD-MCNC: 94 MG/DL (ref 74–99)
GLUCOSE SERPL-MCNC: 88 MG/DL (ref 74–99)
HCT VFR BLD AUTO: 29 % (ref 37–47)
HGB BLD-MCNC: 9.2 G/DL (ref 12.5–16)
IMM GRANULOCYTES # BLD AUTO: 0.32 K/UL
IMM GRANULOCYTES NFR BLD: 2 %
LYMPHOCYTES NFR BLD: 2.87 K/UL
LYMPHOCYTES RELATIVE PERCENT: 15 % (ref 24–44)
MAGNESIUM SERPL-MCNC: 2.5 MG/DL (ref 1.8–2.4)
MCH RBC QN AUTO: 28.6 PG (ref 27–31)
MCHC RBC AUTO-ENTMCNC: 31.7 G/DL (ref 32–36)
MCV RBC AUTO: 90.1 FL (ref 78–100)
MONOCYTES NFR BLD: 13 % (ref 0–5)
MONOCYTES NFR BLD: 2.43 K/UL
NEUTROPHILS NFR BLD: 70 % (ref 36–66)
NEUTS SEG NFR BLD: 13.17 K/UL
PHOSPHATE SERPL-MCNC: 3.4 MG/DL (ref 2.5–4.9)
PLATELET # BLD AUTO: 310 K/UL (ref 140–440)
PMV BLD AUTO: 10.2 FL (ref 7.5–11.1)
POTASSIUM SERPL-SCNC: 3.4 MMOL/L (ref 3.5–5.1)
PROT SERPL-MCNC: 6.6 G/DL (ref 6.4–8.2)
RBC # BLD AUTO: 3.22 M/UL (ref 4.2–5.4)
SODIUM SERPL-SCNC: 138 MMOL/L (ref 136–145)
WBC OTHER # BLD: 18.9 K/UL (ref 4–10.5)

## 2025-07-16 PROCEDURE — 2700000000 HC OXYGEN THERAPY PER DAY

## 2025-07-16 PROCEDURE — 2500000003 HC RX 250 WO HCPCS: Performed by: PHYSICIAN ASSISTANT

## 2025-07-16 PROCEDURE — 6370000000 HC RX 637 (ALT 250 FOR IP): Performed by: NURSE PRACTITIONER

## 2025-07-16 PROCEDURE — 97535 SELF CARE MNGMENT TRAINING: CPT

## 2025-07-16 PROCEDURE — 83735 ASSAY OF MAGNESIUM: CPT

## 2025-07-16 PROCEDURE — 6360000002 HC RX W HCPCS: Performed by: STUDENT IN AN ORGANIZED HEALTH CARE EDUCATION/TRAINING PROGRAM

## 2025-07-16 PROCEDURE — 6360000002 HC RX W HCPCS: Performed by: PHYSICIAN ASSISTANT

## 2025-07-16 PROCEDURE — 6370000000 HC RX 637 (ALT 250 FOR IP): Performed by: STUDENT IN AN ORGANIZED HEALTH CARE EDUCATION/TRAINING PROGRAM

## 2025-07-16 PROCEDURE — 99232 SBSQ HOSP IP/OBS MODERATE 35: CPT | Performed by: INTERNAL MEDICINE

## 2025-07-16 PROCEDURE — 94761 N-INVAS EAR/PLS OXIMETRY MLT: CPT

## 2025-07-16 PROCEDURE — 94664 DEMO&/EVAL PT USE INHALER: CPT

## 2025-07-16 PROCEDURE — 93005 ELECTROCARDIOGRAM TRACING: CPT | Performed by: PHYSICIAN ASSISTANT

## 2025-07-16 PROCEDURE — 97116 GAIT TRAINING THERAPY: CPT

## 2025-07-16 PROCEDURE — 94640 AIRWAY INHALATION TREATMENT: CPT

## 2025-07-16 PROCEDURE — 86140 C-REACTIVE PROTEIN: CPT

## 2025-07-16 PROCEDURE — 80053 COMPREHEN METABOLIC PANEL: CPT

## 2025-07-16 PROCEDURE — 82330 ASSAY OF CALCIUM: CPT

## 2025-07-16 PROCEDURE — 2580000003 HC RX 258: Performed by: INTERNAL MEDICINE

## 2025-07-16 PROCEDURE — 94150 VITAL CAPACITY TEST: CPT

## 2025-07-16 PROCEDURE — 94669 MECHANICAL CHEST WALL OSCILL: CPT

## 2025-07-16 PROCEDURE — 94668 MNPJ CHEST WALL SBSQ: CPT

## 2025-07-16 PROCEDURE — 6360000002 HC RX W HCPCS: Performed by: INTERNAL MEDICINE

## 2025-07-16 PROCEDURE — 6370000000 HC RX 637 (ALT 250 FOR IP): Performed by: INTERNAL MEDICINE

## 2025-07-16 PROCEDURE — 71045 X-RAY EXAM CHEST 1 VIEW: CPT

## 2025-07-16 PROCEDURE — 85025 COMPLETE CBC W/AUTO DIFF WBC: CPT

## 2025-07-16 PROCEDURE — 6370000000 HC RX 637 (ALT 250 FOR IP): Performed by: PHYSICIAN ASSISTANT

## 2025-07-16 PROCEDURE — 84100 ASSAY OF PHOSPHORUS: CPT

## 2025-07-16 PROCEDURE — 2580000003 HC RX 258: Performed by: STUDENT IN AN ORGANIZED HEALTH CARE EDUCATION/TRAINING PROGRAM

## 2025-07-16 PROCEDURE — 82962 GLUCOSE BLOOD TEST: CPT

## 2025-07-16 PROCEDURE — 93010 ELECTROCARDIOGRAM REPORT: CPT | Performed by: INTERNAL MEDICINE

## 2025-07-16 PROCEDURE — 2060000000 HC ICU INTERMEDIATE R&B

## 2025-07-16 PROCEDURE — 97530 THERAPEUTIC ACTIVITIES: CPT

## 2025-07-16 RX ORDER — LACTOBACILLUS RHAMNOSUS GG 10B CELL
1 CAPSULE ORAL
Status: DISCONTINUED | OUTPATIENT
Start: 2025-07-16 | End: 2025-07-16

## 2025-07-16 RX ORDER — POTASSIUM CHLORIDE 1500 MG/1
20 TABLET, EXTENDED RELEASE ORAL
Status: DISCONTINUED | OUTPATIENT
Start: 2025-07-16 | End: 2025-07-17

## 2025-07-16 RX ORDER — LACTOBACILLUS RHAMNOSUS GG 10B CELL
1 CAPSULE ORAL
Status: DISCONTINUED | OUTPATIENT
Start: 2025-07-16 | End: 2025-07-17 | Stop reason: HOSPADM

## 2025-07-16 RX ORDER — AMLODIPINE BESYLATE 10 MG/1
10 TABLET ORAL DAILY
Status: DISCONTINUED | OUTPATIENT
Start: 2025-07-17 | End: 2025-07-17 | Stop reason: HOSPADM

## 2025-07-16 RX ADMIN — ACETAMINOPHEN 650 MG: 325 TABLET ORAL at 20:22

## 2025-07-16 RX ADMIN — ACETAMINOPHEN 650 MG: 325 TABLET ORAL at 08:39

## 2025-07-16 RX ADMIN — AMIODARONE HYDROCHLORIDE 200 MG: 200 TABLET ORAL at 20:23

## 2025-07-16 RX ADMIN — SENNOSIDES, DOCUSATE SODIUM 1 TABLET: 50; 8.6 TABLET, FILM COATED ORAL at 08:38

## 2025-07-16 RX ADMIN — AMLODIPINE BESYLATE 5 MG: 5 TABLET ORAL at 08:38

## 2025-07-16 RX ADMIN — INSULIN LISPRO 1 UNITS: 100 INJECTION, SOLUTION INTRAVENOUS; SUBCUTANEOUS at 12:42

## 2025-07-16 RX ADMIN — CEFEPIME 2000 MG: 2 INJECTION, POWDER, FOR SOLUTION INTRAVENOUS at 20:32

## 2025-07-16 RX ADMIN — BUDESONIDE AND FORMOTEROL FUMARATE DIHYDRATE 2 PUFF: 160; 4.5 AEROSOL RESPIRATORY (INHALATION) at 08:15

## 2025-07-16 RX ADMIN — PREDNISONE 40 MG: 20 TABLET ORAL at 08:38

## 2025-07-16 RX ADMIN — ASPIRIN 81 MG 81 MG: 81 TABLET ORAL at 08:38

## 2025-07-16 RX ADMIN — CLOPIDOGREL BISULFATE 75 MG: 75 TABLET, FILM COATED ORAL at 08:39

## 2025-07-16 RX ADMIN — Medication 1 CAPSULE: at 17:25

## 2025-07-16 RX ADMIN — ATORVASTATIN CALCIUM 40 MG: 40 TABLET, FILM COATED ORAL at 20:23

## 2025-07-16 RX ADMIN — IPRATROPIUM BROMIDE AND ALBUTEROL SULFATE 1 DOSE: .5; 2.5 SOLUTION RESPIRATORY (INHALATION) at 12:25

## 2025-07-16 RX ADMIN — TRAMADOL HYDROCHLORIDE 50 MG: 50 TABLET, COATED ORAL at 05:32

## 2025-07-16 RX ADMIN — IPRATROPIUM BROMIDE AND ALBUTEROL SULFATE 1 DOSE: .5; 2.5 SOLUTION RESPIRATORY (INHALATION) at 19:20

## 2025-07-16 RX ADMIN — IPRATROPIUM BROMIDE AND ALBUTEROL SULFATE 1 DOSE: .5; 2.5 SOLUTION RESPIRATORY (INHALATION) at 16:21

## 2025-07-16 RX ADMIN — BUMETANIDE 1 MG: 0.5 TABLET ORAL at 08:39

## 2025-07-16 RX ADMIN — BUDESONIDE AND FORMOTEROL FUMARATE DIHYDRATE 2 PUFF: 160; 4.5 AEROSOL RESPIRATORY (INHALATION) at 19:21

## 2025-07-16 RX ADMIN — ACETAMINOPHEN 650 MG: 325 TABLET ORAL at 14:21

## 2025-07-16 RX ADMIN — CHLORHEXIDINE GLUCONATE 0.12% ORAL RINSE 15 ML: 1.2 LIQUID ORAL at 20:22

## 2025-07-16 RX ADMIN — CARVEDILOL 6.25 MG: 6.25 TABLET, FILM COATED ORAL at 08:38

## 2025-07-16 RX ADMIN — IRON SUCROSE 200 MG: 20 INJECTION, SOLUTION INTRAVENOUS at 10:23

## 2025-07-16 RX ADMIN — Medication 1 CAPSULE: at 12:42

## 2025-07-16 RX ADMIN — BACITRACIN: 500 OINTMENT TOPICAL at 20:23

## 2025-07-16 RX ADMIN — POTASSIUM CHLORIDE 40 MEQ: 1500 TABLET, EXTENDED RELEASE ORAL at 06:17

## 2025-07-16 RX ADMIN — Medication 1 CAPSULE: at 10:24

## 2025-07-16 RX ADMIN — CEFEPIME 2000 MG: 2 INJECTION, POWDER, FOR SOLUTION INTRAVENOUS at 10:26

## 2025-07-16 RX ADMIN — ACETAMINOPHEN 650 MG: 325 TABLET ORAL at 03:28

## 2025-07-16 RX ADMIN — Medication 1 TABLET: at 08:38

## 2025-07-16 RX ADMIN — HYDROXYCHLOROQUINE SULFATE 300 MG: 200 TABLET ORAL at 08:39

## 2025-07-16 RX ADMIN — CHLORHEXIDINE GLUCONATE 0.12% ORAL RINSE 15 ML: 1.2 LIQUID ORAL at 08:40

## 2025-07-16 RX ADMIN — SODIUM CHLORIDE, PRESERVATIVE FREE 10 ML: 5 INJECTION INTRAVENOUS at 20:23

## 2025-07-16 RX ADMIN — CARVEDILOL 6.25 MG: 6.25 TABLET, FILM COATED ORAL at 17:26

## 2025-07-16 RX ADMIN — TRAMADOL HYDROCHLORIDE 50 MG: 50 TABLET, COATED ORAL at 12:44

## 2025-07-16 RX ADMIN — BACITRACIN: 500 OINTMENT TOPICAL at 08:37

## 2025-07-16 RX ADMIN — POTASSIUM CHLORIDE 20 MEQ: 1500 TABLET, EXTENDED RELEASE ORAL at 08:40

## 2025-07-16 RX ADMIN — SODIUM CHLORIDE, PRESERVATIVE FREE 10 ML: 5 INJECTION INTRAVENOUS at 08:40

## 2025-07-16 RX ADMIN — FAMOTIDINE 20 MG: 20 TABLET, FILM COATED ORAL at 08:38

## 2025-07-16 RX ADMIN — AMIODARONE HYDROCHLORIDE 200 MG: 200 TABLET ORAL at 08:38

## 2025-07-16 RX ADMIN — IPRATROPIUM BROMIDE AND ALBUTEROL SULFATE 1 DOSE: .5; 2.5 SOLUTION RESPIRATORY (INHALATION) at 08:15

## 2025-07-16 RX ADMIN — METOLAZONE 2.5 MG: 2.5 TABLET ORAL at 08:39

## 2025-07-16 RX ADMIN — SENNOSIDES, DOCUSATE SODIUM 1 TABLET: 50; 8.6 TABLET, FILM COATED ORAL at 20:23

## 2025-07-16 RX ADMIN — INSULIN LISPRO 1 UNITS: 100 INJECTION, SOLUTION INTRAVENOUS; SUBCUTANEOUS at 17:26

## 2025-07-16 RX ADMIN — BUMETANIDE 1 MG: 0.5 TABLET ORAL at 17:26

## 2025-07-16 RX ADMIN — ENOXAPARIN SODIUM 40 MG: 100 INJECTION SUBCUTANEOUS at 08:40

## 2025-07-16 ASSESSMENT — PAIN DESCRIPTION - ONSET
ONSET: ON-GOING
ONSET: ON-GOING
ONSET: GRADUAL

## 2025-07-16 ASSESSMENT — PAIN DESCRIPTION - LOCATION
LOCATION: HEAD;NECK
LOCATION: STERNUM
LOCATION: CHEST
LOCATION: STERNUM
LOCATION: CHEST
LOCATION: CHEST

## 2025-07-16 ASSESSMENT — PAIN SCALES - GENERAL
PAINLEVEL_OUTOF10: 3
PAINLEVEL_OUTOF10: 0
PAINLEVEL_OUTOF10: 3
PAINLEVEL_OUTOF10: 7
PAINLEVEL_OUTOF10: 6
PAINLEVEL_OUTOF10: 0
PAINLEVEL_OUTOF10: 3
PAINLEVEL_OUTOF10: 0
PAINLEVEL_OUTOF10: 3

## 2025-07-16 ASSESSMENT — PAIN - FUNCTIONAL ASSESSMENT
PAIN_FUNCTIONAL_ASSESSMENT: ACTIVITIES ARE NOT PREVENTED

## 2025-07-16 ASSESSMENT — PAIN SCALES - WONG BAKER
WONGBAKER_NUMERICALRESPONSE: NO HURT

## 2025-07-16 ASSESSMENT — PAIN DESCRIPTION - FREQUENCY
FREQUENCY: INTERMITTENT

## 2025-07-16 ASSESSMENT — PAIN DESCRIPTION - ORIENTATION
ORIENTATION: RIGHT
ORIENTATION: MID

## 2025-07-16 ASSESSMENT — PAIN DESCRIPTION - PAIN TYPE
TYPE: SURGICAL PAIN

## 2025-07-16 ASSESSMENT — PAIN DESCRIPTION - DESCRIPTORS
DESCRIPTORS: ACHING
DESCRIPTORS: DISCOMFORT
DESCRIPTORS: ACHING
DESCRIPTORS: DISCOMFORT

## 2025-07-16 NOTE — CARE COORDINATION
Reviewed medical record. Pt has been clinically accepted by [facility]. Notified [facility] that pt is only on regular nasal cannula and NOT high flow nasal cannula. This was confirmed with Felix CRUZ today. Pt's insurance pre cert is still pending (was started yesterday morning as soon as pt was officially accepted to [facility]).  Discussed with allison Hull CM and she will let CM know as soon as pre cert comes back approved.     1004: Pre cert is still pending.     1222: Pre cert is still pending as of this time.     1428: Pre cert is still pending as of this time.

## 2025-07-17 ENCOUNTER — APPOINTMENT (OUTPATIENT)
Dept: GENERAL RADIOLOGY | Age: 70
DRG: 216 | End: 2025-07-17
Payer: MEDICARE

## 2025-07-17 VITALS
TEMPERATURE: 98 F | HEART RATE: 80 BPM | DIASTOLIC BLOOD PRESSURE: 65 MMHG | OXYGEN SATURATION: 93 % | RESPIRATION RATE: 16 BRPM | BODY MASS INDEX: 25.58 KG/M2 | WEIGHT: 163 LBS | HEIGHT: 67 IN | SYSTOLIC BLOOD PRESSURE: 146 MMHG

## 2025-07-17 LAB
ACTIVATED CLOTTING TIME, HIGH RANGE: 113 SEC (ref 81–125)
ACTIVATED CLOTTING TIME, HIGH RANGE: 120 SEC (ref 81–125)
ACTIVATED CLOTTING TIME, HIGH RANGE: 502 SEC (ref 81–125)
ACTIVATED CLOTTING TIME, HIGH RANGE: 533 SEC (ref 81–125)
ACTIVATED CLOTTING TIME, HIGH RANGE: 535 SEC (ref 81–125)
ACTIVATED CLOTTING TIME, HIGH RANGE: 557 SEC (ref 81–125)
ACTIVATED CLOTTING TIME, HIGH RANGE: 608 SEC (ref 81–125)
ACTIVATED CLOTTING TIME, HIGH RANGE: 629 SEC (ref 81–125)
ALBUMIN SERPL-MCNC: 3.8 G/DL (ref 3.4–5)
ALBUMIN/GLOB SERPL: 1.2 {RATIO} (ref 1.1–2.2)
ALP SERPL-CCNC: 112 U/L (ref 40–129)
ALT SERPL-CCNC: 21 U/L (ref 10–40)
ANION GAP SERPL CALCULATED.3IONS-SCNC: 13 MMOL/L (ref 9–17)
AST SERPL-CCNC: 20 U/L (ref 15–37)
BILIRUB SERPL-MCNC: 1 MG/DL (ref 0–1)
BUN SERPL-MCNC: 39 MG/DL (ref 7–20)
CA-I BLD-SCNC: 1.12 MMOL/L (ref 1.15–1.33)
CALCIUM SERPL-MCNC: 9.1 MG/DL (ref 8.3–10.6)
CHLORIDE SERPL-SCNC: 94 MMOL/L (ref 99–110)
CO2 SERPL-SCNC: 29 MMOL/L (ref 21–32)
CREAT SERPL-MCNC: 1.5 MG/DL (ref 0.6–1.2)
CRP SERPL HS-MCNC: 25.9 MG/L (ref 0–5)
EKG ATRIAL RATE: 73 BPM
EKG DIAGNOSIS: NORMAL
EKG P AXIS: 77 DEGREES
EKG P-R INTERVAL: 184 MS
EKG Q-T INTERVAL: 422 MS
EKG QRS DURATION: 98 MS
EKG QTC CALCULATION (BAZETT): 464 MS
EKG R AXIS: 21 DEGREES
EKG T AXIS: 59 DEGREES
EKG VENTRICULAR RATE: 73 BPM
GFR, ESTIMATED: 35 ML/MIN/1.73M2
GLUCOSE BLD-MCNC: 126 MG/DL (ref 74–99)
GLUCOSE BLD-MCNC: 84 MG/DL (ref 74–99)
GLUCOSE SERPL-MCNC: 81 MG/DL (ref 74–99)
MAGNESIUM SERPL-MCNC: 2.6 MG/DL (ref 1.8–2.4)
MICROORGANISM SPEC CULT: ABNORMAL
MICROORGANISM/AGENT SPEC: ABNORMAL
PHOSPHATE SERPL-MCNC: 3.6 MG/DL (ref 2.5–4.9)
POTASSIUM SERPL-SCNC: 3.2 MMOL/L (ref 3.5–5.1)
PROT SERPL-MCNC: 7 G/DL (ref 6.4–8.2)
SODIUM SERPL-SCNC: 135 MMOL/L (ref 136–145)
SPECIMEN DESCRIPTION: ABNORMAL

## 2025-07-17 PROCEDURE — 94640 AIRWAY INHALATION TREATMENT: CPT

## 2025-07-17 PROCEDURE — 2500000003 HC RX 250 WO HCPCS: Performed by: PHYSICIAN ASSISTANT

## 2025-07-17 PROCEDURE — 6360000002 HC RX W HCPCS: Performed by: PHYSICIAN ASSISTANT

## 2025-07-17 PROCEDURE — 93005 ELECTROCARDIOGRAM TRACING: CPT | Performed by: PHYSICIAN ASSISTANT

## 2025-07-17 PROCEDURE — 6370000000 HC RX 637 (ALT 250 FOR IP): Performed by: STUDENT IN AN ORGANIZED HEALTH CARE EDUCATION/TRAINING PROGRAM

## 2025-07-17 PROCEDURE — 2700000000 HC OXYGEN THERAPY PER DAY

## 2025-07-17 PROCEDURE — 94660 CPAP INITIATION&MGMT: CPT

## 2025-07-17 PROCEDURE — 86140 C-REACTIVE PROTEIN: CPT

## 2025-07-17 PROCEDURE — 80053 COMPREHEN METABOLIC PANEL: CPT

## 2025-07-17 PROCEDURE — 71045 X-RAY EXAM CHEST 1 VIEW: CPT

## 2025-07-17 PROCEDURE — 2580000003 HC RX 258: Performed by: STUDENT IN AN ORGANIZED HEALTH CARE EDUCATION/TRAINING PROGRAM

## 2025-07-17 PROCEDURE — 6370000000 HC RX 637 (ALT 250 FOR IP): Performed by: THORACIC SURGERY (CARDIOTHORACIC VASCULAR SURGERY)

## 2025-07-17 PROCEDURE — 82962 GLUCOSE BLOOD TEST: CPT

## 2025-07-17 PROCEDURE — 6370000000 HC RX 637 (ALT 250 FOR IP): Performed by: INTERNAL MEDICINE

## 2025-07-17 PROCEDURE — 36415 COLL VENOUS BLD VENIPUNCTURE: CPT

## 2025-07-17 PROCEDURE — 6370000000 HC RX 637 (ALT 250 FOR IP): Performed by: PHYSICIAN ASSISTANT

## 2025-07-17 PROCEDURE — 6370000000 HC RX 637 (ALT 250 FOR IP): Performed by: NURSE PRACTITIONER

## 2025-07-17 PROCEDURE — 82330 ASSAY OF CALCIUM: CPT

## 2025-07-17 PROCEDURE — 84100 ASSAY OF PHOSPHORUS: CPT

## 2025-07-17 PROCEDURE — 94761 N-INVAS EAR/PLS OXIMETRY MLT: CPT

## 2025-07-17 PROCEDURE — 6360000002 HC RX W HCPCS: Performed by: STUDENT IN AN ORGANIZED HEALTH CARE EDUCATION/TRAINING PROGRAM

## 2025-07-17 PROCEDURE — 83735 ASSAY OF MAGNESIUM: CPT

## 2025-07-17 PROCEDURE — 99232 SBSQ HOSP IP/OBS MODERATE 35: CPT | Performed by: INTERNAL MEDICINE

## 2025-07-17 PROCEDURE — 93010 ELECTROCARDIOGRAM REPORT: CPT | Performed by: INTERNAL MEDICINE

## 2025-07-17 RX ORDER — TRAMADOL HYDROCHLORIDE 50 MG/1
50 TABLET ORAL EVERY 6 HOURS PRN
Qty: 12 TABLET | Refills: 0 | Status: SHIPPED | OUTPATIENT
Start: 2025-07-17 | End: 2025-07-20

## 2025-07-17 RX ORDER — IPRATROPIUM BROMIDE AND ALBUTEROL SULFATE 2.5; .5 MG/3ML; MG/3ML
3 SOLUTION RESPIRATORY (INHALATION)
Qty: 360 ML | Refills: 0 | Status: SHIPPED | OUTPATIENT
Start: 2025-07-17

## 2025-07-17 RX ORDER — CARVEDILOL 6.25 MG/1
12.5 TABLET ORAL 2 TIMES DAILY WITH MEALS
Status: DISCONTINUED | OUTPATIENT
Start: 2025-07-17 | End: 2025-07-17 | Stop reason: HOSPADM

## 2025-07-17 RX ORDER — M-VIT,TX,IRON,MINS/CALC/FOLIC 27MG-0.4MG
1 TABLET ORAL
Qty: 90 TABLET | Refills: 3 | Status: SHIPPED | OUTPATIENT
Start: 2025-07-18

## 2025-07-17 RX ORDER — METOLAZONE 2.5 MG/1
2.5 TABLET ORAL DAILY
Qty: 30 TABLET | Refills: 3 | Status: SHIPPED | OUTPATIENT
Start: 2025-07-18

## 2025-07-17 RX ORDER — GUAIFENESIN 600 MG/1
600 TABLET, EXTENDED RELEASE ORAL 2 TIMES DAILY
Status: DISCONTINUED | OUTPATIENT
Start: 2025-07-17 | End: 2025-07-17 | Stop reason: HOSPADM

## 2025-07-17 RX ORDER — LIDOCAINE 4 G/G
1 PATCH TOPICAL DAILY
DISCHARGE
Start: 2025-07-18 | End: 2025-08-17

## 2025-07-17 RX ORDER — AMIODARONE HYDROCHLORIDE 200 MG/1
200 TABLET ORAL 2 TIMES DAILY
Qty: 11 TABLET | Refills: 0 | Status: SHIPPED | OUTPATIENT
Start: 2025-07-17 | End: 2025-07-23

## 2025-07-17 RX ORDER — HYDRALAZINE HYDROCHLORIDE 50 MG/1
50 TABLET, FILM COATED ORAL EVERY 12 HOURS SCHEDULED
Status: DISCONTINUED | OUTPATIENT
Start: 2025-07-17 | End: 2025-07-17 | Stop reason: HOSPADM

## 2025-07-17 RX ORDER — PREDNISONE 10 MG/1
TABLET ORAL
Qty: 13 TABLET | Refills: 0 | Status: SHIPPED | OUTPATIENT
Start: 2025-07-17 | End: 2025-07-24

## 2025-07-17 RX ORDER — GUAIFENESIN 600 MG/1
600 TABLET, EXTENDED RELEASE ORAL 2 TIMES DAILY
Qty: 60 TABLET | Refills: 0 | Status: SHIPPED | OUTPATIENT
Start: 2025-07-17

## 2025-07-17 RX ORDER — GINSENG 100 MG
CAPSULE ORAL
DISCHARGE
Start: 2025-07-17 | End: 2025-07-27

## 2025-07-17 RX ORDER — CARVEDILOL 12.5 MG/1
12.5 TABLET ORAL 2 TIMES DAILY WITH MEALS
DISCHARGE
Start: 2025-07-17

## 2025-07-17 RX ORDER — IPRATROPIUM BROMIDE AND ALBUTEROL SULFATE 2.5; .5 MG/3ML; MG/3ML
3 SOLUTION RESPIRATORY (INHALATION) EVERY 4 HOURS PRN
Qty: 360 ML | Refills: 0 | Status: SHIPPED | OUTPATIENT
Start: 2025-07-17

## 2025-07-17 RX ORDER — BUDESONIDE AND FORMOTEROL FUMARATE DIHYDRATE 160; 4.5 UG/1; UG/1
2 AEROSOL RESPIRATORY (INHALATION)
Qty: 10.2 G | Refills: 3 | Status: SHIPPED | OUTPATIENT
Start: 2025-07-17

## 2025-07-17 RX ORDER — LEVOFLOXACIN 750 MG/1
750 TABLET, FILM COATED ORAL
DISCHARGE
Start: 2025-07-17 | End: 2025-07-24

## 2025-07-17 RX ORDER — POTASSIUM CHLORIDE 1500 MG/1
40 TABLET, EXTENDED RELEASE ORAL
Status: DISCONTINUED | OUTPATIENT
Start: 2025-07-17 | End: 2025-07-17 | Stop reason: HOSPADM

## 2025-07-17 RX ORDER — BUMETANIDE 1 MG/1
1 TABLET ORAL 2 TIMES DAILY
Qty: 30 TABLET | Refills: 3 | Status: SHIPPED | OUTPATIENT
Start: 2025-07-17

## 2025-07-17 RX ADMIN — IPRATROPIUM BROMIDE AND ALBUTEROL SULFATE 1 DOSE: .5; 2.5 SOLUTION RESPIRATORY (INHALATION) at 08:38

## 2025-07-17 RX ADMIN — ASPIRIN 81 MG 81 MG: 81 TABLET ORAL at 09:08

## 2025-07-17 RX ADMIN — IPRATROPIUM BROMIDE AND ALBUTEROL SULFATE 1 DOSE: .5; 2.5 SOLUTION RESPIRATORY (INHALATION) at 12:18

## 2025-07-17 RX ADMIN — CEFEPIME 2000 MG: 2 INJECTION, POWDER, FOR SOLUTION INTRAVENOUS at 09:21

## 2025-07-17 RX ADMIN — ACETAMINOPHEN 650 MG: 325 TABLET ORAL at 09:07

## 2025-07-17 RX ADMIN — Medication 1 CAPSULE: at 09:19

## 2025-07-17 RX ADMIN — AMLODIPINE BESYLATE 10 MG: 10 TABLET ORAL at 09:08

## 2025-07-17 RX ADMIN — METOLAZONE 2.5 MG: 2.5 TABLET ORAL at 09:07

## 2025-07-17 RX ADMIN — PREDNISONE 30 MG: 20 TABLET ORAL at 09:07

## 2025-07-17 RX ADMIN — HYDROXYCHLOROQUINE SULFATE 300 MG: 200 TABLET ORAL at 09:08

## 2025-07-17 RX ADMIN — HYDRALAZINE HYDROCHLORIDE 50 MG: 50 TABLET ORAL at 09:08

## 2025-07-17 RX ADMIN — FAMOTIDINE 20 MG: 20 TABLET, FILM COATED ORAL at 09:07

## 2025-07-17 RX ADMIN — BUMETANIDE 1 MG: 0.5 TABLET ORAL at 09:08

## 2025-07-17 RX ADMIN — CLOPIDOGREL BISULFATE 75 MG: 75 TABLET, FILM COATED ORAL at 09:08

## 2025-07-17 RX ADMIN — BACITRACIN: 500 OINTMENT TOPICAL at 09:20

## 2025-07-17 RX ADMIN — CHLORHEXIDINE GLUCONATE 0.12% ORAL RINSE 15 ML: 1.2 LIQUID ORAL at 09:08

## 2025-07-17 RX ADMIN — ENOXAPARIN SODIUM 40 MG: 100 INJECTION SUBCUTANEOUS at 09:08

## 2025-07-17 RX ADMIN — SENNOSIDES, DOCUSATE SODIUM 1 TABLET: 50; 8.6 TABLET, FILM COATED ORAL at 09:08

## 2025-07-17 RX ADMIN — CARVEDILOL 6.25 MG: 6.25 TABLET, FILM COATED ORAL at 09:08

## 2025-07-17 RX ADMIN — AMIODARONE HYDROCHLORIDE 200 MG: 200 TABLET ORAL at 09:08

## 2025-07-17 RX ADMIN — SODIUM CHLORIDE, PRESERVATIVE FREE 10 ML: 5 INJECTION INTRAVENOUS at 09:19

## 2025-07-17 RX ADMIN — POTASSIUM CHLORIDE 40 MEQ: 1500 TABLET, EXTENDED RELEASE ORAL at 06:14

## 2025-07-17 RX ADMIN — GUAIFENESIN 600 MG: 600 TABLET ORAL at 09:08

## 2025-07-17 RX ADMIN — POLYETHYLENE GLYCOL (3350) 17 G: 17 POWDER, FOR SOLUTION ORAL at 09:07

## 2025-07-17 RX ADMIN — ACETAMINOPHEN 650 MG: 325 TABLET ORAL at 01:37

## 2025-07-17 RX ADMIN — BUDESONIDE AND FORMOTEROL FUMARATE DIHYDRATE 2 PUFF: 160; 4.5 AEROSOL RESPIRATORY (INHALATION) at 08:39

## 2025-07-17 RX ADMIN — POTASSIUM CHLORIDE 40 MEQ: 1500 TABLET, EXTENDED RELEASE ORAL at 09:07

## 2025-07-17 RX ADMIN — Medication 1 TABLET: at 09:07

## 2025-07-17 ASSESSMENT — PAIN DESCRIPTION - ONSET: ONSET: ON-GOING

## 2025-07-17 ASSESSMENT — PAIN DESCRIPTION - ORIENTATION: ORIENTATION: MID

## 2025-07-17 ASSESSMENT — PAIN - FUNCTIONAL ASSESSMENT: PAIN_FUNCTIONAL_ASSESSMENT: ACTIVITIES ARE NOT PREVENTED

## 2025-07-17 ASSESSMENT — PAIN DESCRIPTION - DESCRIPTORS: DESCRIPTORS: DISCOMFORT

## 2025-07-17 ASSESSMENT — PAIN SCALES - WONG BAKER
WONGBAKER_NUMERICALRESPONSE: NO HURT
WONGBAKER_NUMERICALRESPONSE: NO HURT

## 2025-07-17 ASSESSMENT — PAIN DESCRIPTION - FREQUENCY: FREQUENCY: INTERMITTENT

## 2025-07-17 ASSESSMENT — PAIN DESCRIPTION - PAIN TYPE: TYPE: SURGICAL PAIN

## 2025-07-17 ASSESSMENT — PAIN SCALES - GENERAL
PAINLEVEL_OUTOF10: 0
PAINLEVEL_OUTOF10: 3
PAINLEVEL_OUTOF10: 0

## 2025-07-17 ASSESSMENT — PAIN DESCRIPTION - LOCATION: LOCATION: CHEST

## 2025-07-17 ASSESSMENT — PAIN DESCRIPTION - DIRECTION: RADIATING_TOWARDS: NO WHERE

## 2025-07-17 NOTE — CARE COORDINATION
CM reviewed pt’s medical record and discussed with CVICU provider. CVICU nurse informed CM that pt's precert was approved. CM set up transportation with [facility] at 12:30 PM.    PRECERT IS APPROVED    If pt requires narcotic/controlled medications, a script signed in ink is required for all facilities.    Pt will discharge to [facility] at discharge.    Call report to [phone].    FAX AVS with completed JEFFREY and any scripts to [phone], then place in discharge envelope.    Notify Family.

## 2025-07-17 NOTE — PROGRESS NOTES
Cardiology Progress Note     Today's Plan cpm    Admit Date:  6/30/2025    Consult reason / Seen today for: CHF    Subjective and Overnight Events: No events overnight.  Shortness of breath as result      Telemetry: Sinus rhythm    Assessment / Plan:     Coronary artery disease  [facility] showed severe two-vessel disease: 95 %ostial RCA stenosis and 70% diagonal stenosis ( DIAG is large vessel).  CTS was consulted.  Recommend CABG-planning for early next week  Currently chest pain-free.  Aspirin 81 mg daily/atorvastatin/carvedilol    Valvular heart disease  Moderate AI vena contract to 0.4 cm   CTS recommend AVR  Plan for CABG with AVR Tuesday, July 8    HFpEF  Acute on chronic: EF 60%  Volume status improved.  She is negative almost 5 L  shortness of breath has improved as well after diuresis.  Holding  torsemide 20 mg daily for TAI        Hypertension uncontrolled  Blood pressure uncontrolled admission.  Improving with medication adjustment  Amlodipine 10 mg daily/ carvedilol 25 mg twice daily/ Diovan 80 mg     Pulmonary hypertension  RVSP 52 mmHg suggestive of moderate pulmonary hypertension  Pulmonology consult      Chronic respiratory failure in the setting of severe COPD    History of Presenting Illness:    Chief complain on admission : 69 y.o.year old who is admitted for  Chief Complaint   Patient presents with    Shortness of Breath     Patient was upstairs getting RA infusion and was sent to ED d/t decrease O2 levels. Does wear 3LPM O2 at baseline         Past medical history:    has a past medical history of Heart abnormality, Hypertension, Idiopathic osteoporosis, Idiopathic osteoporosis, Other specified disorders of bone density and structure, multiple sites, Pap smear for cervical cancer screening, and Rheumatoid arthritis with rheumatoid factor of multiple sites without organ or systems involvement (HCC).  Past surgical 
                                                                                 Cardiology Progress Note     Today's Plan decrease torsemide 20 mg daily    Admit Date:  6/30/2025    Consult reason / Seen today for: CHF    Subjective and Overnight Events: No events overnight.  She is sitting up in the chair having lunch.  She has no cardiac complaints.      Telemetry: Sinus rhythm    Assessment / Plan:     Coronary artery disease  [facility] showed severe two-vessel disease: 95 %ostial RCA stenosis and 70% diagonal stenosis ( DIAG is large vessel).  CTS was consulted.  Recommend CABG-planning for early next week  Currently chest pain-free.  Aspirin 81 mg daily/atorvastatin/carvedilol    Valvular heart disease  Moderate AI vena contract to 0.4 cm   CTS recommend AVR    HFpEF  Acute on chronic: EF 60%  Volume status improved.  She is negative almost 5 L  shortness of breath has improved as well after diuresis.  Decrease torsemide 20 mg daily      Hypertension uncontrolled  Blood pressure uncontrolled admission.  Improving with medication adjustment  Amlodipine 10 mg daily/ carvedilol 25 mg twice daily/ Diovan 80 mg     Pulmonary hypertension  RVSP 52 mmHg suggestive of moderate pulmonary hypertension  Pulmonology consult      Chronic respiratory failure in the setting of severe COPD    History of Presenting Illness:    Chief complain on admission : 69 y.o.year old who is admitted for  Chief Complaint   Patient presents with    Shortness of Breath     Patient was upstairs getting RA infusion and was sent to ED d/t decrease O2 levels. Does wear 3LPM O2 at baseline         Past medical history:    has a past medical history of Heart abnormality, Hypertension, Idiopathic osteoporosis, Idiopathic osteoporosis, Other specified disorders of bone density and structure, multiple sites, Pap smear for cervical cancer screening, and Rheumatoid arthritis with rheumatoid factor of multiple sites without organ or systems involvement 
                                                                               Cardiology Progress Note     Admit Date:  2025    Consult reason/ Seen today for :       Subjective and  Overnight Events : Being weaned off oxygen although still on high flow oxygen today  S/p CABG with SVG to RCA and SVG to diagonal and aortic valve replacement using 21 mm tissue valve left atrial appendage clipping on 2025      Chief complain on admission : 69 y.o.year old who is admitted for  Chief Complaint   Patient presents with    Shortness of Breath     Patient was upstairs getting RA infusion and was sent to ED d/t decrease O2 levels. Does wear 3LPM O2 at baseline       Assessment / Plan:  ASCVD: S/p CABG continue supportive care antiplatelets as per protocol  Respiratory failure due to underlying COPD and heart failure  CHF: Acute Systolic/ Diastolic decompensated heart failure. Continue IV Lasix 40 mg BID. Depending on response we can titrate diuretics accordingly.  Watch for toxicity follow renal function electrolytes closely ,  Consider checking BNP  Echo shows EF 60 to 75% %   Ideally needs better blood pressure control consider increasing Coreg 12.5 twice daily  On amiodarone 200 mg as per protocol  HTN: stable, continue To titrate up medication as needed  DVT Prophylaxis if no contraindication  Discussed with primary team, hospitalist service, bedside nursing staff and family  Past medical history:    has a past medical history of Heart abnormality, Hypertension, Idiopathic osteoporosis, Idiopathic osteoporosis, Other specified disorders of bone density and structure, multiple sites, Pap smear for cervical cancer screening, and Rheumatoid arthritis with rheumatoid factor of multiple sites without organ or systems involvement (HCC).  Past surgical history:   has a past surgical history that includes  section; Ankle fracture surgery; Colonoscopy; Cardiac procedure (N/A, 2025); Cardiac procedure (N/A, 
                                                                               Cardiology Progress Note     Admit Date:  2025    Consult reason/ Seen today for :       Subjective and  Overnight Events : no major changes  S/p CABG with SVG to RCA and SVG to diagonal and aortic valve replacement using 21 mm tissue valve left atrial appendage clipping on 2025      Chief complain on admission : 69 y.o.year old who is admitted for  Chief Complaint   Patient presents with    Shortness of Breath     Patient was upstairs getting RA infusion and was sent to ED d/t decrease O2 levels. Does wear 3LPM O2 at baseline       Assessment / Plan:  ASCVD: S/p CABG continue supportive care antiplatelets as per protocol  Respiratory failure due to underlying COPD and heart failure  CHF: Acute Systolic/ Diastolic decompensated heart failure. Continue IV Lasix 40 mg BID. Depending on response we can titrate diuretics accordingly.  Watch for toxicity follow renal function electrolytes closely ,  Consider checking BNP  Echo shows EF 60 to 75% %   Ideally needs better blood pressure control consider increasing Coreg 12.5 twice daily  On amiodarone 200 mg as per protocol  HTN: stable, continue To titrate up medication as needed  DVT Prophylaxis if no contraindication  Discussed with primary team, hospitalist service, bedside nursing staff and family  Past medical history:    has a past medical history of Heart abnormality, Hypertension, Idiopathic osteoporosis, Idiopathic osteoporosis, Other specified disorders of bone density and structure, multiple sites, Pap smear for cervical cancer screening, and Rheumatoid arthritis with rheumatoid factor of multiple sites without organ or systems involvement (HCC).  Past surgical history:   has a past surgical history that includes  section; Ankle fracture surgery; Colonoscopy; Cardiac procedure (N/A, 2025); Cardiac procedure (N/A, 2025); and Coronary artery bypass graft (N/A, 
                                                                               Cardiology Progress Note     Admit Date:  2025    Consult reason/ Seen today for :       Subjective and  Overnight Events : no major changes  S/p CABG with SVG to RCA and SVG to diagonal and aortic valve replacement using 21 mm tissue valve left atrial appendage clipping on 2025      Chief complain on admission : 69 y.o.year old who is admitted for  Chief Complaint   Patient presents with    Shortness of Breath     Patient was upstairs getting RA infusion and was sent to ED d/t decrease O2 levels. Does wear 3LPM O2 at baseline       Assessment / Plan:  ASCVD: S/p CABG continue supportive care antiplatelets as per protocol  Respiratory failure due to underlying COPD and heart failure  CHF: Acute Systolic/ Diastolic decompensated heart failure. Continue IV Lasix 40 mg BID. Depending on response we can titrate diuretics accordingly.  Watch for toxicity follow renal function electrolytes closely ,  Consider checking BNP  Echo shows EF 60 to 75% %   Ideally needs better blood pressure control consider increasing Coreg 12.5 twice daily  On amiodarone 200 mg as per protocol  HTN: stable, continue To titrate up medication as needed  DVT Prophylaxis if no contraindication  Discussed with primary team, hospitalist service, bedside nursing staff and family  Past medical history:    has a past medical history of Heart abnormality, Hypertension, Idiopathic osteoporosis, Idiopathic osteoporosis, Other specified disorders of bone density and structure, multiple sites, Pap smear for cervical cancer screening, and Rheumatoid arthritis with rheumatoid factor of multiple sites without organ or systems involvement (HCC).  Past surgical history:   has a past surgical history that includes  section; Ankle fracture surgery; Colonoscopy; Cardiac procedure (N/A, 2025); Cardiac procedure (N/A, 2025); and Coronary artery bypass graft (N/A, 
                                                                               Cardiology Progress Note     Admit Date:  6/30/2025      Subjective and  Overnight Events : Overnight patient remained stable.  She is still on oxygen.  Blood pressure remains uncontrolled        Physical Exam:  Vitals:    07/04/25 0815   BP: (!) 171/69   Pulse: 73   Resp: 17   Temp: 97.9 °F (36.6 °C)   SpO2: 95%        General Appearance:  No distress, conversant  Respiratory:  Normal breath sounds, No respiratory distress, No wheezing  Cardiovascular: S1, S2, no murmurs, gallops. JVD wnl  Abdomen /GI:  Bowel sounds normal, Soft, No tenderness   Integument:  Well hydrated, no rash   Neurologic:  Alert & oriented x 3, no focal deficits noted       Lab Data:  CBC:   Recent Labs     07/03/25  0400   WBC 8.8   HGB 12.2*   HCT 38.1   MCV 87.0        BMP:   Recent Labs     07/02/25  0239 07/03/25  0400 07/04/25  0353    137 138   K 3.2* 4.2 3.7    107 107   CO2 22 21 20*   BUN 23* 18 17   CREATININE 1.2 1.1 1.2     PT/INR: No results for input(s): \"PROTIME\", \"INR\" in the last 72 hours.  BNP:    Recent Labs     07/02/25  0239 07/04/25  0353   PROBNP 882* 1,164*     TROPONIN: No results for input(s): \"TROPONINT\" in the last 72 hours.       Assessment and Recommendations:      Chronic respiratory failure in the setting of severe COPD and pulmonary hypertension  Moderate aortic regurgitation  Severe ostial RCA stenosis  Uncontrolled hypertension  Acute on chronic HFpEF        69 y.o.year old female with above medical history.  At present we will start her on valsartan 80 mg daily because of 1 controlled hypertension.  Will continue with torsemide, carvedilol, Jardiance, aspirin, atorvastatin and amlodipine for now.  With make further plans about PCI to RCA versus single-vessel bypass surgeries/valve replacement versus conservative treatment with cardiac surgery.  All labs, medications and tests reviewed by myself , continue all 
                                                                               Cardiology Progress Note     Admit Date:  6/30/2025      Subjective and  Overnight Events : Patient is awake and extubated.  She underwent two-vessel CABG with SVG to RCA and SVG to diagonal artery, aortic valve replacement utilizing a 21 mm [place] Inspiris tissue valve and left atrial appendage ligation using AtriClip on 7/8/2025.    Currently patient is feeling better.  She is not on any inotropes or vasopressors.    Physical Exam:  Vitals:    07/09/25 1149   BP:    Pulse:    Resp: 20   Temp:    SpO2:         General Appearance:  No distress, conversant  Respiratory:  Normal breath sounds, No respiratory distress, No wheezing  Cardiovascular: S1, S2, no murmurs, gallops. JVD wnl  Abdomen /GI:  Bowel sounds normal, Soft, No tenderness   Integument:  Well hydrated, no rash   Neurologic:  Alert & oriented x 3, no focal deficits noted       Lab Data:  CBC:   Recent Labs     07/07/25  0547 07/08/25  1315 07/08/25  2150 07/09/25  0310 07/09/25  0545 07/09/25  0830   WBC 7.3 18.0* 15.4* 14.1* 14.6* 15.3*   HGB 12.1* 9.4* 8.8* 8.7* 8.6* 8.9*   HCT 38.5 30.5* 27.9* 27.9* 27.6* 28.3*   MCV 88.9 89.2 88.0 88.6 88.7 89.6    114* 141  --   --   --      BMP:   Recent Labs     07/07/25  1545 07/08/25  0830 07/08/25  1446 07/08/25  1501 07/09/25  0545 07/09/25  0658 07/09/25  0731 07/09/25  0830 07/09/25  1130   NA  --    < > 147*   < > 145  --   --  146* 145   K  --    < > 5.3*   < > 4.5  --   --  4.2 4.4   CL  --    < > 113*   < > 115*  --   --  115* 115*   CO2  --    < > 22   < > 19*  --   --  19* 18*   PHOS 3.6  --  4.1  --  3.5  --   --   --   --    BUN  --    < > 21*   < > 21*  --   --  21* 21*   CREATININE  --    < > 1.4*   < > 1.2   < > 1.3* 1.2 1.3*    < > = values in this interval not displayed.     PT/INR:   Recent Labs     07/08/25  1315 07/08/25  1446 07/09/25  0545   PROTIME 22.9* 21.2* 17.9*   INR 2.1 1.9 1.5     BNP:  No results 
                                                                               Cardiology Progress Note     Admit Date:  6/30/2025      Subjective and  Overnight Events : Patient states that she is feeling better.  She is still short of breath and intermittently requires high flow oxygen.    Brief history: She underwent two-vessel CABG with SVG to RCA and SVG to diagonal artery, aortic valve replacement utilizing a 21 mm [place] Inspiris tissue valve and left atrial appendage ligation using AtriClip on 7/8/2025.    Currently patient is feeling better.  She is not on any inotropes or vasopressors.    Physical Exam:  Vitals:    07/10/25 1700   BP: (!) 135/47   Pulse: 79   Resp: 15   Temp:    SpO2: 91%        General Appearance:  No distress, conversant  Respiratory:  Normal breath sounds, No respiratory distress, No wheezing  Cardiovascular: S1, S2, no murmurs, gallops. JVD wnl  Abdomen /GI:  Bowel sounds normal, Soft, No tenderness   Integument:  Well hydrated, no rash   Neurologic:  Alert & oriented x 3, no focal deficits noted       Lab Data:  CBC:   Recent Labs     07/08/25  1315 07/08/25  2150 07/09/25  0310 07/09/25  0545 07/09/25  0830 07/10/25  0425   WBC 18.0* 15.4*   < > 14.6* 15.3* 18.8*   HGB 9.4* 8.8*   < > 8.6* 8.9* 7.9*   HCT 30.5* 27.9*   < > 27.6* 28.3* 25.7*   MCV 89.2 88.0   < > 88.7 89.6 89.9   * 141  --   --   --   --     < > = values in this interval not displayed.     BMP:   Recent Labs     07/09/25  0545 07/09/25  0658 07/09/25  1130 07/09/25  1327 07/09/25  1440 07/09/25  1441 07/10/25  0425 07/10/25  0603 07/10/25  1122 07/10/25  1406      < > 145  --   --   --  142  --  138  --    K 4.5   < > 4.4  --   --   --  4.7  --  4.2  --    *   < > 115*  --   --   --  111*  --  109  --    CO2 19*   < > 18*  --   --   --  19*  --  19*  --    PHOS 3.5  --   --   --  3.5  --  3.3  --   --   --    BUN 21*   < > 21*  --   --   --  25*  --  27*  --    CREATININE 1.2   < > 1.3*   < >  --   
                                                             CARDIOLOGY  NOTE        Name:  Radha Gomez /Age/Sex: 1955  (69 y.o. female)   MRN & CSN:  [id number] & [id number] Admission Date/Time: 2025  8:51 AM   Location:  3110/3110-A PCP: Luis Buenrostro MD       Hospital Day: 2        PLAN FROM CARDIOLOGY FOR TODAY:   Cardiac catheterization today right and left heart      - cardiology consult is for: Congestive heart failure    -  Interval history: Underwent cardiac cath  Please consult pulmonary patient has severe pulmonary hypertension which is out of proportion to her coronary artery disease    ASSESSMENT/ PLAN:     - Congestive heart failure has HFpEF we will continue to diurese and monitor  Patient gets readmitted again with symptoms of congestive heart failure  Underwent cardiac cath which showed patient has critical RCA disease which is ostial cusp is calcified as well    -Severe pulmonary hypertension pulmonary has been consulted for further management RV systolic pressure is 74/8, which is also on the higher side appears to be biventricular continue to diurese    -hypertension on amlodipine and carvedilol    -BNP is elevated suggestive of fluid overload since 1090    -Also has valvular heart disease with severe aortic insufficiency, transesophageal echo showed moderate aortic degree of aortic insufficiency with the vena contract of about 0.4 cm   We will recheck transesophageal echocardiogram tomorrow to evaluate the status of the aortic valve prior to pursuing with any further intervention    -Carotid artery disease being followed by interventional neurology             Subjective:      Todays complain: Patient short of breath    HPI:  Radha is a 69 y.o.year old who and presents with had concerns including Shortness of Breath (Patient was upstairs getting RA infusion and was sent to ED d/t decrease O2 levels. Does wear 3LPM O2 at baseline ).  Chief Complaint   Patient presents with    
                                             [facility] [place] [address]  Phone: [phone]    Fax [phone]                  Jose Taylor MD, [facility]       Richard Akers MD, [facility]  Oswald Woodard MD, [facility]    MD Sherwin Armenta MD Tariq Rizvi, MD Bilal Alam, MD Dr. Waseem Sajjad MD Melissa Kellis, APRN      Chelsey Williamson, APRRITA Polo, APRRITA Mcniar, APRN  PAZ ScottC    CARDIOLOGY  NOTE      Name:  Radha Gomez /Age/Sex: 1955  (69 y.o. female)   MRN & CSN:  [id number] & [id number] Admission Date/Time: 2025  8:51 AM   [facility]  [facility] PCP: Luis Buenrostro MD       Hospital Day: 4    - Cardiology consult is for: CHF      ASSESSMENT/ PLAN:  Acute on chronic heart failure  Severe pulmonary hypertension  Moderate aortic insufficiency  - Volume overload improving.  -Strict I's and O's Daily weights.  BNP improved  - Echocardiogram revealing preserved ejection fraction with aortic insufficiency  -MARY showing only moderate aortic regurgitation  - Jardiance 10 mg daily  - IV Lasix currently held in setting of hypokalemia  Severe coronary artery disease  -Chest pain-free at this time  -Left heart catheterization revealed 95% ostial RCA as well as 70% diagonal.  -CT surgery consulted.  -Further management following CT surgery and pulmonology evaluation.  - Aspirin, atorvastatin 40 mg daily, carvedilol 25 mg twice daily  Hypertension  -Blood pressure stable at this time.  - Continue amlodipine 10 mg daily  Hypokalemia  -3.2.  Please replete  COPD  -Pulmonology consulted for evaluation of PHTN          Subjective:  Radha is a 69 y.o.year old     Patient continues to have some shortness of breath.    Discussed care with the nursing staff.    Patient accompanied by her  at bedside.    Discussed her different options for treatment strategy moving forward.  Informed her that we still have yet to 
                                             [facility] [place] [address]  Phone: [phone]    Fax [phone]                  Jose Taylor MD, [facility]       Richard Akers MD, [facility]  Oswald Woodard MD, [facility]    MD Sherwin Armenta MD Tariq Rizvi, MD Bilal Alam, MD Dr. Waseem Sajjad MD Melissa Kellis, APRRITA Williamson, APRRITA Polo, APRRITA Mcnair, APRN  Francis Marroquin PADevorahC    CARDIOLOGY  NOTE      Name:  Radha Gomez /Age/Sex: 1955  (69 y.o. female)   MRN & CSN:  [id number] & [id number] Admission Date/Time: 2025  8:51 AM   [facility]: Luis Buenrostro MD       Hospital Day: 3    - Cardiology consult is for: CHF      ASSESSMENT/ PLAN:  Acute on chronic heart failure  Severe pulmonary hypertension  Moderate aortic insufficiency  - Volume overload improving.  -Strict I's and O's Daily weights.  BNP improved  - Echocardiogram revealing preserved ejection fraction with aortic insufficiency  -MARY today showing only moderate aortic regurgitation  - Jardiance 10 mg daily  - IV Lasix currently held in setting of hypokalemia  Severe coronary artery disease  -Chest pain-free at this time  -Left heart catheterization revealed 95% ostial RCA as well as 70% diagonal.  -CT surgery consulted.  -Further management following CT surgery and pulmonology evaluation.  - Aspirin, atorvastatin 40 mg daily, carvedilol 25 mg twice daily  Hypertension  -Blood pressure stable at this time.  - Continue amlodipine 10 mg daily  Hypokalemia  -3.2.  Please replete  COPD  -Pulmonology consulted for evaluation of PHTN          Subjective:  Radha is a 69 y.o.year old     Discontinued patient following her MARY.    Informed her that she only has moderate aortic insufficiency at this time and we will likely continue with conservative measures.    Informed her that we do need to address her coronary artery disease but would 
                                             [facility] [place] [address]  Phone: [phone]    Fax [phone]                  Jose Taylor MD, [facility]       Richard Akers MD, [facility]  Oswald Woodard MD, [facility]    MD Sherwin Armenta MD Tariq Rizvi, MD Bilal Alam, MD Dr. Waseem Sajjad MD Melissa Kellis, APRN      Chelsey Williamson, APRRITA Polo, APRRITA Mcnair, APRN  Francis Marroquin PADevorahC    CARDIOLOGY  NOTE      Name:  Radha Gomez /Age/Sex: 1955  (69 y.o. female)   MRN & CSN:  [id number] & [id number] Admission Date/Time: 2025  8:51 AM   [facility]: Luis Buenrostro MD       Hospital Day: 8    - Cardiology consult is for: CHF      ASSESSMENT/ PLAN:  Acute on chronic heart failure  Severe pulmonary hypertension  Moderate aortic insufficiency  - Volume overload improving.  -Strict I's and O's Daily weights.  BNP improved  - Echocardiogram revealing preserved ejection fraction with aortic insufficiency  -MARY showing only moderate aortic regurgitation  -Plan for CABG + AVR tomorrow.   - Jardiance 10 mg daily  - Torsemide currently held  Severe coronary artery disease  -Chest pain-free at this time  -Left heart catheterization revealed 95% ostial RCA as well as 70% diagonal.  -CT surgery following.  - Aspirin, atorvastatin 40 mg daily, carvedilol 25 mg twice daily  Hypertension  -Blood pressure stable at this time.  - Continue amlodipine 10 mg daily  -Valsartan held to avoid vasoplegia  COPD  -Pulmonology consulted for evaluation of PHTN          Subjective:  Radha is a 69 y.o.year old     Patient continues to have some shortness of breath.    Patient accompanied by her  at bedside.    Patient prepared for CABG AVR tomorrow.     Objective: Temperature:  Current - Temp: 98.2 °F (36.8 °C); Max - Temp  Av.1 °F (36.7 °C)  Min: 98 °F (36.7 °C)  Max: 98.2 °F (36.8 °C)    Respiratory Rate : Resp  
     Nutrition Note    Positive nutrition screen received for at-home tube feeding. Pt does not have any history of use of TF, no enteral access per [employer], is on oral diet; likely a mistake. If any nutrition-related needs arise, please consult RD. Otherwise, will assess per protocol.     Electronically signed by Alyse Fishman RD on 7/1/25 at 11:50 AM EDT    Contact: [phone]    
    V2.0    [facility] [place] [place] Progress Note      Name:  Radha Gomez /Age/Sex: 1955  (69 y.o. female)   MRN & CSN:  [id number] & [id number] Encounter Date/Time: 2025 10:50 AM EDT   Location:  -A PCP: Luis Buenrostro MD     Attending:Vasu Huntley,*       Hospital Day: 14    Assessment and Recommendations   Radha Gomez is a 69 y.o. female with pmh of rheumatoid arthritis, hypertension, hyperlipidemia, HFpEF, valvular heart disease, severe pulmonary hypertension, COPD, chronic respiratory failure on 3 L of oxygen at home,  who presents with Acute on chronic respiratory failure with hypoxia (HCC)      CAD s/p  CABG with AVR on 2025  Acute on chronic hypoxic respiratory failure suspected secondary to volume overload:   Acute on chronic HFpEF:   Presented with worsening shortness of breath, baseline O2 3 L, SpO2 on presentation 83%,   VBG normal, chest x-ray pulmonary congestion  Elevated BNP  Last echocardiogram  showed EF 65 to 70%, grade 2 diastolic dysfunction, severe AR, moderate TR, severe pulmonary hypertension  S/p IV Lasix 40. Continue PO torsemide  currently on 4 L by nasal cannula, respiratory panel negative, wean off oxygen as tolerated.  [facility] - recommended to Consult Pulm for sever pulmonary HTN   pulm note reviewed.  Recommended for right heart cath   DW cardiology - LHC showed 95 % ostial RCA and 70% diagonal.  Possible PCI versus CABG. further decision after discussion with CT surgery    s/p  CABG with AVR   Extubated .   Patient  on Vapotherm. Down to FiO2 55 % 30 L  IV Lasix increased to 80 mg TID. IV solumedrol    chest x-ray reviewed -opacity in the right lung base.  Looks like from atelectasis.  She had similar finding on her CAT scan which was done 2 months back.  Low concern for pneumonia.  We will monitor off antibiotics.  Obtain Pro-Phani and CRP in the meantime       TAI on CKD    Cr 1.7 today   Nephrology following    # Hypertension: On 
    V2.0    [facility] [place] Progress Note      Name:  Radha Gomez /Age/Sex: 1955  (69 y.o. female)   MRN & CSN:  [id number] & [id number] Encounter Date/Time: 2025 10:50 AM EDT   Location:  [facility]0/3110-A PCP: Lusi Buenrostro MD     Attending:Juvenal Cuellar,*       Hospital Day: 6    Assessment and Recommendations   Radha Gomez is a 69 y.o. female with pmh of rheumatoid arthritis, hypertension, hyperlipidemia, HFpEF, valvular heart disease, severe pulmonary hypertension, COPD, chronic respiratory failure on 3 L of oxygen at home,  who presents with Acute on chronic respiratory failure with hypoxia (HCC)      Acute on chronic hypoxic respiratory failure suspected secondary to volume overload:   Acute on chronic HFpEF:   Presented with worsening shortness of breath, baseline O2 3 L, SpO2 on presentation 83%,   VBG normal, chest x-ray pulmonary congestion  Elevated BNP  Last echocardiogram  showed EF 65 to 70%, grade 2 diastolic dysfunction, severe AR, moderate TR, severe pulmonary hypertension  S/p IV Lasix 40. Continue PO torsemide  currently on 4 L by nasal cannula, respiratory panel negative, wean off oxygen as tolerated.  [facility] - recommended to Consult Pulm for sever pulmonary HTN   pulm note reviewed.  Recommended for right heart cath   DW cardiology - LHC showed 95 % ostial RCA and 70% diagonal.  Possible PCI versus CABG. further decision after discussion with CT surgery   DW CTS possible CABG on 25     # Hypertension: On amlodipine, Coreg, continue    # Hyperlipidemia: On atorvastatin continue    # Rheumatoid arthritis: On prednisone and Plaquenil continue    # COPD: On inhalers continue    # Idiopathic osteoporosis: On Prolia     Comment: Please note this report has been produced using speech recognition software and may contain errors related to that system including errors in grammar, punctuation, and spelling, as well as words and phrases that may be inappropriate. If 
    V2.0    [facility] [place]      Name:  Radha Gomez /Age/Sex: 1955  (69 y.o. female)   MRN & CSN:  [id number] & [id number] Encounter Date/Time: 2025 10:50 AM EDT   Location:  [facility]0/3110-A PCP: Luis Buenrostro MD     Attending:Juvenal Cuellar,*       Hospital Day: 5    Assessment and Recommendations   Radha Gomez is a 69 y.o. female with pmh of rheumatoid arthritis, hypertension, hyperlipidemia, HFpEF, valvular heart disease, severe pulmonary hypertension, COPD, chronic respiratory failure on 3 L of oxygen at home,  who presents with Acute on chronic respiratory failure with hypoxia (HCC)      Acute on chronic hypoxic respiratory failure suspected secondary to volume overload:   Acute on chronic HFpEF:   Presented with worsening shortness of breath, baseline O2 3 L, SpO2 on presentation 83%,   VBG normal, chest x-ray pulmonary congestion  Elevated BNP  Last echocardiogram  showed EF 65 to 70%, grade 2 diastolic dysfunction, severe AR, moderate TR, severe pulmonary hypertension  S/p IV Lasix 40. Continue PO torsemide  currently on 4 L by nasal cannula, respiratory panel negative, wean off oxygen as tolerated.  [facility] Cardiology - recommended to Consult Pulm for sever pulmonary HTN   pulm note reviewed.  Recommended for right heart cath   DW cardiology - [facility] showed 95 % ostial RCA and 70% diagonal.  Possible PCI versus CABG. further decision after discussion with CT surgery     # Hypertension: On amlodipine, Coreg, continue    # Hyperlipidemia: On atorvastatin continue    # Rheumatoid arthritis: On prednisone and Plaquenil continue    # COPD: On inhalers continue    # Idiopathic osteoporosis: On Prolia     Comment: Please note this report has been produced using speech recognition software and may contain errors related to that system including errors in grammar, punctuation, and spelling, as well as words and phrases that may be inappropriate. If there are any questions or concerns 
    V2.0    [facility] [place] Progress Note      Name:  Radha Gomez /Age/Sex: 1955  (69 y.o. female)   MRN & CSN:  [id number] & [id number] Encounter Date/Time: 2025 10:50 AM EDT   Location:  -A PCP: Luis Buenrostro MD     Attending:Vasu Huntley,*       Hospital Day: 13    Assessment and Recommendations   Radha Gomez is a 69 y.o. female with pmh of rheumatoid arthritis, hypertension, hyperlipidemia, HFpEF, valvular heart disease, severe pulmonary hypertension, COPD, chronic respiratory failure on 3 L of oxygen at home,  who presents with Acute on chronic respiratory failure with hypoxia (HCC)      CAD s/p  CABG with AVR on 2025  Acute on chronic hypoxic respiratory failure suspected secondary to volume overload:   Acute on chronic HFpEF:   Presented with worsening shortness of breath, baseline O2 3 L, SpO2 on presentation 83%,   VBG normal, chest x-ray pulmonary congestion  Elevated BNP  Last echocardiogram  showed EF 65 to 70%, grade 2 diastolic dysfunction, severe AR, moderate TR, severe pulmonary hypertension  S/p IV Lasix 40. Continue PO torsemide  currently on 4 L by nasal cannula, respiratory panel negative, wean off oxygen as tolerated.  [facility] - recommended to Consult Pulm for sever pulmonary HTN   pulm note reviewed.  Recommended for right heart cath   DW cardiology - LHC showed 95 % ostial RCA and 70% diagonal.  Possible PCI versus CABG. further decision after discussion with CT surgery    s/p  CABG with AVR   Extubated .   Patient  on Vapotherm. Down to FiO2 55 % 35 L  IV Lasix increased to 80 mg TID. IV solumedrol         TAI - resolved    Cr 1.6 > 1.3   DW nephrology - continue to Hold losartan and torsemide this morning.  Patient looks euvolemic on exam.  Nephrology consulted    # Hypertension: On amlodipine, Coreg, continue    # Hyperlipidemia: On atorvastatin continue    # Rheumatoid arthritis: On prednisone and Plaquenil continue    # COPD: On 
    V2.0    [facility] [place] Progress Note      Name:  Radha Gomez /Age/Sex: 1955  (69 y.o. female)   MRN & CSN:  [id number] & [id number] Encounter Date/Time: 2025 10:50 AM EDT   Location:  -A PCP: Luis Buenrostro MD     Attending:Vasu Huntley,*       Hospital Day: 10    Assessment and Recommendations   Radha Gomez is a 69 y.o. female with pmh of rheumatoid arthritis, hypertension, hyperlipidemia, HFpEF, valvular heart disease, severe pulmonary hypertension, COPD, chronic respiratory failure on 3 L of oxygen at home,  who presents with Acute on chronic respiratory failure with hypoxia (HCC)      CAD  Acute on chronic hypoxic respiratory failure suspected secondary to volume overload:   Acute on chronic HFpEF:   Presented with worsening shortness of breath, baseline O2 3 L, SpO2 on presentation 83%,   VBG normal, chest x-ray pulmonary congestion  Elevated BNP  Last echocardiogram  showed EF 65 to 70%, grade 2 diastolic dysfunction, severe AR, moderate TR, severe pulmonary hypertension  S/p IV Lasix 40. Continue PO torsemide  currently on 4 L by nasal cannula, respiratory panel negative, wean off oxygen as tolerated.  [facility] - recommended to Consult Pulm for sever pulmonary HTN   pulm note reviewed.  Recommended for right heart cath   DW cardiology - C showed 95 % ostial RCA and 70% diagonal.  Possible PCI versus CABG. further decision after discussion with CT surgery    s/p  CABG with AVR   Extubated today. Currently on HFNC. Wean off as tolerated  IV lasix once        TAI - resolved    Cr 1.6 > 1.3   DW nephrology - continue to Hold losartan and torsemide this morning.  Patient looks euvolemic on exam.  Nephrology consulted    # Hypertension: On amlodipine, Coreg, continue    # Hyperlipidemia: On atorvastatin continue    # Rheumatoid arthritis: On prednisone and Plaquenil continue    # COPD: On inhalers continue    # Idiopathic osteoporosis: On Prolia   
    V2.0    [facility] [place] Progress Note      Name:  Radha Gomez /Age/Sex: 1955  (69 y.o. female)   MRN & CSN:  [id number] & [id number] Encounter Date/Time: 2025 10:50 AM EDT   Location:  OR/NONE PCP: Luis Buenrostro MD     Attending:Yohannes Tatum MD       Hospital Day: 2    Assessment and Recommendations   Radha Gomez is a 69 y.o. female with pmh of rheumatoid arthritis, hypertension, hyperlipidemia, HFpEF, valvular heart disease, severe pulmonary hypertension, COPD, chronic respiratory failure on 3 L of oxygen at home,  who presents with Acute on chronic respiratory failure with hypoxia (HCC)      # Acute on chronic hypoxic respiratory failure suspected secondary to volume overload: Presented with worsening shortness of breath, baseline O2 3 L, SpO2 on presentation 83%, VBG normal, chest x-ray pulmonary congestion, patient on torsemide held, started on IV Lasix, currently on 6 L by nasal cannula, respiratory panel negative, wean off oxygen as tolerated.     # Acute on chronic HFpEF: Elevated BNP, chest x-ray pulmonary congestion, last echocardiogram  showed EF 65 to 70%, grade 2 diastolic dysfunction, severe AR, moderate TR, severe pulmonary hypertension, obtain echocardiogram, started on IV Lasix 40 mg twice daily, held torsemide, strict ins/O's, daily weight, fluid and salt restriction, consulted cardiology evaluated by cardiology and recommended right and left heart cath on      # Hypertension: On amlodipine, Coreg, continue  # Hyperlipidemia: On atorvastatin continue  # Rheumatoid arthritis: On prednisone and Plaquenil continue  # COPD: On inhalers continue  # Idiopathic osteoporosis: On Prolia     Comment: Please note this report has been produced using speech recognition software and may contain errors related to that system including errors in grammar, punctuation, and spelling, as well as words and phrases that may be inappropriate. If there are any questions or concerns please 
    V2.0    [facility] [place]      Name:  Radha Gomez /Age/Sex: 1955  (69 y.o. female)   MRN & CSN:  [id number] & [id number] Encounter Date/Time: 2025 10:50 AM EDT   Location:  [facility]0/3110-A PCP: Luis Buenrostro MD     Attending:Juvenal Cuellar,*       Hospital Day: 7    Assessment and Recommendations   Radha Gomez is a 69 y.o. female with pmh of rheumatoid arthritis, hypertension, hyperlipidemia, HFpEF, valvular heart disease, severe pulmonary hypertension, COPD, chronic respiratory failure on 3 L of oxygen at home,  who presents with Acute on chronic respiratory failure with hypoxia (HCC)      Acute on chronic hypoxic respiratory failure suspected secondary to volume overload:   Acute on chronic HFpEF:   Presented with worsening shortness of breath, baseline O2 3 L, SpO2 on presentation 83%,   VBG normal, chest x-ray pulmonary congestion  Elevated BNP  Last echocardiogram  showed EF 65 to 70%, grade 2 diastolic dysfunction, severe AR, moderate TR, severe pulmonary hypertension  S/p IV Lasix 40. Continue PO torsemide  currently on 4 L by nasal cannula, respiratory panel negative, wean off oxygen as tolerated.  [facility] Cardiology - recommended to Consult Pulm for sever pulmonary HTN   pulm note reviewed.  Recommended for right heart cath   DW cardiology - LHC showed 95 % ostial RCA and 70% diagonal.  Possible PCI versus CABG. further decision after discussion with CT surgery   DW CTS possible CABG on 25  Spirometry showed Mild Obstruction     TAI    Cr 1.6    Held losartan and torsemide this morning.  Patient looks euvolemic on exam.  Nephrology consulted    # Hypertension: On amlodipine, Coreg, continue    # Hyperlipidemia: On atorvastatin continue    # Rheumatoid arthritis: On prednisone and Plaquenil continue    # COPD: On inhalers continue    # Idiopathic osteoporosis: On Prolia     Comment: Please note this report has been produced using speech recognition software and 
    V2.0    [facility] [place] Progress Note      Name:  Radha Gomez /Age/Sex: 1955  (69 y.o. female)   MRN & CSN:  [id number] & [id number] Encounter Date/Time: 2025 10:50 AM EDT   Location:  [facility]0/3110-A PCP: Luis Buenrostro MD     Attending:Juvenal Cuellar,*       Hospital Day: 8    Assessment and Recommendations   aRdha Gomez is a 69 y.o. female with pmh of rheumatoid arthritis, hypertension, hyperlipidemia, HFpEF, valvular heart disease, severe pulmonary hypertension, COPD, chronic respiratory failure on 3 L of oxygen at home,  who presents with Acute on chronic respiratory failure with hypoxia (HCC)      Acute on chronic hypoxic respiratory failure suspected secondary to volume overload:   Acute on chronic HFpEF:   Presented with worsening shortness of breath, baseline O2 3 L, SpO2 on presentation 83%,   VBG normal, chest x-ray pulmonary congestion  Elevated BNP  Last echocardiogram  showed EF 65 to 70%, grade 2 diastolic dysfunction, severe AR, moderate TR, severe pulmonary hypertension  S/p IV Lasix 40. Continue PO torsemide  currently on 4 L by nasal cannula, respiratory panel negative, wean off oxygen as tolerated.  [facility] Cardiology - recommended to Consult Pulm for sever pulmonary HTN   pulm note reviewed.  Recommended for right heart cath   DW cardiology - LHC showed 95 % ostial RCA and 70% diagonal.  Possible PCI versus CABG. further decision after discussion with CT surgery   DW CTS CABG tomorrow on 25  Spirometry showed Mild Obstruction     TAI - resolved    Cr 1.6 > 1.3   DW nephrology - continue to Hold losartan and torsemide this morning.  Patient looks euvolemic on exam.  Nephrology consulted    # Hypertension: On amlodipine, Coreg, continue    # Hyperlipidemia: On atorvastatin continue    # Rheumatoid arthritis: On prednisone and Plaquenil continue    # COPD: On inhalers continue    # Idiopathic osteoporosis: On Prolia     Comment: Please note this report has been 
    V2.0    [facility] [place]      Name:  Radha Gomez /Age/Sex: 1955  (69 y.o. female)   MRN & CSN:  [id number] & [id number] Encounter Date/Time: 7/3/2025 10:50 AM EDT   Location:  3110/3110-A PCP: Luis Buenrostro MD     Attending:Yohannes Tatum MD       Hospital Day: 4    Assessment and Recommendations   Radha Gomez is a 69 y.o. female with pmh of rheumatoid arthritis, hypertension, hyperlipidemia, HFpEF, valvular heart disease, severe pulmonary hypertension, COPD, chronic respiratory failure on 3 L of oxygen at home,  who presents with Acute on chronic respiratory failure with hypoxia (HCC)      # Acute on chronic hypoxic respiratory failure suspected secondary to volume overload: Presented with worsening shortness of breath, baseline O2 3 L, SpO2 on presentation 83%, VBG normal, chest x-ray pulmonary congestion, patient on torsemide held, started on IV Lasix, currently on 3 L by nasal cannula, respiratory panel negative, wean off oxygen as tolerated.     # Acute on chronic HFpEF: Elevated BNP, chest x-ray pulmonary congestion, last echocardiogram  showed EF 65 to 70%, grade 2 diastolic dysfunction, severe AR, moderate TR, severe pulmonary hypertension, obtain echocardiogram, started on IV Lasix 40 mg twice daily, held torsemide, strict ins/O's, daily weight, fluid and salt restriction, consulted cardiology evaluated by cardiology and recommended right and left heart cath on  which is negative so planned for MARY on  which showed moderate AR, mild MR, mild TR, trivial pericardial effusion, consulted CT surgery by cardiology.     # Hypertension: On amlodipine, Coreg, continue  # Hyperlipidemia: On atorvastatin continue  # Rheumatoid arthritis: On prednisone and Plaquenil continue  # COPD: On inhalers continue  # Idiopathic osteoporosis: On Prolia     Comment: Please note this report has been produced using speech recognition software and may contain errors related to that system 
    V2.0    [facility] [place]      Name:  Radha Gomez /Age/Sex: 1955  (69 y.o. female)   MRN & CSN:  [id number] & [id number] Encounter Date/Time: 7/10/2025 10:50 AM EDT   Location:  -A PCP: Luis Buenrostro MD     Attending:Vasu Huntley,*       Hospital Day: 11    Assessment and Recommendations   Radha Gomez is a 69 y.o. female with pmh of rheumatoid arthritis, hypertension, hyperlipidemia, HFpEF, valvular heart disease, severe pulmonary hypertension, COPD, chronic respiratory failure on 3 L of oxygen at home,  who presents with Acute on chronic respiratory failure with hypoxia (HCC)      CAD  Acute on chronic hypoxic respiratory failure suspected secondary to volume overload:   Acute on chronic HFpEF:   Presented with worsening shortness of breath, baseline O2 3 L, SpO2 on presentation 83%,   VBG normal, chest x-ray pulmonary congestion  Elevated BNP  Last echocardiogram  showed EF 65 to 70%, grade 2 diastolic dysfunction, severe AR, moderate TR, severe pulmonary hypertension  S/p IV Lasix 40. Continue PO torsemide  currently on 4 L by nasal cannula, respiratory panel negative, wean off oxygen as tolerated.  [facility] Cardiology - recommended to Consult Pulm for sever pulmonary HTN   pulm note reviewed.  Recommended for right heart cath   DW cardiology - C showed 95 % ostial RCA and 70% diagonal.  Possible PCI versus CABG. further decision after discussion with CT surgery    s/p  CABG with AVR   Extubated today.   Patient continues to be on Vapotherm.  FiO2 70 % 40 L  Continue IV Lasix 40 twice daily        TAI - resolved    Cr 1.6 > 1.3   DW nephrology - continue to Hold losartan and torsemide this morning.  Patient looks euvolemic on exam.  Nephrology consulted    # Hypertension: On amlodipine, Coreg, continue    # Hyperlipidemia: On atorvastatin continue    # Rheumatoid arthritis: On prednisone and Plaquenil continue    # COPD: On inhalers continue    # Idiopathic 
    V2.0    [facility] [place] Progress Note      Name:  Radha Gomez /Age/Sex: 1955  (69 y.o. female)   MRN & CSN:  [id number] & [id number] Encounter Date/Time: 2025 10:50 AM EDT   Location:  -A PCP: Luis Buenrostro MD     Attending:Vasu Huntley,*       Hospital Day: 18    Assessment and Recommendations   Radha Gomez is a 69 y.o. female with pmh of rheumatoid arthritis, hypertension, hyperlipidemia, HFpEF, valvular heart disease, severe pulmonary hypertension, COPD, chronic respiratory failure on 3 L of oxygen at home,  who presents with Acute on chronic respiratory failure with hypoxia (HCC)      CAD s/p  CABG with AVR on 2025  Acute on chronic hypoxic respiratory failure suspected secondary to volume overload:   Acute on chronic HFpEF:   Presented with worsening shortness of breath, baseline O2 3 L, SpO2 on presentation 83%,   VBG normal, chest x-ray pulmonary congestion  Elevated BNP  Last echocardiogram  showed EF 65 to 70%, grade 2 diastolic dysfunction, severe AR, moderate TR, severe pulmonary hypertension  S/p IV Lasix 40. Continue PO torsemide  currently on 4 L by nasal cannula, respiratory panel negative, wean off oxygen as tolerated.  [facility] Cardiology - recommended to Consult Pulm for sever pulmonary HTN   pulm note reviewed.  Recommended for right heart cath    LHC showed 95 % ostial RCA and 70% diagonal.   s/p  CABG with AVR   Extubated .   S/p IV Lasix.  Switch to oral Bumex   chest x-ray reviewed again -opacity in the right lung base.  Looks like from atelectasis.  She had similar finding on her CAT scan which was done 2 months back.  Low concern for pneumonia.  CRP down trending.   Now on 4 l and doing well  Since white blood cells went up today and sputum culture is now growing Pseudomonas, Klebsiella, Enterobacter cloacae.  Started on antibiotics started on antibiotics.  Her chest x-ray has been stable and CRP has been downtrending.  So unclear if 
    V2.0    [facility] [place]      Name:  Radha Gomez /Age/Sex: 1955  (69 y.o. female)   MRN & CSN:  [id number] & [id number] Encounter Date/Time: 2025 10:50 AM EDT   Location:  -A PCP: Luis Buenrostro MD     Attending:Vasu Huntley,*       Hospital Day: 15    Assessment and Recommendations   Radha Gomez is a 69 y.o. female with pmh of rheumatoid arthritis, hypertension, hyperlipidemia, HFpEF, valvular heart disease, severe pulmonary hypertension, COPD, chronic respiratory failure on 3 L of oxygen at home,  who presents with Acute on chronic respiratory failure with hypoxia (HCC)      CAD s/p  CABG with AVR on 2025  Acute on chronic hypoxic respiratory failure suspected secondary to volume overload:   Acute on chronic HFpEF:   Presented with worsening shortness of breath, baseline O2 3 L, SpO2 on presentation 83%,   VBG normal, chest x-ray pulmonary congestion  Elevated BNP  Last echocardiogram  showed EF 65 to 70%, grade 2 diastolic dysfunction, severe AR, moderate TR, severe pulmonary hypertension  S/p IV Lasix 40. Continue PO torsemide  currently on 4 L by nasal cannula, respiratory panel negative, wean off oxygen as tolerated.  [facility] Cardiology - recommended to Consult Pulm for sever pulmonary HTN   pulm note reviewed.  Recommended for right heart cath   DW cardiology - LHC showed 95 % ostial RCA and 70% diagonal.  Possible PCI versus CABG. further decision after discussion with CT surgery    s/p  CABG with AVR   Extubated .   Patient  on Vapotherm. Down to FiO2 50 % 30 L  IV Lasix increased to 80 mg TID. IV solumedrol    chest x-ray reviewed again -opacity in the right lung base.  Looks like from atelectasis.  She had similar finding on her CAT scan which was done 2 months back.  Low concern for pneumonia.   Pro-Phani and CRP elevated but likely due to TAI and stress.   Sputum culture ordered.  Will continue monitor off antibiotics       TAI on CKD 
    V2.0    [facility] [place] Progress Note      Name:  Radha Gomez /Age/Sex: 1955  (69 y.o. female)   MRN & CSN:  [id number] & [id number] Encounter Date/Time: 7/15/2025 10:50 AM EDT   Location:  -A PCP: Luis Buenrostro MD     Attending:Vasu Huntley,*       Hospital Day: 16    Assessment and Recommendations   Radha Gomez is a 69 y.o. female with pmh of rheumatoid arthritis, hypertension, hyperlipidemia, HFpEF, valvular heart disease, severe pulmonary hypertension, COPD, chronic respiratory failure on 3 L of oxygen at home,  who presents with Acute on chronic respiratory failure with hypoxia (HCC)      CAD s/p  CABG with AVR on 2025  Acute on chronic hypoxic respiratory failure suspected secondary to volume overload:   Acute on chronic HFpEF:   Presented with worsening shortness of breath, baseline O2 3 L, SpO2 on presentation 83%,   VBG normal, chest x-ray pulmonary congestion  Elevated BNP  Last echocardiogram  showed EF 65 to 70%, grade 2 diastolic dysfunction, severe AR, moderate TR, severe pulmonary hypertension  S/p IV Lasix 40. Continue PO torsemide  currently on 4 L by nasal cannula, respiratory panel negative, wean off oxygen as tolerated.  [facility] - recommended to Consult Pulm for sever pulmonary HTN   pulm note reviewed.  Recommended for right heart cath   [facility] cardiology - LHC showed 95 % ostial RCA and 70% diagonal.  Possible PCI versus CABG. further decision after discussion with CT surgery    s/p  CABG with AVR   Extubated .   IV Lasix increased to 80 mg TID. IV solumedrol    chest x-ray reviewed again -opacity in the right lung base.  Looks like from atelectasis.  She had similar finding on her CAT scan which was done 2 months back.  Low concern for pneumonia.  CRP down trending. WBC stable  Sputum culture ordered.  Will monitor off antibiotics  Now on 5 L. OFF vapotherm     TAI on CKD    Cr 1.6 and stable    Nephrology following    # Hypertension: 
    V2.0  [facility] [place] [facility] [place]      Name:  Radha Gomez /Age/Sex: 1955  (69 y.o. female)   MRN & CSN:  [id number] & [id number] Encounter Date/Time: 2025 9:58 AM EDT    Location:  -A PCP: Luis Buenrostro MD       Hospital Day: 12    Assessment and Plan:   Radha Gomez is a 69 y.o. female with pmh of COPD, CHF, HTN, severe pulm HTN,  who presents with Acute on chronic respiratory failure with hypoxia (HCC), CHF exacerbation, TAI. Found to have 95% ostial RCA as well as 70% diagonal . Now s/p CABG x 2 (SVG-RCA, SVG-DIAG), + AVR with tissue valve. Critical care consulted for vent management and hyperglycemia management     Hospital Problems             Last Modified POA     * (Principal) Acute on chronic respiratory failure with hypoxia (HCC) 2025 Yes     Acute on chronic congestive heart failure (HCC) 2025 Yes     Coronary artery disease 2025 Yes   CABG x 2 (SVG-RCA, SVG-DIAG), + AVR with tissue valve  TAI   COPD (on 3L at home)  CHF  HTN  RA     Recommendations:     Awake and following appropriately.  Extubated , on vapotherm. Continues to have high requirements. Maintain O2 sats > 88%  Recommend facemask at ngiht as is a mouth breather  Started on lasix q12 per CTS  Continue inhalers and aggressive pulm care  Off pressors and inotropes  Creatinine back to baseline  On ISS ACHS  Chest tube management per CTS    No further critical care needs. Continue mangemetn per Pulm and CTS       Diet ADULT DIET; Regular; Low Fat/Low Chol/High Fiber/2 gm Na; No Added Salt (3-4 gm)   DVT Prophylaxis [] Lovenox, []  Heparin, [] SCDs, [] Ambulation,  [] Eliquis, [] Xarelto  [] Coumadin   GI Prophylaxis [] Yes          [] No   Code Status Full Code    Disposition Patient requires continued ICU care due to CABG     Subjective:   Extubated yesterday to vapotherm.   Remains on vapotherm with Fio2 of 70% with O2 sats in hihg 90s. + mouth breather   Awake and alert. States breathing 
  CLINICAL PHARMACY SERVICES  Renal dose adjustment made per [facility] protocol     Estimated Creatinine Clearance: 37 mL/min (A) (based on SCr of 1.5 mg/dL (H)).  Recent Labs     07/14/25  0710 07/15/25  0346 07/16/25  0515   BUN 47* 50* 45*   CREATININE 1.6* 1.6* 1.5*     Body mass index is 26 kg/m².    CEFEPIME FOR INDICATION:  PNA (CAP)    PREVIOUS ORDER:  Cefepime  2,000 mg ivpb every 24 hours  [infusion over 30 minutes]      NEW RENALLY ADJUSTED ORDER:    Cefepime 2,000mg ivpb x1 dose over 30 minutes, followed by    2,000 mg ivpb every 12 hours    [extended infusion over 240 minutes]       Vasu King [facility]  7/16/2025 8:52 AM     
  Cardiothoracic Surgery  IN-PATIENT SERVICE   [facility]    Daily Note            Date:   2025  Patient name:  Radha Gomez  Date of admission:  2025  8:51 AM  MRN:   [id number]  Account:  [id number]  YOB: 1955  PCP:    Luis Buenrostro MD  Room:   [address] [place] [address]  Code Status:    Full Code    Physician Requesting Consult: Juvenal Cuellar,*    Reason for Consult:  AI    Chief Complaint:     SOB    History of Present Illness:     Radha Gomez is a 69 y.o. female with pmh of rheumatoid arthritis, hypertension, hyperlipidemia, HFpEF, valvular heart disease, severe pulmonary hypertension, COPD, chronic respiratory failure on 3 L of oxygen at home, who presents with shortness of breath, getting RA infusion and was sent to ED d/t decrease O2 levels. Does wear 3LPM O2 at baseline .  Patient was apparently asymptomatic until last week started having worsening shortness of breath, leg swelling, cough, denies any chest pain, dizziness, urinary or bowel symptoms.     We were consulted after the MARY showed Moderate AI and the     Past Medical History:     Past Medical History:   Diagnosis Date    Heart abnormality     Hypertension     Idiopathic osteoporosis 2025    Idiopathic osteoporosis 2025    Other specified disorders of bone density and structure, multiple sites 2025    Pap smear for cervical cancer screening 2010    neg    Rheumatoid arthritis with rheumatoid factor of multiple sites without organ or systems involvement (HCC) 2025        Past Surgical History:     Past Surgical History:   Procedure Laterality Date    ANKLE FRACTURE SURGERY      right    CARDIAC PROCEDURE N/A 2025    Left heart cath / coronary angiography performed by Jose A Guzman MD at [facility] CARDIAC CATH LAB    CARDIAC PROCEDURE N/A 2025    Right heart cath performed by Jose A Guzman MD at [facility] CARDIAC CATH LAB     SECTION      
  Cardiothoracic Surgery  IN-PATIENT SERVICE   [facility]    Daily Note            Date:   2025  Patient name:  Radha Gomez  Date of admission:  2025  8:51 AM  MRN:   [id number]  Account:  [id number]  YOB: 1955  PCP:    Luis Buenrostro MD  Room:   [address] [place] [address]  Code Status:    Full Code    Physician Requesting Consult: Juvenal Cuellar,*    Reason for Consult:  AI    Chief Complaint:     SOB    History of Present Illness:     Radha Gomez is a 69 y.o. female with pmh of rheumatoid arthritis, hypertension, hyperlipidemia, HFpEF, valvular heart disease, severe pulmonary hypertension, COPD, chronic respiratory failure on 3 L of oxygen at home, who presents with shortness of breath, getting RA infusion and was sent to ED d/t decrease O2 levels. Does wear 3LPM O2 at baseline .  Patient was apparently asymptomatic until last week started having worsening shortness of breath, leg swelling, cough, denies any chest pain, dizziness, urinary or bowel symptoms.     We were consulted after the MARY showed Moderate AI and the     Past Medical History:     Past Medical History:   Diagnosis Date    Heart abnormality     Hypertension     Idiopathic osteoporosis 2025    Idiopathic osteoporosis 2025    Other specified disorders of bone density and structure, multiple sites 2025    Pap smear for cervical cancer screening 2010    neg    Rheumatoid arthritis with rheumatoid factor of multiple sites without organ or systems involvement (HCC) 2025        Past Surgical History:     Past Surgical History:   Procedure Laterality Date    ANKLE FRACTURE SURGERY      right    CARDIAC PROCEDURE N/A 2025    Left heart cath / coronary angiography performed by Jose A Guzman MD at [facility] CARDIAC CATH LAB    CARDIAC PROCEDURE N/A 2025    Right heart cath performed by Jose A Guzman MD at [facility] CARDIAC CATH LAB     SECTION      
  Nephrology Progress Note  7/11/2025 12:33 PM  Subjective:     Interval History: Radha Gomez is a 69 y.o. female    who appears to be doing about the same still short of breath may benefit from some steroids in the setting of pain related to arthritis      Data:   Scheduled Meds:   famotidine  20 mg Oral Daily    Or    famotidine (PEPCID) injection  20 mg IntraVENous Daily    furosemide  40 mg IntraVENous BID    potassium chloride  20 mEq Oral BID WC    insulin lispro  0-8 Units SubCUTAneous 4x Daily AC & HS    budesonide-formoterol  2 puff Inhalation BID RT    ipratropium 0.5 mg-albuterol 2.5 mg  1 Dose Inhalation Q4H RT    carvedilol  6.25 mg Oral BID WC    hydroxychloroquine  300 mg Oral Daily    predniSONE  5 mg Oral Every Other Day    [Held by provider] tiotropium  2 puff Inhalation Daily RT    sodium chloride flush  5-40 mL IntraVENous 2 times per day    enoxaparin  40 mg SubCUTAneous Daily    clopidogrel  75 mg Oral Daily    amiodarone  200 mg Oral BID    chlorhexidine  15 mL Mouth/Throat BID    mupirocin   Each Nostril BID    multivitamin  1 tablet Oral Daily with breakfast    polyethylene glycol  17 g Oral Daily    sennosides-docusate sodium  1 tablet Oral BID    atorvastatin  40 mg Oral Nightly    bacitracin   Topical BID    acetaminophen  650 mg Oral Q6H    aspirin  81 mg Oral Daily     Continuous Infusions:   sodium chloride      EPINEPHrine Stopped (07/08/25 2300)    niCARdipine Stopped (07/08/25 1343)    dextrose           CBC   Recent Labs     07/08/25  1315 07/08/25  2150 07/09/25  0310 07/09/25  0830 07/10/25  0425 07/11/25  0510   WBC 18.0* 15.4*   < > 15.3* 18.8* 16.3*   HGB 9.4* 8.8*   < > 8.9* 7.9* 7.2*   HCT 30.5* 27.9*   < > 28.3* 25.7* 23.6*   * 141  --   --   --   --     < > = values in this interval not displayed.      BMP   Recent Labs     07/09/25  1440 07/09/25  1441 07/10/25  0425 07/10/25  0603 07/10/25  1122 07/10/25  1406 07/11/25  0510   NA  --   --  142  --  138  --  137 
  Nephrology Progress Note  7/14/2025 10:02 AM  Subjective:     Interval History: Radha Gomez is a 69 y.o. female    appears to be doing better overall short of breath with exertion tired at times still requiring Vapotherm    Data:   Scheduled Meds:   lidocaine  1 patch TransDERmal Daily    amLODIPine  2.5 mg Oral Daily    chlorothiazide (DIURIL) 500 mg in sodium chloride 0.9 % 50 mL IVPB  500 mg IntraVENous Q24H    furosemide  80 mg IntraVENous TID    iron sucrose  200 mg IntraVENous Q24H    ipratropium 0.5 mg-albuterol 2.5 mg  1 Dose Inhalation 4x Daily RT    famotidine  20 mg Oral Daily    Or    famotidine (PEPCID) injection  20 mg IntraVENous Daily    methylPREDNISolone  40 mg IntraVENous Daily    insulin lispro  0-8 Units SubCUTAneous 4x Daily AC & HS    budesonide-formoterol  2 puff Inhalation BID RT    carvedilol  6.25 mg Oral BID WC    hydroxychloroquine  300 mg Oral Daily    sodium chloride flush  5-40 mL IntraVENous 2 times per day    enoxaparin  40 mg SubCUTAneous Daily    clopidogrel  75 mg Oral Daily    amiodarone  200 mg Oral BID    chlorhexidine  15 mL Mouth/Throat BID    multivitamin  1 tablet Oral Daily with breakfast    polyethylene glycol  17 g Oral Daily    sennosides-docusate sodium  1 tablet Oral BID    atorvastatin  40 mg Oral Nightly    bacitracin   Topical BID    acetaminophen  650 mg Oral Q6H    aspirin  81 mg Oral Daily     Continuous Infusions:   sodium chloride      sodium chloride      EPINEPHrine Stopped (07/08/25 2300)    niCARdipine Stopped (07/08/25 1343)    dextrose           CBC   Recent Labs     07/12/25  0630 07/12/25  1611 07/13/25  0640 07/14/25  0710   WBC 12.5*  --  15.7* 17.1*   HGB 6.8* 8.7* 8.4* 8.5*   HCT 22.0* 27.4* 25.9* 26.4*     --  184 231      BMP   Recent Labs     07/12/25  1611 07/13/25  0640 07/14/25  0710   * 133* 138   K 4.2 3.6 3.9    97* 98*   CO2 20* 25 26   PHOS 3.3 3.6 4.0   BUN 37* 42* 47*   CREATININE 1.8* 1.7* 1.6*     Hepatic: 
  Nephrology Progress Note  7/15/2025 9:15 AM  Subjective:     Interval History: Radha Gomez is a 69 y.o. female    now on Vapotherm oxygenating better still requiring nasal cannula O2 less short of breath working on rehab    Data:   Scheduled Meds:   bumetanide  1 mg Oral BID    metOLazone  2.5 mg Oral Daily    predniSONE  40 mg Oral Daily    Followed by    [START ON 7/17/2025] predniSONE  30 mg Oral Daily    Followed by    [START ON 7/19/2025] predniSONE  20 mg Oral Daily    Followed by    [START ON 7/21/2025] predniSONE  10 mg Oral Daily    lidocaine  1 patch TransDERmal Daily    amLODIPine  2.5 mg Oral Daily    iron sucrose  200 mg IntraVENous Q24H    ipratropium 0.5 mg-albuterol 2.5 mg  1 Dose Inhalation 4x Daily RT    famotidine  20 mg Oral Daily    Or    famotidine (PEPCID) injection  20 mg IntraVENous Daily    insulin lispro  0-8 Units SubCUTAneous 4x Daily AC & HS    budesonide-formoterol  2 puff Inhalation BID RT    carvedilol  6.25 mg Oral BID WC    hydroxychloroquine  300 mg Oral Daily    sodium chloride flush  5-40 mL IntraVENous 2 times per day    enoxaparin  40 mg SubCUTAneous Daily    clopidogrel  75 mg Oral Daily    amiodarone  200 mg Oral BID    chlorhexidine  15 mL Mouth/Throat BID    multivitamin  1 tablet Oral Daily with breakfast    polyethylene glycol  17 g Oral Daily    sennosides-docusate sodium  1 tablet Oral BID    atorvastatin  40 mg Oral Nightly    bacitracin   Topical BID    acetaminophen  650 mg Oral Q6H    aspirin  81 mg Oral Daily     Continuous Infusions:   sodium chloride      sodium chloride      EPINEPHrine Stopped (07/08/25 2300)    niCARdipine Stopped (07/08/25 1343)    dextrose           CBC   Recent Labs     07/13/25  0640 07/14/25  0710 07/15/25  0346   WBC 15.7* 17.1* 17.9*   HGB 8.4* 8.5* 8.8*   HCT 25.9* 26.4* 27.9*    231 277      BMP   Recent Labs     07/13/25  0640 07/14/25  0710 07/15/25  0346   * 138 137   K 3.6 3.9 3.5   CL 97* 98* 96*   CO2 25 26 28 
  Nephrology Progress Note  7/16/2025 7:39 AM  Subjective:     Interval History: Radha Gomez is a 69 y.o. female who appears to be doing somewhat better less short of breath today    Data:   Scheduled Meds:   bumetanide  1 mg Oral BID    metOLazone  2.5 mg Oral Daily    amLODIPine  5 mg Oral Daily    predniSONE  40 mg Oral Daily    Followed by    [START ON 7/17/2025] predniSONE  30 mg Oral Daily    Followed by    [START ON 7/19/2025] predniSONE  20 mg Oral Daily    Followed by    [START ON 7/21/2025] predniSONE  10 mg Oral Daily    lidocaine  1 patch TransDERmal Daily    iron sucrose  200 mg IntraVENous Q24H    ipratropium 0.5 mg-albuterol 2.5 mg  1 Dose Inhalation 4x Daily RT    famotidine  20 mg Oral Daily    Or    famotidine (PEPCID) injection  20 mg IntraVENous Daily    insulin lispro  0-8 Units SubCUTAneous 4x Daily AC & HS    budesonide-formoterol  2 puff Inhalation BID RT    carvedilol  6.25 mg Oral BID WC    hydroxychloroquine  300 mg Oral Daily    sodium chloride flush  5-40 mL IntraVENous 2 times per day    enoxaparin  40 mg SubCUTAneous Daily    clopidogrel  75 mg Oral Daily    amiodarone  200 mg Oral BID    chlorhexidine  15 mL Mouth/Throat BID    multivitamin  1 tablet Oral Daily with breakfast    polyethylene glycol  17 g Oral Daily    sennosides-docusate sodium  1 tablet Oral BID    atorvastatin  40 mg Oral Nightly    bacitracin   Topical BID    acetaminophen  650 mg Oral Q6H    aspirin  81 mg Oral Daily     Continuous Infusions:   sodium chloride      sodium chloride      EPINEPHrine Stopped (07/08/25 2300)    niCARdipine Stopped (07/08/25 1343)    dextrose           CBC   Recent Labs     07/14/25  0710 07/15/25  0346 07/16/25  0515   WBC 17.1* 17.9* 18.9*   HGB 8.5* 8.8* 9.2*   HCT 26.4* 27.9* 29.0*    277 310      BMP   Recent Labs     07/14/25  0710 07/15/25  0346 07/16/25  0515    137 138   K 3.9 3.5 3.4*   CL 98* 96* 96*   CO2 26 28 30   PHOS 4.0 3.9 3.4   BUN 47* 50* 45* 
  Nephrology Progress Note  7/8/2025 6:44 AM  Subjective:     Interval History: Radha Gomez is a 69 y.o. female with doing well overall preop prior to surgery seen with family in the room explained risk of renal failure in the above setting and explained and answered all questions        Data:   Scheduled Meds:   ceFAZolin  2,000 mg IntraVENous Once    vancomycin  1,000 mg IntraVENous Once    sodium chloride flush  5-40 mL IntraVENous 2 times per day    amiodarone  400 mg Oral BID    mupirocin   Each Nostril BID    chlorhexidine gluconate   Topical See Admin Instructions    ipratropium 0.5 mg-albuterol 2.5 mg  1 Dose Inhalation 4x Daily RT    budesonide  0.5 mg Nebulization BID RT    aminocaproic acid (AMICAR) 10,000 mg in sodium chloride 0.9 % 210 mL infusion  1,000 mg/hr IntraVENous On Call to OR    heparin (porcine) 30,000 Units in sodium chloride 0.9 % 1,000 mL (cell saver)  30,000 Units IntraVENous On Call to OR    cardioplegia solution   IntraVENous On Call to OR    dexmedeTOMIDine  0.1-1.5 mcg/kg/hr IntraVENous On Call to OR    EPINEPHrine  1-20 mcg/min IntraVENous On Call to OR    insulin  0.1-50 Units/hr IntraVENous On Call to OR    niCARdipine  2.5-15 mg/hr IntraVENous On Call to OR    norepinephrine-sodium chloride  1-100 mcg/min IntraVENous On Call to OR    [Held by provider] torsemide  20 mg Oral Daily    tiotropium-olodaterol  2 puff Inhalation Daily    potassium chloride  40 mEq Oral Daily with breakfast    [Held by provider] valsartan  80 mg Oral Daily    sodium chloride flush  5-40 mL IntraVENous 2 times per day    guaiFENesin  600 mg Oral BID    atorvastatin  40 mg Oral Nightly    amLODIPine  10 mg Oral Daily    aspirin  81 mg Oral Daily    empagliflozin  10 mg Oral Daily    hydroxychloroquine  300 mg Oral Daily    predniSONE  5 mg Oral Every Other Day    sodium chloride flush  5-40 mL IntraVENous 2 times per day     Continuous Infusions:   sodium chloride      sodium chloride           CBC 
  Nephrology Progress Note  7/9/2025 1:02 PM  Subjective:     Interval History: Radha Gomez is a 69 y.o. female  Doing Well Seen This Morning on the Ventilator Supportive Care Work on Weaning off the Ventilator Post Cardiac Surgery      Data:   Scheduled Meds:   budesonide-formoterol  2 puff Inhalation BID RT    ipratropium 0.5 mg-albuterol 2.5 mg  1 Dose Inhalation Q4H RT    carvedilol  6.25 mg Oral BID WC    sodium chloride flush  5-40 mL IntraVENous 2 times per day    enoxaparin  40 mg SubCUTAneous Daily    clopidogrel  75 mg Oral Daily    amiodarone  200 mg Oral BID    chlorhexidine  15 mL Mouth/Throat BID    mupirocin   Each Nostril BID    multivitamin  1 tablet Oral Daily with breakfast    polyethylene glycol  17 g Oral Daily    sennosides-docusate sodium  1 tablet Oral BID    atorvastatin  40 mg Oral Nightly    famotidine  20 mg Oral BID    Or    famotidine (PEPCID) injection  20 mg IntraVENous BID    ceFAZolin (ANCEF) IV  2,000 mg IntraVENous Q8H    vancomycin (VANCOCIN) IV  1,000 mg IntraVENous Q12H    [START ON 7/10/2025] bacitracin   Topical BID    acetaminophen  650 mg Oral Q6H    aspirin  81 mg Oral Daily     Continuous Infusions:   sodium chloride 50 mL/hr at 07/09/25 1158    sodium chloride      EPINEPHrine Stopped (07/08/25 2300)    niCARdipine Stopped (07/08/25 1343)    insulin 0.6 Units/hr (07/09/25 1246)    dextrose      dexmedeTOMIDine HCl in NaCl Stopped (07/08/25 1810)    milrinone      norepinephrine-sodium chloride Stopped (07/08/25 2100)         CBC   Recent Labs     07/07/25  0547 07/08/25  1315 07/08/25  2150 07/09/25  0310 07/09/25  0545 07/09/25  0830   WBC 7.3 18.0* 15.4* 14.1* 14.6* 15.3*   HGB 12.1* 9.4* 8.8* 8.7* 8.6* 8.9*   HCT 38.5 30.5* 27.9* 27.9* 27.6* 28.3*    114* 141  --   --   --       BMP   Recent Labs     07/07/25  1545 07/08/25  0830 07/08/25  1446 07/08/25  1501 07/09/25  0545 07/09/25  0658 07/09/25  0731 07/09/25  0830 07/09/25  1130   NA  --    < > 147*   < > 
  Physical Therapy Treatment Note  Name: Radha Gomez MRN: [id number] :   1955   Date:  7/15/2025   Admission Date: 2025 Room:  A   Restrictions/Precautions:  Restrictions/Precautions  Restrictions/Precautions: General Precautions, Fall Risk Position Activity Restriction  Sternal Precautions: No Pushing, No Pulling, 5# Lifting Restrictions       Communication with other providers:  Pt okay to see for therapy per RN   Subjective:  Patient states:  Agreeable to session.   Pain:   Location, Type, Intensity (0/10 to 10/10):  Denies   Objective:    Observation:  Pt sitting up in recliner upon PTA arrival.   Objective Measures:  Tele, O2 86-93%. Pt is on 5L O2 upon PTA arrival, O2 increased to 10L for ambulation dt desat to 83%, returned to room on 7L O2 and pt recovered to 90%.   Treatment, including education/measures:    Therapeutic Activity Training:   Therapeutic activity training was instructed today.  Cues were given for safety, sequence, UE/LE placement, awareness, and balance. Activities performed today included STS.     Mobility:  STS: From recliner with Mod A x 2 reps.     Gait:  Gait training conducted for ~200ft with RW and CGA for safety. Pt demos continuous gait, decreased gait speed, decreased foot clearance. Pt tolerated introduction to RW well.     Safety:   Gait belt donned this session. Pt returned safely to recliner with chair alarm activated, call light in reach, all needs met.   Assessment / Impression:    Pt tolerated OOB Activities well this date, pt continues to demo difficulty with STSs, impaired endurance, and decreased overall strength and will continue to benefit from skilled therapy to address the same.   Patient's tolerance of treatment:  Good   Adverse Reaction: none  Significant change in status and impact:  none  Barriers to improvement:  none  Plan for Next Session:    Plan to continue OOB Activities.   Time in:  1040  Time out:  1108  Timed treatment minutes:  
  Physical Therapy Treatment Note  Name: Radha Gomez MRN: [id number] :   1955   Date:  2025   Admission Date: 2025 Room:  [address] [place] [address]   Restrictions/Precautions:  Restrictions/Precautions  Restrictions/Precautions: General Precautions, Fall Risk    Communication with other providers:  Pt okay to see for therapy per RN   Subjective:  Patient states:  Agreeable to session.   Pain:   Location, Type, Intensity (0/10 to 10/10):  Denies   Objective:    Observation:  Pt supine in bed upon PTA arrival.   Objective Measures:  Tele, O2 86%-93% throughout session. Pt required increased O2 at start of ambulation to 8L dt O2 at 86%, decreased back to 6L for remainder of ambulation pt sats at 88-92%, pt returned to baseline 4L on wall when returned to room.   Treatment, including education/measures:    Therapeutic Activity Training:   Therapeutic activity training was instructed today.  Cues were given for safety, sequence, UE/LE placement, awareness, and balance.  Activities performed today included bed mobility training, sup-sit, sit-stand, SPT.    Mobility:  Sup <> sit: SBA  Scooting: SBA  STS: SBA from EOB.     Gait:  Gait training conducted for ~350ft with no AD and CGA progressing to SBA. Pt demos decreased step length, decreased gait speed, decreased endurance requiring rest breaks for recovery.     Exercises:  Pt performed seated APs, marching, hip add with pillow, hip abd with manual resistance, LAQ, HS curl with manual resistance, glut sets 1 x 20 ea. With exercise handout provided for increased carryover.     Safety:   Gait belt donned this session. Pt returned safely to bed with no alarm pt signed waiver, call light in reach, all needs met.   Assessment / Impression:    Pt tolerated OOB activities very well this date but does present with O2 fluctuation and endurance deficits. Pt will continue to benefit from skilled therapy to increase strength and endurance.   Patient's tolerance of treatment:  
  Physical Therapy Treatment Note  Name: Radha Gomez MRN: [id number] :   1955   Date:  2025   Admission Date: 2025 Room:  A   Restrictions/Precautions:  Restrictions/Precautions  Restrictions/Precautions: General Precautions, Fall Risk Position Activity Restriction  Sternal Precautions: No Pushing, No Pulling, 5# Lifting Restrictions   Communication with other providers:  Pt okay to see for therapy per RN   Subjective:  Patient states:  Agreeable to session, motivated  Pain:   Location, Type, Intensity (0/10 to 10/10):  Denies   Objective:    Observation:  Pt sitting up in recliner upon PTA arrival.   Objective Measures:  Tele, stable  Treatment, including education/measures:    Therapeutic Activity Training:   Therapeutic activity training was instructed today.  Cues were given for safety, sequence, UE/LE placement, awareness, and balance. Activities performed today included bed mobility training, sup-sit, sit-stand, SPT.    Mobility:  STS: From recliner with CGA, from commode with CGA.     Gait:  Gait training conducted for ~200ft +10ft + 10ft with RW and CGA for safety. Pt demos decreased step length, decreased gait speed, decreased foot clearance, continuous gait.     Safety:   Gait belt donned this session. Pt returned safely to recliner with chair alarm activated, call light in reach, all needs met.   Assessment / Impression:    Pt tolerated OOB activities well this date but does present with impaired overall strength, endurance, and functional mobility.   Patient's tolerance of treatment:  Good   Adverse Reaction: none  Significant change in status and impact:  none  Barriers to improvement:  none  Plan for Next Session:    Plan to continue OOB activities.   Time in:  1325  Time out:  1353  Timed treatment minutes:  28  Total treatment time:  28    Previously filed items:  Social/Functional History  Lives With: Spouse  Type of Home: House  Home Layout: One level  Bathroom 
 brought back and updated on PT status and Plan of care. Charge number provided to  and security code provided so  can call and get updates on PT.  left hospital and wants updated when PT is extubated.  
1431: received verbal order with readback for pre-op EKG per NANCY Abad.  
1436: received verbal order with readback for Mg, Phos, CAIOB, APTT, and PT-INR per NANCY Abad.   
4 Eyes Skin Assessment     NAME:  Radha Gomez  YOB: 1955  MEDICAL RECORD NUMBER:  [id number]    The patient is being assessed for  Post-Op Surgical    I agree that at least one RN has performed a thorough Head to Toe Skin Assessment on the patient. ALL assessment sites listed below have been assessed.      Areas assessed by both nurses:    Head, Face, Ears, Shoulders, Back, Chest, Arms, Elbows, Hands, Sacrum. Buttock, Coccyx, Ischium, Legs. Feet and Heels, and Under Medical Devices         Does the Patient have a Wound? No noted wound(s) Surgical Incisions noted see LDA Bruise noted to left lower ABD.       Enzo Prevention initiated by RN: No  Wound Care Orders initiated by RN: No    Pressure Injury (Stage 1,2,3,4, Unstageable, DTI, NWPT, and Complex wounds) if present, place Wound referral order by RN under : No    New Ostomies, if present place, Ostomy referral order under : No     Nurse 1 eSignature: Electronically signed by Chelsey Lorenz RN on 7/8/25 at 3:27 PM EDT    **SHARE this note so that the co-signing nurse can place an eSignature**    Nurse 2 eSignature: Electronically signed by Juliet Seals RN on 7/8/25 at 5:41 PM EDT  
Attempted to complete Bedside Spirometry at this time.  Will attempt at a later time.  Patient did not want to do at this time  
Bedside Spirometry completed by this RT.  Testing was stopped for 2 minutes due to patients saturation dropping into the low 80's.  Patient recovered well and was able to complete testing with good effort.  Pre-op IS education was provided to patient as well.  Patient achieved 1000 ml with coaching.    FVC          2.57    Actual    84 %Predicted  FEV1          1.66   Actual    72 %Predicted  FEV1/FVC   64.6  Actual     84  %Predicted  FEF 25-75   .90   Actual    43 %Predicted      MACHINE INTERPRETATION: MILD OBSTRUCTION                
[facility] Cardiothoracic Surgery  Daily Progress Note    Surgeon:  Yuko    Procedure:  s/p CORONARY ARTERY BYPASS GRAFT x 2, SVG-RCA, SVG-DIAG, AORTIC VALVE REPLACEMENT UTILIZING A 21MM REYES INSPIRIS TISSUE VALVE; LEFT ATRIAL APPENDAGE LIGATION UTILIZING A 50MM ATRICLIP; BILATERAL ENDOSCOPIC GREATER SAPHENOUS VEIN HARVEST; INTRAOPERATIVE TRANSESOPHAGEAL ECHOCARDIOGRAM  on 7/9/25    Subjective:     Patient was seen and examined at bedside this morning without any complaints.    Hospital Course:  7/8/25: Patient was transferred to the ICU with oxygen saturations in the 80s.  She had a history of pulmonary hypertension preoperatively.  An ABG revealed acidosis and this was corrected.  The ventilator was adjusted and the patient sats were in the low 90s.  She came out of the OR on Levophed and epinephrine drip.  7/9/25: Off all drips.  Patient remains intubated this morning.  The PEEP was decreased from 8 to 5.  PO2 is 57.8 pH of 7.39.  Critical care was comfortable extubating to high flow Vapotherm this morning.  We will get a repeat ABG.  If the patient can tolerate Vapotherm we will start aspirin Plavix and Lovenox.  Calcium replacement.  Lasix 20 mg IV once today.    Vital Signs: BP (!) 156/64   Pulse 73   Temp 98.4 °F (36.9 °C) (Core)   Resp 23   Ht 1.702 m (5' 7\")   Wt 81.6 kg (179 lb 14.3 oz)   SpO2 91%   BMI 28.18 kg/m²  O2 Flow Rate (L/min): 4 L/min   Admit Weight: Weight - Scale: 77.1 kg (170 lb)       I/O's:  I/O last 3 completed shifts:  In: 2697.4 [I.V.:1994.3; Blood:350; IV Piggyback:353.1]  Out: 4597 [Urine:3568; Blood:600; Chest Tube:429]    Data:    CBC:   Recent Labs     07/07/25  0547 07/08/25  1315 07/08/25  2150 07/09/25  0310 07/09/25  0545 07/09/25  0830   WBC 7.3 18.0* 15.4* 14.1* 14.6* 15.3*   HGB 12.1* 9.4* 8.8* 8.7* 8.6* 8.9*   HCT 38.5 30.5* 27.9* 27.9* 27.6* 28.3*   MCV 88.9 89.2 88.0 88.6 88.7 89.6    114* 141  --   --   --  
Comprehensive Nutrition Assessment    Type and Reason for Visit:  Initial, LOS    Nutrition Recommendations/Plan:   If unable to extubate soon, recommend starting tube feeds- Vital AF 1.2 (peptide based formula) @ 55mL/hr continuous to provide 1584kcal, 99g PRO  Monitor hemodynamic status, GI status, weights, labs, POC     Malnutrition Assessment:  Malnutrition Status:  Insufficient data (07/09/25 0951)    Context:  Acute Illness     Findings of the 6 clinical characteristics of malnutrition:  Energy Intake:  75% or less of estimated energy requirements for 7 or more days  Weight Loss:  No weight loss     Body Fat Loss:  Unable to assess     Muscle Mass Loss:  Unable to assess    Fluid Accumulation:  Mild Extremities   Strength:  Not Performed    Nutrition Assessment:    Admitted w/ acute on chronic respiratory failure, pmh of COPD, CHF, HTN, severe pulm HTN. Pt remains on vent s/p CABG x2 and AVR yesterday. Sedated, not on pressors at this time, MAP 88. If unable to extubate soon, recommend starting tube feeds, will provide recs. Noted some wt gain, likely r/t routine post op fluid retention. Wt otherwise stable. Follow at high nutrition risk.    Nutrition Related Findings:    +NaCl, insulin, precedex paused; Na 146, GFR 45, BUN 21, hgb 8.9 Wound Type: Surgical Incision       Current Nutrition Intake & Therapies:    Average Meal Intake: NPO  Average Supplements Intake: NPO  ADULT DIET; Clear Liquid; 4 carb choices (60 gm/meal); Low Fat/Low Chol/High Fiber/2 gm Na    Anthropometric Measures:  Height: 170.2 cm (5' 7\")  Ideal Body Weight (IBW): 135 lbs (61 kg)    Admission Body Weight: 77.5 kg (170 lb 13.7 oz)  Current Body Weight: 81.6 kg (179 lb 14.3 oz),   IBW. Weight Source: Bed scale  Current BMI (kg/m2): 28.2  Usual Body Weight: 74.7 kg (164 lb 10.9 oz) (1/20/25)     % Weight Change (Calculated): 9.2  Weight Adjustment For: No Adjustment                 BMI Categories: Overweight (BMI 25.0-29.9)    Estimated 
Comprehensive Nutrition Assessment    Type and Reason for Visit:  Reassess    Nutrition Recommendations/Plan:   Continue Cardiac Diet  Begin low yefri high protein oral nutrition supplement bid, between meals as needed     Malnutrition Assessment:  Malnutrition Status:  Insufficient data (07/09/25 1575)    Context:  Acute Illness       Nutrition Assessment:    Pt continues extubated, chest tubes removed. Feeding self on cardiac diet. Will add oral supplement to optimize intake and follow as high nutrition risk.    Nutrition Related Findings:    BUN 28, Cr 1.6, GFR 33, Mg 3.2, Glu 143, A1c 5.5, Phos 2   Wound Type: Surgical Incision       Current Nutrition Intake & Therapies:    Average Meal Intake: 51-75%  Average Supplements Intake: None Ordered  ADULT DIET; Regular; Low Fat/Low Chol/High Fiber/2 gm Na; No Added Salt (3-4 gm)    Anthropometric Measures:  Height: 170.2 cm (5' 7\")  Ideal Body Weight (IBW): 135 lbs (61 kg)    Admission Body Weight: 77.5 kg (170 lb 13.7 oz)  Current Body Weight: 83.1 kg (183 lb 3.2 oz),   IBW. Weight Source: Bed scale  Current BMI (kg/m2): 28.7  Usual Body Weight: 74.7 kg (164 lb 10.9 oz) (1/20/25)     % Weight Change (Calculated): 9.2  Weight Adjustment For: No Adjustment                 BMI Categories: Overweight (BMI 25.0-29.9)    Estimated Daily Nutrient Needs:  Energy Requirements Based On: Kcal/kg  Weight Used for Energy Requirements: Admission  Energy (kcal/day): 4394-8630 (20-25kcal/kg)  Weight Used for Protein Requirements: Admission  Protein (g/day):  (1.2-2g/kg)  Method Used for Fluid Requirements: 1 ml/kcal  Fluid (ml/day): 7243-8264    Nutrition Diagnosis:   Predicted inadequate energy intake related to increase demand for energy/nutrients as evidenced by wounds, s/p surgery    Nutrition Interventions:   Food and/or Nutrient Delivery: Continue Current Diet, Start Oral Nutrition Supplement  Nutrition Education/Counseling: No recommendation at this time  Coordination of 
Comprehensive Nutrition Assessment    Type and Reason for Visit:  Reassess    Nutrition Recommendations/Plan:   Continue current diet and oral nutrition supplements  Monitor weights, po intakes, labs, fluid status, POC     Malnutrition Assessment:  Malnutrition Status:  At risk for malnutrition (07/15/25 1235)    Context:  Acute Illness       Nutrition Assessment:    Pt up in chair, ate most of her lunch except her green beans. Liking oral supplements, will continue. Pt denies nutrition needs/questions at this time. Plan to d/c to [place] soon. Follow at moderate nutrition risk.    Nutrition Related Findings:    +bumex, bowel meds. GFR 33, BUN 50, Cr 1.6 Wound Type: Surgical Incision       Current Nutrition Intake & Therapies:    Average Meal Intake: %  Average Supplements Intake: %  ADULT DIET; Regular; Low Fat/Low Chol/High Fiber/2 gm Na; No Added Salt (3-4 gm)  ADULT ORAL NUTRITION SUPPLEMENT; Lunch, Dinner; Low Calorie/High Protein Oral Supplement    Anthropometric Measures:  Height: 170.2 cm (5' 7\")  Ideal Body Weight (IBW): 135 lbs (61 kg)    Admission Body Weight: 77.5 kg (170 lb 13.7 oz)  Current Body Weight: 76.8 kg (169 lb 5 oz),   IBW. Weight Source: Standing scale  Current BMI (kg/m2): 26.5  Usual Body Weight: 74.7 kg (164 lb 10.9 oz) (1/20/25)     % Weight Change (Calculated): 9.2  Weight Adjustment For: No Adjustment                 BMI Categories: Overweight (BMI 25.0-29.9)    Estimated Daily Nutrient Needs:  Energy Requirements Based On: Kcal/kg  Weight Used for Energy Requirements: Current  Energy (kcal/day): 0809-8363  (25-30kcal/kg)  Weight Used for Protein Requirements: Current  Protein (g/day): 77-92 (1.0-1.2g/kg)  Method Used for Fluid Requirements: 1 ml/kcal  Fluid (ml/day): 2000    Nutrition Diagnosis:   Predicted inadequate energy intake related to increase demand for energy/nutrients as evidenced by wounds, s/p surgery    Nutrition Interventions:   Food and/or Nutrient Delivery: 
Dr. Choudhary made aware of patient still requiring high oxygen. He is okay with the CT of chest being done at a later time when oxygen requirement is decreased.       VALENTIN AndreaN RN    
EPOC results called to ICU Meghna PA gave verbal order to increase heated HF to 40 LPM and P02 was down from previous. RT notified and settings changed. EPOC repeat due at 1830. BIPAP orders placed as well.  
[facility] Cardiothoracic Surgery  Daily Progress Note    Surgeon:  Yuko    Procedure:  s/p CORONARY ARTERY BYPASS GRAFT x 2, SVG-RCA, SVG-DIAG, AORTIC VALVE REPLACEMENT UTILIZING A 21MM REYES INSPIRIS TISSUE VALVE; LEFT ATRIAL APPENDAGE LIGATION UTILIZING A 50MM ATRICLIP; BILATERAL ENDOSCOPIC GREATER SAPHENOUS VEIN HARVEST; INTRAOPERATIVE TRANSESOPHAGEAL ECHOCARDIOGRAM  on 7/9/25    Subjective:     Patient was seen and examined at bedside this morning without any complaints.    Hospital Course:  06/30/2025 - 7/7/2025: Patient presented to the emergency department complaining of increased oxygen requirements, shortness of breath, leg extremity edema.  Echocardiogram was obtained that revealed abnormality of the aortic valve therefore cardiology team was consulted.  Patient was found to have multivessel coronary artery disease.  CT surgery team was consulted for possible surgical intervention.  Patient medically optimized preoperatively.  7/8/25: Patient was transferred to the ICU with oxygen saturations in the 80s.  She had a history of pulmonary hypertension preoperatively.  An ABG revealed acidosis and this was corrected.  The ventilator was adjusted and the patient sats were in the low 90s.  She came out of the OR on Levophed and epinephrine drip.  7/9/25: Off all drips.  Patient remains intubated this morning.  The PEEP was decreased from 8 to 5.  PO2 is 57.8 pH of 7.39.  Critical care was comfortable extubating to high flow Vapotherm this morning.  We will get a repeat ABG.  If the patient can tolerate Vapotherm we will start aspirin Plavix and Lovenox.  Calcium replacement.  Lasix 20 mg IV once today.  Remove hard mediastinal chest tube.  Restart home medications as appropriate.  Coreg 6.25 ordered.  Electrolyte replacement per protocol.  7/10/25: Patient on Vapotherm this morning.  Tolerated CPAP through the night.  Electrolyte replacement per protocol.  Lasix 
[facility] Cardiothoracic Surgery  Daily Progress Note    Surgeon:  Yuko    Procedure:  s/p CORONARY ARTERY BYPASS GRAFT x 2, SVG-RCA, SVG-DIAG, AORTIC VALVE REPLACEMENT UTILIZING A 21MM REYES INSPIRIS TISSUE VALVE; LEFT ATRIAL APPENDAGE LIGATION UTILIZING A 50MM ATRICLIP; BILATERAL ENDOSCOPIC GREATER SAPHENOUS VEIN HARVEST; INTRAOPERATIVE TRANSESOPHAGEAL ECHOCARDIOGRAM  on 7/9/25    Subjective:     Patient was seen and examined at bedside this morning without any complaints.    Hospital Course:  06/30/2025 - 7/7/2025: Patient presented to the emergency department complaining of increased oxygen requirements, shortness of breath, leg extremity edema.  Echocardiogram was obtained that revealed abnormality of the aortic valve therefore cardiology team was consulted.  Patient was found to have multivessel coronary artery disease.  CT surgery team was consulted for possible surgical intervention.  Patient medically optimized preoperatively.  7/8/25: Patient was transferred to the ICU with oxygen saturations in the 80s.  She had a history of pulmonary hypertension preoperatively.  An ABG revealed acidosis and this was corrected.  The ventilator was adjusted and the patient sats were in the low 90s.  She came out of the OR on Levophed and epinephrine drip.  7/9/25: Off all drips.  Patient remains intubated this morning.  The PEEP was decreased from 8 to 5.  PO2 is 57.8 pH of 7.39.  Critical care was comfortable extubating to high flow Vapotherm this morning.  We will get a repeat ABG.  If the patient can tolerate Vapotherm we will start aspirin Plavix and Lovenox.  Calcium replacement.  Lasix 20 mg IV once today.  Remove hard mediastinal chest tube.  Restart home medications as appropriate.  Coreg 6.25 ordered.  Electrolyte replacement per protocol.  7/10/25: Patient on Vapotherm this morning.  Tolerated CPAP through the night.  Electrolyte replacement per protocol.  Lasix 
[facility] Cardiothoracic Surgery  Daily Progress Note    Surgeon:  Yuko    Procedure:  s/p CORONARY ARTERY BYPASS GRAFT x 2, SVG-RCA, SVG-DIAG, AORTIC VALVE REPLACEMENT UTILIZING A 21MM REYES INSPIRIS TISSUE VALVE; LEFT ATRIAL APPENDAGE LIGATION UTILIZING A 50MM ATRICLIP; BILATERAL ENDOSCOPIC GREATER SAPHENOUS VEIN HARVEST; INTRAOPERATIVE TRANSESOPHAGEAL ECHOCARDIOGRAM  on 7/9/25    Subjective:     Patient was seen and examined at bedside this morning without any complaints.    Hospital Course:  06/30/2025 - 7/7/2025: Patient presented to the emergency department complaining of increased oxygen requirements, shortness of breath, leg extremity edema.  Echocardiogram was obtained that revealed abnormality of the aortic valve therefore cardiology team was consulted.  Patient was found to have multivessel coronary artery disease.  CT surgery team was consulted for possible surgical intervention.  Patient medically optimized preoperatively.  7/8/25: Patient was transferred to the ICU with oxygen saturations in the 80s.  She had a history of pulmonary hypertension preoperatively.  An ABG revealed acidosis and this was corrected.  The ventilator was adjusted and the patient sats were in the low 90s.  She came out of the OR on Levophed and epinephrine drip.  7/9/25: Off all drips.  Patient remains intubated this morning.  The PEEP was decreased from 8 to 5.  PO2 is 57.8 pH of 7.39.  Critical care was comfortable extubating to high flow Vapotherm this morning.  We will get a repeat ABG.  If the patient can tolerate Vapotherm we will start aspirin Plavix and Lovenox.  Calcium replacement.  Lasix 20 mg IV once today.  Remove hard mediastinal chest tube.  Restart home medications as appropriate.  Coreg 6.25 ordered.  Electrolyte replacement per protocol.  7/10/25: Patient on Vapotherm this morning.  Tolerated CPAP through the night.  Electrolyte replacement per protocol.  Lasix 
Mixed venous pO2   36.5  mmHg  
Nephrology Progress Note  7/12/2025 9:48 AM        Subjective:   Admit Date: 6/30/2025  PCP: Luis Buenrostro MD    Interval History: Still requiring high flow oxygen    Diet: Eating some not up to the upper    ROS: Hypoxia/shortness of breath, she is still at 35 L/min with 70% of fraction of inspired oxygen    No fever and acceptable blood pressure, urine output recorded 1.3 L over the last 24 hours    Data:     Current meds:    furosemide  40 mg IntraVENous Once    famotidine  20 mg Oral Daily    Or    famotidine (PEPCID) injection  20 mg IntraVENous Daily    methylPREDNISolone  40 mg IntraVENous Daily    furosemide  40 mg IntraVENous BID    potassium chloride  20 mEq Oral BID WC    insulin lispro  0-8 Units SubCUTAneous 4x Daily AC & HS    budesonide-formoterol  2 puff Inhalation BID RT    ipratropium 0.5 mg-albuterol 2.5 mg  1 Dose Inhalation Q4H RT    carvedilol  6.25 mg Oral BID WC    hydroxychloroquine  300 mg Oral Daily    sodium chloride flush  5-40 mL IntraVENous 2 times per day    enoxaparin  40 mg SubCUTAneous Daily    clopidogrel  75 mg Oral Daily    amiodarone  200 mg Oral BID    chlorhexidine  15 mL Mouth/Throat BID    mupirocin   Each Nostril BID    multivitamin  1 tablet Oral Daily with breakfast    polyethylene glycol  17 g Oral Daily    sennosides-docusate sodium  1 tablet Oral BID    atorvastatin  40 mg Oral Nightly    bacitracin   Topical BID    acetaminophen  650 mg Oral Q6H    aspirin  81 mg Oral Daily      sodium chloride      sodium chloride      EPINEPHrine Stopped (07/08/25 2300)    niCARdipine Stopped (07/08/25 1343)    dextrose           I/O last 3 completed shifts:  In: 1240 [P.O.:1240]  Out: 1705 [Urine:1705]    CBC:   Recent Labs     07/10/25  0425 07/11/25  0510 07/12/25  0630   WBC 18.8* 16.3* 12.5*   HGB 7.9* 7.2* 6.8*   PLT  --   --  141          Recent Labs     07/10/25  1122 07/10/25  1406 07/11/25  0510 07/12/25  0630     --  137 135*   K 4.2  --  4.7 5.0    
Nephrology Progress Note  7/13/2025 10:03 AM        Subjective:   Admit Date: 6/30/2025  PCP: Luis Buenrostro MD    Interval History: I have seen Ms. Gomez in early morning  She was breathing little bit better    Diet: Eating some    ROS: Still with high flow oxygen but lower fraction of inspired oxygen    She made 5.8 L of urine for the last 24 hours, no fever acceptable blood pressure    Data:     Current meds:    lidocaine  1 patch TransDERmal Daily    amLODIPine  2.5 mg Oral Daily    furosemide  80 mg IntraVENous TID    chlorothiazide (DIURIL) 500 mg in sodium chloride 0.9 % 50 mL IVPB  500 mg IntraVENous Q12H    iron sucrose  200 mg IntraVENous Q24H    ipratropium 0.5 mg-albuterol 2.5 mg  1 Dose Inhalation 4x Daily RT    famotidine  20 mg Oral Daily    Or    famotidine (PEPCID) injection  20 mg IntraVENous Daily    methylPREDNISolone  40 mg IntraVENous Daily    insulin lispro  0-8 Units SubCUTAneous 4x Daily AC & HS    budesonide-formoterol  2 puff Inhalation BID RT    carvedilol  6.25 mg Oral BID WC    hydroxychloroquine  300 mg Oral Daily    sodium chloride flush  5-40 mL IntraVENous 2 times per day    enoxaparin  40 mg SubCUTAneous Daily    clopidogrel  75 mg Oral Daily    amiodarone  200 mg Oral BID    chlorhexidine  15 mL Mouth/Throat BID    multivitamin  1 tablet Oral Daily with breakfast    polyethylene glycol  17 g Oral Daily    sennosides-docusate sodium  1 tablet Oral BID    atorvastatin  40 mg Oral Nightly    bacitracin   Topical BID    acetaminophen  650 mg Oral Q6H    aspirin  81 mg Oral Daily      sodium chloride      sodium chloride      EPINEPHrine Stopped (07/08/25 2300)    niCARdipine Stopped (07/08/25 1343)    dextrose           I/O last 3 completed shifts:  In: 1825.9 [P.O.:940; Blood:350.5; IV Piggyback:535.4]  Out: 6165 [Urine:6165]    CBC:   Recent Labs     07/11/25  0510 07/12/25  0630 07/12/25  1611 07/13/25  0640   WBC 16.3* 12.5*  --  15.7*   HGB 7.2* 6.8* 8.7* 8.4*   PLT  -- 
Occupational Therapy     Occupational Therapy Treatment Note  Name: Radha Gomez MRN: [id number] :             1955   Date:  2025   Admission Date: 2025 Room:  -A      Communication with other providers:  RN approved session, co tx with PTA     Subjective:  Patient states:  \"I thought I was on bed rest\"  Pain: noted generalized pain did not rate  Restrictions: General Precautions, Fall Risk Position Activity Restriction  Sternal Precautions: No Pushing, No Pulling, 5# Lifting Restrictions       No one at bedside     Objective:    Observation:  pt was supine in bed upon arrival, agreeable to session  Objective Measures:  Pt alert and oriented     Treatment, including education:  Therapeutic Activity Training:   Therapeutic activity training was instructed today.  Cues were given for safety, sequence, UE/LE placement, visual cues, and balance.       Pt seated in chair upon arrival. Pt completed sit to stand from edge of bed with 2 trials with MIN to MOD A x1-2. Pt completed functional transfer in room with MIN A with increased time and verbal cues for hand placement. Pt completed standing rest break and MIN to MOD A x1-2 and completed functional mobility with 2 trials with CGA and AD used this date. Pt seated in chair. Pt required increased time as oxygen destated to 88 on heated high flow RN aware.     Education: Role of OT, OT POC, safety, benefits of EOB/OOB activity, rationale for treatment  Safety Measures: Gait belt used for safety of pt and therapist, Left in chair, Alarm in place, call light and phone within reach, lines managed     Assessment / Impression:    Patient's tolerance of treatment: good  Adverse Reaction: none  Significant change in status and impact:  none  Barriers to improvement: none     Plan for Next Session:    Continue OT POC     Time in: 1112  Time out:1135   Timed treatment minutes: 23   Total treatment time: 23     Electronically signed by:       Tonya Fernandez 
Occupational Therapy    Occupational Therapy Treatment Note  Name: Radha Gomez MRN: [id number] :   1955   Date:  7/10/2025   Admission Date: 2025 Room:  -A     Communication with other providers:  RN approved session, co tx with PTA    Subjective:  Patient states:  \"I thought I was on bed rest\"  Pain: noted generalized pain did not rate  Restrictions: General Precautions, Fall Risk Position Activity Restriction  Sternal Precautions: No Pushing, No Pulling, 5# Lifting Restrictions       No one at bedside    Objective:    Observation:  pt was supine in bed upon arrival, agreeable to session  Objective Measures:  Pt alert and oriented    Treatment, including education:  Therapeutic Activity Training:   Therapeutic activity training was instructed today.  Cues were given for safety, sequence, UE/LE placement, visual cues, and balance.      Pt supine in bed upon arrival. Pt completed supine to sit with MOD A x2 with verbal cues for sternal precautions.  Pt seated edge of bed with good balance. Pt completed sit to stand from edge of bed with 2 trials with MIN to MOD A x1-2. Pt completed functional transfer from bed to chair with MIN A with increased time and verbal cues for hand placement. Pt completed sit to stand from armed chair with two trials and MIN to MOD A x1-2 and completed functional mobility with 3 trials with CGA and AD used this date. Pt seated in chair. Pt required increased time as oxygen destated to 86 on heated high flow RN aware.    Education: Role of OT, OT POC, safety, benefits of EOB/OOB activity, rationale for treatment  Safety Measures: Gait belt used for safety of pt and therapist, Left in chair, Alarm in place, call light and phone within reach, lines managed    Assessment / Impression:    Patient's tolerance of treatment: good  Adverse Reaction: none  Significant change in status and impact:  none  Barriers to improvement: none    Plan for Next Session:    Continue OT 
Occupational Therapy  [facility] ACUTE CARE OCCUPATIONAL THERAPY RE-EVALUATION    Radha Gomez, 1955, 2006/2006-A, 7/9/2025    Discharge Recommendation: Encourage intensive OT services at discharge, typically 3 hours/day, 5 days/week.     History:  [place]:  The primary encounter diagnosis was Acute on chronic congestive heart failure, unspecified heart failure type ([facility]). Diagnoses of Acute respiratory failure, unspecified whether with hypoxia or hypercapnia (HCC), Essential hypertension, Shortness of breath, Chest pain, Nonrheumatic aortic valve insufficiency, and Coronary artery disease, unspecified vessel or lesion type, unspecified whether angina present, unspecified whether native or transplanted heart were also pertinent to this visit.    Subjective:  Patient states: \"How often are you guys coming?!\"  Pain: Pt reported 7/10 sternal surgical site pain this date  Communication with other providers: PT Ellis RN Mita  Restrictions: General Precautions, Fall Risk, [place], Chest tube, Pate Catheter, IV, Telemetry, BP cuff, Pulse ox, temporary Pacemaker, Arterial Line, 30L Vapo Therm, Sternal precautions, Bed/Chair alarm    Home Setup/Prior level of function:  Social/Functional History  Lives With: Spouse  Type of Home: House  Home Layout: One level  Bathroom Shower/Tub: Tub/Shower unit  Bathroom Toilet: Standard  Bathroom Equipment: Shower chair  Bathroom Accessibility: Accessible  Home Equipment: Oxygen  Receives Help From: Family  Prior Level of Assist for ADLs: Independent  Homemaking Responsibilities: No  Prior Level of Assist for Ambulation: Independent household ambulator (uses no AD)  Prior Level of Assist for Transfers: Independent  Active : No  Patient's  Info:  PROVIDES HER TRANSPORTATION  Mode of Transportation: Car    Examination:  Observation: Supine in chair upon OT arrival. Agreeable to evaluation.  Vision: WFL  Hearing: WFL  Vitals: Stable vitals throughout 
Occupational Therapy  [facility] ACUTE CARE OCCUPATIONAL THERAPY EVALUATION    [place] LUZ Gomez, 1955, 3110/3110-A, 7/5/2025    Discharge Recommendation: continue to assess d/t planned sx 7/8      History:  [place]:  The primary encounter diagnosis was Acute on chronic congestive heart failure, unspecified heart failure type ([facility]). Diagnoses of Acute respiratory failure, unspecified whether with hypoxia or hypercapnia ([facility]), Essential hypertension, Shortness of breath, Chest pain, and Nonrheumatic aortic valve insufficiency were also pertinent to this visit.  Past Medical History:   Diagnosis Date    Heart abnormality     Hypertension     Idiopathic osteoporosis 05/28/2025    Idiopathic osteoporosis 05/28/2025    Other specified disorders of bone density and structure, multiple sites 05/28/2025    Pap smear for cervical cancer screening 04/05/2010    neg    Rheumatoid arthritis with rheumatoid factor of multiple sites without organ or systems involvement ([facility]) 05/28/2025       Subjective:  Patient states: \"You don't know what it means to me to be able to get up and walk with you. This is awesome\"  Pain:  denies  Communication with other providers: co-eval with PT, handoff to RN  Restrictions: General Precautions, Fall Risk    Home Setup/Prior level of function:  Social/Functional History  Lives With: Spouse  Type of Home: House  Home Layout: One level  Bathroom Shower/Tub: Tub/Shower unit  Bathroom Toilet: Standard  Bathroom Equipment: Shower chair  Bathroom Accessibility: Accessible  Home Equipment: Oxygen (O2 CONCENTRATOR)  Receives Help From: Family  Prior Level of Assist for ADLs: Independent  Homemaking Responsibilities: No  Prior Level of Assist for Ambulation: Independent household ambulator, with or without device  Active : No  Patient's  Info:  PROVIDES HER TRANSPORTATION  Mode of Transportation: Car     Examination:  Observation: Seated in recliner upon arrival, agreeable to 
Occupational Therapy Treatment Note    Name: Radha Gomez MRN: [id number] :   1955   Date:  2025   Admission Date: 2025 Room:  A     Restrictions/Precautions:  Restrictions/Precautions  Restrictions/Precautions: General Precautions, Fall Risk Position Activity Restriction  Sternal Precautions: No Pushing, No Pulling, 5# Lifting Restrictions          Communication with other providers: Per chart review and Nurse patient is appropriate for therapeutic intervention. Notify the nurse pain increased after tx.     Subjective:  Patient states:  Agreeable to OT. \"I'm tired now after that!\"  Pain:   4/10 chest incision site     Objective:    Observation: In chair with spouse present   Objective Measures:  Alert and oriented     Treatment, including education:  Therapeutic Activity Training:   Therapeutic activity training was instructed today.  Cues were given for safety, sequence, UE/LE placement, awareness, and balance.    Bed mobility: Min A for BLE sit>supine   Sitting balance:F+ for dyn  Sit/stand transfers:Min-Mod A due to slight retropulsion; performing repetitive reps in order to improve IND and safey. Demo F+ techs and F activity tolerance  Functional Mobility: CGA around the room<>bathroom + unit hallway ~170' with 1 standing RB due to SOB utilizing RW     Activities performed today included bed mobility training, transfers, functional mobility  to increase strength, activity tolerance to facilitate IND c ADL tasks, func transfers / mobility with G safety awareness carryover     Self Care Training:   Cues were given for safety, sequence, UE/LE placement, visual cues, and balance. Activities performed today included dressing, toileting, personal hygiene, and grooming task.  Grooming: CGA while standing at sink with 1 UE support  UB bathing: CGA while standing at sink with 1 UE support at all times with F upright posture due to fatigue  UB dressing: Min A to [facility] jimmie FREITAS 
Occupational Therapy Treatment Note    Name: Radha Gomez MRN: [id number] :   1955   Date:  7/15/2025   Admission Date: 2025 Room:  A     Restrictions/Precautions:  Restrictions/Precautions  Restrictions/Precautions: General Precautions, Fall Risk Position Activity Restriction  Sternal Precautions: No Pushing, No Pulling, 5# Lifting Restrictions         Communication with other providers: Per chart review and Nurse patient is appropriate for therapeutic intervention. Notify the nurse pain increased after tx.    Subjective:  Patient states:  Agreeable to OT. \"It feels so good to brush my teeth.\"  Pain:   4/10 chest incision site    Objective:    Observation: In chair with spouse present   Objective Measures:  Alert and oriented    Treatment, including education:  Therapeutic Activity Training:   Therapeutic activity training was instructed today.  Cues were given for safety, sequence, UE/LE placement, awareness, and balance.      Sitting balance:F+ for dyn  Sit/stand transfers:Min-Mod A due to slight retropulsion; performing repetitive reps in order to improve IND and safey. Demo F+ techs and F activity tolerance  Functional Mobility: CGA around the room<>bathroom + unit hallway ~160' with 1 standing RB due to SOB utilizing RW    Activities performed today included bed mobility training, transfers, functional mobility  to increase strength, activity tolerance to facilitate IND c ADL tasks, func transfers / mobility with G safety awareness carryover    Self Care Training:   Cues were given for safety, sequence, UE/LE placement, visual cues, and balance. Activities performed today included dressing, toileting, personal hygiene, and grooming task.  Grooming: CGA while standing at sink with 1 UE support  UB bathing: CGA while standing at sink with 1 UE support at all times with F upright posture due to fatigue  Require 1 seated RB for ~5 mins for deep diaphragmatic breathing exercises  
PT  called and asked for update. PT  provided security code and update was given to .   
PT FI02 decreased from 60 % to 50 % and PT oxygen dropped to 86%.  notified and she ordered to place patient back on 60% Fi02 and 6 peep and place PT on Spontaneous. RT notified and will be over to change settings.  
PT off floor to OR for CABG. Assessment complete prior to surgery.  
PT son and  updated on PT extubation and current status. Both stated they will be up soon to visit.  
Patient is doing well getting up out of bed and chair with minimal assistance. Patient is safe walking with standard walker.     After rounding with Dr. Huntley, patient is going to be dischaged home today instead of [place].     Patient will need different oxygen tank for travel. Order placed for home O2 toyin Mcintosh in pulmonary called as well.     Patients  called and updated on discharge plan.       SHAYY Andrea RN    
Patient switched to HFNC at 5 lpm. Will continue to monitor.  
Per pt request no morning meds given d/t npo status. She prefers to take her medication with food to prevent stomach ache. Pt updated on poc and new time of MARY. On going care provided.   
Physical Therapy    Physical Therapy Treatment Note  Name: Radha Gomez MRN: [id number] :   1955   Date:  2025   Admission Date: 2025 Room:  A   Restrictions/Precautions:  Restrictions/Precautions  Restrictions/Precautions: General Precautions, Fall Risk Position Activity Restriction  Sternal Precautions: No Pushing, No Pulling, 5# Lifting Restrictions       Communication with other providers:  per nurse ok to switch from vapo therm to re breather for gt. Nurse request pt don a cardiac bra before amb  Subjective:  Patient states:  pt motivated and agreeable to tx. Pt c/o her thighs are swollen  Pain:   Location, Type, Intensity (0/10 to 10/10):  rates pain 4  Objective:    Observation:  alert and oriented and on 25 liters portable O2 for gt and vapo therm at rest  Objective Measures:  O2 staying inn 90's during gt  Treatment, including education/measures:  Sit<=>stand min assist  Amb with cw cga/min 150'  Ex in sitting and long sitting:  Shoulder rolls with deep breathing 8 reps forward and backward  10 reps aps   10 reps laqs  For swelling in thighs pt did 10 reps aps followed with  10 reps quad sets followed with 10 reps glut sets  Left up in chair with call light and alarm on. Gt belt used for transfers and gt.   Assessment / Impression:      Patient's tolerance of treatment:  good   Adverse Reaction: na  Significant change in status and impact:  na  Barriers to improvement:  strength, safety and breathing  Plan for Next Session:    Cont. POC                Time in:  0940  Time out:  1020  Timed treatment minutes:  40  Total treatment time:  40    Previously filed items:  Social/Functional History  Lives With: Spouse  Type of Home: House  Home Layout: One level  Bathroom Shower/Tub: Tub/Shower unit  Bathroom Toilet: Standard  Bathroom Equipment: Shower chair  Bathroom Accessibility: Accessible  Home Equipment: Oxygen  Receives Help From: Family  Prior Level of Assist for ADLs: 
Physical Therapy    Physical Therapy Treatment Note  Name: Radha Gomez MRN: [id number] :   1955   Date:  2025   Admission Date: 2025 Room:  A   Restrictions/Precautions:  Restrictions/Precautions  Restrictions/Precautions: General Precautions, Fall Risk Position Activity Restriction  Sternal Precautions: No Pushing, No Pulling, 5# Lifting Restrictions       Communication with other providers:  nurse ed tx cotx garcia    Subjective:  Patient states: Pt. Agreeable to work with therapy.    Pain:  (0/10 to 10/10):    Objective:    Observation: Pt. In chair  Objective Measures:    Treatment, including education/measures:  STS: Milka-modAx1/2  SPT: Milka x1 vc for full turn, no AD, very guarded, dec step ht when pivoting  Standing balance:F with no AD, min-CGA x~2'  TherEx  LAQ, DF/PF vc for proper form  Gait Training:  Cues were given for safety, sequence, device management, balance, posture, awareness, path.    Amb x5-10' x2 sets, no AD. Rest breaks seated between. Fwd/back steps    Safety  Patient left safely in the chair, with call light/phone in reach with alarm applied.  Gait belt was used for transfers and gait. Nursing updated post-session    Education:   Pt. Educated on importance of out of bed activity, safe functional mobility, and walker management.     Assessment / Impression:      Patient's tolerance of treatment:  good   Adverse Reaction: na  Significant change in status and impact:  na  Barriers to improvement:  weakness and endurance  Plan for Next Session:    Cont per POC and progress as able.               Time in:  1112  Time out:  1136  Timed treatment minutes: 24  Total treatment time:  24      Previously filed items:  Social/Functional History  Lives With: Spouse  Type of Home: House  Home Layout: One level  Bathroom Shower/Tub: Tub/Shower unit  Bathroom Toilet: Standard  Bathroom Equipment: Shower chair  Bathroom Accessibility: Accessible  Home Equipment: Oxygen  Receives 
Physical Therapy    Physical Therapy Treatment Note  Name: Radha Gomez MRN: [id number] :   1955   Date:  2025   Admission Date: 2025 Room:  A   Restrictions/Precautions:  Restrictions/Precautions  Restrictions/Precautions: General Precautions, Fall Risk Position Activity Restriction  Sternal Precautions: No Pushing, No Pulling, 5# Lifting Restrictions       Communication with other providers:  Hand off with Nurse    Subjective:  Patient states: Pt. Agreeable to work with therapy.    Pain:  (0/10 to 10/10):  4/10   Objective:    Observation: Pt. Up in recliner upon arrival to room   Objective Measures:  15L HFNC while walking and vapo therm while in room.   Treatment, including education/measures:  Therapeutic Activity Training:   Therapeutic activity training was instructed today.  Cues were given for safety, sequence, UE/LE placement, awareness, and balance.    Activities performed today included:    Bed Mobility: DNT, OOB pre/post session   Sit to stand transfer: ModA    Sitting balance:SBA,unsupported in recliner  Standing balance:CGA with Cardiac Walker     Gait Training:  Cues were given for safety, sequence, device management, balance, posture, awareness, path.    Amb 4x50ft, CGA, Cardiac Walker, ~6 minutes to complete. Pt. Requires standing resting breaks.  Pt. With decreased darrick, decreased step height and length. Pt. With mild SOB while walking, SpO2 >90% throughout.     Safety  Patient left safely in the recliner, with call light/phone in reach with alarm applied.  Gait belt was used for transfers and gait. Nursing updated post-session    Education:   Pt. Educated on importance of out of bed activity, safe functional mobility, and walker management.     Assessment / Impression:      Patient's tolerance of treatment:  Great   Adverse Reaction: none  Significant change in status and impact:  none  Barriers to improvement:  Decreased endurance    Plan for Next Session:    Cont 
Physical Therapy    Physical Therapy Treatment Note  Name: Radha Gomez MRN: [id number] :   1955   Date:  7/10/2025   Admission Date: 2025 Room:     Restrictions/Precautions:  Restrictions/Precautions  Restrictions/Precautions: General Precautions, Fall Risk Position Activity Restriction  Sternal Precautions: No Pushing, No Pulling, 5# Lifting Restrictions       Communication with other providers:  nurse ed tx cotx garcia    Subjective:  Patient states: Pt. Agreeable to work with therapy.    Pain:  (0/10 to 10/10):    Objective:    Observation: Pt. Supine in bed upon arrival to room   Objective Measures:    Treatment, including education/measures:  Bed Mobility: modAx2  STS: Milka-modAx1/2  SPT: Milka x1 vc for full turn, no AD, very guarded    Sitting balance:F+  Standing balance:F with no AD, min-CGA x~2-3' seated rest between    Gait Training:  Cues were given for safety, sequence, device management, balance, posture, awareness, path.    Amb x5',5',5', no AD. Rest breaks seated     Safety  Patient left safely in the chair, with call light/phone in reach with alarm applied.  Gait belt was used for transfers and gait. Nursing updated post-session    Education:   Pt. Educated on importance of out of bed activity, safe functional mobility, and walker management.     Assessment / Impression:      Patient's tolerance of treatment:  good   Adverse Reaction: na  Significant change in status and impact:  na  Barriers to improvement:  weakness and endurance  Plan for Next Session:    Cont per POC and progress as able.               Time in:  1311  Time out:  1353  Timed treatment minutes: 42  Total treatment time:  42      Previously filed items:  Social/Functional History  Lives With: Spouse  Type of Home: House  Home Layout: One level  Bathroom Shower/Tub: Tub/Shower unit  Bathroom Toilet: Standard  Bathroom Equipment: Shower chair  Bathroom Accessibility: Accessible  Home Equipment: Oxygen  Receives 
Physical Therapy    Tx att, pt being prepped for transfusion, rec to hold tx until later per nurse    Electronically signed by:    Jose Enrique Grace, PTA  7/12/2025, 11:40 AM'      
Physical Therapy  Facility/Department: [facility] 3N  Physical Therapy Initial Assessment    Name: Radha Gomez  : 1955  MRN: [id number]  Date of Service: 2025    Discharge Recommendations:  Continue to assess pending progress (will continue to assist patient with OOB mobility at this time and re-eval after CABG/AVR on 25)          Patient Diagnosis(es): The primary encounter diagnosis was Acute on chronic congestive heart failure, unspecified heart failure type (HCC). Diagnoses of Acute respiratory failure, unspecified whether with hypoxia or hypercapnia (HCC), Essential hypertension, Shortness of breath, Chest pain, and Nonrheumatic aortic valve insufficiency were also pertinent to this visit.  Past Medical History:  has a past medical history of Heart abnormality, Hypertension, Idiopathic osteoporosis, Idiopathic osteoporosis, Other specified disorders of bone density and structure, multiple sites, Pap smear for cervical cancer screening, and Rheumatoid arthritis with rheumatoid factor of multiple sites without organ or systems involvement ([facility]).  Past Surgical History:  has a past surgical history that includes  section; Ankle fracture surgery; Colonoscopy; Cardiac procedure (N/A, 2025); and Cardiac procedure (N/A, 2025).    Assessment  Body Structures, Functions, Activity Limitations Requiring Skilled Therapeutic Intervention: Decreased functional mobility ;Decreased endurance;Decreased ADL status;Decreased balance;Decreased high-level IADLs;Decreased strength  Therapy Prognosis: Good  Decision Making: High Complexity  Clinical Presentation: unpredictable characteristics  Requires PT Follow-Up: Yes  Activity Tolerance  Activity Tolerance: Patient tolerated evaluation without incident    Plan  Physical Therapy Plan  General Plan: 5-7 times per week  Current Treatment Recommendations: Strengthening, ROM, Balance training, Functional mobility training, Transfer training, 
Physical Therapy  Facility/Department: [facility]  Physical Therapy Re-Evaluation    Name: Radha Gomez  : 1955  MRN: [id number]  Date of Service: 2025    Discharge Recommendations:      Facility for intensive rehabilitation, anticipate 3 hours per day and 5 days per week.           Patient Diagnosis(es): The primary encounter diagnosis was Acute on chronic congestive heart failure, unspecified heart failure type (HCC). Diagnoses of Acute respiratory failure, unspecified whether with hypoxia or hypercapnia (HCC), Essential hypertension, Shortness of breath, Chest pain, Nonrheumatic aortic valve insufficiency, and Coronary artery disease, unspecified vessel or lesion type, unspecified whether angina present, unspecified whether native or transplanted heart were also pertinent to this visit.  Now s/p CORONARY ARTERY BYPASS GRAFT x 2 and AORTIC VALVE REPLACEMENT     Past Medical History:  has a past medical history of Heart abnormality, Hypertension, Idiopathic osteoporosis, Idiopathic osteoporosis, Other specified disorders of bone density and structure, multiple sites, Pap smear for cervical cancer screening, and Rheumatoid arthritis with rheumatoid factor of multiple sites without organ or systems involvement (HCC).  Past Surgical History:  has a past surgical history that includes  section; Ankle fracture surgery; Colonoscopy; Cardiac procedure (N/A, 2025); Cardiac procedure (N/A, 2025); and Coronary artery bypass graft (N/A, 2025).    Assessment  Body Structures, Functions, Activity Limitations Requiring Skilled Therapeutic Intervention: Decreased functional mobility ;Decreased endurance;Decreased balance;Decreased high-level IADLs;Decreased strength;Decreased ADL status;Increased pain  Therapy Prognosis: Good  Decision Making: High Complexity  Clinical Presentation: unpredictable characteristics  Requires PT Follow-Up: Yes  Activity Tolerance  Activity Tolerance: Patient tolerated 
Physical Therapy  PT/OT att, pt requests holding tx at this time    
Pt A&O. Following all commands.     Respiratory Vitals   RR 22  Vt 702  RSBI 33  NIF -44.    Order to extubate to vapotherm from Dr Huntley.     Pt extubated to Vapotherm as ordered.    40L 75% FIO2.    Pt tolerating well.          
Pt arrived to room 2006 from [facility] at approximately 1302.    Initial vent settings changed to acvc, vt 450, rr 20, peep 8 and 100%    8.0 ETT advanced to 23 cm yin post x-ray.  
Pt off unit in procedure.  
Pt seen and examined dw ct-s and pt and give diuretic and monitor renal. Full note to follow  
Pulmonary and Critical Care  Progress Note      VITALS:  /61   Pulse 72   Temp 98.2 °F (36.8 °C) (Core)   Resp 20   Ht 1.702 m (5' 7\")   Wt 81.6 kg (179 lb 14.3 oz)   SpO2 94%   BMI 28.18 kg/m²     Subjective:   CHIEF COMPLAINT :SOB     HPI:                The patient is a 69 y.o. female has been been extubated and doing well. She is on low dose pressors. She is in mild resp distress. She still has output from the chest tube    Objective:   PHYSICAL EXAM:    LUNGS:Occasional basal crackles  Abd-soft, BS+,NT  Ext- 1 + pedal edema  CVS-s1s2, ESM in aortic area      DATA:    CBC:  Recent Labs     07/07/25  0547 07/08/25  1315 07/08/25  2150 07/09/25  0310 07/09/25  0545 07/09/25  0830   WBC 7.3 18.0* 15.4* 14.1* 14.6* 15.3*   RBC 4.33 3.42* 3.17* 3.15* 3.11* 3.16*   HGB 12.1* 9.4* 8.8* 8.7* 8.6* 8.9*   HCT 38.5 30.5* 27.9* 27.9* 27.6* 28.3*    114* 141  --   --   --    MCV 88.9 89.2 88.0 88.6 88.7 89.6   MCH 27.9 27.5 27.8 27.6 27.7 28.2   MCHC 31.4* 30.8* 31.5* 31.2* 31.2* 31.4*   RDW 14.6 14.3 14.6 14.7 14.7 14.8      BMP:  Recent Labs     07/09/25  0545 07/09/25  0658 07/09/25  0731 07/09/25  0830 07/09/25  1130     --   --  146* 145   K 4.5  --   --  4.2 4.4   *  --   --  115* 115*   CO2 19*  --   --  19* 18*   BUN 21*  --   --  21* 21*   CREATININE 1.2   < > 1.3* 1.2 1.3*   CALCIUM 7.3*  --   --  7.3* 8.4   GLUCOSE 110*  --   --  114* 120*    < > = values in this interval not displayed.      ABG:  No results for input(s): \"PH\", \"PO2ART\", \"IRA3DIG\", \"HCO3\", \"BEART\", \"O2SAT\" in the last 72 hours.  BNP  No results found for: \"BNP\"   D-Dimer:  Lab Results   Component Value Date    DDIMER 5.22 (H) 12/06/2024      Radiology: There is small right pleural effusion. Mild biapical pleural parenchymal changes   present. There is mild cardiomegaly, similar to the previous examination.   Cardiac postsurgical changes present. There is a right IJ approach [place]-[employer]es   catheter with tip in the 
Pulmonary and Critical Care  Progress Note      VITALS:  /63   Pulse 77   Temp 98 °F (36.7 °C) (Oral)   Resp 19   Ht 1.702 m (5' 7\")   Wt 76.8 kg (169 lb 6.4 oz)   SpO2 95%   BMI 26.53 kg/m²     Subjective:   CHIEF COMPLAINT :SOB     HPI:                The patient is a 69 y.o. female is sitting in the chair. She is still on high fio2. She is in mild resp distress      Objective:   PHYSICAL EXAM:    LUNGS:Occasional basal crackles  Abd-soft, BS+,NT  Ext- 1 + pedal edema  CVS-s1s2, no murmurs      DATA:    CBC:  Recent Labs     07/13/25  0640 07/14/25  0710 07/15/25  0346   WBC 15.7* 17.1* 17.9*   RBC 2.98* 3.01* 3.14*   HGB 8.4* 8.5* 8.8*   HCT 25.9* 26.4* 27.9*    231 277   MCV 86.9 87.7 88.9   MCH 28.2 28.2 28.0   MCHC 32.4 32.2 31.5*   RDW 15.1* 15.3* 15.5*      BMP:  Recent Labs     07/13/25  0640 07/14/25  0710 07/15/25  0346   * 138 137   K 3.6 3.9 3.5   CL 97* 98* 96*   CO2 25 26 28   BUN 42* 47* 50*   CREATININE 1.7* 1.6* 1.6*   CALCIUM 8.9 9.6 9.3   GLUCOSE 116* 111* 103*      ABG:  No results for input(s): \"PH\", \"PO2ART\", \"XCB7OQZ\", \"HCO3\", \"BEART\", \"O2SAT\" in the last 72 hours.  BNP  No results found for: \"BNP\"   D-Dimer:  Lab Results   Component Value Date    DDIMER 5.22 (H) 12/06/2024      Radiology:  No significant remaining right basal opacities. Unchanged mild coarsened   interstitial opacities which may be due to chronic lung disease and/or mild   interstitial edema.          1.  Stable triangular shaped nodular opacity in the right middle lobe measuring   1 x 0.9 cm on image 67 of axial series 4. Continued follow-up as per Fleischner   S[employer] criteria.  2.  Interval postsurgical changes of a median sternotomy.  3.  There is some widening of the sternotomy site measuring up to 5 mm on image   34 of axial series 2. Please correlate with surgical history.   4.  Interval development of a small right-sided pleural effusion with either   adjacent atelectasis or airspace 
Pulmonary and Critical Care  Progress Note      VITALS:  /63   Pulse 81   Temp 98.2 °F (36.8 °C) (Oral)   Resp 15   Ht 1.702 m (5' 7\")   Wt 83.1 kg (183 lb 3.2 oz)   SpO2 93%   BMI 28.69 kg/m²     Subjective:   CHIEF COMPLAINT :SOB     HPI:                The patient is a 69 y.o. female is sitting in the chair. She says that she is breathing better now. Her Fio2 is 50% with sats of 96%. She is in mild resp distress. Her CXR is showing improvement    Objective:   PHYSICAL EXAM:    LUNGS:Occasional basal crackles  Abd-soft, BS+,NT  Ext- 1 + pedal edema  CVS-s1s2, no murmurs      DATA:    CBC:  Recent Labs     07/10/25  0425 07/11/25  0510 07/12/25  0630   WBC 18.8* 16.3* 12.5*   RBC 2.86* 2.59* 2.46*   HGB 7.9* 7.2* 6.8*   HCT 25.7* 23.6* 22.0*   PLT  --   --  141   MCV 89.9 91.1 89.4   MCH 27.6 27.8 27.6   MCHC 30.7* 30.5* 30.9*   RDW 15.7* 15.8* 15.8*      BMP:  Recent Labs     07/10/25  1122 07/10/25  1406 07/11/25  0510 07/12/25  0630     --  137 135*   K 4.2  --  4.7 5.0     --  108 106   CO2 19*  --  20* 19*   BUN 27*  --  28* 31*   CREATININE 1.6* 2.0* 1.6* 1.6*   CALCIUM 6.9*  --  7.5* 8.2*   GLUCOSE 150*  --  121* 128*      ABG:  No results for input(s): \"PH\", \"PO2ART\", \"PIM9CVD\", \"HCO3\", \"BEART\", \"O2SAT\" in the last 72 hours.  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 
Pulmonary and Critical Care  Progress Note      VITALS:  /66   Pulse 83   Temp 98.2 °F (36.8 °C) (Oral)   Resp 16   Ht 1.702 m (5' 7\")   Wt 84.4 kg (186 lb)   SpO2 94%   BMI 29.13 kg/m²     Subjective:   CHIEF COMPLAINT :SOB     HPI:                The patient is a 69 y.o. female is lying in the bed. She is in mild resp distress. She has been extubated. Her chest tubes have been removed.     Objective:   PHYSICAL EXAM:    LUNGS:Occasional basal crackles  Abd-soft, BS+,NT  Ext- 1 + pedal edema  CVS-s1s2, no murmurs      DATA:    CBC:  Recent Labs     07/08/25  1315 07/08/25  2150 07/09/25  0310 07/09/25  0545 07/09/25  0830 07/10/25  0425   WBC 18.0* 15.4*   < > 14.6* 15.3* 18.8*   RBC 3.42* 3.17*   < > 3.11* 3.16* 2.86*   HGB 9.4* 8.8*   < > 8.6* 8.9* 7.9*   HCT 30.5* 27.9*   < > 27.6* 28.3* 25.7*   * 141  --   --   --   --    MCV 89.2 88.0   < > 88.7 89.6 89.9   MCH 27.5 27.8   < > 27.7 28.2 27.6   MCHC 30.8* 31.5*   < > 31.2* 31.4* 30.7*   RDW 14.3 14.6   < > 14.7 14.8 15.7*    < > = values in this interval not displayed.      BMP:  Recent Labs     07/09/25  1130 07/09/25  1327 07/10/25  0403 07/10/25  0425 07/10/25  1122     --   --  142 138   K 4.4  --   --  4.7 4.2   *  --   --  111* 109   CO2 18*  --   --  19* 19*   BUN 21*  --   --  25* 27*   CREATININE 1.3*   < > 1.6* 1.6* 1.6*   CALCIUM 8.4  --   --  7.5* 6.9*   GLUCOSE 120*  --   --  138* 150*    < > = values in this interval not displayed.      ABG:  No results for input(s): \"PH\", \"PO2ART\", \"WYJ7BYU\", \"HCO3\", \"BEART\", \"O2SAT\" in the last 72 hours.  BNP  No results found for: \"BNP\"   D-Dimer:  Lab Results   Component Value Date    DDIMER 5.22 (H) 12/06/2024      Radiology: FINDINGS REMAIN CONSISTENT WITH CHF AND SHOW NO SIGNIFICANT CHANGE FROM PRIOR   STUDY. RIGHT BASILAR AIRSPACE DISEASE PERSISTS.      Assessment/Plan     Patient Active Problem List    Diagnosis Date Noted    Coronary artery disease 07/08/2025    Acute on 
Pulmonary and Critical Care  Progress Note      VITALS:  /73   Pulse 68   Temp 98.6 °F (37 °C) (Oral)   Resp 18   Ht 1.702 m (5' 7\")   Wt 79.7 kg (175 lb 12.8 oz)   SpO2 96%   BMI 27.53 kg/m²     Subjective:   CHIEF COMPLAINT :SOB     HPI:                The patient is a 69 y.o. female is sitting in the chair. She is in mild resp distress. She is on 50% fio2 sats 97%.    Objective:   PHYSICAL EXAM:    LUNGS:Occasional basal crackles  Abd-soft, BS+,NT  Ext- 1 + pedal edema  CVS-s1s2, no murmurs      DATA:    CBC:  Recent Labs     07/12/25  0630 07/12/25  1611 07/13/25  0640 07/14/25  0710   WBC 12.5*  --  15.7* 17.1*   RBC 2.46*  --  2.98* 3.01*   HGB 6.8* 8.7* 8.4* 8.5*   HCT 22.0* 27.4* 25.9* 26.4*     --  184 231   MCV 89.4  --  86.9 87.7   MCH 27.6  --  28.2 28.2   MCHC 30.9*  --  32.4 32.2   RDW 15.8*  --  15.1* 15.3*      BMP:  Recent Labs     07/12/25  1611 07/13/25  0640 07/14/25  0710   * 133* 138   K 4.2 3.6 3.9    97* 98*   CO2 20* 25 26   BUN 37* 42* 47*   CREATININE 1.8* 1.7* 1.6*   CALCIUM 8.7 8.9 9.6   GLUCOSE 158* 116* 111*      ABG:  No results for input(s): \"PH\", \"PO2ART\", \"TIN0QZI\", \"HCO3\", \"BEART\", \"O2SAT\" in the last 72 hours.  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 
Pulmonary and Critical Care  Progress Note      VITALS:  BP (!) 114/58   Pulse 80   Temp 98.2 °F (36.8 °C) (Oral)   Resp 16   Ht 1.702 m (5' 7\")   Wt 83.1 kg (183 lb 3.2 oz)   SpO2 91%   BMI 28.69 kg/m²     Subjective:   CHIEF COMPLAINT :SOB     HPI:                The patient is a 69 y.o. female is sitting in the chair. She is in mild resp distress. She had the right IJ removed and has the PICC    Objective:   PHYSICAL EXAM:    LUNGS:Occasional basal crackles  Abd-soft, BS+,NT  Ext- 1 + pedal edema  CVS-s1s2, no murmurs      DATA:    CBC:  Recent Labs     07/08/25  1315 07/08/25  2150 07/09/25  0310 07/09/25  0830 07/10/25  0425 07/11/25  0510   WBC 18.0* 15.4*   < > 15.3* 18.8* 16.3*   RBC 3.42* 3.17*   < > 3.16* 2.86* 2.59*   HGB 9.4* 8.8*   < > 8.9* 7.9* 7.2*   HCT 30.5* 27.9*   < > 28.3* 25.7* 23.6*   * 141  --   --   --   --    MCV 89.2 88.0   < > 89.6 89.9 91.1   MCH 27.5 27.8   < > 28.2 27.6 27.8   MCHC 30.8* 31.5*   < > 31.4* 30.7* 30.5*   RDW 14.3 14.6   < > 14.8 15.7* 15.8*    < > = values in this interval not displayed.      BMP:  Recent Labs     07/10/25  0425 07/10/25  0603 07/10/25  1122 07/10/25  1406 07/11/25  0510     --  138  --  137   K 4.7  --  4.2  --  4.7   *  --  109  --  108   CO2 19*  --  19*  --  20*   BUN 25*  --  27*  --  28*   CREATININE 1.6*   < > 1.6* 2.0* 1.6*   CALCIUM 7.5*  --  6.9*  --  7.5*   GLUCOSE 138*  --  150*  --  121*    < > = values in this interval not displayed.      ABG:  No results for input(s): \"PH\", \"PO2ART\", \"APC0WMV\", \"HCO3\", \"BEART\", \"O2SAT\" in the last 72 hours.  BNP  No results found for: \"BNP\"   D-Dimer:  Lab Results   Component Value Date    DDIMER 5.22 (H) 12/06/2024      Radiology: right-sided PICC line placement. Tip in the region of the   cavoatrial junction. Cardiac silhouette normal size. Density right lung base   relatively similar mainly to the layering effusion with atelectasis. Basilar   pneumonia also possible. Diffuse 
Pulmonary and Critical Care  Progress Note      VITALS:  BP (!) 122/58   Pulse 77   Temp 98.3 °F (36.8 °C) (Oral)   Resp 18   Ht 1.702 m (5' 7\")   Wt 81.1 kg (178 lb 12.8 oz)   SpO2 93%   BMI 28.00 kg/m²     Subjective:   CHIEF COMPLAINT :SOB     HPI:                The patient is a 69 y.o. female is walking in the gardner. She is looking better. She is in mild resp distress      Objective:   PHYSICAL EXAM:    LUNGS:Occasional basal crackles  Abd-soft, BS+,NT  Ext- 1 + pedal edema  CVS-s1s2, no murmurs      DATA:    CBC:  Recent Labs     07/11/25  0510 07/12/25  0630 07/12/25  1611 07/13/25  0640   WBC 16.3* 12.5*  --  15.7*   RBC 2.59* 2.46*  --  2.98*   HGB 7.2* 6.8* 8.7* 8.4*   HCT 23.6* 22.0* 27.4* 25.9*   PLT  --  141  --  184   MCV 91.1 89.4  --  86.9   MCH 27.8 27.6  --  28.2   MCHC 30.5* 30.9*  --  32.4   RDW 15.8* 15.8*  --  15.1*      BMP:  Recent Labs     07/12/25  0630 07/12/25  1611 07/13/25  0640   * 135* 133*   K 5.0 4.2 3.6    101 97*   CO2 19* 20* 25   BUN 31* 37* 42*   CREATININE 1.6* 1.8* 1.7*   CALCIUM 8.2* 8.7 8.9   GLUCOSE 128* 158* 116*      ABG:  No results for input(s): \"PH\", \"PO2ART\", \"VXU5IHP\", \"HCO3\", \"BEART\", \"O2SAT\" in the last 72 hours.  BNP  No results found for: \"BNP\"   D-Dimer:  Lab Results   Component Value Date    DDIMER 5.22 (H) 12/06/2024      Radiology: 1.  Mild worsening alveolar opacity at the right lung base     2.  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    
Pulmonary and Critical Care  Progress Note      VITALS:  BP (!) 145/68   Pulse 79   Temp 98.1 °F (36.7 °C) (Oral)   Resp 20   Ht 1.702 m (5' 7\")   Wt 77.2 kg (170 lb 3.1 oz)   SpO2 94%   BMI 26.66 kg/m²     Subjective:   CHIEF COMPLAINT :SOB     HPI:                The patient is a 69 y.o. female is lying in th bed. She is in mild resp distress      Objective:   PHYSICAL EXAM:    LUNGS:Occasional basal crackles  Abd-soft, BS+,NT  Ext- no pedal edema  CVS-s1s2, no murmurs        DATA:    CBC:  Recent Labs     07/05/25  0322 07/07/25  0547   WBC 8.2 7.3   RBC 4.38 4.33   HGB 12.1* 12.1*   HCT 37.7 38.5    220   MCV 86.1 88.9   MCH 27.6 27.9   MCHC 32.1 31.4*   RDW 14.3 14.6      BMP:  Recent Labs     07/05/25 0322 07/06/25  0836 07/07/25  0547    138 137   K 4.1 4.3 4.4    105 105   CO2 21 21 20*   BUN 20 26* 23*   CREATININE 1.6* 1.6* 1.3*   CALCIUM 8.7 8.7 9.1   GLUCOSE 81 123* 96      ABG:  No results for input(s): \"PH\", \"PO2ART\", \"PKW6LEK\", \"HCO3\", \"BEART\", \"O2SAT\" in the last 72 hours.  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 
Pulmonary and Critical Care  Progress Note      VITALS:  BP (!) 146/63   Pulse 71   Temp 98.1 °F (36.7 °C) (Oral)   Resp 25   Ht 1.702 m (5' 7\")   Wt 77.3 kg (170 lb 6.7 oz)   SpO2 95%   BMI 26.69 kg/m²     Subjective:   CHIEF COMPLAINT :SOB     HPI:                The patient is a 69 y.o. female is on the vent and sedated. She had CABG x 2 today with AVR tissue. She has minimal response to the painful stimuli    Objective:   PHYSICAL EXAM:    LUNGS:Occasional basal crackles  Abd-soft, BS+,NT  Ext- 1 + pedal edema  CVS-s1s2, ESM in aortic area      DATA:    CBC:  Recent Labs     07/07/25  0547   WBC 7.3   RBC 4.33   HGB 12.1*   HCT 38.5      MCV 88.9   MCH 27.9   MCHC 31.4*   RDW 14.6      BMP:  Recent Labs     07/06/25  0836 07/07/25  0547 07/08/25  0830 07/08/25  1116 07/08/25  1144 07/08/25  1210    137  --   --   --   --    K 4.3 4.4  --   --   --   --     105  --   --   --   --    CO2 21 20*  --   --   --   --    BUN 26* 23*  --   --   --   --    CREATININE 1.6* 1.3*   < > 1.2 1.3* 1.3*   CALCIUM 8.7 9.1  --   --   --   --    GLUCOSE 123* 96  --   --   --   --     < > = values in this interval not displayed.      ABG:  No results for input(s): \"PH\", \"PO2ART\", \"PFF6PAT\", \"HCO3\", \"BEART\", \"O2SAT\" in the last 72 hours.  BNP  No results found for: \"BNP\"   D-Dimer:  Lab Results   Component Value Date    DDIMER 5.22 (H) 12/06/2024      Radiology: Customary positioning of support lines as above without evidence of active   pulmonary process      Assessment/Plan     Patient Active Problem List    Diagnosis Date Noted    Acute on chronic congestive heart failure (HCC) 07/05/2025    Other specified disorders of bone density and structure, multiple sites 05/28/2025    Idiopathic osteoporosis 05/28/2025    Rheumatoid arthritis with rheumatoid factor of multiple sites without organ or systems involvement (HCC) 05/28/2025    Chest pain 04/28/2025    Valvular heart disease 12/11/2024    Heart failure 
Pulmonary and Critical Care  Progress Note      VITALS:  BP (!) 160/68   Pulse 77   Temp 97.8 °F (36.6 °C) (Oral)   Resp 19   Ht 1.702 m (5' 7\")   Wt 75.3 kg (166 lb)   SpO2 (!) 89%   BMI 26.00 kg/m²     Subjective:   CHIEF COMPLAINT :SOB     HPI:                The patient is a 69 y.o. female is sitting in the chair. She is in mild resp distress. Her fio2 is decreasing. Await rehab placement    Objective:   PHYSICAL EXAM:    LUNGS:Occasional basal crackles  Abd-soft, BS+,NT  Ext- 1 + pedal edema  CVS-s1s2, no murmurs      DATA:    CBC:  Recent Labs     07/14/25  0710 07/15/25  0346 07/16/25  0515   WBC 17.1* 17.9* 18.9*   RBC 3.01* 3.14* 3.22*   HGB 8.5* 8.8* 9.2*   HCT 26.4* 27.9* 29.0*    277 310   MCV 87.7 88.9 90.1   MCH 28.2 28.0 28.6   MCHC 32.2 31.5* 31.7*   RDW 15.3* 15.5* 17.0*      BMP:  Recent Labs     07/14/25  0710 07/15/25  0346 07/16/25  0515    137 138   K 3.9 3.5 3.4*   CL 98* 96* 96*   CO2 26 28 30   BUN 47* 50* 45*   CREATININE 1.6* 1.6* 1.5*   CALCIUM 9.6 9.3 9.3   GLUCOSE 111* 103* 88      ABG:  No results for input(s): \"PH\", \"PO2ART\", \"HRT5HXQ\", \"HCO3\", \"BEART\", \"O2SAT\" in the last 72 hours.  BNP  No results found for: \"BNP\"   D-Dimer:  Lab Results   Component Value Date    DDIMER 5.22 (H) 12/06/2024      Radiology:  No acute findings or significant interval change .         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    
Pulmonary and Critical Care  Progress Note    Subjective:   The patient has slightly improved  Shortness of breath has improved  Chest pain none.  Addressing respiratory complaints Patient is negative for  hemoptysis and cyanosis  CONSTITUTIONAL:  negative for fevers and chills      Past Medical History:     has a past medical history of Heart abnormality, Hypertension, Idiopathic osteoporosis, Idiopathic osteoporosis, Other specified disorders of bone density and structure, multiple sites, Pap smear for cervical cancer screening, and Rheumatoid arthritis with rheumatoid factor of multiple sites without organ or systems involvement (HCC).   has a past surgical history that includes  section; Ankle fracture surgery; Colonoscopy; Cardiac procedure (N/A, 2025); and Cardiac procedure (N/A, 2025).   reports that she has quit smoking. Her smoking use included cigarettes. She quit smokeless tobacco use about 11 years ago. She reports current alcohol use. She reports that she does not use drugs.  Family history:  family history includes Breast Cancer in her maternal grandmother.    Allergies   Allergen Reactions    Nickel Hives, Itching and Swelling     Social History:    Reviewed; no changes    Objective:   PHYSICAL EXAM:        VITALS:  BP (!) 146/62   Pulse 81   Temp 98.4 °F (36.9 °C) (Oral)   Resp 23   Ht 1.702 m (5' 7\")   Wt 77.4 kg (170 lb 10.2 oz)   SpO2 91%   BMI 26.73 kg/m²     24HR INTAKE/OUTPUT:    Intake/Output Summary (Last 24 hours) at 2025 1110  Last data filed at 2025 1950  Gross per 24 hour   Intake --   Output 1800 ml   Net -1800 ml       CONSTITUTIONAL:  awake, alert, cooperative, no apparent distress, and appears stated age  LUNGS:  decreased breath sounds,occ basilar crackles.  CARDIOVASCULAR:  normal S1 and S2 and positive JVD  ABD:Abdomen soft, non-tender. BS normal. No masses,  No organomegaly  NEURO:Alert and oriented x3. Gait normal. Reflexes and motor strength normal 
Pulmonary and Critical Care  Progress Note    Subjective:   The patient is better.On 4 L N/C.  Shortness of breath has improved  Chest pain none.  Addressing respiratory complaints Patient is negative for  hemoptysis and cyanosis  CONSTITUTIONAL:  negative for fevers and chills      Past Medical History:     has a past medical history of Heart abnormality, Hypertension, Idiopathic osteoporosis, Idiopathic osteoporosis, Other specified disorders of bone density and structure, multiple sites, Pap smear for cervical cancer screening, and Rheumatoid arthritis with rheumatoid factor of multiple sites without organ or systems involvement (HCC).   has a past surgical history that includes  section; Ankle fracture surgery; Colonoscopy; Cardiac procedure (N/A, 2025); and Cardiac procedure (N/A, 2025).   reports that she has quit smoking. Her smoking use included cigarettes. She quit smokeless tobacco use about 11 years ago. She reports current alcohol use. She reports that she does not use drugs.  Family history:  family history includes Breast Cancer in her maternal grandmother.    Allergies   Allergen Reactions    Nickel Hives, Itching and Swelling     Social History:    Reviewed; no changes    Objective:   PHYSICAL EXAM:        VITALS:  /66   Pulse 71   Temp 98.6 °F (37 °C) (Oral)   Resp 26   Ht 1.702 m (5' 7\")   Wt 77.1 kg (169 lb 15.6 oz)   SpO2 91%   BMI 26.62 kg/m²     24HR INTAKE/OUTPUT:    Intake/Output Summary (Last 24 hours) at 2025 1247  Last data filed at 2025 0751  Gross per 24 hour   Intake 360 ml   Output 2100 ml   Net -1740 ml       CONSTITUTIONAL:  awake, alert, cooperative, no apparent distress, and appears stated age  LUNGS:  decreased breath sounds,occ basilar crackles.  CARDIOVASCULAR:  normal S1 and S2 and positive JVD  ABD:Abdomen soft, non-tender. BS normal. No masses,  No organomegaly  NEURO:Alert and oriented x3. Gait normal. Reflexes and motor strength normal 
RT at bedside  to extubate PT per protocol. PT is alert and orient and able to follow commands. RSBI 33 and NIF at -44. PT ready to extubate to [facility].   
Report called to Lindsay Bey.  
Spiritual Health Critical Care Progress Note  [facility]      Room # 2006/2006-A    Name: Radha Gomez           Age: 69 y.o.    Gender: female          MRN: [id number]  Synagogue: Non-Yazidi       Preferred Language: English      Date: 07/10/25  Visit Time: Begin Time: 1652 End Time : 1657  Complexity of Encounter: Low      Visit Summary:  visited patient in CVICU to assess spiritual health needs. Spouse was present at the time. Patient/family expressed their spiritual support from FAMILY.  provided pastoral presence/active listening. No concerns expressed from pt or spouse for spiritual health at this time.       Referral/Consult From: Rounding  Encounter Overview/Reason: Spiritual/Emotional Needs  Encounter Code:     Service Provided For: Patient/Spouse     Patient was available.    Madelin, Belief, Meaning:   Patient identifies as spiritual  is connected with a madelin tradition or spiritual practice  madelin/ spirituality is a source of strength  Family/Friends unable to assess at this time  Rituals (if applicable)      Importance and Influence:  Patient has spiritual/personal beliefs that influence decisions regarding their health  Family/Friends Other: unable to assess at this time.    Community:  Patient   indicated that they feel well-supported  Family/Friends   indicated that they feel well-supported    Assessment and Plan of Care:   Emotions Expressed by Patient:   Assessment: Calm, Coping, Hopeful, Peaceful    Interventions by :   Intervention: Active listening, Nurtured Hope, Discussed illness injury and it’s impact, Explored/Affirmed feelings, thoughts, concerns     Result/ Response by Patient:   Outcome: Comfort, Engaged in conversation, Expressed Gratitude, Expressed feelings, needs, and concerns, Receptive    Patient Plan of Care:   Plan and Referrals  Plan/Referrals: Continue Support (comment)     Emotions Expressed by Spouse/Family/Friends: 
Spoke with RN, patient is wanting to change her portable home O2.  Explained to RN, she or her POA will need to contact her DME oxygen supplier to change any of her out pt home o2.      
This said nurse attempted to contact [employer] to give report and no answer at this time will attempt again.    
[facility] Cardiothoracic Surgery  Daily Progress Note    Surgeon:  Yuko    Procedure:  s/p CORONARY ARTERY BYPASS GRAFT x 2, SVG-RCA, SVG-DIAG, AORTIC VALVE REPLACEMENT UTILIZING A 21MM REYES INSPIRIS TISSUE VALVE; LEFT ATRIAL APPENDAGE LIGATION UTILIZING A 50MM ATRICLIP; BILATERAL ENDOSCOPIC GREATER SAPHENOUS VEIN HARVEST; INTRAOPERATIVE TRANSESOPHAGEAL ECHOCARDIOGRAM  on 7/9/25    Subjective:     Patient was seen and examined at bedside this morning without any complaints.    Hospital Course:  06/30/2025 - 7/7/2025: Patient presented to the emergency department complaining of increased oxygen requirements, shortness of breath, leg extremity edema.  Echocardiogram was obtained that revealed abnormality of the aortic valve therefore cardiology team was consulted.  Patient was found to have multivessel coronary artery disease.  CT surgery team was consulted for possible surgical intervention.  Patient medically optimized preoperatively.  7/8/25: Patient was transferred to the ICU with oxygen saturations in the 80s.  She had a history of pulmonary hypertension preoperatively.  An ABG revealed acidosis and this was corrected.  The ventilator was adjusted and the patient sats were in the low 90s.  She came out of the OR on Levophed and epinephrine drip.  7/9/25: Off all drips.  Patient remains intubated this morning.  The PEEP was decreased from 8 to 5.  PO2 is 57.8 pH of 7.39.  Critical care was comfortable extubating to high flow Vapotherm this morning.  We will get a repeat ABG.  If the patient can tolerate Vapotherm we will start aspirin Plavix and Lovenox.  Calcium replacement.  Lasix 20 mg IV once today.  Remove hard mediastinal chest tube.  Restart home medications as appropriate.  Coreg 6.25 ordered.  Electrolyte replacement per protocol.  7/10/25: Patient on Vapotherm this morning.  Tolerated CPAP through the night.  Electrolyte replacement per protocol.  Lasix 
[facility] Cardiothoracic Surgery  Daily Progress Note    Surgeon:  Yuko    Procedure:  s/p CORONARY ARTERY BYPASS GRAFT x 2, SVG-RCA, SVG-DIAG, AORTIC VALVE REPLACEMENT UTILIZING A 21MM REYES INSPIRIS TISSUE VALVE; LEFT ATRIAL APPENDAGE LIGATION UTILIZING A 50MM ATRICLIP; BILATERAL ENDOSCOPIC GREATER SAPHENOUS VEIN HARVEST; INTRAOPERATIVE TRANSESOPHAGEAL ECHOCARDIOGRAM  on 7/9/25    Subjective:     Patient was seen and examined at bedside this morning without any complaints.    Hospital Course:  06/30/2025 - 7/7/2025: Patient presented to the emergency department complaining of increased oxygen requirements, shortness of breath, leg extremity edema.  Echocardiogram was obtained that revealed abnormality of the aortic valve therefore cardiology team was consulted.  Patient was found to have multivessel coronary artery disease.  CT surgery team was consulted for possible surgical intervention.  Patient medically optimized preoperatively.  7/8/25: Patient was transferred to the ICU with oxygen saturations in the 80s.  She had a history of pulmonary hypertension preoperatively.  An ABG revealed acidosis and this was corrected.  The ventilator was adjusted and the patient sats were in the low 90s.  She came out of the OR on Levophed and epinephrine drip.  7/9/25: Off all drips.  Patient remains intubated this morning.  The PEEP was decreased from 8 to 5.  PO2 is 57.8 pH of 7.39.  Critical care was comfortable extubating to high flow Vapotherm this morning.  We will get a repeat ABG.  If the patient can tolerate Vapotherm we will start aspirin Plavix and Lovenox.  Calcium replacement.  Lasix 20 mg IV once today.  Remove hard mediastinal chest tube.  Restart home medications as appropriate.  Coreg 6.25 ordered.  Electrolyte replacement per protocol.  7/10/25: Patient on Vapotherm this morning.  Tolerated CPAP through the night.  Electrolyte replacement per protocol.  Lasix 
< > 4.4  --   --   --   --  4.7 4.2   *   < > 115*  --   --   --   --  111* 109   CO2 19*   < > 18*  --   --   --   --  19* 19*   PHOS 3.5  --   --   --  3.5  --   --  3.3  --    BUN 21*   < > 21*  --   --   --   --  25* 27*   CREATININE 1.2   < > 1.3*   < >  --    < > 1.6* 1.6* 1.6*    < > = values in this interval not displayed.     Hepatic: No results for input(s): \"AST\", \"ALT\", \"BILITOT\", \"ALKPHOS\" in the last 72 hours.    Invalid input(s): \"ALB\"  Troponin: No results for input(s): \"TROPONINI\" in the last 72 hours.  BNP: No results for input(s): \"BNP\" in the last 72 hours.  Lipids: No results for input(s): \"CHOL\", \"HDL\" in the last 72 hours.    Invalid input(s): \"LDLCALCU\"  ABGs:   Lab Results   Component Value Date/Time    PHART 7.452 07/01/2025 10:28 AM    PO2ART 67.1 07/01/2025 10:28 AM    RAH5JXL 32.4 07/01/2025 10:28 AM     INR:   Recent Labs     07/08/25  1315 07/08/25  1446 07/09/25  0545   INR 2.1 1.9 1.5     Renal Labs  Albumin:  No results found for: \"LABALBU\"  Calcium:    Lab Results   Component Value Date/Time    CALCIUM 6.9 07/10/2025 11:22 AM     Phosphorus:    Lab Results   Component Value Date/Time    PHOS 3.3 07/10/2025 04:25 AM     U/A:    Lab Results   Component Value Date/Time    NITRU NEGATIVE 10/29/2024 04:12 PM    COLORU Yellow 10/29/2024 04:12 PM    PHUR 5.5 10/29/2024 04:12 PM    WBCUA <1 12/29/2020 12:00 PM    RBCUA 1 12/29/2020 12:00 PM    MUCUS RARE 10/19/2020 07:49 AM    TRICHOMONAS NONE SEEN 12/29/2020 12:00 PM    BACTERIA RARE 12/29/2020 12:00 PM    CLARITYU CLEAR 12/29/2020 12:00 PM    UROBILINOGEN 0.2 10/29/2024 04:12 PM    BILIRUBINUR NEGATIVE 10/29/2024 04:12 PM    BLOODU NEGATIVE 12/29/2020 12:00 PM    GLUCOSEU NEGATIVE 10/29/2024 04:12 PM    KETUA NEGATIVE 10/29/2024 04:12 PM     ABG:    Lab Results   Component Value Date/Time    PHART 7.452 07/01/2025 10:28 AM    MBK5IFG 32.4 07/01/2025 10:28 AM    PO2ART 67.1 07/01/2025 10:28 AM    EJR9YOV 22.1 07/01/2025 10:28 AM    
COLONOSCOPY          Medications Prior to Admission:     Prior to Admission medications    Medication Sig Start Date End Date Taking? Authorizing Provider   denosumab (PROLIA) 60 MG/ML SOSY SC injection Inject 1 mL into the skin every 6 months 5/28/25  Yes Chrissy Randolph MD   predniSONE (DELTASONE) 5 MG tablet Take 1 tablet by mouth every other day 5/28/25  Yes Chrissy Randolph MD   hydroxychloroquine (PLAQUENIL) 200 MG tablet Take 1.5 tablets by mouth daily 5/28/25  Yes Chrissy Randolph MD   tiotropium (SPIRIVA) 18 MCG inhalation capsule Inhale 1 capsule into the lungs daily 5/5/25  Yes Mp Choudhary MD   atorvastatin (LIPITOR) 40 MG tablet Take 1 tablet by mouth nightly 12/13/24  Yes Tonia Simpson MD   empagliflozin (JARDIANCE) 10 MG tablet Take 1 tablet by mouth daily 12/14/24  Yes Tonia Simpson MD   torsemide (DEMADEX) 20 MG tablet Take 1 tablet by mouth in the morning and 1 tablet in the evening. 12/13/24  Yes Tonia Simpson MD   vitamin D (CHOLECALCIFEROL) 25 MCG (1000 UT) TABS tablet Take 1 tablet by mouth daily 10/8/24  Yes Chrissy Randolph MD   calcium carbonate (OSCAL) 500 MG TABS tablet Take 1 tablet by mouth daily OTC   Yes Venkat Guthrie MD   aspirin 81 MG chewable tablet Take 1 tablet by mouth daily OTC   Yes Venkat Guthrie MD   Cyanocobalamin (VITAMIN B 12) 500 MCG TABS Take 500 mcg by mouth daily OTC   Yes Provider, Historical, MD   Elderberry-Vitamin C-Zinc (Ginx GUMMY PO) Take 2 each by mouth daily OTC   Yes Venkat Guthrie MD   carvedilol (COREG) 25 MG tablet Take 1 tablet by mouth 2 times daily 7/3/24  Yes Venkat Guthrie MD   amLODIPine (NORVASC) 10 MG tablet Take 1 tablet by mouth daily   Yes Venkat Guthrie MD   Handicap Placard MISC by Does not apply route 10/1/24   Nguyen Adkins APRN - CNP        Allergies:     Nickel    Social History:     Tobacco:    reports that she has quit smoking. Her 
Dyspnea 10/31/2024    Nonrheumatic aortic valve insufficiency 10/30/2024    Acute on chronic respiratory failure with hypoxia (HCC) 10/29/2024    COPD exacerbation (HCC) 09/24/2024    NAFLD (nonalcoholic fatty liver disease) 06/05/2024    Calcific tendinitis of right shoulder 12/08/2021    Bilateral carotid artery stenosis 06/11/2020    Tobacco dependence 06/11/2020    Chronic kidney disease, stage III (moderate) (HCC) 11/06/2019    Carotid stenosis, asymptomatic, left 11/06/2019    Essential hypertension 11/06/2019    Anxiety 11/06/2019   1. Acute on chronic respiratory failure secondary to #2- improving  2. Congestive heart failure- improving  3. Severe pulmonary hypertension  4. Valvular heart disease.  5. Rheumatoid arthritis  6. CAD s/p CABG  7.COPD  8. TAI  9. Post op vent s/p extubation  10.Pulmonary congestion  11. RML nodule- need follow up  12. Klebsiella, Enterobacter and Pseudomonas pneumonia       Diuresis  Inhalers  Abx  F/u C&S  Keep sats > 92%  ICS  OOB  PT/OT  BIPAP prn  DVT and GI Prophylaxis  C/w present management  Await rehab placement    Electronically signed by Mp Choudhary MD on 7/17/2025 at 10:37 AM    
07/08/25  1315 07/09/25  0545   APTT 42.7* 31.1       Chest X-Ray:      XR CHEST PORTABLE     Date Of Service: 7/11/2025 4:40     Reason for study: Post op open heart surgery,     Comparison: 7/10/2025     Findings:  AP chest shows right-sided PICC line placement. Tip in the region of the   cavoatrial junction. Cardiac silhouette normal size. Density right lung base   relatively similar mainly to the layering effusion with atelectasis. Basilar   pneumonia also possible. Diffuse interstitial prominence elsewhere similar may   be chronic. A component of interstitial edema difficult to exclude  Removal of previously noted right internal jugular [place] catheter  IMPRESSION:  1. Removal of right IJ catheter and placement of a right PICC line in customary   position as above  2. Otherwise stable inpatient chest as discussed    Scheduled Meds:    famotidine  20 mg Oral Daily    Or    famotidine (PEPCID) injection  20 mg IntraVENous Daily    furosemide  40 mg IntraVENous BID    potassium chloride  20 mEq Oral BID WC    insulin lispro  0-8 Units SubCUTAneous 4x Daily AC & HS    budesonide-formoterol  2 puff Inhalation BID RT    ipratropium 0.5 mg-albuterol 2.5 mg  1 Dose Inhalation Q4H RT    carvedilol  6.25 mg Oral BID WC    hydroxychloroquine  300 mg Oral Daily    predniSONE  5 mg Oral Every Other Day    [Held by provider] tiotropium  2 puff Inhalation Daily RT    sodium chloride flush  5-40 mL IntraVENous 2 times per day    enoxaparin  40 mg SubCUTAneous Daily    clopidogrel  75 mg Oral Daily    amiodarone  200 mg Oral BID    chlorhexidine  15 mL Mouth/Throat BID    mupirocin   Each Nostril BID    multivitamin  1 tablet Oral Daily with breakfast    polyethylene glycol  17 g Oral Daily    sennosides-docusate sodium  1 tablet Oral BID    atorvastatin  40 mg Oral Nightly    bacitracin   Topical BID    acetaminophen  650 mg Oral Q6H    aspirin  81 mg Oral Daily     Continuous Infusions:    sodium chloride      EPINEPHrine 
8.5* 8.8*   HCT 25.9* 26.4* 27.9*   MCV 86.9 87.7 88.9    231 277     BMP:   Recent Labs     07/13/25  0640 07/14/25  0710 07/15/25  0346   * 138 137   K 3.6 3.9 3.5   CL 97* 98* 96*   CO2 25 26 28   PHOS 3.6 4.0 3.9   BUN 42* 47* 50*   CREATININE 1.7* 1.6* 1.6*     PT/INR:   No results for input(s): \"PROTIME\", \"INR\" in the last 72 hours.    APTT:   No results for input(s): \"APTT\" in the last 72 hours.      Chest X-Ray:   Improved aeration noted in RLL today, no large PTX or effusion, overall improving    Scheduled Meds:    bumetanide  1 mg Oral BID    metOLazone  2.5 mg Oral Daily    predniSONE  40 mg Oral Daily    Followed by    [START ON 7/17/2025] predniSONE  30 mg Oral Daily    Followed by    [START ON 7/19/2025] predniSONE  20 mg Oral Daily    Followed by    [START ON 7/21/2025] predniSONE  10 mg Oral Daily    lidocaine  1 patch TransDERmal Daily    amLODIPine  2.5 mg Oral Daily    iron sucrose  200 mg IntraVENous Q24H    ipratropium 0.5 mg-albuterol 2.5 mg  1 Dose Inhalation 4x Daily RT    famotidine  20 mg Oral Daily    Or    famotidine (PEPCID) injection  20 mg IntraVENous Daily    methylPREDNISolone  40 mg IntraVENous Daily    insulin lispro  0-8 Units SubCUTAneous 4x Daily AC & HS    budesonide-formoterol  2 puff Inhalation BID RT    carvedilol  6.25 mg Oral BID WC    hydroxychloroquine  300 mg Oral Daily    sodium chloride flush  5-40 mL IntraVENous 2 times per day    enoxaparin  40 mg SubCUTAneous Daily    clopidogrel  75 mg Oral Daily    amiodarone  200 mg Oral BID    chlorhexidine  15 mL Mouth/Throat BID    multivitamin  1 tablet Oral Daily with breakfast    polyethylene glycol  17 g Oral Daily    sennosides-docusate sodium  1 tablet Oral BID    atorvastatin  40 mg Oral Nightly    bacitracin   Topical BID    acetaminophen  650 mg Oral Q6H    aspirin  81 mg Oral Daily     Continuous Infusions:    sodium chloride      sodium chloride      EPINEPHrine Stopped (07/08/25 2300)    niCARdipine 
92%   Weight:       Height:             Physical Exam:    General: Afebrile, no distress on 3L by nasal cannula  Eyes: EOMI  ENT: neck supple.   Cardiovascular: S1-S2 normal, murmur+  Respiratory: Bilateral crepitations  Gastrointestinal: Soft, non tender bowel sounds normal  Genitourinary: no suprapubic tenderness  Musculoskeletal: 1+ pitting edema bilateral lower extremities  Neuro: AOx3.  Psych: Mood appropriate.    Medications:   Medications:    famotidine  20 mg Oral Daily    Or    famotidine (PEPCID) injection  20 mg IntraVENous Daily    methylPREDNISolone  40 mg IntraVENous Daily    furosemide  40 mg IntraVENous BID    potassium chloride  20 mEq Oral BID WC    insulin lispro  0-8 Units SubCUTAneous 4x Daily AC & HS    budesonide-formoterol  2 puff Inhalation BID RT    ipratropium 0.5 mg-albuterol 2.5 mg  1 Dose Inhalation Q4H RT    carvedilol  6.25 mg Oral BID WC    hydroxychloroquine  300 mg Oral Daily    predniSONE  5 mg Oral Every Other Day    [Held by provider] tiotropium  2 puff Inhalation Daily RT    sodium chloride flush  5-40 mL IntraVENous 2 times per day    enoxaparin  40 mg SubCUTAneous Daily    clopidogrel  75 mg Oral Daily    amiodarone  200 mg Oral BID    chlorhexidine  15 mL Mouth/Throat BID    mupirocin   Each Nostril BID    multivitamin  1 tablet Oral Daily with breakfast    polyethylene glycol  17 g Oral Daily    sennosides-docusate sodium  1 tablet Oral BID    atorvastatin  40 mg Oral Nightly    bacitracin   Topical BID    acetaminophen  650 mg Oral Q6H    aspirin  81 mg Oral Daily      Infusions:    sodium chloride      EPINEPHrine Stopped (07/08/25 2300)    niCARdipine Stopped (07/08/25 1343)    dextrose       PRN Meds: potassium chloride, 40 mEq, PRN   Or  potassium alternative oral replacement, 40 mEq, PRN   Or  potassium chloride, 10 mEq, PRN  magnesium sulfate, 2,000 mg, PRN  sodium phosphate 15 mmol in sodium chloride 0.9 % 250 mL IVPB, 15 mmol, PRN  protamine, 50 mg, Once 
Date/Time    TSH 0.46 06/30/2025 01:45 PM     IRON:    Lab Results   Component Value Date/Time    IRON 32 06/30/2025 01:45 PM     Iron Saturation:  No components found for: \"PERCENTFE\"  TIBC:    Lab Results   Component Value Date/Time    TIBC 244 06/30/2025 01:45 PM     FERRITIN:    Lab Results   Component Value Date/Time    FERRITIN 285 06/30/2025 01:45 PM     RPR:  No results found for: \"RPR\"  JOSE ROBERTO:    Lab Results   Component Value Date/Time    JOSE ROBERTO None Detected 05/28/2024 07:42 AM     24 Hour Urine for Creatinine Clearance:  No components found for: \"CREAT4\", \"UHRS10\", \"UTV10\"      Objective:   I/O: 07/16 0701 - 07/17 0700  In: 1225.1 [P.O.:1030]  Out: 400 [Urine:400]  I/O last 3 completed shifts:  In: 1465.1 [P.O.:1270; IV Piggyback:195.1]  Out: 1650 [Urine:1650]  No intake/output data recorded.  Vitals: BP (!) 157/59   Pulse 79   Temp 98.2 °F (36.8 °C) (Oral)   Resp 19   Ht 1.702 m (5' 7\")   Wt 73.9 kg (163 lb)   SpO2 94%   BMI 25.53 kg/m²  {  General appearance: awake   HEENT: Head: Normal, normocephalic, atraumatic.  Neck: supple, symmetrical, trachea midline  Lungs: diminished breath sounds bilaterally  Heart: S1, S2 normal  Abdomen: abnormal findings:  soft nt  Extremities: edema trace  Neurologic: Mental status: alertness:  awake on nasal cannula        Assessment and Plan:      IMP:  1.  acute on chronic hypoxic respiratory failure fluid overload   #2 CHF exacerbation EF 65 to 70% with diastolic heart failure with aortic regurgitation and tricuspid regurgitation and pulmonary hypertension   #3 hypertension   #4 rheumatoid arthritis   #5 COPD   #6 acute renal failure from cardiorenal syndrome  #7 hypokalemia    Plan     #1 respiratory failure improving nasal cannula O2 good diuresis with Bumex and Zaroxolyn  #2 post CABG cardiac stable  #3 blood pressure somewhat increased make adjustments in dose  #4 pain control supportive care  #5 baseline COPD with 3 to 4 L nasal cannula O2  #6 in setting of 
projects in the vicinity of right ventricle. There is also likely   mediastinal drain projecting over the lower right heart. Left atrial appendage   clip noted. Small pleural effusions are present. No evidence of pneumothorax.   Prominent interstitial markings are noted, which may represent edema. No   significant airspace consolidation.     IMPRESSION:  1.  Unchanged support devices as above.      2.  Small pleural effusions. Prominent interstitial markings may be due to edema   and/or underlying chronic lung disease.       Scheduled Meds:    budesonide-formoterol  2 puff Inhalation BID RT    ipratropium 0.5 mg-albuterol 2.5 mg  1 Dose Inhalation Q4H RT    carvedilol  6.25 mg Oral BID WC    sodium chloride flush  5-40 mL IntraVENous 2 times per day    enoxaparin  40 mg SubCUTAneous Daily    clopidogrel  75 mg Oral Daily    amiodarone  200 mg Oral BID    chlorhexidine  15 mL Mouth/Throat BID    mupirocin   Each Nostril BID    multivitamin  1 tablet Oral Daily with breakfast    polyethylene glycol  17 g Oral Daily    sennosides-docusate sodium  1 tablet Oral BID    atorvastatin  40 mg Oral Nightly    famotidine  20 mg Oral BID    Or    famotidine (PEPCID) injection  20 mg IntraVENous BID    ceFAZolin (ANCEF) IV  2,000 mg IntraVENous Q8H    vancomycin (VANCOCIN) IV  1,000 mg IntraVENous Q12H    [START ON 7/10/2025] bacitracin   Topical BID    acetaminophen  650 mg Oral Q6H    aspirin  81 mg Oral Daily     Continuous Infusions:    sodium chloride 50 mL/hr at 07/09/25 1158    sodium chloride      EPINEPHrine Stopped (07/08/25 2300)    niCARdipine Stopped (07/08/25 1343)    insulin 0.6 Units/hr (07/09/25 1246)    dextrose      dexmedeTOMIDine HCl in NaCl Stopped (07/08/25 1810)    milrinone      norepinephrine-sodium chloride Stopped (07/08/25 2100)           Physical Exam:    General: A&O x 3, NAD noted  Neck:  supple, no carotid bruits, no JVD  ENT: ALEN  Heart: Normal S1, S2, RRR, No murmurs noted   Lungs: Diminished 
mL IntraVENous 2 times per day    enoxaparin  40 mg SubCUTAneous Daily    clopidogrel  75 mg Oral Daily    amiodarone  200 mg Oral BID    chlorhexidine  15 mL Mouth/Throat BID    multivitamin  1 tablet Oral Daily with breakfast    polyethylene glycol  17 g Oral Daily    sennosides-docusate sodium  1 tablet Oral BID    atorvastatin  40 mg Oral Nightly    bacitracin   Topical BID    acetaminophen  650 mg Oral Q6H    aspirin  81 mg Oral Daily      Infusions:    sodium chloride      sodium chloride      EPINEPHrine Stopped (07/08/25 2300)    niCARdipine Stopped (07/08/25 1343)    dextrose       PRN Meds: sodium chloride, , PRN  ipratropium 0.5 mg-albuterol 2.5 mg, 1 Dose, Q4H PRN  potassium chloride, 40 mEq, PRN   Or  potassium alternative oral replacement, 40 mEq, PRN   Or  potassium chloride, 10 mEq, PRN  magnesium sulfate, 2,000 mg, PRN  sodium phosphate 15 mmol in sodium chloride 0.9 % 250 mL IVPB, 15 mmol, PRN  protamine, 50 mg, Once PRN  sodium chloride flush, 5-40 mL, PRN  sodium chloride, , PRN  ondansetron, 4 mg, Q8H PRN   Or  ondansetron, 4 mg, Q6H PRN  traMADol, 50 mg, Q6H PRN  hydrALAZINE, 10 mg, Q5 Min PRN  bisacodyl, 10 mg, Daily PRN  potassium chloride, 20 mEq, PRN  calcium chloride 1,000 mg in sodium chloride 0.9 % 100 mL IVPB, 1,000 mg, PRN   Or  calcium chloride 2,000 mg in sodium chloride 0.9 % 100 mL IVPB, 2,000 mg, PRN  albumin human 5%, 12.5 g, PRN  EPINEPHrine, 1-5 mcg/min, Continuous PRN  niCARdipine, 2.5-15 mg/hr, Continuous PRN  glucose, 4 tablet, PRN  dextrose bolus, 125 mL, PRN   Or  dextrose bolus, 250 mL, PRN  glucagon (rDNA), 1 mg, PRN  dextrose, , Continuous PRN        Labs and Imaging   Cardiac procedure  Result Date: 7/1/2025  Indication: 69-year-old female with chronic respiratory failure and moderate severe aortic regurgitation noted on echocardiogram.  She is now referred for coronary angiography and right heart catheterization.  She is currently on 5 L of oxygen via nasal cannula. 
consult , please call with questions.    Sayed Raulito Krishnan MD, MD 7/13/2025 1:58 PM     The above note is prepared with the intention to serve as communication with trained medical care practitioners only. Some of the information is provided by secondary sources as well as from our patient and/or family member(s) recollection, thus inaccuracies may occur. In addition, other professionals integrate data into the electronic medical record, and the Heart Team MD and NPs are not responsible for these. Any errors will be corrected upon verification with documented reports.   
continue all other medications of all above medical condition listed as is except for changes mentioned above.    Thank you very much for consult , please call with questions.    Sayed Raulito Krishnan MD, MD 7/16/2025 10:07 AM     The above note is prepared with the intention to serve as communication with trained medical care practitioners only. Some of the information is provided by secondary sources as well as from our patient and/or family member(s) recollection, thus inaccuracies may occur. In addition, other professionals integrate data into the electronic medical record, and the Heart Team MD and NPs are not responsible for these. Any errors will be corrected upon verification with documented reports.   
CHEST PORTABLE ACCESSION: [id number] DATE OF SERVICE:  6/30/2025 9:16 ORDERING PROVIDER:RENNY SALGADO  REASON FOR STUDY: sob PATIENT DEMOGRAPHICS: 69 years, Female COMPARISON: 12/11/2024 TECHNIQUE: Single view of the chest was obtained. FINDINGS: Overlying EKG leads and wires obscure portions of the chest. Atherosclerotic aorta. Cardiomediastinal silhouette appeared within normal limits. Pulmonary vasculature appears prominent particularly in the perihilar regions. Emphysematous changes are again seen. Lung volumes are satisfactory. There is scattered interstitial infiltrates bilaterally most pronounced within the basilar regions. Mild peribronchial thickening. Trace bilateral pleural effusions.  Bony structures appear stable. IMPRESSION: 1. Prominent central pulmonary vasculature with bilateral interstitial changes. Findings suggest that of pulmonary vascular congestive changes. Clinical correlation. 2. Emphysematous changes similar to the prior exam. This dictation was created with voice recognition software.  While attempts have  been made to review the dictation as it is transcribed, on occasion the spoken word can be misinterpreted by the technology leading to omissions or inappropriate words, phrases or sentences.  Dictated and Electronically Signed By: Vasu Walls [facility] 6/30/2025 9:49          CBC:   Recent Labs     07/07/25  0547   WBC 7.3   HGB 12.1*        BMP:    Recent Labs     07/06/25  0836 07/07/25  0547 07/08/25  0830 07/08/25  1210 07/08/25  1234 07/08/25  1255    137  --   --   --   --    K 4.3 4.4  --   --   --   --     105  --   --   --   --    CO2 21 20*  --   --   --   --    BUN 26* 23*  --   --   --   --    CREATININE 1.6* 1.3*   < > 1.3* 1.3* 1.4*   GLUCOSE 123* 96  --   --   --   --     < > = values in this interval not displayed.     Hepatic:   No results for input(s): \"AST\", \"ALT\", \"BILITOT\", \"ALKPHOS\" in the last 72 
Electronically Signed By: Vasu Walls, [facility] Radiologists 6/30/2025 9:49          CBC:   Recent Labs     06/30/25  0915   WBC 6.8   HGB 13.1        BMP:    Recent Labs     06/30/25  0949 07/01/25  0754 07/02/25  0239    139 137   K 3.9 3.5 3.2*    105 103   CO2 21 20* 22   BUN 17 21* 23*   CREATININE 1.2 1.2 1.2   GLUCOSE 102* 125* 98     Hepatic:   Recent Labs     06/30/25  0949 07/01/25  0754   AST 25 19   ALT 17 17   BILITOT 0.9 0.7   ALKPHOS 109 115     Lipids:   Lab Results   Component Value Date/Time    CHOL 154 07/01/2025 07:54 AM     07/01/2025 07:54 AM    TRIG 50 07/01/2025 07:54 AM     Hemoglobin A1C:   Lab Results   Component Value Date/Time    LABA1C 5.5 07/01/2025 10:22 AM     TSH:   Lab Results   Component Value Date/Time    TSH 0.46 06/30/2025 01:45 PM     Troponin: No results found for: \"TROPONINT\"  Lactic Acid: No results for input(s): \"LACTA\" in the last 72 hours.  BNP:   Recent Labs     06/30/25  0915 07/02/25  0239   PROBNP 1,090* 882*     UA:  Lab Results   Component Value Date/Time    NITRU NEGATIVE 10/29/2024 04:12 PM    COLORU Yellow 10/29/2024 04:12 PM    PHUR 5.5 10/29/2024 04:12 PM    WBCUA <1 12/29/2020 12:00 PM    RBCUA 1 12/29/2020 12:00 PM    MUCUS RARE 10/19/2020 07:49 AM    TRICHOMONAS NONE SEEN 12/29/2020 12:00 PM    BACTERIA RARE 12/29/2020 12:00 PM    CLARITYU CLEAR 12/29/2020 12:00 PM    LEUKOCYTESUR NEGATIVE 10/29/2024 04:12 PM    UROBILINOGEN 0.2 10/29/2024 04:12 PM    BILIRUBINUR NEGATIVE 10/29/2024 04:12 PM    BLOODU NEGATIVE 12/29/2020 12:00 PM    GLUCOSEU NEGATIVE 10/29/2024 04:12 PM    KETUA NEGATIVE 10/29/2024 04:12 PM     Urine Cultures: No results found for: \"LABURIN\"  Blood Cultures: No results found for: \"BC\"  No results found for: \"BLOODCULT2\"  Organism: No results found for: \"ORG\"      Electronically signed by Yohannes Tatum MD on 7/2/2025 at 10:37 AM   
Cervical Carotid: Atherosclerosis at the left carotid bifurcation results   in approximately 55% stenosis of the proximal left internal carotid artery..     Vertebrals: The dominant right vertebral artery appears to be patent throughout   its course. The proximal and mid left vertebral artery are occluded. The   opacified portion is likely retrograde field. These findings were reported   previously.     IMPRESSION:  1. Approximately 30% origin stenosis of the right internal carotid artery and   55% origin stenosis of the left internal carotid artery.  2. Occlusive disease involving the proximal/mid left vertebral artery. This   finding was reported previously. The dominant right vertebral artery is patent.  3. 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  4. Severe origin stenosis of the left common carotid artery.  5. Please see above for complete discussion.    Bedside Spirometry:  Ordered and pending    Hx of:  Afib: no  PCI: no  Chest radiation: no      [employer] (STS) Short-Term Operative Risk Calculator Evaluation    Procedure Type: CABG + AVR   Perioperative Outcome Estimate %   Operative Mortality 6.54%   Morbidity & Mortality 25.7%   Stroke 4.21%   Renal Failure 8.56%   Reoperation 4.54%   Prolonged Ventilation 19.7%   Deep Sternal Wound Infection 0.262%   Long Hospital Stay (>14 days) 26.7%   Short Hospital Stay (<6 days)* 12.5%         Clinical Summary       Planned Surgery: CABG + AVR, Urgent, First cardiovascular surgery   Demographics: 69 year old, female, 77kg, 170cm, BMI: 26.6 kg/m²   Lab Values: Creatinine: 1.6 mg/dL, Hematocrit: 37.7%, WBC Count: 8.2 10³/?L, Platelet Count: 352189 cells/?L   PreOp Medications: Oral diabetes control   Substance Abuse: Former smoker   Risk Factors / Comorbidities: Diabetes Mellitus , Hypertension   Pulmonary RF: Severe CLD, Home O?   Cardiac Status: Acute and chronic heart failure, NYHA Class I, Ejection Fraction = 65% 
CHEST PORTABLE  Result Date: 7/8/2025  XR CHEST PORTABLE Date Of Service: 7/8/2025 13:10 Reason for study: Post op open heart surgery, Comparison: 7/4/2025 Findings: AP chest shows endotracheal tube placement with tip 4.8 cm above mina. Right IJ Wayland-Ines catheter with tip terminating in the main pulmonary artery outflow tract noted. Cardiac silhouette upper limits of normal by size. Coarse interstitium diffusely. Trace bilateral effusions are suspected. No pneumothorax. No new focal consolidation. Mediastinal drain also suggested over the inferior cardiac border IMPRESSION: 1. Customary positioning of support lines as above without evidence of active pulmonary process  Dictated and Electronically Signed By: Isiah Ruiz MD [facility] 7/8/2025 13:29          Electronically signed by Chelsea Cadet MD on 7/10/2025 at 9:14 AM    
48.0 mm Hg    POC PO2 60.4 (L) 83.0 - 108.0 mm Hg    POC HCO3 24.0 21.0 - 28.0 mmol/L    TCO2 (calc), Art 25 21 - 32 mmol/L    Negative Base Excess, Art 1.1 0.0 - 3.0 mmol/L    POC O2 SAT 90.2 (L) 94.0 - 98.0 %   POC PANEL BMP W/IOCA    Collection Time: 07/08/25  6:59 PM   Result Value Ref Range    POC Sodium 150 (H) 136 - 145 mmol/L    POC Potassium 4.2 3.5 - 5.1 mmol/L    POC Chloride 116 (H) 99 - 110 mmol/L    POC Anion Gap 10 7 - 16 mmol/L    POC Glucose 123 (H) 74 - 99 mg/dL    POC Creatinine 1.3 (H) 0.5 - 1.2 mg/dL    eGFR, POC 44 (L) >60 mL/min/1.73m2    POC Ionized Calcium 1.17 1.15 - 1.33 mmol/L   POCT H&H    Collection Time: 07/08/25  6:59 PM   Result Value Ref Range    POC Hemoglobin (calc) 9.0 (L) 12 - 17 g/dL    POC Hematocrit 26 (L) 38 - 51 %   Arterial Blood Gas, POC    Collection Time: 07/08/25  8:11 PM   Result Value Ref Range    POC pH 7.356 7.35 - 7.45    POC pCO2 43.5 35.0 - 48.0 mm Hg    POC PO2 55.3 (L) 83.0 - 108.0 mm Hg    POC HCO3 24.3 21.0 - 28.0 mmol/L    TCO2 (calc), Art 26 21 - 32 mmol/L    Negative Base Excess, Art 1.2 0.0 - 3.0 mmol/L    POC O2 SAT 86.8 (L) 94.0 - 98.0 %   POC PANEL BMP W/IOCA    Collection Time: 07/08/25  8:11 PM   Result Value Ref Range    POC Sodium 149 (H) 136 - 145 mmol/L    POC Potassium 4.2 3.5 - 5.1 mmol/L    POC Chloride 116 (H) 99 - 110 mmol/L    POC Anion Gap 9 7 - 16 mmol/L    POC Glucose 113 (H) 74 - 99 mg/dL    POC BUN 22 (H) 7 - 20 mg/dL    POC Creatinine 1.3 (H) 0.5 - 1.2 mg/dL    eGFR, POC 44 (L) >60 mL/min/1.73m2    POC Ionized Calcium 1.15 1.15 - 1.33 mmol/L   POCT H&H    Collection Time: 07/08/25  8:11 PM   Result Value Ref Range    POC Hemoglobin (calc) 8.8 (L) 12 - 17 g/dL    POC Hematocrit 26 (L) 38 - 51 %   Arterial Blood Gas, POC    Collection Time: 07/08/25  9:25 PM   Result Value Ref Range    POC pH 7.453 (H) 7.35 - 7.45    POC pCO2 39.0 35.0 - 48.0 mm Hg    POC PO2 55.7 (L) 83.0 - 108.0 mm Hg    POC HCO3 27.3 21.0 - 28.0 mmol/L    TCO2

## 2025-07-17 NOTE — PLAN OF CARE
Problem: ABCDS Injury Assessment  Goal: Absence of physical injury  Outcome: Progressing     Problem: Safety - Adult  Goal: Free from fall injury  Outcome: Progressing     
  Problem: Discharge Planning  Goal: Discharge to home or other facility with appropriate resources  7/10/2025 0808 by Chelsey Lorenz RN  Outcome: Progressing  7/10/2025 0333 by Ana Knox RN  Outcome: Progressing     Problem: Pain  Goal: Verbalizes/displays adequate comfort level or baseline comfort level  7/10/2025 0808 by Chelsey Lorenz RN  Outcome: Progressing  7/10/2025 0333 by Ana Knox RN  Outcome: Progressing     Problem: ABCDS Injury Assessment  Goal: Absence of physical injury  7/10/2025 0808 by Chelsey Lorenz RN  Outcome: Progressing  7/10/2025 0333 by Ana Knox RN  Outcome: Progressing     Problem: Safety - Adult  Goal: Free from fall injury  7/10/2025 0808 by Chelsey Lorenz RN  Outcome: Progressing  7/10/2025 0333 by Ana Knox RN  Outcome: Progressing     Problem: Chronic Conditions and Co-morbidities  Goal: Patient's chronic conditions and co-morbidity symptoms are monitored and maintained or improved  7/10/2025 0808 by Chelsey Lorenz RN  Outcome: Progressing  7/10/2025 0333 by Ana Knox RN  Outcome: Progressing     Problem: Neurosensory - Adult  Goal: Achieves stable or improved neurological status  7/10/2025 0808 by Chelsey Lorenz RN  Outcome: Progressing  7/10/2025 0333 by Ana Knox RN  Outcome: Progressing  Goal: Absence of seizures  7/10/2025 0808 by Chelsey Lorenz RN  Outcome: Progressing  7/10/2025 0333 by Ana Knox RN  Outcome: Progressing  Goal: Achieves maximal functionality and self care  7/10/2025 0808 by Chelsey Lorenz RN  Outcome: Progressing  7/10/2025 0333 by Ana Knox RN  Outcome: Progressing     Problem: Respiratory - Adult  Goal: Achieves optimal ventilation and oxygenation  7/10/2025 0808 by Chelsey Lorenz RN  Outcome: Progressing  7/10/2025 0333 by Ana Knox RN  Outcome: Progressing     Problem: Cardiovascular - Adult  Goal: Maintains optimal cardiac 
  Problem: Discharge Planning  Goal: Discharge to home or other facility with appropriate resources  7/15/2025 2044 by Azeem Roth RN  Outcome: Progressing  7/15/2025 0804 by Chelsey Lorenz RN  Outcome: Progressing     Problem: Pain  Goal: Verbalizes/displays adequate comfort level or baseline comfort level  7/15/2025 2044 by Azeem Roth RN  Outcome: Progressing  7/15/2025 0804 by Chelsey Lorenz RN  Outcome: Progressing  Flowsheets (Taken 7/15/2025 0800)  Verbalizes/displays adequate comfort level or baseline comfort level:   Encourage patient to monitor pain and request assistance   Assess pain using appropriate pain scale   Administer analgesics based on type and severity of pain and evaluate response     Problem: ABCDS Injury Assessment  Goal: Absence of physical injury  7/15/2025 2044 by Azeem oRth RN  Outcome: Progressing  7/15/2025 0804 by Chelsey Lorenz RN  Outcome: Progressing     Problem: Safety - Adult  Goal: Free from fall injury  7/15/2025 2044 by Azeem Roth RN  Outcome: Progressing  7/15/2025 0804 by Chelsey Lorenz RN  Outcome: Progressing     Problem: Chronic Conditions and Co-morbidities  Goal: Patient's chronic conditions and co-morbidity symptoms are monitored and maintained or improved  7/15/2025 2044 by Azeem Roth RN  Outcome: Progressing  7/15/2025 0804 by Chelsey Lorenz RN  Outcome: Progressing     Problem: Neurosensory - Adult  Goal: Achieves stable or improved neurological status  7/15/2025 2044 by Azeem Roth RN  Outcome: Progressing  7/15/2025 0804 by Chelsey Lorenz RN  Outcome: Progressing  Goal: Absence of seizures  7/15/2025 2044 by Azeem Roth RN  Outcome: Progressing  7/15/2025 0804 by Chelsey Lorenz RN  Outcome: Progressing  Goal: Achieves maximal functionality and self care  7/15/2025 2044 by Azeem Roth RN  Outcome: Progressing  7/15/2025 0804 by Chelsey Lorenz RN  Outcome: Progressing   
  Problem: Discharge Planning  Goal: Discharge to home or other facility with appropriate resources  7/16/2025 2002 by Azeem Roth RN  Outcome: Progressing  7/16/2025 1744 by Mita Wheat RN  Outcome: Progressing     Problem: Pain  Goal: Verbalizes/displays adequate comfort level or baseline comfort level  7/16/2025 2002 by Azeem Roth RN  Outcome: Progressing  7/16/2025 1744 by Mita Wheat RN  Outcome: Progressing     Problem: ABCDS Injury Assessment  Goal: Absence of physical injury  7/16/2025 2002 by Azeem Roth RN  Outcome: Progressing  7/16/2025 1744 by Mita Wheat RN  Outcome: Progressing     Problem: Safety - Adult  Goal: Free from fall injury  7/16/2025 2002 by Azeem Roth RN  Outcome: Progressing  7/16/2025 1744 by Mita Wheat RN  Outcome: Progressing     Problem: Chronic Conditions and Co-morbidities  Goal: Patient's chronic conditions and co-morbidity symptoms are monitored and maintained or improved  7/16/2025 2002 by Azeem Roth RN  Outcome: Progressing  7/16/2025 1744 by Mita Wheat RN  Outcome: Progressing     Problem: Neurosensory - Adult  Goal: Achieves stable or improved neurological status  7/16/2025 2002 by Azeem Roth RN  Outcome: Progressing  7/16/2025 1744 by Mita Wheat RN  Outcome: Progressing  Goal: Absence of seizures  7/16/2025 2002 by Azeem Roth RN  Outcome: Progressing  7/16/2025 1744 by Mita Wheat RN  Outcome: Progressing  Goal: Achieves maximal functionality and self care  7/16/2025 2002 by Azeem Roth RN  Outcome: Progressing  7/16/2025 1744 by Mita Wheat RN  Outcome: Progressing     Problem: Respiratory - Adult  Goal: Achieves optimal ventilation and oxygenation  7/16/2025 2002 by Azeem Roth RN  Outcome: Progressing  7/16/2025 1744 by Mita Wheat RN  Outcome: Progressing     Problem: Cardiovascular - Adult  Goal: Maintains optimal cardiac output and hemodynamic stability  7/16/2025 2002 by Gonzalez 
  Problem: Discharge Planning  Goal: Discharge to home or other facility with appropriate resources  7/17/2025 0735 by Chelsey Lorenz RN  Outcome: Progressing  7/16/2025 2002 by Azeem Roth RN  Outcome: Progressing  7/16/2025 1744 by Mita Wheat RN  Outcome: Progressing     Problem: Pain  Goal: Verbalizes/displays adequate comfort level or baseline comfort level  7/17/2025 0735 by Chelsey Lorenz RN  Outcome: Progressing  7/16/2025 2002 by Azeem Roth RN  Outcome: Progressing  7/16/2025 1744 by Mita Wheat RN  Outcome: Progressing     Problem: ABCDS Injury Assessment  Goal: Absence of physical injury  7/17/2025 0735 by Chelsey Lorenz RN  Outcome: Progressing  7/16/2025 2002 by Azeem Roth RN  Outcome: Progressing  7/16/2025 1744 by Mita Wheat RN  Outcome: Progressing     Problem: Safety - Adult  Goal: Free from fall injury  7/17/2025 0735 by Chelsey Lorenz RN  Outcome: Progressing  7/16/2025 2002 by Azeem Roth RN  Outcome: Progressing  7/16/2025 1744 by Mita Wheat RN  Outcome: Progressing     Problem: Chronic Conditions and Co-morbidities  Goal: Patient's chronic conditions and co-morbidity symptoms are monitored and maintained or improved  7/17/2025 0735 by Chelsey Lorenz RN  Outcome: Progressing  7/16/2025 2002 by Azeem Roth RN  Outcome: Progressing  7/16/2025 1744 by Mita Wheat RN  Outcome: Progressing     Problem: Neurosensory - Adult  Goal: Achieves stable or improved neurological status  7/17/2025 0735 by Chelsey Lorenz RN  Outcome: Progressing  7/16/2025 2002 by Azeem Roth RN  Outcome: Progressing  7/16/2025 1744 by Mita Wheat RN  Outcome: Progressing  Goal: Absence of seizures  7/17/2025 0735 by Chelsey Lorenz RN  Outcome: Progressing  7/16/2025 2002 by Azeem Roth RN  Outcome: Progressing  7/16/2025 1744 by Ray, Mita D, RN  Outcome: Progressing  Goal: Achieves maximal functionality and self care  7/17/2025 4239 
  Problem: Discharge Planning  Goal: Discharge to home or other facility with appropriate resources  7/4/2025 2328 by Yesenia Hartley RN  Outcome: Progressing     Problem: Pain  Goal: Verbalizes/displays adequate comfort level or baseline comfort level  7/5/2025 0751 by Bharati Payne RN  Outcome: Progressing  7/4/2025 2328 by Yesenia Hartley RN  Outcome: Progressing     Problem: ABCDS Injury Assessment  Goal: Absence of physical injury  7/4/2025 2328 by Yesenia Hartley RN  Outcome: Progressing     Problem: Safety - Adult  Goal: Free from fall injury  7/5/2025 0751 by Bharati Payne RN  Outcome: Progressing  7/4/2025 2328 by Yesenia Hartley RN  Outcome: Progressing     
  Problem: Discharge Planning  Goal: Discharge to home or other facility with appropriate resources  7/4/2025 2328 by Yesenia Hartley RN  Outcome: Progressing  7/4/2025 1248 by Angélica Alvarez RN  Outcome: Progressing  Flowsheets (Taken 7/4/2025 0820)  Discharge to home or other facility with appropriate resources: Identify barriers to discharge with patient and caregiver     Problem: Pain  Goal: Verbalizes/displays adequate comfort level or baseline comfort level  7/4/2025 2328 by Yesenia Hartley RN  Outcome: Progressing  7/4/2025 1248 by Angélica Alvarez RN  Outcome: Progressing     Problem: ABCDS Injury Assessment  Goal: Absence of physical injury  7/4/2025 2328 by Yesenia Hartley RN  Outcome: Progressing  7/4/2025 1248 by Angélica Alvarez RN  Outcome: Progressing     Problem: Safety - Adult  Goal: Free from fall injury  7/4/2025 2328 by Yesenia Hartley RN  Outcome: Progressing  7/4/2025 1248 by Angélica Alvarez RN  Outcome: Progressing     
  Problem: Discharge Planning  Goal: Discharge to home or other facility with appropriate resources  Outcome: Progressing     Problem: Pain  Goal: Verbalizes/displays adequate comfort level or baseline comfort level  Outcome: Progressing     Problem: ABCDS Injury Assessment  Goal: Absence of physical injury  Outcome: Progressing     Problem: Safety - Adult  Goal: Free from fall injury  Outcome: Progressing     
  Problem: Discharge Planning  Goal: Discharge to home or other facility with appropriate resources  Outcome: Progressing     Problem: Pain  Goal: Verbalizes/displays adequate comfort level or baseline comfort level  Outcome: Progressing     Problem: ABCDS Injury Assessment  Goal: Absence of physical injury  Outcome: Progressing     Problem: Safety - Adult  Goal: Free from fall injury  Outcome: Progressing     Problem: Chronic Conditions and Co-morbidities  Goal: Patient's chronic conditions and co-morbidity symptoms are monitored and maintained or improved  Outcome: Progressing     
  Problem: Discharge Planning  Goal: Discharge to home or other facility with appropriate resources  Outcome: Progressing     Problem: Pain  Goal: Verbalizes/displays adequate comfort level or baseline comfort level  Outcome: Progressing     Problem: ABCDS Injury Assessment  Goal: Absence of physical injury  Outcome: Progressing     Problem: Safety - Adult  Goal: Free from fall injury  Outcome: Progressing     Problem: Chronic Conditions and Co-morbidities  Goal: Patient's chronic conditions and co-morbidity symptoms are monitored and maintained or improved  Outcome: Progressing     Problem: Neurosensory - Adult  Goal: Achieves stable or improved neurological status  Outcome: Progressing  Goal: Absence of seizures  Outcome: Progressing  Goal: Achieves maximal functionality and self care  Outcome: Progressing     Problem: Respiratory - Adult  Goal: Achieves optimal ventilation and oxygenation  Outcome: Progressing     Problem: Cardiovascular - Adult  Goal: Maintains optimal cardiac output and hemodynamic stability  Outcome: Progressing  Flowsheets (Taken 7/9/2025 1700 by Chelsey Lorenz, RN)  Maintains optimal cardiac output and hemodynamic stability: Monitor blood pressure and heart rate  Goal: Absence of cardiac dysrhythmias or at baseline  Outcome: Progressing  Flowsheets (Taken 7/9/2025 1700 by Chelsey Lorenz, RN)  Absence of cardiac dysrhythmias or at baseline: Monitor cardiac rate and rhythm     Problem: Skin/Tissue Integrity - Adult  Goal: Skin integrity remains intact  Outcome: Progressing  Flowsheets (Taken 7/10/2025 0332)  Skin Integrity Remains Intact:   Monitor for areas of redness and/or skin breakdown   Assess vascular access sites hourly   Every 4-6 hours minimum:  Change oxygen saturation probe site  Goal: Incisions, wounds, or drain sites healing without S/S of infection  Outcome: Progressing  Goal: Oral mucous membranes remain intact  Outcome: Progressing     Problem: Musculoskeletal - 
  Problem: Discharge Planning  Goal: Discharge to home or other facility with appropriate resources  Outcome: Progressing     Problem: Pain  Goal: Verbalizes/displays adequate comfort level or baseline comfort level  Outcome: Progressing     Problem: ABCDS Injury Assessment  Goal: Absence of physical injury  Outcome: Progressing     Problem: Safety - Adult  Goal: Free from fall injury  Outcome: Progressing     Problem: Chronic Conditions and Co-morbidities  Goal: Patient's chronic conditions and co-morbidity symptoms are monitored and maintained or improved  Outcome: Progressing     Problem: Neurosensory - Adult  Goal: Achieves stable or improved neurological status  Outcome: Progressing  Goal: Absence of seizures  Outcome: Progressing  Goal: Achieves maximal functionality and self care  Outcome: Progressing     Problem: Respiratory - Adult  Goal: Achieves optimal ventilation and oxygenation  Outcome: Progressing     Problem: Cardiovascular - Adult  Goal: Maintains optimal cardiac output and hemodynamic stability  Outcome: Progressing  Goal: Absence of cardiac dysrhythmias or at baseline  Outcome: Progressing     Problem: Skin/Tissue Integrity - Adult  Goal: Skin integrity remains intact  Outcome: Progressing  Goal: Incisions, wounds, or drain sites healing without S/S of infection  Outcome: Progressing  Goal: Oral mucous membranes remain intact  Outcome: Progressing     Problem: Musculoskeletal - Adult  Goal: Return mobility to safest level of function  Outcome: Progressing  Goal: Maintain proper alignment of affected body part  Outcome: Progressing  Goal: Return ADL status to a safe level of function  Outcome: Progressing     Problem: Gastrointestinal - Adult  Goal: Minimal or absence of nausea and vomiting  Outcome: Progressing  Goal: Maintains or returns to baseline bowel function  Outcome: Progressing  Goal: Maintains adequate nutritional intake  Outcome: Progressing     Problem: Genitourinary - Adult  Goal: 
  Problem: Discharge Planning  Goal: Discharge to home or other facility with appropriate resources  Outcome: Progressing  Flowsheets (Taken 6/30/2025 2029)  Discharge to home or other facility with appropriate resources: Identify barriers to discharge with patient and caregiver     Problem: Pain  Goal: Verbalizes/displays adequate comfort level or baseline comfort level  Outcome: Progressing     Problem: ABCDS Injury Assessment  Goal: Absence of physical injury  Outcome: Progressing     Problem: Safety - Adult  Goal: Free from fall injury  Outcome: Progressing     
  Problem: Discharge Planning  Goal: Discharge to home or other facility with appropriate resources  Outcome: Progressing  Flowsheets (Taken 7/14/2025 2000)  Discharge to home or other facility with appropriate resources:   Identify barriers to discharge with patient and caregiver   Arrange for needed discharge resources and transportation as appropriate     Problem: Pain  Goal: Verbalizes/displays adequate comfort level or baseline comfort level  Outcome: Progressing  Flowsheets (Taken 7/14/2025 2000)  Verbalizes/displays adequate comfort level or baseline comfort level:   Encourage patient to monitor pain and request assistance   Assess pain using appropriate pain scale   Administer analgesics based on type and severity of pain and evaluate response   Implement non-pharmacological measures as appropriate and evaluate response     Problem: ABCDS Injury Assessment  Goal: Absence of physical injury  Outcome: Progressing     Problem: Safety - Adult  Goal: Free from fall injury  Outcome: Progressing     Problem: Chronic Conditions and Co-morbidities  Goal: Patient's chronic conditions and co-morbidity symptoms are monitored and maintained or improved  Outcome: Progressing  Flowsheets (Taken 7/14/2025 2000)  Care Plan - Patient's Chronic Conditions and Co-Morbidity Symptoms are Monitored and Maintained or Improved:   Monitor and assess patient's chronic conditions and comorbid symptoms for stability, deterioration, or improvement   Collaborate with multidisciplinary team to address chronic and comorbid conditions and prevent exacerbation or deterioration   Update acute care plan with appropriate goals if chronic or comorbid symptoms are exacerbated and prevent overall improvement and discharge     Problem: Neurosensory - Adult  Goal: Achieves stable or improved neurological status  Outcome: Progressing  Flowsheets (Taken 7/14/2025 2000)  Achieves stable or improved neurological status:   Assess for and report changes in 
  Problem: Discharge Planning  Goal: Discharge to home or other facility with appropriate resources  Outcome: Progressing  Flowsheets (Taken 7/4/2025 0820)  Discharge to home or other facility with appropriate resources: Identify barriers to discharge with patient and caregiver     Problem: Pain  Goal: Verbalizes/displays adequate comfort level or baseline comfort level  Outcome: Progressing     Problem: ABCDS Injury Assessment  Goal: Absence of physical injury  Outcome: Progressing     Problem: Safety - Adult  Goal: Free from fall injury  Outcome: Progressing     
  Problem: Discharge Planning  Goal: Discharge to home or other facility with appropriate resources  Outcome: Progressing  Flowsheets (Taken 7/8/2025 1308 by Chelsey Lorenz, RN)  Discharge to home or other facility with appropriate resources:   Identify barriers to discharge with patient and caregiver   Arrange for needed discharge resources and transportation as appropriate     Problem: Pain  Goal: Verbalizes/displays adequate comfort level or baseline comfort level  Outcome: Progressing     Problem: ABCDS Injury Assessment  Goal: Absence of physical injury  Outcome: Progressing     Problem: Safety - Adult  Goal: Free from fall injury  Outcome: Progressing     Problem: Chronic Conditions and Co-morbidities  Goal: Patient's chronic conditions and co-morbidity symptoms are monitored and maintained or improved  Outcome: Progressing  Flowsheets (Taken 7/8/2025 1308 by Chelsey Lorenz, RN)  Care Plan - Patient's Chronic Conditions and Co-Morbidity Symptoms are Monitored and Maintained or Improved: Monitor and assess patient's chronic conditions and comorbid symptoms for stability, deterioration, or improvement     Problem: Neurosensory - Adult  Goal: Achieves stable or improved neurological status  Outcome: Progressing  Goal: Absence of seizures  Outcome: Progressing  Goal: Achieves maximal functionality and self care  Outcome: Progressing     Problem: Respiratory - Adult  Goal: Achieves optimal ventilation and oxygenation  Outcome: Progressing  Flowsheets (Taken 7/8/2025 1408 by Chelsey Lorenz, RN)  Achieves optimal ventilation and oxygenation:   Assess for changes in respiratory status   Assess for changes in mentation and behavior   Position to facilitate oxygenation and minimize respiratory effort     Problem: Cardiovascular - Adult  Goal: Maintains optimal cardiac output and hemodynamic stability  Outcome: Progressing  Goal: Absence of cardiac dysrhythmias or at baseline  Outcome: Progressing     Problem: 
Met with patient and Spouse. introduced myself as the Heart failure education R.N. I have seen this patient for heart failure education at two other admissions. Patient politely declined further education. Reviewed possible causes for readmission. Patient is participating in Cardiac Rehab. She voices compliance in diet, fluids, medications and regular cardiac follow up.     Admitting diagnosis- acute on chronic respiratory failure  Cardiologist- [place]  Heart Failure Education Nurse consulted- yes   Ejection fraction 65-70 % as of 9/24/2024  Pro BNP- 1,090 on 6/30    The following handouts were given : Heart Failure education booklet, the 'Stop Light' Handout,  a link to the [employer]'s Healthier Living with Heart Failure Interactive workbook and a list of heart failure related education available on the hospital TV and how to access it.           
Absence of urinary retention  Outcome: Progressing     Problem: Infection - Adult  Goal: Absence of infection at discharge  Outcome: Progressing  Goal: Absence of infection during hospitalization  Outcome: Progressing     Problem: Metabolic/Fluid and Electrolytes - Adult  Goal: Electrolytes maintained within normal limits  Outcome: Progressing  Goal: Hemodynamic stability and optimal renal function maintained  Outcome: Progressing  Goal: Glucose maintained within prescribed range  Outcome: Progressing     Problem: Hematologic - Adult  Goal: Maintains hematologic stability  Outcome: Progressing     Problem: Change in Body Image  Goal: Pt/Family communicate acceptance of loss or change in body image and feel psychological comfort and peace  Description: INTERVENTIONS:  1. Assess patient/family anxiety and grief process related to change in body image, loss of functional status, loss of sense of self, and forgiveness  2. Provide emotional and spiritual support  3. Provide information about the patient's health status with consideration of family and cultural values  4. Communicate willingness to discuss loss and facilitate grief process with patient/family as appropriate  5. Emphasize sustaining relationships within family system and community, or tesfaye/spiritual traditions  6. Initiate Spiritual Care, Psychosocial Clinical Specialist consult as needed  Outcome: Progressing     Problem: Decision Making  Goal: Pt/Family able to effectively weigh alternatives and participate in decision making related to treatment and care  Description: INTERVENTIONS:  1. Determine when there are differences between patient's view, family's view, and healthcare provider's view of condition  2. Facilitate patient and family articulation of goals for care  3. Help patient and family identify pros/cons of alternative solutions  4. Provide information as requested by patient/family  5. Respect patient/family right to receive or not to 
Integrity - Adult  Goal: Skin integrity remains intact  7/15/2025 0804 by Chelsey Lorenz RN  Outcome: Progressing  7/15/2025 0248 by Lali Rivera RN  Outcome: Progressing  Flowsheets (Taken 7/14/2025 2000)  Skin Integrity Remains Intact:   Monitor for areas of redness and/or skin breakdown   Assess vascular access sites hourly   Every 4-6 hours minimum:  Change oxygen saturation probe site   Every 4-6 hours:  If on nasal continuous positive airway pressure, assess nares and determine need for appliance change or resting period   Turn and reposition as indicated   Assess need for specialty bed   Positioning devices   Pressure redistribution bed/mattress (bed type)   Check visual cues for pain  Goal: Incisions, wounds, or drain sites healing without S/S of infection  7/15/2025 0804 by Chelsey Lorenz RN  Outcome: Progressing  7/15/2025 0248 by Lali Rivera RN  Outcome: Progressing  Flowsheets (Taken 7/14/2025 2000)  Incisions, Wounds, or Drain Sites Healing Without Sign and Symptoms of Infection:   ADMISSION and DAILY: Assess and document risk factors for pressure ulcer development   TWICE DAILY: Assess and document skin integrity   TWICE DAILY: Assess and document dressing/incision, wound bed, drain sites and surrounding tissue   Implement wound care per orders   Initiate isolation precautions as appropriate   Initiate pressure ulcer prevention bundle as indicated  Goal: Oral mucous membranes remain intact  7/15/2025 0804 by Chelsey Lorenz RN  Outcome: Progressing  7/15/2025 0248 by Lali Rivera RN  Outcome: Progressing  Flowsheets (Taken 7/14/2025 2000)  Oral Mucous Membranes Remain Intact:   Assess oral mucosa and hygiene practices   Implement preventative oral hygiene regimen   Implement oral medicated treatments as ordered     Problem: Musculoskeletal - Adult  Goal: Return mobility to safest level of function  7/15/2025 0804 by Chelsey Lorenz RN  Outcome: Progressing  7/15/2025 0248 by 
stability and optimal renal function maintained  7/9/2025 0818 by Chelsey Lorenz RN  Outcome: Progressing  7/8/2025 2355 by Ana Knox RN  Outcome: Progressing  Goal: Glucose maintained within prescribed range  7/9/2025 0818 by Chelsey Lorenz RN  Outcome: Progressing  7/8/2025 2355 by Ana Knox RN  Outcome: Progressing     Problem: Hematologic - Adult  Goal: Maintains hematologic stability  7/9/2025 0818 by Chelsey Lorenz RN  Outcome: Progressing  7/8/2025 2355 by Ana Knox RN  Outcome: Progressing     Problem: Change in Body Image  Goal: Pt/Family communicate acceptance of loss or change in body image and feel psychological comfort and peace  Description: INTERVENTIONS:  1. Assess patient/family anxiety and grief process related to change in body image, loss of functional status, loss of sense of self, and forgiveness  2. Provide emotional and spiritual support  3. Provide information about the patient's health status with consideration of family and cultural values  4. Communicate willingness to discuss loss and facilitate grief process with patient/family as appropriate  5. Emphasize sustaining relationships within family system and community, or tesfaye/spiritual traditions  6. Initiate Spiritual Care, Psychosocial Clinical Specialist consult as needed  7/9/2025 0818 by Chelsey Lorenz RN  Outcome: Progressing  7/8/2025 2355 by Ana Knox RN  Outcome: Progressing  Flowsheets (Taken 7/8/2025 1408 by Chelsey Lorenz, RN)  Patient/family communicate acceptance of loss or change in body image and feel psychological comfort and peace:   Assess patient/family anxiety and grief process related to change in body image, loss of functional status, loss of sense of self, and forgiveness   Provide information about the patient’s health status with consideration of family and cultural values     Problem: Decision Making  Goal: Pt/Family able to effectively weigh

## 2025-07-17 NOTE — DISCHARGE SUMMARY
Cardiothoracic and Vascular Surgery Discharge Summary  Today's Date: 25  Name:  Radha Gomez /Age/Sex: 1955  (69 y.o. female)   MRN & CSN:  [id number] & [id number] Admission Date/Time: 2025  8:51 AM   Location:  -A PCP: Luis Buenrostro MD       Admit date: 2025   Discharge date: 2025      Admitting Provider: No admitting provider for patient encounter.   Discharge Provider: Felix Zimmerman PA-C     Discharge Diagnoses:   Active Hospital Problems    Diagnosis Date Noted    Coronary artery disease [I25.10] 2025    Acute on chronic congestive heart failure (HCC) [I50.9] 2025    Acute on chronic respiratory failure with hypoxia (HCC) [J96.21] 10/29/2024     Discharged Condition: stable.    Operation    S/p CORONARY ARTERY BYPASS GRAFT x 2, SVG-RCA, SVG-DIAG, AORTIC VALVE REPLACEMENT UTILIZING A 21MM REYES INSPIRIS TISSUE VALVE; LEFT ATRIAL APPENDAGE LIGATION UTILIZING A 50MM ATRICLIP; BILATERAL ENDOSCOPIC GREATER SAPHENOUS VEIN HARVEST; INTRAOPERATIVE TRANSESOPHAGEAL ECHOCARDIOGRAM  on 25      Hospital Course:  2025 - 2025: Patient presented to the emergency department complaining of increased oxygen requirements, shortness of breath, leg extremity edema.  Echocardiogram was obtained that revealed abnormality of the aortic valve therefore cardiology team was consulted.  Patient was found to have multivessel coronary artery disease.  CT surgery team was consulted for possible surgical intervention.  Patient medically optimized preoperatively.  25: Patient was transferred to the ICU with oxygen saturations in the 80s.  She had a history of pulmonary hypertension preoperatively.  An ABG revealed acidosis and this was corrected.  The ventilator was adjusted and the patient sats were in the low 90s.  She came out of the OR on Levophed and epinephrine drip.  25: Off all drips.  Patient remains intubated this morning.  The PEEP was decreased from 8

## 2025-07-17 NOTE — DISCHARGE INSTRUCTIONS
Cardiac Surgery Discharge Instructions:    DIET  Fat and cholesterol allowances are made for strength and healing purposes. Adequate caloric and protein intake is essential for wound healing; fiber and hydration aid in bowel motility. Follow a low fat, low sodium, high fiber, high protein diet.  NO SALT is to be used when cooking or added at the table. Restrict sodium intake to 1500 mg daily. We encourage lean meats, such as: lean pork, turkey, chicken, and fish. Meats should be baked, broiled, roasted, or grilled, NEVER fried. Try to eat more whole foods. Example 1) Eat a bake potato, instead of french fries. Example 2) Eat an apple, instead of apple pie. Avoid caffeinated beverages.    Stay away from the SALTY SEVEN:  1) BREAD  2) PIZZA  3) SANDWICHES  4) COLD CUTS or CURED MEATS  5) SOUPS of any kind / CANNED foods  6) BURRITOS or TACOS  7) CHEESE of any kind (swiss has the least amount of sodium)    ACTIVITY  Weigh yourself each day, at the same, after you void. Call your doctor for weight gain of 3 lbs in 1 day or 5 lbs in 3 days.  Must be up in chair during meal and/or snack times.   Put on your white stockings (VIVIANA hose) every morning and remove them at night.  Keep your legs elevated when sitting.  Remember to use your incentive spirometer every hour, 10 times an hour, while awake. Do not do this rapidly! Remember quality is better than quantity.  Take it easy for the next two weeks; no heavy lifting or work.   Gradually increase activity as tolerated. Plan rest periods, but do NOT sit in the same position for more than 2 hours.  Do the exercises that Physical Therapy showed you three times a day.  You may climb stairs when you feel strong enough.   You may resume sexual activity when you can climb two flights of stairs without getting short of breath.    For the next two weeks, avoid any heavy lifting, pushing, or pulling objects. Do not lift anything greater than a gallon of milk.  Avoid activity that

## 2025-07-17 NOTE — CARE COORDINATION
Ryann has been APPROVED for [facility]   Effective From:  07/15/2025  Effective Through:  07/23/2025  Authorization Number:  [id number]

## 2025-07-22 ENCOUNTER — TELEPHONE (OUTPATIENT)
Dept: PULMONOLOGY | Age: 70
End: 2025-07-22

## 2025-07-22 NOTE — TELEPHONE ENCOUNTER
Order for [facility] placed in Dr. Choudhary box for signature, once signed [facility]

## 2025-07-22 NOTE — TELEPHONE ENCOUNTER
Patient called into office requesting Dr. Choudhary send a new order for the large oxygen tanks, she states ever since her surgery the portable pulse dose machine she has is not enough and she needs the larger tanks for continuous flow. Informed patient documentation would need to be completed explaining why patient is going from pulse dose to continuous flow (there is a telephone encounter from 7/1/25 Rheumatology about her oxygen usage). Patient states Dr. Choudhary has seen her every day while in the hospital and she knows he will send an order to [facility] for this, and she would like it taken care of before she gets discharged. Informed patient I would send him & staff members this message and she would be getting a return call.

## 2025-07-25 ENCOUNTER — APPOINTMENT (OUTPATIENT)
Dept: GENERAL RADIOLOGY | Age: 70
End: 2025-07-25
Payer: MEDICARE

## 2025-07-25 ENCOUNTER — HOSPITAL ENCOUNTER (OUTPATIENT)
Age: 70
Setting detail: OBSERVATION
Discharge: HOME | End: 2025-07-27
Attending: STUDENT IN AN ORGANIZED HEALTH CARE EDUCATION/TRAINING PROGRAM | Admitting: STUDENT IN AN ORGANIZED HEALTH CARE EDUCATION/TRAINING PROGRAM
Payer: MEDICARE

## 2025-07-25 DIAGNOSIS — E87.6 HYPOKALEMIA: Primary | ICD-10-CM

## 2025-07-25 DIAGNOSIS — Z95.1 S/P CABG X 2: ICD-10-CM

## 2025-07-25 LAB
ANION GAP SERPL CALCULATED.3IONS-SCNC: 12 MMOL/L (ref 9–17)
ANION GAP SERPL CALCULATED.3IONS-SCNC: 13 MMOL/L (ref 9–17)
BASOPHILS # BLD: 0.07 K/UL
BASOPHILS NFR BLD: 0 % (ref 0–1)
BUN SERPL-MCNC: 31 MG/DL (ref 7–20)
BUN SERPL-MCNC: 31 MG/DL (ref 7–20)
CA-I BLD-SCNC: 1.04 MMOL/L (ref 1.15–1.33)
CALCIUM SERPL-MCNC: 9 MG/DL (ref 8.3–10.6)
CALCIUM SERPL-MCNC: 9.3 MG/DL (ref 8.3–10.6)
CHLORIDE SERPL-SCNC: 91 MMOL/L (ref 99–110)
CHLORIDE SERPL-SCNC: 92 MMOL/L (ref 99–110)
CO2 SERPL-SCNC: 30 MMOL/L (ref 21–32)
CO2 SERPL-SCNC: 30 MMOL/L (ref 21–32)
CREAT SERPL-MCNC: 1.5 MG/DL (ref 0.6–1.2)
CREAT SERPL-MCNC: 1.6 MG/DL (ref 0.6–1.2)
EOSINOPHIL # BLD: 0.22 K/UL
EOSINOPHILS RELATIVE PERCENT: 1 % (ref 0–3)
ERYTHROCYTE [DISTWIDTH] IN BLOOD BY AUTOMATED COUNT: 16.1 % (ref 11.7–14.9)
ERYTHROCYTE [DISTWIDTH] IN BLOOD BY AUTOMATED COUNT: 16.1 % (ref 11.7–14.9)
GFR, ESTIMATED: 33 ML/MIN/1.73M2
GFR, ESTIMATED: 34 ML/MIN/1.73M2
GLUCOSE SERPL-MCNC: 109 MG/DL (ref 74–99)
GLUCOSE SERPL-MCNC: 91 MG/DL (ref 74–99)
HCT VFR BLD AUTO: 29.7 % (ref 37–47)
HCT VFR BLD AUTO: 32.2 % (ref 37–47)
HGB BLD-MCNC: 10.3 G/DL (ref 12.5–16)
HGB BLD-MCNC: 9.6 G/DL (ref 12.5–16)
IMM GRANULOCYTES # BLD AUTO: 0.1 K/UL
IMM GRANULOCYTES NFR BLD: 1 %
LYMPHOCYTES NFR BLD: 2.55 K/UL
LYMPHOCYTES RELATIVE PERCENT: 15 % (ref 24–44)
MAGNESIUM SERPL-MCNC: 2.6 MG/DL (ref 1.8–2.4)
MAGNESIUM SERPL-MCNC: 3.2 MG/DL (ref 1.8–2.4)
MCH RBC QN AUTO: 28.1 PG (ref 27–31)
MCH RBC QN AUTO: 28.4 PG (ref 27–31)
MCHC RBC AUTO-ENTMCNC: 32 G/DL (ref 32–36)
MCHC RBC AUTO-ENTMCNC: 32.3 G/DL (ref 32–36)
MCV RBC AUTO: 87.9 FL (ref 78–100)
MCV RBC AUTO: 88 FL (ref 78–100)
MONOCYTES NFR BLD: 12 % (ref 0–5)
MONOCYTES NFR BLD: 2 K/UL
NEUTROPHILS NFR BLD: 72 % (ref 36–66)
NEUTS SEG NFR BLD: 12.45 K/UL
PHOSPHATE SERPL-MCNC: 1.8 MG/DL (ref 2.5–4.9)
PLATELET # BLD AUTO: 320 K/UL (ref 140–440)
PLATELET # BLD AUTO: 352 K/UL (ref 140–440)
PMV BLD AUTO: 10.1 FL (ref 7.5–11.1)
PMV BLD AUTO: 10.5 FL (ref 7.5–11.1)
POTASSIUM SERPL-SCNC: 2.6 MMOL/L (ref 3.5–5.1)
POTASSIUM SERPL-SCNC: 3.5 MMOL/L (ref 3.5–5.1)
RBC # BLD AUTO: 3.38 M/UL (ref 4.2–5.4)
RBC # BLD AUTO: 3.66 M/UL (ref 4.2–5.4)
SODIUM SERPL-SCNC: 134 MMOL/L (ref 136–145)
SODIUM SERPL-SCNC: 134 MMOL/L (ref 136–145)
WBC OTHER # BLD: 16.8 K/UL (ref 4–10.5)
WBC OTHER # BLD: 17.4 K/UL (ref 4–10.5)

## 2025-07-25 PROCEDURE — 85025 COMPLETE CBC W/AUTO DIFF WBC: CPT

## 2025-07-25 PROCEDURE — 83735 ASSAY OF MAGNESIUM: CPT

## 2025-07-25 PROCEDURE — 6360000002 HC RX W HCPCS: Performed by: PHYSICIAN ASSISTANT

## 2025-07-25 PROCEDURE — 2700000000 HC OXYGEN THERAPY PER DAY

## 2025-07-25 PROCEDURE — 84100 ASSAY OF PHOSPHORUS: CPT

## 2025-07-25 PROCEDURE — 85027 COMPLETE CBC AUTOMATED: CPT

## 2025-07-25 PROCEDURE — 94640 AIRWAY INHALATION TREATMENT: CPT

## 2025-07-25 PROCEDURE — 2500000003 HC RX 250 WO HCPCS: Performed by: LICENSED PRACTICAL NURSE

## 2025-07-25 PROCEDURE — 2580000003 HC RX 258: Performed by: PHYSICIAN ASSISTANT

## 2025-07-25 PROCEDURE — 6370000000 HC RX 637 (ALT 250 FOR IP): Performed by: LICENSED PRACTICAL NURSE

## 2025-07-25 PROCEDURE — 6370000000 HC RX 637 (ALT 250 FOR IP): Performed by: STUDENT IN AN ORGANIZED HEALTH CARE EDUCATION/TRAINING PROGRAM

## 2025-07-25 PROCEDURE — 6360000002 HC RX W HCPCS: Performed by: LICENSED PRACTICAL NURSE

## 2025-07-25 PROCEDURE — 2500000003 HC RX 250 WO HCPCS: Performed by: STUDENT IN AN ORGANIZED HEALTH CARE EDUCATION/TRAINING PROGRAM

## 2025-07-25 PROCEDURE — 99285 EMERGENCY DEPT VISIT HI MDM: CPT

## 2025-07-25 PROCEDURE — 93005 ELECTROCARDIOGRAM TRACING: CPT | Performed by: STUDENT IN AN ORGANIZED HEALTH CARE EDUCATION/TRAINING PROGRAM

## 2025-07-25 PROCEDURE — 6360000002 HC RX W HCPCS: Performed by: STUDENT IN AN ORGANIZED HEALTH CARE EDUCATION/TRAINING PROGRAM

## 2025-07-25 PROCEDURE — 80048 BASIC METABOLIC PNL TOTAL CA: CPT

## 2025-07-25 PROCEDURE — 71045 X-RAY EXAM CHEST 1 VIEW: CPT

## 2025-07-25 PROCEDURE — 2580000003 HC RX 258: Performed by: STUDENT IN AN ORGANIZED HEALTH CARE EDUCATION/TRAINING PROGRAM

## 2025-07-25 PROCEDURE — 2100000000 HC CCU R&B

## 2025-07-25 PROCEDURE — 82330 ASSAY OF CALCIUM: CPT

## 2025-07-25 RX ORDER — AMIODARONE HYDROCHLORIDE 200 MG/1
200 TABLET ORAL DAILY
Status: CANCELLED | OUTPATIENT
Start: 2025-07-25

## 2025-07-25 RX ORDER — PREDNISONE 10 MG/1
10 TABLET ORAL EVERY EVENING
Status: DISCONTINUED | OUTPATIENT
Start: 2025-07-25 | End: 2025-07-26

## 2025-07-25 RX ORDER — HYDROXYCHLOROQUINE SULFATE 200 MG/1
300 TABLET, FILM COATED ORAL DAILY
Status: DISCONTINUED | OUTPATIENT
Start: 2025-07-26 | End: 2025-07-27 | Stop reason: HOSPADM

## 2025-07-25 RX ORDER — SODIUM CHLORIDE 9 MG/ML
INJECTION, SOLUTION INTRAVENOUS PRN
Status: DISCONTINUED | OUTPATIENT
Start: 2025-07-25 | End: 2025-07-27 | Stop reason: HOSPADM

## 2025-07-25 RX ORDER — TRAMADOL HYDROCHLORIDE 50 MG/1
50 TABLET ORAL 2 TIMES DAILY
COMMUNITY
End: 2025-07-30

## 2025-07-25 RX ORDER — POLYETHYLENE GLYCOL 3350 17 G/17G
17 POWDER, FOR SOLUTION ORAL DAILY PRN
Status: DISCONTINUED | OUTPATIENT
Start: 2025-07-25 | End: 2025-07-27 | Stop reason: HOSPADM

## 2025-07-25 RX ORDER — ONDANSETRON 2 MG/ML
4 INJECTION INTRAMUSCULAR; INTRAVENOUS EVERY 6 HOURS PRN
Status: DISCONTINUED | OUTPATIENT
Start: 2025-07-25 | End: 2025-07-27 | Stop reason: HOSPADM

## 2025-07-25 RX ORDER — PREDNISONE 10 MG/1
30 TABLET ORAL EVERY EVENING
Status: ON HOLD | COMMUNITY
End: 2025-07-27 | Stop reason: HOSPADM

## 2025-07-25 RX ORDER — ATORVASTATIN CALCIUM 40 MG/1
40 TABLET, FILM COATED ORAL NIGHTLY
Status: DISCONTINUED | OUTPATIENT
Start: 2025-07-25 | End: 2025-07-27 | Stop reason: HOSPADM

## 2025-07-25 RX ORDER — AMIODARONE HYDROCHLORIDE 200 MG/1
200 TABLET ORAL 2 TIMES DAILY
Status: DISCONTINUED | OUTPATIENT
Start: 2025-07-25 | End: 2025-07-26

## 2025-07-25 RX ORDER — LEVOFLOXACIN 500 MG/1
750 TABLET, FILM COATED ORAL
Status: DISCONTINUED | OUTPATIENT
Start: 2025-07-25 | End: 2025-07-25

## 2025-07-25 RX ORDER — BUDESONIDE AND FORMOTEROL FUMARATE DIHYDRATE 160; 4.5 UG/1; UG/1
2 AEROSOL RESPIRATORY (INHALATION)
Status: DISCONTINUED | OUTPATIENT
Start: 2025-07-25 | End: 2025-07-27 | Stop reason: HOSPADM

## 2025-07-25 RX ORDER — GUAIFENESIN 600 MG/1
600 TABLET, EXTENDED RELEASE ORAL 2 TIMES DAILY
Status: DISCONTINUED | OUTPATIENT
Start: 2025-07-25 | End: 2025-07-27 | Stop reason: HOSPADM

## 2025-07-25 RX ORDER — MAGNESIUM SULFATE IN WATER 40 MG/ML
2000 INJECTION, SOLUTION INTRAVENOUS ONCE
Status: DISCONTINUED | OUTPATIENT
Start: 2025-07-25 | End: 2025-07-25

## 2025-07-25 RX ORDER — ACETAMINOPHEN 325 MG/1
650 TABLET ORAL EVERY 6 HOURS PRN
Status: DISCONTINUED | OUTPATIENT
Start: 2025-07-25 | End: 2025-07-27 | Stop reason: HOSPADM

## 2025-07-25 RX ORDER — ASPIRIN 81 MG/1
81 TABLET, CHEWABLE ORAL DAILY
Status: DISCONTINUED | OUTPATIENT
Start: 2025-07-26 | End: 2025-07-27 | Stop reason: HOSPADM

## 2025-07-25 RX ORDER — TRAMADOL HYDROCHLORIDE 50 MG/1
50 TABLET ORAL 2 TIMES DAILY
Status: DISCONTINUED | OUTPATIENT
Start: 2025-07-25 | End: 2025-07-26

## 2025-07-25 RX ORDER — BUMETANIDE 0.5 MG/1
1 TABLET ORAL DAILY
Status: DISCONTINUED | OUTPATIENT
Start: 2025-07-26 | End: 2025-07-27

## 2025-07-25 RX ORDER — IPRATROPIUM BROMIDE AND ALBUTEROL SULFATE 2.5; .5 MG/3ML; MG/3ML
1 SOLUTION RESPIRATORY (INHALATION)
Status: DISCONTINUED | OUTPATIENT
Start: 2025-07-25 | End: 2025-07-27 | Stop reason: HOSPADM

## 2025-07-25 RX ORDER — AMLODIPINE BESYLATE 10 MG/1
10 TABLET ORAL DAILY
Status: DISCONTINUED | OUTPATIENT
Start: 2025-07-26 | End: 2025-07-27 | Stop reason: HOSPADM

## 2025-07-25 RX ORDER — MAGNESIUM SULFATE 1 G/100ML
1000 INJECTION INTRAVENOUS ONCE
Status: COMPLETED | OUTPATIENT
Start: 2025-07-25 | End: 2025-07-25

## 2025-07-25 RX ORDER — CARVEDILOL 6.25 MG/1
12.5 TABLET ORAL 2 TIMES DAILY WITH MEALS
Status: DISCONTINUED | OUTPATIENT
Start: 2025-07-25 | End: 2025-07-27 | Stop reason: HOSPADM

## 2025-07-25 RX ORDER — LEVOFLOXACIN 750 MG/1
750 TABLET, FILM COATED ORAL
Status: ON HOLD | COMMUNITY
End: 2025-07-27 | Stop reason: HOSPADM

## 2025-07-25 RX ORDER — POTASSIUM CHLORIDE 7.45 MG/ML
10 INJECTION INTRAVENOUS PRN
Status: DISCONTINUED | OUTPATIENT
Start: 2025-07-25 | End: 2025-07-27 | Stop reason: HOSPADM

## 2025-07-25 RX ORDER — POTASSIUM CHLORIDE 1500 MG/1
40 TABLET, EXTENDED RELEASE ORAL PRN
Status: DISCONTINUED | OUTPATIENT
Start: 2025-07-25 | End: 2025-07-27 | Stop reason: HOSPADM

## 2025-07-25 RX ORDER — SODIUM CHLORIDE 0.9 % (FLUSH) 0.9 %
5-40 SYRINGE (ML) INJECTION PRN
Status: DISCONTINUED | OUTPATIENT
Start: 2025-07-25 | End: 2025-07-27 | Stop reason: HOSPADM

## 2025-07-25 RX ORDER — POTASSIUM CHLORIDE 7.45 MG/ML
10 INJECTION INTRAVENOUS
Status: DISPENSED | OUTPATIENT
Start: 2025-07-25 | End: 2025-07-25

## 2025-07-25 RX ORDER — ACETAMINOPHEN 650 MG/1
650 SUPPOSITORY RECTAL EVERY 6 HOURS PRN
Status: DISCONTINUED | OUTPATIENT
Start: 2025-07-25 | End: 2025-07-27 | Stop reason: HOSPADM

## 2025-07-25 RX ORDER — SODIUM CHLORIDE 0.9 % (FLUSH) 0.9 %
5-40 SYRINGE (ML) INJECTION EVERY 12 HOURS SCHEDULED
Status: DISCONTINUED | OUTPATIENT
Start: 2025-07-25 | End: 2025-07-27 | Stop reason: HOSPADM

## 2025-07-25 RX ORDER — CALCIUM CARBONATE 500(1250)
500 TABLET ORAL DAILY
Status: DISCONTINUED | OUTPATIENT
Start: 2025-07-26 | End: 2025-07-27 | Stop reason: HOSPADM

## 2025-07-25 RX ORDER — ONDANSETRON 4 MG/1
4 TABLET, ORALLY DISINTEGRATING ORAL EVERY 8 HOURS PRN
Status: DISCONTINUED | OUTPATIENT
Start: 2025-07-25 | End: 2025-07-27 | Stop reason: HOSPADM

## 2025-07-25 RX ORDER — LEVOFLOXACIN 500 MG/1
750 TABLET, FILM COATED ORAL
Status: COMPLETED | OUTPATIENT
Start: 2025-07-26 | End: 2025-07-26

## 2025-07-25 RX ORDER — METOLAZONE 2.5 MG/1
2.5 TABLET ORAL DAILY
Status: DISCONTINUED | OUTPATIENT
Start: 2025-07-26 | End: 2025-07-26

## 2025-07-25 RX ADMIN — TRAMADOL HYDROCHLORIDE 50 MG: 50 TABLET, COATED ORAL at 21:40

## 2025-07-25 RX ADMIN — POTASSIUM BICARBONATE 50 MEQ: 391 TABLET, EFFERVESCENT ORAL at 14:13

## 2025-07-25 RX ADMIN — CARVEDILOL 12.5 MG: 6.25 TABLET, FILM COATED ORAL at 18:57

## 2025-07-25 RX ADMIN — PREDNISONE 10 MG: 10 TABLET ORAL at 19:42

## 2025-07-25 RX ADMIN — ACETAMINOPHEN 650 MG: 325 TABLET ORAL at 21:40

## 2025-07-25 RX ADMIN — IPRATROPIUM BROMIDE AND ALBUTEROL SULFATE 1 DOSE: .5; 2.5 SOLUTION RESPIRATORY (INHALATION) at 21:06

## 2025-07-25 RX ADMIN — POTASSIUM CHLORIDE 10 MEQ: 7.46 INJECTION, SOLUTION INTRAVENOUS at 20:25

## 2025-07-25 RX ADMIN — POTASSIUM CHLORIDE 10 MEQ: 7.46 INJECTION, SOLUTION INTRAVENOUS at 22:27

## 2025-07-25 RX ADMIN — POTASSIUM CHLORIDE 10 MEQ: 7.46 INJECTION, SOLUTION INTRAVENOUS at 18:34

## 2025-07-25 RX ADMIN — ATORVASTATIN CALCIUM 40 MG: 40 TABLET, FILM COATED ORAL at 21:40

## 2025-07-25 RX ADMIN — POTASSIUM CHLORIDE 10 MEQ: 7.46 INJECTION, SOLUTION INTRAVENOUS at 15:04

## 2025-07-25 RX ADMIN — POTASSIUM CHLORIDE 10 MEQ: 7.46 INJECTION, SOLUTION INTRAVENOUS at 21:27

## 2025-07-25 RX ADMIN — AMIODARONE HYDROCHLORIDE 200 MG: 200 TABLET ORAL at 21:40

## 2025-07-25 RX ADMIN — GUAIFENESIN 600 MG: 600 TABLET ORAL at 21:40

## 2025-07-25 RX ADMIN — CALCIUM CHLORIDE 1000 MG: 100 INJECTION, SOLUTION INTRAVENOUS at 21:52

## 2025-07-25 RX ADMIN — MAGNESIUM SULFATE HEPTAHYDRATE 1000 MG: 1 INJECTION, SOLUTION INTRAVENOUS at 15:05

## 2025-07-25 RX ADMIN — BUDESONIDE AND FORMOTEROL FUMARATE DIHYDRATE 2 PUFF: 160; 4.5 AEROSOL RESPIRATORY (INHALATION) at 21:06

## 2025-07-25 RX ADMIN — SODIUM PHOSPHATE, MONOBASIC, MONOHYDRATE AND SODIUM PHOSPHATE, DIBASIC, ANHYDROUS 15 MMOL: 142; 276 INJECTION, SOLUTION INTRAVENOUS at 22:54

## 2025-07-25 RX ADMIN — SODIUM CHLORIDE, PRESERVATIVE FREE 10 ML: 5 INJECTION INTRAVENOUS at 21:41

## 2025-07-25 ASSESSMENT — PAIN DESCRIPTION - ORIENTATION: ORIENTATION: RIGHT;LEFT

## 2025-07-25 ASSESSMENT — PAIN DESCRIPTION - LOCATION: LOCATION: GENERALIZED

## 2025-07-25 ASSESSMENT — PAIN DESCRIPTION - PAIN TYPE: TYPE: SURGICAL PAIN

## 2025-07-25 ASSESSMENT — PAIN DESCRIPTION - DIRECTION: RADIATING_TOWARDS: GENERAL

## 2025-07-25 ASSESSMENT — PAIN - FUNCTIONAL ASSESSMENT
PAIN_FUNCTIONAL_ASSESSMENT: NONE - DENIES PAIN
PAIN_FUNCTIONAL_ASSESSMENT: ACTIVITIES ARE NOT PREVENTED

## 2025-07-25 ASSESSMENT — PAIN SCALES - GENERAL: PAINLEVEL_OUTOF10: 3

## 2025-07-25 ASSESSMENT — PAIN DESCRIPTION - ONSET: ONSET: PROGRESSIVE

## 2025-07-25 ASSESSMENT — PAIN DESCRIPTION - FREQUENCY: FREQUENCY: INTERMITTENT

## 2025-07-25 ASSESSMENT — PAIN DESCRIPTION - DESCRIPTORS: DESCRIPTORS: ACHING;DISCOMFORT

## 2025-07-25 NOTE — PROGRESS NOTES
Perfect serve sent to  Chelsey Haynes.  Patient states she is only suppose to have 10 mg of predisone for here arthritis, and does not want to take 30 mg.  Also her levofloxacin  she has been on was taken yesterday and is not due until 1000 tomorrow am.  So she said she wasn't taking it now- we did switch it  til 1000 7/26

## 2025-07-25 NOTE — ED NOTES
ED TO INPATIENT SBAR HANDOFF    Patient Name: Radha Gomez   :  1955  69 y.o.   Preferred Name  Radha   Family/Caregiver Present yes   Restraints no   C-SSRS: Risk of Suicide: No Risk  Sitter no   Sepsis Risk Score Sepsis V2 Risk Score: 29.2    PLEASE NOTE--Encounter / Re-Admission Within 30 Days  This patient has had another encounter or admission within the last 30 days.      Readmission Risk Score: 26.4      Situation  Chief Complaint   Patient presents with    Abnormal Lab     Low Potassium     Brief Description of Patient's Condition: Radha Gomez is a 69 y.o. female that presents to department for concerns of hypokalemia.  Patient is status post coronary artery bypass graft x 2 with aortic valve replacement.  This was performed on 2025 by cardiothoracic surgery.  Ultimately, patient was discharged to [facility].  She is on 4 to 6 L nasal cannula oxygen as needed.  States that [facility] has [facility].  She has been doing all of her therapy.  She does report some intermittent throbbing pain in her chest from the procedure but this has been persistent.  Is not new.  She declines any new onset heaviness pressure arm pain jaw pain.  She declines any associated shortness of breath.  It appears as if her potassium was 2.9 she received oral replacement and repeat was 2.9 therefore she came here for replacement.  She otherwise is asymptomatic.  Declines headache neck pain.  Declines blurry vision double vision.  Declines and unilateral weakness numbness or tingling.  Still been tolerating p.o. intake.  Declines nausea vomiting diarrhea.  No fevers or chills.   Mental Status: oriented and alert  Arrived from: nursing home    Imaging:   XR CHEST PORTABLE    (Results Pending)   XR CHEST PORTABLE    (Results Pending)     Abnormal labs:   Abnormal Labs Reviewed   CBC WITH AUTO DIFFERENTIAL - Abnormal; Notable for the following components:       Result Value    WBC 17.4 (*)     RBC

## 2025-07-25 NOTE — PROGRESS NOTES
Received message that predison is for here respiratory issues not her rhumatoid arthritis.  Message also states to call doctor .  Joshua Weeks  called  and left message  regarding prednisone and sodium phosphate

## 2025-07-25 NOTE — H&P
IntraVENous Q1H    magnesium sulfate  1,000 mg IntraVENous Once      Infusions:   PRN Meds: potassium chloride, 40 mEq, PRN   Or  potassium alternative oral replacement, 40 mEq, PRN   Or  potassium chloride, 10 mEq, PRN        Labs    CBC:   Recent Labs     07/25/25  1302   WBC 17.4*   HGB 10.3*        BMP:    Recent Labs     07/25/25  1302   *   K 2.6*   CL 91*   CO2 30   BUN 31*   CREATININE 1.6*   GLUCOSE 91     Hepatic: No results for input(s): \"AST\", \"ALT\", \"BILITOT\", \"ALKPHOS\" in the last 72 hours.    Invalid input(s): \"ALB\"  Lipids:   Lab Results   Component Value Date/Time    CHOL 154 07/01/2025 07:54 AM     07/01/2025 07:54 AM    TRIG 50 07/01/2025 07:54 AM     Hemoglobin A1C:   Lab Results   Component Value Date/Time    LABA1C 5.5 07/01/2025 10:22 AM     TSH:   Lab Results   Component Value Date/Time    TSH 0.46 06/30/2025 01:45 PM     Troponin: No results found for: \"TROPONINT\"  Lactic Acid: No results for input(s): \"LACTA\" in the last 72 hours.  BNP: No results for input(s): \"PROBNP\" in the last 72 hours.  UA:  Lab Results   Component Value Date/Time    NITRU NEGATIVE 10/29/2024 04:12 PM    COLORU Yellow 10/29/2024 04:12 PM    PHUR 5.5 10/29/2024 04:12 PM    WBCUA <1 12/29/2020 12:00 PM    RBCUA 1 12/29/2020 12:00 PM    MUCUS RARE 10/19/2020 07:49 AM    TRICHOMONAS NONE SEEN 12/29/2020 12:00 PM    BACTERIA RARE 12/29/2020 12:00 PM    CLARITYU CLEAR 12/29/2020 12:00 PM    LEUKOCYTESUR NEGATIVE 10/29/2024 04:12 PM    UROBILINOGEN 0.2 10/29/2024 04:12 PM    BILIRUBINUR NEGATIVE 10/29/2024 04:12 PM    BLOODU NEGATIVE 12/29/2020 12:00 PM    GLUCOSEU NEGATIVE 10/29/2024 04:12 PM    KETUA NEGATIVE 10/29/2024 04:12 PM     Urine Cultures: No results found for: \"LABURIN\"  Blood Cultures: No results found for: \"BC\"  No results found for: \"BLOODCULT2\"  Organism: No results found for: \"ORG\"    Imaging/Diagnostics Last 24 Hours   No results found.    I discussed this patient with the ED provider

## 2025-07-25 NOTE — ED TRIAGE NOTES
Pt had CABG done on 7/8/25 and is at [facility] for rehab. Pt states she was just discharged from nursing home but is confused on that. Pt states she was told to come to hospital for IV replacement K+.

## 2025-07-25 NOTE — SIGNIFICANT EVENT
Discussed this patient with the ED provider.  Patient brought to the hospital from her assisted living facility due to hypokalemia.  Most recent potassium 2.6.  Patient received multiple potassium replacements.  Discussed with ER physician.  No EKG changes.  Asymptomatic.      Recommendations from CT surgery team include placing patient under observation status under the hospitalist and recommending placement in CVICU for cardiac nurses to monitor patient and follow CT surgery postop electrolyte replacement protocol.  Repeat labs every 6 hours.    Goal will be for patient to return to rehab in the next 24 hours after hypokalemia is resolved.    Full consult note to follow.

## 2025-07-25 NOTE — ED NOTES
Medication History  [facility]    Patient Name: Radha Gomez 1955     Medication history has been completed by: Lianet Diane CPhT    Source(s) of information: patient [facility] [place]     Primary Care Physician: Luis Buenrostro MD     Pharmacy: [facility]    Allergies as of 07/25/2025 - Fully Reviewed 07/25/2025   Allergen Reaction Noted    Nickel Hives, Itching, and Swelling 12/10/2024        Prior to Admission medications    Medication Sig Start Date End Date Taking? Authorizing Provider   levoFLOXacin (LEVAQUIN) 750 MG tablet Take 1 tablet by mouth every 48 hours   Yes Provider, MD Venkat   predniSONE (DELTASONE) 10 MG tablet Take 3 tablets by mouth every evening   Yes Provider, MD Venkat   traMADol (ULTRAM) 50 MG tablet Take 1 tablet by mouth 2 times daily. 07/25/25 Patient reports she usually takes this in the afternoon and at HS.   Yes Provider, MD Venkat   amiodarone (CORDARONE) 200 MG tablet Take 1 tablet by mouth 2 times daily for 11 doses 7/17/25 7/25/25 Yes Juvenal Cuellar MD   budesonide-formoterol (SYMBICORT) 160-4.5 MCG/ACT AERO Inhale 2 puffs into the lungs in the morning and 2 puffs in the evening. 7/17/25  Yes Juvenal Cuellar MD   ipratropium 0.5 mg-albuterol 2.5 mg (DUONEB) 0.5-2.5 (3) MG/3ML SOLN nebulizer solution Inhale 3 mLs into the lungs 4 times daily 7/17/25  Yes Juvenal Cuellar MD   guaiFENesin (MUCINEX) 600 MG extended release tablet Take 1 tablet by mouth 2 times daily 7/17/25  Yes Juvenal Cuellar MD   bumetanide (BUMEX) 1 MG tablet Take 1 tablet by mouth in the morning and 1 tablet in the evening.  Patient taking differently: Take 1 tablet by mouth daily 7/17/25  Yes Juvenal Cuellar MD   metOLazone (ZAROXOLYN) 2.5 MG tablet Take 1 tablet by mouth daily 7/18/25  Yes Juvenal Cuellar MD   Multiple Vitamins-Minerals (THERAPEUTIC MULTIVITAMIN-MINERALS) tablet Take 1 tablet by mouth

## 2025-07-25 NOTE — ED PROVIDER NOTES
due to severe sepsis or septic shock?   No   Exclusion criteria - the patient is NOT to be included for SEP-1 Core Measure due to:  Infection is not suspected    Disposition: Hospitalized     I am the primary physician of record    Clinical Impression:  1. Hypokalemia    2. S/P CABG x 2      Disposition referral (if applicable):  No follow-up provider specified.  Disposition medications (if applicable):  New Prescriptions    No medications on file     ED Provider Disposition Time  DISPOSITION Decision To Admit 07/25/2025 02:10:24 PM   DISPOSITION CONDITION Stable           Comment: Please note this report has been produced using speech recognition software and may contain errors related to that system including errors in grammar, punctuation, and spelling, as well as words and phrases that may be inappropriate. If there are any questions or concerns please feel free to contact the dictating provider for clarification.        Jay Hazel,   07/25/25 2930

## 2025-07-26 ENCOUNTER — APPOINTMENT (OUTPATIENT)
Dept: GENERAL RADIOLOGY | Age: 70
End: 2025-07-26
Payer: MEDICARE

## 2025-07-26 LAB
ANION GAP SERPL CALCULATED.3IONS-SCNC: 10 MMOL/L (ref 9–17)
ANION GAP SERPL CALCULATED.3IONS-SCNC: 11 MMOL/L (ref 9–17)
ANION GAP SERPL CALCULATED.3IONS-SCNC: 13 MMOL/L (ref 9–17)
ANION GAP SERPL CALCULATED.3IONS-SCNC: 9 MMOL/L (ref 9–17)
BUN SERPL-MCNC: 24 MG/DL (ref 7–20)
BUN SERPL-MCNC: 26 MG/DL (ref 7–20)
CA-I BLD-SCNC: 1.1 MMOL/L (ref 1.15–1.33)
CA-I BLD-SCNC: 1.1 MMOL/L (ref 1.15–1.33)
CA-I BLD-SCNC: 1.18 MMOL/L (ref 1.15–1.33)
CA-I BLD-SCNC: 1.22 MMOL/L (ref 1.15–1.33)
CALCIUM SERPL-MCNC: 8.3 MG/DL (ref 8.3–10.6)
CALCIUM SERPL-MCNC: 8.5 MG/DL (ref 8.3–10.6)
CALCIUM SERPL-MCNC: 9.3 MG/DL (ref 8.3–10.6)
CALCIUM SERPL-MCNC: 9.6 MG/DL (ref 8.3–10.6)
CHLORIDE SERPL-SCNC: 100 MMOL/L (ref 99–110)
CHLORIDE SERPL-SCNC: 102 MMOL/L (ref 99–110)
CHLORIDE SERPL-SCNC: 93 MMOL/L (ref 99–110)
CHLORIDE SERPL-SCNC: 96 MMOL/L (ref 99–110)
CO2 SERPL-SCNC: 25 MMOL/L (ref 21–32)
CO2 SERPL-SCNC: 25 MMOL/L (ref 21–32)
CO2 SERPL-SCNC: 31 MMOL/L (ref 21–32)
CO2 SERPL-SCNC: 32 MMOL/L (ref 21–32)
CREAT SERPL-MCNC: 1.1 MG/DL (ref 0.6–1.2)
CREAT SERPL-MCNC: 1.2 MG/DL (ref 0.6–1.2)
CREAT SERPL-MCNC: 1.4 MG/DL (ref 0.6–1.2)
CREAT SERPL-MCNC: 1.4 MG/DL (ref 0.6–1.2)
EKG ATRIAL RATE: 65 BPM
EKG DIAGNOSIS: NORMAL
EKG P AXIS: 90 DEGREES
EKG P-R INTERVAL: 170 MS
EKG Q-T INTERVAL: 486 MS
EKG QRS DURATION: 108 MS
EKG QTC CALCULATION (BAZETT): 505 MS
EKG R AXIS: 12 DEGREES
EKG T AXIS: 61 DEGREES
EKG VENTRICULAR RATE: 65 BPM
ERYTHROCYTE [DISTWIDTH] IN BLOOD BY AUTOMATED COUNT: 16.5 % (ref 11.7–14.9)
ERYTHROCYTE [DISTWIDTH] IN BLOOD BY AUTOMATED COUNT: 16.7 % (ref 11.7–14.9)
GFR, ESTIMATED: 37 ML/MIN/1.73M2
GFR, ESTIMATED: 38 ML/MIN/1.73M2
GFR, ESTIMATED: 44 ML/MIN/1.73M2
GFR, ESTIMATED: 50 ML/MIN/1.73M2
GLUCOSE SERPL-MCNC: 105 MG/DL (ref 74–99)
GLUCOSE SERPL-MCNC: 115 MG/DL (ref 74–99)
GLUCOSE SERPL-MCNC: 82 MG/DL (ref 74–99)
GLUCOSE SERPL-MCNC: 97 MG/DL (ref 74–99)
HCT VFR BLD AUTO: 30.5 % (ref 37–47)
HCT VFR BLD AUTO: 32.1 % (ref 37–47)
HGB BLD-MCNC: 9.4 G/DL (ref 12.5–16)
HGB BLD-MCNC: 9.8 G/DL (ref 12.5–16)
MAGNESIUM SERPL-MCNC: 2.1 MG/DL (ref 1.8–2.4)
MAGNESIUM SERPL-MCNC: 2.2 MG/DL (ref 1.8–2.4)
MAGNESIUM SERPL-MCNC: 2.3 MG/DL (ref 1.8–2.4)
MAGNESIUM SERPL-MCNC: 2.6 MG/DL (ref 1.8–2.4)
MCH RBC QN AUTO: 27.6 PG (ref 27–31)
MCH RBC QN AUTO: 28.4 PG (ref 27–31)
MCHC RBC AUTO-ENTMCNC: 30.5 G/DL (ref 32–36)
MCHC RBC AUTO-ENTMCNC: 30.8 G/DL (ref 32–36)
MCV RBC AUTO: 90.4 FL (ref 78–100)
MCV RBC AUTO: 92.1 FL (ref 78–100)
PHOSPHATE SERPL-MCNC: 2.3 MG/DL (ref 2.5–4.9)
PHOSPHATE SERPL-MCNC: 2.6 MG/DL (ref 2.5–4.9)
PHOSPHATE SERPL-MCNC: 3.1 MG/DL (ref 2.5–4.9)
PLATELET # BLD AUTO: 290 K/UL (ref 140–440)
PLATELET # BLD AUTO: 294 K/UL (ref 140–440)
PMV BLD AUTO: 10.5 FL (ref 7.5–11.1)
PMV BLD AUTO: 9.9 FL (ref 7.5–11.1)
POTASSIUM SERPL-SCNC: 3.5 MMOL/L (ref 3.5–5.1)
POTASSIUM SERPL-SCNC: 3.6 MMOL/L (ref 3.5–5.1)
POTASSIUM SERPL-SCNC: 3.8 MMOL/L (ref 3.5–5.1)
POTASSIUM SERPL-SCNC: 3.8 MMOL/L (ref 3.5–5.1)
POTASSIUM SERPL-SCNC: 4.3 MMOL/L (ref 3.5–5.1)
RBC # BLD AUTO: 3.31 M/UL (ref 4.2–5.4)
RBC # BLD AUTO: 3.55 M/UL (ref 4.2–5.4)
SODIUM SERPL-SCNC: 136 MMOL/L (ref 136–145)
SODIUM SERPL-SCNC: 136 MMOL/L (ref 136–145)
SODIUM SERPL-SCNC: 138 MMOL/L (ref 136–145)
SODIUM SERPL-SCNC: 138 MMOL/L (ref 136–145)
WBC OTHER # BLD: 13.1 K/UL (ref 4–10.5)
WBC OTHER # BLD: 13.4 K/UL (ref 4–10.5)

## 2025-07-26 PROCEDURE — 2700000000 HC OXYGEN THERAPY PER DAY

## 2025-07-26 PROCEDURE — 6370000000 HC RX 637 (ALT 250 FOR IP): Performed by: LICENSED PRACTICAL NURSE

## 2025-07-26 PROCEDURE — 84132 ASSAY OF SERUM POTASSIUM: CPT

## 2025-07-26 PROCEDURE — 85027 COMPLETE CBC AUTOMATED: CPT

## 2025-07-26 PROCEDURE — 36415 COLL VENOUS BLD VENIPUNCTURE: CPT

## 2025-07-26 PROCEDURE — 6360000002 HC RX W HCPCS: Performed by: LICENSED PRACTICAL NURSE

## 2025-07-26 PROCEDURE — 82330 ASSAY OF CALCIUM: CPT

## 2025-07-26 PROCEDURE — G0378 HOSPITAL OBSERVATION PER HR: HCPCS

## 2025-07-26 PROCEDURE — 93010 ELECTROCARDIOGRAM REPORT: CPT | Performed by: INTERNAL MEDICINE

## 2025-07-26 PROCEDURE — 2580000003 HC RX 258: Performed by: STUDENT IN AN ORGANIZED HEALTH CARE EDUCATION/TRAINING PROGRAM

## 2025-07-26 PROCEDURE — 83735 ASSAY OF MAGNESIUM: CPT

## 2025-07-26 PROCEDURE — 2500000003 HC RX 250 WO HCPCS: Performed by: STUDENT IN AN ORGANIZED HEALTH CARE EDUCATION/TRAINING PROGRAM

## 2025-07-26 PROCEDURE — 94150 VITAL CAPACITY TEST: CPT

## 2025-07-26 PROCEDURE — 94640 AIRWAY INHALATION TREATMENT: CPT

## 2025-07-26 PROCEDURE — 94761 N-INVAS EAR/PLS OXIMETRY MLT: CPT

## 2025-07-26 PROCEDURE — 84100 ASSAY OF PHOSPHORUS: CPT

## 2025-07-26 PROCEDURE — 6370000000 HC RX 637 (ALT 250 FOR IP): Performed by: PHYSICIAN ASSISTANT

## 2025-07-26 PROCEDURE — 2500000003 HC RX 250 WO HCPCS: Performed by: LICENSED PRACTICAL NURSE

## 2025-07-26 PROCEDURE — 97166 OT EVAL MOD COMPLEX 45 MIN: CPT

## 2025-07-26 PROCEDURE — 80048 BASIC METABOLIC PNL TOTAL CA: CPT

## 2025-07-26 PROCEDURE — 97162 PT EVAL MOD COMPLEX 30 MIN: CPT

## 2025-07-26 PROCEDURE — 71045 X-RAY EXAM CHEST 1 VIEW: CPT

## 2025-07-26 RX ORDER — PREDNISONE 5 MG/1
5 TABLET ORAL EVERY EVENING
Status: DISCONTINUED | OUTPATIENT
Start: 2025-07-26 | End: 2025-07-27 | Stop reason: HOSPADM

## 2025-07-26 RX ORDER — NEOMYCIN/BACITRACIN/POLYMYXINB 3.5-400-5K
OINTMENT (GRAM) TOPICAL 2 TIMES DAILY
Status: DISCONTINUED | OUTPATIENT
Start: 2025-07-26 | End: 2025-07-27 | Stop reason: HOSPADM

## 2025-07-26 RX ORDER — POTASSIUM CHLORIDE 1500 MG/1
20 TABLET, EXTENDED RELEASE ORAL 2 TIMES DAILY WITH MEALS
Status: DISCONTINUED | OUTPATIENT
Start: 2025-07-26 | End: 2025-07-26

## 2025-07-26 RX ORDER — POTASSIUM CHLORIDE 1500 MG/1
20 TABLET, EXTENDED RELEASE ORAL DAILY
Status: DISCONTINUED | OUTPATIENT
Start: 2025-07-27 | End: 2025-07-27 | Stop reason: HOSPADM

## 2025-07-26 RX ORDER — TRAMADOL HYDROCHLORIDE 50 MG/1
50 TABLET ORAL EVERY 6 HOURS PRN
Status: DISCONTINUED | OUTPATIENT
Start: 2025-07-26 | End: 2025-07-27 | Stop reason: HOSPADM

## 2025-07-26 RX ORDER — AMIODARONE HYDROCHLORIDE 200 MG/1
200 TABLET ORAL DAILY
Status: DISCONTINUED | OUTPATIENT
Start: 2025-07-27 | End: 2025-07-27 | Stop reason: HOSPADM

## 2025-07-26 RX ADMIN — POTASSIUM BICARBONATE 40 MEQ: 782 TABLET, EFFERVESCENT ORAL at 09:20

## 2025-07-26 RX ADMIN — PREDNISONE 5 MG: 5 TABLET ORAL at 17:11

## 2025-07-26 RX ADMIN — BACITRACIN ZINC, NEOMYCIN, POLYMYXIN B SULFAT: 5000; 3.5; 4 OINTMENT TOPICAL at 10:00

## 2025-07-26 RX ADMIN — SODIUM PHOSPHATE, MONOBASIC, MONOHYDRATE AND SODIUM PHOSPHATE, DIBASIC, ANHYDROUS 15 MMOL: 142; 276 INJECTION, SOLUTION INTRAVENOUS at 15:59

## 2025-07-26 RX ADMIN — POTASSIUM CHLORIDE 10 MEQ: 7.46 INJECTION, SOLUTION INTRAVENOUS at 02:22

## 2025-07-26 RX ADMIN — CALCIUM 500 MG: 500 TABLET ORAL at 08:10

## 2025-07-26 RX ADMIN — POTASSIUM BICARBONATE 40 MEQ: 782 TABLET, EFFERVESCENT ORAL at 22:06

## 2025-07-26 RX ADMIN — GUAIFENESIN 600 MG: 600 TABLET ORAL at 22:06

## 2025-07-26 RX ADMIN — AMIODARONE HYDROCHLORIDE 200 MG: 200 TABLET ORAL at 08:10

## 2025-07-26 RX ADMIN — SODIUM CHLORIDE, PRESERVATIVE FREE 10 ML: 5 INJECTION INTRAVENOUS at 22:07

## 2025-07-26 RX ADMIN — BUMETANIDE 1 MG: 0.5 TABLET ORAL at 09:21

## 2025-07-26 RX ADMIN — ASPIRIN 81 MG 81 MG: 81 TABLET ORAL at 08:09

## 2025-07-26 RX ADMIN — IPRATROPIUM BROMIDE AND ALBUTEROL SULFATE 1 DOSE: .5; 2.5 SOLUTION RESPIRATORY (INHALATION) at 21:10

## 2025-07-26 RX ADMIN — BUDESONIDE AND FORMOTEROL FUMARATE DIHYDRATE 2 PUFF: 160; 4.5 AEROSOL RESPIRATORY (INHALATION) at 21:09

## 2025-07-26 RX ADMIN — IPRATROPIUM BROMIDE AND ALBUTEROL SULFATE 1 DOSE: .5; 2.5 SOLUTION RESPIRATORY (INHALATION) at 12:38

## 2025-07-26 RX ADMIN — POTASSIUM CHLORIDE 10 MEQ: 7.46 INJECTION, SOLUTION INTRAVENOUS at 04:42

## 2025-07-26 RX ADMIN — ATORVASTATIN CALCIUM 40 MG: 40 TABLET, FILM COATED ORAL at 22:06

## 2025-07-26 RX ADMIN — IPRATROPIUM BROMIDE AND ALBUTEROL SULFATE 1 DOSE: .5; 2.5 SOLUTION RESPIRATORY (INHALATION) at 17:21

## 2025-07-26 RX ADMIN — CARVEDILOL 12.5 MG: 6.25 TABLET, FILM COATED ORAL at 17:11

## 2025-07-26 RX ADMIN — POTASSIUM CHLORIDE 10 MEQ: 7.46 INJECTION, SOLUTION INTRAVENOUS at 03:28

## 2025-07-26 RX ADMIN — POTASSIUM BICARBONATE 40 MEQ: 782 TABLET, EFFERVESCENT ORAL at 13:43

## 2025-07-26 RX ADMIN — SODIUM CHLORIDE, PRESERVATIVE FREE 10 ML: 5 INJECTION INTRAVENOUS at 08:11

## 2025-07-26 RX ADMIN — HYDROXYCHLOROQUINE SULFATE 300 MG: 200 TABLET ORAL at 08:09

## 2025-07-26 RX ADMIN — CARVEDILOL 12.5 MG: 6.25 TABLET, FILM COATED ORAL at 08:09

## 2025-07-26 RX ADMIN — AMLODIPINE BESYLATE 10 MG: 10 TABLET ORAL at 08:09

## 2025-07-26 RX ADMIN — POTASSIUM BICARBONATE 20 MEQ: 782 TABLET, EFFERVESCENT ORAL at 09:19

## 2025-07-26 RX ADMIN — SODIUM CHLORIDE, PRESERVATIVE FREE 10 ML: 5 INJECTION INTRAVENOUS at 22:06

## 2025-07-26 RX ADMIN — BACITRACIN ZINC, NEOMYCIN, POLYMYXIN B SULFAT: 5000; 3.5; 4 OINTMENT TOPICAL at 22:08

## 2025-07-26 RX ADMIN — TRAMADOL HYDROCHLORIDE 50 MG: 50 TABLET, COATED ORAL at 22:15

## 2025-07-26 RX ADMIN — LEVOFLOXACIN 750 MG: 500 TABLET, FILM COATED ORAL at 09:21

## 2025-07-26 RX ADMIN — POTASSIUM CHLORIDE 10 MEQ: 7.46 INJECTION, SOLUTION INTRAVENOUS at 05:46

## 2025-07-26 RX ADMIN — BUDESONIDE AND FORMOTEROL FUMARATE DIHYDRATE 2 PUFF: 160; 4.5 AEROSOL RESPIRATORY (INHALATION) at 08:16

## 2025-07-26 RX ADMIN — GUAIFENESIN 600 MG: 600 TABLET ORAL at 08:10

## 2025-07-26 RX ADMIN — TRAMADOL HYDROCHLORIDE 50 MG: 50 TABLET, COATED ORAL at 08:09

## 2025-07-26 RX ADMIN — IPRATROPIUM BROMIDE AND ALBUTEROL SULFATE 1 DOSE: .5; 2.5 SOLUTION RESPIRATORY (INHALATION) at 08:17

## 2025-07-26 ASSESSMENT — PAIN DESCRIPTION - DESCRIPTORS
DESCRIPTORS: ACHING;SORE
DESCRIPTORS: SPASM;SORE

## 2025-07-26 ASSESSMENT — PAIN DESCRIPTION - LOCATION
LOCATION: CHEST
LOCATION: CHEST;GENERALIZED

## 2025-07-26 ASSESSMENT — PAIN DESCRIPTION - ORIENTATION
ORIENTATION: ANTERIOR
ORIENTATION: MID

## 2025-07-26 ASSESSMENT — PAIN DESCRIPTION - FREQUENCY: FREQUENCY: INTERMITTENT

## 2025-07-26 ASSESSMENT — PAIN DESCRIPTION - ONSET: ONSET: GRADUAL

## 2025-07-26 ASSESSMENT — PAIN SCALES - GENERAL
PAINLEVEL_OUTOF10: 4
PAINLEVEL_OUTOF10: 2

## 2025-07-26 ASSESSMENT — PAIN DESCRIPTION - PAIN TYPE: TYPE: SURGICAL PAIN;CHRONIC PAIN

## 2025-07-26 ASSESSMENT — PAIN - FUNCTIONAL ASSESSMENT: PAIN_FUNCTIONAL_ASSESSMENT: PREVENTS OR INTERFERES SOME ACTIVE ACTIVITIES AND ADLS

## 2025-07-26 NOTE — PROGRESS NOTES
4 Eyes Skin Assessment     NAME:  Radha Gomez  YOB: 1955  MEDICAL RECORD NUMBER:  [id number]    The patient is being assessed for  Admission    I agree that at least one RN has performed a thorough Head to Toe Skin Assessment on the patient. ALL assessment sites listed below have been assessed.      Areas assessed by both nurses:    Head, Face, Ears, Shoulders, Back, Chest, Arms, Elbows, Hands, Sacrum. Buttock, Coccyx, Ischium, Legs. Feet and Heels, and Under Medical Devices         Does the Patient have a Wound? Yes wound(s) were present on assessment. LDA wound assessment was Initiated and completed by RN       Enzo Prevention initiated by RN: Yes  Wound Care Orders initiated by RN: No    For hospital-acquired stage 1 & 2 and ALL Stage 3,4, Unstageable, DTI, NWPT, and Complex wounds: place order “IP Wound Care/Ostomy Nurse Eval and Treat” by RN under : NA    New Ostomies, if present place, Ostomy referral order under : No     Nurse 1 eSignature: Electronically signed by Shayla Navarro RN on 7/25/25 at 7:00 PM EDT    **SHARE this note so that the co-signing nurse can place an eSignature**    Nurse 2 eSignature: Electronically signed by Tika Breaux RN on 7/25/25 at 7:00 PM EDT

## 2025-07-26 NOTE — PROGRESS NOTES
Occupational Therapy  [facility] ACUTE CARE OCCUPATIONAL THERAPY EVALUATION    Ulmer LUZ Gomez, 1955, 2004/2004-A, 7/26/2025    Discharge Recommendation: home with assist prn + HH OT      History:  [place]:  The primary encounter diagnosis was Hypokalemia. A diagnosis of S/P CABG x 2 was also pertinent to this visit.  Past Medical History:   Diagnosis Date    Heart abnormality     Hypertension     Idiopathic osteoporosis 05/28/2025    Idiopathic osteoporosis 05/28/2025    Other specified disorders of bone density and structure, multiple sites 05/28/2025    Pap smear for cervical cancer screening 04/05/2010    neg    Rheumatoid arthritis with rheumatoid factor of multiple sites without organ or systems involvement ([facility]) 05/28/2025         Subjective:  Patient states: \"I was just about to go home from rehab and then I had to come here instead\"  Pain:  2/10 sternal pain   Communication with other providers: co-eval with PT, handoff to RN  Restrictions: General Precautions, Fall Risk, sternal precautions    Home Setup/Prior level of function:    Social/Functional History  Lives With: Spouse  Type of Home: House  Home Layout: One level  Bathroom Shower/Tub: Tub/Shower unit  Bathroom Toilet: Standard  Bathroom Equipment: Shower chair  Bathroom Accessibility: Accessible  Home Equipment: Oxygen (O2 CONCENTRATOR)  Receives Help From: Family  Prior Level of Assist for ADLs: Independent  Homemaking Responsibilities: No  Prior Level of Assist for Ambulation: Independent household ambulator, with or without device  Active : No  Patient's  Info:  PROVIDES HER TRANSPORTATION  Mode of Transportation: Car     Examination:  Observation: Seated in recliner upon arrival, agreeable to therapy eval.  Vision: WFL  Hearing: WFL  Vitals: Stable vitals throughout session on 4L O2      Body Systems and functions:  ROM: WFL   Strength: B UE 4/5 across all major joints   Sensation: WFL  Tone: Normal  Coordination:

## 2025-07-26 NOTE — PROGRESS NOTES
RN removed pts purse strung suture on chest per CTS PA-C verbal orders. RN cleaned site with chg stick, allowed site to dry fully, applied bacitracin ointment and covered with bandaid. No drainage noted during removal. RN will continue to monitor site for drainage.

## 2025-07-26 NOTE — PLAN OF CARE
Problem: Chronic Conditions and Co-morbidities  Goal: Patient's chronic conditions and co-morbidity symptoms are monitored and maintained or improved  Outcome: Progressing     Problem: Neurosensory - Adult  Goal: Achieves stable or improved neurological status  Outcome: Progressing  Goal: Absence of seizures  Outcome: Progressing  Goal: Achieves maximal functionality and self care  Outcome: Progressing     Problem: Respiratory - Adult  Goal: Achieves optimal ventilation and oxygenation  Outcome: Progressing     Problem: Cardiovascular - Adult  Goal: Maintains optimal cardiac output and hemodynamic stability  Outcome: Progressing  Goal: Absence of cardiac dysrhythmias or at baseline  Outcome: Progressing     Problem: Skin/Tissue Integrity - Adult  Goal: Skin integrity remains intact  Outcome: Progressing  Flowsheets (Taken 7/25/2025 1550 by Miranda Szymanski RN)  Skin Integrity Remains Intact:   Monitor for areas of redness and/or skin breakdown   Assess vascular access sites hourly   Every 4-6 hours minimum:  Change oxygen saturation probe site   Turn and reposition as indicated  Goal: Incisions, wounds, or drain sites healing without S/S of infection  Outcome: Progressing  Goal: Oral mucous membranes remain intact  Outcome: Progressing  Flowsheets (Taken 7/25/2025 9452 by Miranda Szymanski, RN)  Oral Mucous Membranes Remain Intact: Assess oral mucosa and hygiene practices     Problem: Musculoskeletal - Adult  Goal: Return mobility to safest level of function  Outcome: Progressing  Goal: Maintain proper alignment of affected body part  Outcome: Progressing  Goal: Return ADL status to a safe level of function  Outcome: Progressing     Problem: Gastrointestinal - Adult  Goal: Minimal or absence of nausea and vomiting  Outcome: Progressing  Goal: Maintains or returns to baseline bowel function  Outcome: Progressing  Goal: Maintains adequate nutritional intake  Outcome: Progressing     Problem: Genitourinary - Adult  Goal:

## 2025-07-26 NOTE — CONSULTS
[facility] ACUTE CARE PHYSICAL THERAPY EVALUATION  [place] LUZ Gomez, 1955, -A, 2025    History  [place]:  The primary encounter diagnosis was Hypokalemia. A diagnosis of S/P CABG x 2 was also pertinent to this visit.  Patient  has a past medical history of Heart abnormality, Hypertension, Idiopathic osteoporosis, Idiopathic osteoporosis, Other specified disorders of bone density and structure, multiple sites, Pap smear for cervical cancer screening, and Rheumatoid arthritis with rheumatoid factor of multiple sites without organ or systems involvement ([facility]).  Patient  has a past surgical history that includes  section; Ankle fracture surgery; Colonoscopy; Cardiac procedure (N/A, 2025); Cardiac procedure (N/A, 2025); and Coronary artery bypass graft (N/A, 2025).    Recommendation: Home with , initial use of RW, and HH PT     Subjective:  Patient states:  \"I should get up and move a little\"   Pain:  denies   Communication with other providers:  RN, co-eval with Hannah COMER   Restrictions: General precautions, falls, sternal precautions (recent open heart surgery )    Home Setup/Prior level of function  Social/Functional History  Lives With: Spouse  Type of Home: House  Home Layout: One level  Bathroom Shower/Tub: Tub/Shower unit  Bathroom Toilet: Standard  Bathroom Equipment: Shower chair  Bathroom Accessibility: Accessible  Home Equipment: 3-5L Oxygen, RW   Receives Help From: Family  Prior Level of Assist for ADLs: Independent  Homemaking Responsibilities: No  Prior Level of Assist for Ambulation: Independent household ambulator, with or without device  Prior Level of Assist for Transfers: Independent  Active : No  Patient's  Info: Spouse  Mode of Transportation: Car    Examination of body systems (includes body structures/functions, activity/participation limitations):  Observation:  Sitting in recliner upon arrival. Cooperative with therapy.   Vision:  
Metabolic Panel    Collection Time: 07/25/25  4:30 PM   Result Value Ref Range    Sodium 134 (L) 136 - 145 mmol/L    Potassium 3.5 3.5 - 5.1 mmol/L    Chloride 92 (L) 99 - 110 mmol/L    CO2 30 21 - 32 mmol/L    Anion Gap 12 9 - 17 mmol/L    Glucose 109 (H) 74 - 99 mg/dL    BUN 31 (H) 7 - 20 mg/dL    Creatinine 1.5 (H) 0.6 - 1.2 mg/dL    Est, Glom Filt Rate 34 (L) >60 mL/min/1.73m2    Calcium 9.0 8.3 - 10.6 mg/dL   Magnesium    Collection Time: 07/25/25  4:30 PM   Result Value Ref Range    Magnesium 3.2 (H) 1.8 - 2.4 mg/dL   Phosphorus    Collection Time: 07/25/25  4:30 PM   Result Value Ref Range    Phosphorus 1.8 (L) 2.5 - 4.9 mg/dL   Calcium, Ionic    Collection Time: 07/25/25  4:30 PM   Result Value Ref Range    Calcium, Ionized 1.04 (L) 1.15 - 1.33 mmol/L   CBC    Collection Time: 07/25/25  4:30 PM   Result Value Ref Range    WBC 16.8 (H) 4.0 - 10.5 k/uL    RBC 3.38 (L) 4.20 - 5.40 m/uL    Hemoglobin 9.6 (L) 12.5 - 16.0 g/dL    Hematocrit 29.7 (L) 37.0 - 47.0 %    MCV 87.9 78.0 - 100.0 fL    MCH 28.4 27.0 - 31.0 pg    MCHC 32.3 32.0 - 36.0 g/dL    RDW 16.1 (H) 11.7 - 14.9 %    Platelets 320 140 - 440 k/uL    MPV 10.5 7.5 - 11.1 fL   K (Potassium)    Collection Time: 07/26/25  1:18 AM   Result Value Ref Range    Potassium 3.6 3.5 - 5.1 mmol/L   Magnesium    Collection Time: 07/26/25  1:18 AM   Result Value Ref Range    Magnesium 2.6 (H) 1.8 - 2.4 mg/dL   Calcium, Ionic    Collection Time: 07/26/25  1:18 AM   Result Value Ref Range    Calcium, Ionized 1.22 1.15 - 1.33 mmol/L   Basic Metabolic Panel    Collection Time: 07/26/25  7:40 AM   Result Value Ref Range    Sodium 138 136 - 145 mmol/L    Potassium 3.5 3.5 - 5.1 mmol/L    Chloride 102 99 - 110 mmol/L    CO2 25 21 - 32 mmol/L    Anion Gap 10 9 - 17 mmol/L    Glucose 97 74 - 99 mg/dL    BUN 24 (H) 7 - 20 mg/dL    Creatinine 1.1 0.6 - 1.2 mg/dL    Est, Glom Filt Rate 50 (L) >60 mL/min/1.73m2    Calcium 8.3 8.3 - 10.6 mg/dL   Calcium, Ionic    Collection Time:

## 2025-07-26 NOTE — CARE COORDINATION
FORREST received call from DIONE Mcguire, asking for CM to start the process of getting pt approved to return to [facility] for rehab. PT has seen the pt. Awaiting OT note. CM called Nadine with [employer] to determine if pt is able to return. Left secure VM. -gw  CM received call from Nadine with [employer] stating that pt was to be discharged from [facility] today. Pt wanted to go home as tomorrow is her birthday. FORREST received call from Hannah with OT who stated OT/PT recommended home with [facility]. CM called DIONE Mcguire, to inform her of this. -gw  Pt would like home care from [facility]. CM called [place] liaison and left a secure VM asking for a return call.-gw  CM sent PS to Dr HE Buenrostro asking for a home care order. -gw  Consult to Case Management completed. -gw

## 2025-07-26 NOTE — PLAN OF CARE
Problem: Chronic Conditions and Co-morbidities  Goal: Patient's chronic conditions and co-morbidity symptoms are monitored and maintained or improved  7/26/2025 0929 by Vikas Raphael RN  Outcome: Progressing  7/25/2025 2009 by Chelsey Lorenz RN  Outcome: Progressing     Problem: Neurosensory - Adult  Goal: Achieves stable or improved neurological status  7/26/2025 0929 by Vikas Raphael RN  Outcome: Progressing  7/25/2025 2009 by Chelsey Lorenz RN  Outcome: Progressing  Flowsheets (Taken 7/25/2025 2000)  Achieves stable or improved neurological status: Assess for and report changes in neurological status  Goal: Absence of seizures  7/26/2025 0929 by Vikas Raphael RN  Outcome: Progressing  7/25/2025 2009 by Chelsey Lorenz RN  Outcome: Progressing  Flowsheets (Taken 7/25/2025 2000)  Absence of seizures:   Monitor for seizure activity.  If seizure occurs, document type and location of movements and any associated apnea   If seizure occurs, turn head to side and suction secretions as needed  Goal: Achieves maximal functionality and self care  7/26/2025 0929 by Vikas Raphael RN  Outcome: Progressing  7/25/2025 2009 by Chelsey Lorenz RN  Outcome: Progressing  Flowsheets (Taken 7/25/2025 2000)  Achieves maximal functionality and self care: Monitor swallowing and airway patency with patient fatigue and changes in neurological status     Problem: Respiratory - Adult  Goal: Achieves optimal ventilation and oxygenation  7/26/2025 0929 by Vikas Raphael RN  Outcome: Progressing  7/25/2025 2009 by Chelsey Lorenz RN  Outcome: Progressing     Problem: Cardiovascular - Adult  Goal: Maintains optimal cardiac output and hemodynamic stability  7/26/2025 0929 by Vikas Raphael RN  Outcome: Progressing  7/25/2025 2009 by Chelsey Lorenz RN  Outcome: Progressing  Goal: Absence of cardiac dysrhythmias or at baseline  7/26/2025 0929 by Vikas Raphael RN  Outcome: Progressing  7/25/2025 2009 by Kelechi

## 2025-07-26 NOTE — DISCHARGE INSTR - COC
Called and left a message for patient regarding lab/culture results. Advised patient to return call to Triage nursing at [phone] or [phone] to discuss results and recommendations.     Attempt #1 above information and transfer of Radha Gomez  is necessary for the continuing treatment of the diagnosis listed and that she requires {Admit to Appropriate Level of Care:11814} for {GREATER/LESS:319741058} 30 days.     Update Admission H&P: {CHP DME Changes in HandP:102598321}    PHYSICIAN SIGNATURE:  {Esignature:[id number]}

## 2025-07-26 NOTE — PROGRESS NOTES
V2.0    [facility] [place] Note      Name:  Radha Gomez /Age/Sex: 1955  (69 y.o. female)   MRN & CSN:  [id number] & [id number] Encounter Date/Time: 2025 2:17 PM EDT   Location:  -A PCP: Luis Buenrostro MD     Attending:Gerry Buenrostro MD       Hospital Day: 2    Assessment and Recommendations   Radha Gomez is a 69 y.o. female who presents with Hypokalemia      Plan:     Hypokalemia  -Likely in the setting of loop diuretic and metolazone.  - She has been started on potassium replenishment.  Potassium has improved.  As per CT surgery okay for discharge if potassium continues to trend up with replenishment.  Diuretic dose has been decreased to 1 mg of Bumex daily and metolazone has been discontinued.    CAD status post CABG with AVR 25  Chronic respiratory  failure, COPD without exacerbation  Hypertension  Hyperlipidemia rheumatoid arthritis  CAD status post CABG      Diet ADULT DIET; Regular   DVT Prophylaxis [x] Lovenox, []  Heparin, [] SCDs, [] Ambulation,  [] Eliquis, [] Xarelto  [] Coumadin   Code Status Full Code   Disposition From: SNF  Expected Disposition: Home  Estimated Date of Discharge: 24 hours  Patient requires continued admission due to hypokalemia   Surrogate Decision Maker/ Virgilio Umana (Spouse)      Personally reviewed Lab Studies and Imaging     Discussed management of the case with CT who recommended potassium replenishment         Subjective:     Chief Complaint:   Chief Complaint   Patient presents with    Abnormal Lab     Low Potassium       Feels okay.  Reports she feels stronger and would like to go home when was in the process of getting discharged from SNF before she was brought here.    Review of Systems:      Pertinent positives and negatives discussed in HPI    Objective:     Intake/Output Summary (Last 24 hours) at 2025 1417  Last data filed at 2025 1200  Gross per 24 hour   Intake 2822.08 ml   Output 500 ml   Net 2322.08 ml      Vitals:

## 2025-07-27 ENCOUNTER — APPOINTMENT (OUTPATIENT)
Dept: GENERAL RADIOLOGY | Age: 70
End: 2025-07-27
Payer: MEDICARE

## 2025-07-27 VITALS
RESPIRATION RATE: 19 BRPM | BODY MASS INDEX: 24.96 KG/M2 | DIASTOLIC BLOOD PRESSURE: 59 MMHG | TEMPERATURE: 97.7 F | SYSTOLIC BLOOD PRESSURE: 140 MMHG | HEART RATE: 73 BPM | OXYGEN SATURATION: 96 % | WEIGHT: 159 LBS | HEIGHT: 67 IN

## 2025-07-27 LAB
ANION GAP SERPL CALCULATED.3IONS-SCNC: 10 MMOL/L (ref 9–17)
ANION GAP SERPL CALCULATED.3IONS-SCNC: 11 MMOL/L (ref 9–17)
ANION GAP SERPL CALCULATED.3IONS-SCNC: 11 MMOL/L (ref 9–17)
ANION GAP SERPL CALCULATED.3IONS-SCNC: 12 MMOL/L (ref 9–17)
ANION GAP SERPL CALCULATED.3IONS-SCNC: 13 MMOL/L (ref 9–17)
BUN SERPL-MCNC: 19 MG/DL (ref 7–20)
BUN SERPL-MCNC: 23 MG/DL (ref 7–20)
CA-I BLD-SCNC: 1.1 MMOL/L (ref 1.15–1.33)
CA-I BLD-SCNC: 1.11 MMOL/L (ref 1.15–1.33)
CA-I BLD-SCNC: 1.14 MMOL/L (ref 1.15–1.33)
CALCIUM SERPL-MCNC: 8.8 MG/DL (ref 8.3–10.6)
CALCIUM SERPL-MCNC: 8.9 MG/DL (ref 8.3–10.6)
CALCIUM SERPL-MCNC: 9 MG/DL (ref 8.3–10.6)
CALCIUM SERPL-MCNC: 9.1 MG/DL (ref 8.3–10.6)
CALCIUM SERPL-MCNC: 9.3 MG/DL (ref 8.3–10.6)
CHLORIDE SERPL-SCNC: 95 MMOL/L (ref 99–110)
CHLORIDE SERPL-SCNC: 96 MMOL/L (ref 99–110)
CHLORIDE SERPL-SCNC: 96 MMOL/L (ref 99–110)
CHLORIDE SERPL-SCNC: 97 MMOL/L (ref 99–110)
CHLORIDE SERPL-SCNC: 97 MMOL/L (ref 99–110)
CO2 SERPL-SCNC: 28 MMOL/L (ref 21–32)
CO2 SERPL-SCNC: 29 MMOL/L (ref 21–32)
CO2 SERPL-SCNC: 30 MMOL/L (ref 21–32)
CREAT SERPL-MCNC: 1.2 MG/DL (ref 0.6–1.2)
CREAT SERPL-MCNC: 1.3 MG/DL (ref 0.6–1.2)
ERYTHROCYTE [DISTWIDTH] IN BLOOD BY AUTOMATED COUNT: 16.6 % (ref 11.7–14.9)
GFR, ESTIMATED: 42 ML/MIN/1.73M2
GFR, ESTIMATED: 43 ML/MIN/1.73M2
GFR, ESTIMATED: 45 ML/MIN/1.73M2
GFR, ESTIMATED: 45 ML/MIN/1.73M2
GFR, ESTIMATED: 47 ML/MIN/1.73M2
GLUCOSE SERPL-MCNC: 117 MG/DL (ref 74–99)
GLUCOSE SERPL-MCNC: 147 MG/DL (ref 74–99)
GLUCOSE SERPL-MCNC: 88 MG/DL (ref 74–99)
GLUCOSE SERPL-MCNC: 91 MG/DL (ref 74–99)
GLUCOSE SERPL-MCNC: 92 MG/DL (ref 74–99)
HCT VFR BLD AUTO: 33.2 % (ref 37–47)
HGB BLD-MCNC: 10.5 G/DL (ref 12.5–16)
MAGNESIUM SERPL-MCNC: 2.1 MG/DL (ref 1.8–2.4)
MAGNESIUM SERPL-MCNC: 2.2 MG/DL (ref 1.8–2.4)
MCH RBC QN AUTO: 28.5 PG (ref 27–31)
MCHC RBC AUTO-ENTMCNC: 31.6 G/DL (ref 32–36)
MCV RBC AUTO: 90.2 FL (ref 78–100)
PHOSPHATE SERPL-MCNC: 2.7 MG/DL (ref 2.5–4.9)
PHOSPHATE SERPL-MCNC: 3 MG/DL (ref 2.5–4.9)
PLATELET # BLD AUTO: 295 K/UL (ref 140–440)
PMV BLD AUTO: 10 FL (ref 7.5–11.1)
POTASSIUM SERPL-SCNC: 3.4 MMOL/L (ref 3.5–5.1)
POTASSIUM SERPL-SCNC: 3.7 MMOL/L (ref 3.5–5.1)
POTASSIUM SERPL-SCNC: 3.8 MMOL/L (ref 3.5–5.1)
POTASSIUM SERPL-SCNC: 3.9 MMOL/L (ref 3.5–5.1)
POTASSIUM SERPL-SCNC: 4 MMOL/L (ref 3.5–5.1)
POTASSIUM SERPL-SCNC: 4.1 MMOL/L (ref 3.5–5.1)
RBC # BLD AUTO: 3.68 M/UL (ref 4.2–5.4)
SODIUM SERPL-SCNC: 136 MMOL/L (ref 136–145)
SODIUM SERPL-SCNC: 136 MMOL/L (ref 136–145)
SODIUM SERPL-SCNC: 137 MMOL/L (ref 136–145)
SODIUM SERPL-SCNC: 137 MMOL/L (ref 136–145)
SODIUM SERPL-SCNC: 138 MMOL/L (ref 136–145)
WBC OTHER # BLD: 13.1 K/UL (ref 4–10.5)

## 2025-07-27 PROCEDURE — 84132 ASSAY OF SERUM POTASSIUM: CPT

## 2025-07-27 PROCEDURE — 83735 ASSAY OF MAGNESIUM: CPT

## 2025-07-27 PROCEDURE — 6360000002 HC RX W HCPCS: Performed by: PHYSICIAN ASSISTANT

## 2025-07-27 PROCEDURE — 80048 BASIC METABOLIC PNL TOTAL CA: CPT

## 2025-07-27 PROCEDURE — 94640 AIRWAY INHALATION TREATMENT: CPT

## 2025-07-27 PROCEDURE — 85027 COMPLETE CBC AUTOMATED: CPT

## 2025-07-27 PROCEDURE — 36415 COLL VENOUS BLD VENIPUNCTURE: CPT

## 2025-07-27 PROCEDURE — G0378 HOSPITAL OBSERVATION PER HR: HCPCS

## 2025-07-27 PROCEDURE — 94761 N-INVAS EAR/PLS OXIMETRY MLT: CPT

## 2025-07-27 PROCEDURE — 6370000000 HC RX 637 (ALT 250 FOR IP): Performed by: LICENSED PRACTICAL NURSE

## 2025-07-27 PROCEDURE — 6370000000 HC RX 637 (ALT 250 FOR IP): Performed by: PHYSICIAN ASSISTANT

## 2025-07-27 PROCEDURE — 2700000000 HC OXYGEN THERAPY PER DAY

## 2025-07-27 PROCEDURE — 2500000003 HC RX 250 WO HCPCS: Performed by: LICENSED PRACTICAL NURSE

## 2025-07-27 PROCEDURE — 84100 ASSAY OF PHOSPHORUS: CPT

## 2025-07-27 PROCEDURE — 71045 X-RAY EXAM CHEST 1 VIEW: CPT

## 2025-07-27 PROCEDURE — 82330 ASSAY OF CALCIUM: CPT

## 2025-07-27 RX ORDER — CLOPIDOGREL BISULFATE 75 MG/1
75 TABLET ORAL DAILY
Status: DISCONTINUED | OUTPATIENT
Start: 2025-07-27 | End: 2025-07-27 | Stop reason: HOSPADM

## 2025-07-27 RX ORDER — POTASSIUM CHLORIDE 1500 MG/1
20 TABLET, EXTENDED RELEASE ORAL ONCE
Status: COMPLETED | OUTPATIENT
Start: 2025-07-27 | End: 2025-07-27

## 2025-07-27 RX ORDER — AMIODARONE HYDROCHLORIDE 200 MG/1
200 TABLET ORAL DAILY
Qty: 30 TABLET | Refills: 1 | Status: SHIPPED | OUTPATIENT
Start: 2025-07-28

## 2025-07-27 RX ORDER — TORSEMIDE 20 MG/1
20 TABLET ORAL DAILY
Status: DISCONTINUED | OUTPATIENT
Start: 2025-07-28 | End: 2025-07-27 | Stop reason: HOSPADM

## 2025-07-27 RX ORDER — TORSEMIDE 20 MG/1
20 TABLET ORAL DAILY
Qty: 30 TABLET | Refills: 1 | Status: SHIPPED | OUTPATIENT
Start: 2025-07-28 | End: 2025-07-30

## 2025-07-27 RX ORDER — CALCIUM GLUCONATE 20 MG/ML
1000 INJECTION, SOLUTION INTRAVENOUS ONCE
Status: COMPLETED | OUTPATIENT
Start: 2025-07-27 | End: 2025-07-27

## 2025-07-27 RX ORDER — CLOPIDOGREL BISULFATE 75 MG/1
75 TABLET ORAL DAILY
Qty: 30 TABLET | Refills: 1 | Status: SHIPPED | OUTPATIENT
Start: 2025-07-28

## 2025-07-27 RX ORDER — POTASSIUM CHLORIDE 1500 MG/1
20 TABLET, EXTENDED RELEASE ORAL DAILY
Qty: 30 TABLET | Refills: 1 | Status: SHIPPED | OUTPATIENT
Start: 2025-07-28 | End: 2025-07-30

## 2025-07-27 RX ORDER — POTASSIUM CHLORIDE 1500 MG/1
40 TABLET, EXTENDED RELEASE ORAL ONCE
Status: COMPLETED | OUTPATIENT
Start: 2025-07-27 | End: 2025-07-27

## 2025-07-27 RX ADMIN — POTASSIUM BICARBONATE 40 MEQ: 782 TABLET, EFFERVESCENT ORAL at 08:39

## 2025-07-27 RX ADMIN — POTASSIUM BICARBONATE 20 MEQ: 782 TABLET, EFFERVESCENT ORAL at 03:50

## 2025-07-27 RX ADMIN — BUMETANIDE 1 MG: 0.5 TABLET ORAL at 08:34

## 2025-07-27 RX ADMIN — IPRATROPIUM BROMIDE AND ALBUTEROL SULFATE 1 DOSE: .5; 2.5 SOLUTION RESPIRATORY (INHALATION) at 07:34

## 2025-07-27 RX ADMIN — AMIODARONE HYDROCHLORIDE 200 MG: 200 TABLET ORAL at 08:33

## 2025-07-27 RX ADMIN — POTASSIUM CHLORIDE 20 MEQ: 1500 TABLET, EXTENDED RELEASE ORAL at 09:36

## 2025-07-27 RX ADMIN — CALCIUM 500 MG: 500 TABLET ORAL at 08:34

## 2025-07-27 RX ADMIN — HYDROXYCHLOROQUINE SULFATE 300 MG: 200 TABLET ORAL at 08:32

## 2025-07-27 RX ADMIN — AMLODIPINE BESYLATE 10 MG: 10 TABLET ORAL at 08:34

## 2025-07-27 RX ADMIN — CALCIUM GLUCONATE 1000 MG: 20 INJECTION, SOLUTION INTRAVENOUS at 12:41

## 2025-07-27 RX ADMIN — BACITRACIN ZINC, NEOMYCIN, POLYMYXIN B SULFAT: 5000; 3.5; 4 OINTMENT TOPICAL at 08:35

## 2025-07-27 RX ADMIN — SODIUM CHLORIDE, PRESERVATIVE FREE 10 ML: 5 INJECTION INTRAVENOUS at 08:35

## 2025-07-27 RX ADMIN — GUAIFENESIN 600 MG: 600 TABLET ORAL at 08:34

## 2025-07-27 RX ADMIN — TIOTROPIUM BROMIDE INHALATION SPRAY 5 MCG: 3.12 SPRAY, METERED RESPIRATORY (INHALATION) at 07:35

## 2025-07-27 RX ADMIN — CARVEDILOL 12.5 MG: 6.25 TABLET, FILM COATED ORAL at 08:34

## 2025-07-27 RX ADMIN — POTASSIUM CHLORIDE 20 MEQ: 1500 TABLET, EXTENDED RELEASE ORAL at 08:33

## 2025-07-27 RX ADMIN — IPRATROPIUM BROMIDE AND ALBUTEROL SULFATE 1 DOSE: .5; 2.5 SOLUTION RESPIRATORY (INHALATION) at 11:25

## 2025-07-27 RX ADMIN — BUDESONIDE AND FORMOTEROL FUMARATE DIHYDRATE 2 PUFF: 160; 4.5 AEROSOL RESPIRATORY (INHALATION) at 07:36

## 2025-07-27 RX ADMIN — POTASSIUM CHLORIDE 40 MEQ: 1500 TABLET, EXTENDED RELEASE ORAL at 12:16

## 2025-07-27 RX ADMIN — POTASSIUM CHLORIDE 20 MEQ: 1500 TABLET, EXTENDED RELEASE ORAL at 09:11

## 2025-07-27 RX ADMIN — CLOPIDOGREL BISULFATE 75 MG: 75 TABLET, FILM COATED ORAL at 09:11

## 2025-07-27 RX ADMIN — ACETAMINOPHEN 650 MG: 325 TABLET ORAL at 08:33

## 2025-07-27 RX ADMIN — ASPIRIN 81 MG 81 MG: 81 TABLET ORAL at 08:33

## 2025-07-27 ASSESSMENT — PAIN SCALES - GENERAL
PAINLEVEL_OUTOF10: 0
PAINLEVEL_OUTOF10: 0

## 2025-07-27 NOTE — DISCHARGE SUMMARY
V2.0  Discharge Summary    Name:  Radha Gomez /Age/Sex: 1955 (70 y.o. female)   Admit Date: 2025  Discharge Date: 25    MRN & CSN:  [id number] & [id number] Encounter Date and Time 25 2:49 PM EDT    Attending:  No att. providers found Discharging Provider: Gerry Buenrostro MD       Hospital Course:     Brief HPI:Radha Gomez is a 69 y.o. female who presents to the hospital for low potassium.  Patient endorses being at SNF for rehab following cardiac surgery.  Patient stated that while she was there at [facility] she had some labs taken and was found to have low potassium.  She did some oral replacements however recheck noted to be even lower.  She stated that she has not been having any diarrhea, nausea, vomiting for couple days but did state that a few days ago she had significant vomiting and diarrhea.  She also endorses being on a water pill daily.     Brief Problem Based Course:     Hypokalemia  -Likely in the setting of high-dose loop diuretic and metolazone.  - Aggressively replenished with IV and oral potassium.  Diuretic regimen has been adjusted due to below her dry weight as per CT surgery.  Now on torsemide 20 daily as per CT surgery..  Continue potassium 20 mill equivalents daily as per CT surgery recommendations on discharge and they will see the patient in office within 3 days for repeat blood work and postop check.  Adjust potassium supplementation and diuretic regimen as outpatient.     CAD status post CABG with AVR 25  Chronic respiratory  failure, COPD without exacerbation  Hypertension  Hyperlipidemia rheumatoid arthritis  CAD status post CABG      The patient expressed appropriate understanding of, and agreement with the discharge recommendations, medications, and plan.     Consults this admission:  IP CONSULT TO CARDIOTHORACIC SURGERY  IP CONSULT TO CARDIOTHORACIC SURGERY  IP CONSULT TO CASE MANAGEMENT  IP CONSULT TO HOME CARE NEEDS    Discharge Diagnosis:

## 2025-07-27 NOTE — PROGRESS NOTES
V2.0    [facility] [place] Progress Note      Name:  Radha Gomez /Age/Sex: 1955  (70 y.o. female)   MRN & CSN:  [id number] & [id number] Encounter Date/Time: 2025 2:17 PM EDT   Location:  -A PCP: Luis Buenrostro MD     Attending:Gerry Buenrostro MD       Hospital Day: 3    Assessment and Recommendations   Radha Gomez is a 70 y.o. female who presents with Hypokalemia      Plan:     Hypokalemia  -Likely in the setting of loop diuretic and metolazone.  - She has been started on potassium replenishment.   Diuretic regimen has been adjusted due to below her dry weight as per CT surgery.  Now on torsemide 20 daily already.  Continue potassium replenishment and discharged when level acceptable.    CAD status post CABG with AVR 25  Chronic respiratory  failure, COPD without exacerbation  Hypertension  Hyperlipidemia rheumatoid arthritis  CAD status post CABG      Diet ADULT DIET; Regular   DVT Prophylaxis [x] Lovenox, []  Heparin, [] SCDs, [] Ambulation,  [] Eliquis, [] Xarelto  [] Coumadin   Code Status Full Code   Disposition From: SNF  Expected Disposition: Home  Estimated Date of Discharge: 24 hours  Patient requires continued admission due to hypokalemia   Surrogate Decision Maker/ Virgilio Umana (Spouse)      Personally reviewed Lab Studies and Imaging     Discussed management of the case with CT who recommended potassium replenishment         Subjective:     Chief Complaint:   Chief Complaint   Patient presents with    Abnormal Lab     Low Potassium       Feels okay.  Report lots of urine output yesterday.  Reports she passed her physical therapy eval and was cleared to go home..    Review of Systems:      Pertinent positives and negatives discussed in HPI    Objective:     Intake/Output Summary (Last 24 hours) at 2025 1012  Last data filed at 2025 0646  Gross per 24 hour   Intake 960 ml   Output 3400 ml   Net -2440 ml      Vitals:   Vitals:    25 0725 25 0802 25

## 2025-07-27 NOTE — PROGRESS NOTES
1448: PIVs removed; dry gauze secured by silk cloth tape. No complications; pt tolerated well. All pt belongings provided to pt's , Virgilio Gomez. Pt discharged from CVICU at this time.

## 2025-07-27 NOTE — PLAN OF CARE
Problem: Chronic Conditions and Co-morbidities  Goal: Patient's chronic conditions and co-morbidity symptoms are monitored and maintained or improved  Outcome: Adequate for Discharge     Problem: Neurosensory - Adult  Goal: Achieves stable or improved neurological status  Outcome: Adequate for Discharge  Goal: Absence of seizures  Outcome: Adequate for Discharge  Goal: Achieves maximal functionality and self care  Outcome: Adequate for Discharge     Problem: Respiratory - Adult  Goal: Achieves optimal ventilation and oxygenation  Outcome: Adequate for Discharge     Problem: Cardiovascular - Adult  Goal: Maintains optimal cardiac output and hemodynamic stability  Outcome: Adequate for Discharge  Goal: Absence of cardiac dysrhythmias or at baseline  Outcome: Adequate for Discharge     Problem: Skin/Tissue Integrity - Adult  Goal: Skin integrity remains intact  Outcome: Adequate for Discharge  Goal: Incisions, wounds, or drain sites healing without S/S of infection  Outcome: Adequate for Discharge  Goal: Oral mucous membranes remain intact  Outcome: Adequate for Discharge     Problem: Musculoskeletal - Adult  Goal: Return mobility to safest level of function  Outcome: Adequate for Discharge  Goal: Maintain proper alignment of affected body part  Outcome: Adequate for Discharge  Goal: Return ADL status to a safe level of function  Outcome: Adequate for Discharge     Problem: Gastrointestinal - Adult  Goal: Minimal or absence of nausea and vomiting  Outcome: Adequate for Discharge  Goal: Maintains or returns to baseline bowel function  Outcome: Adequate for Discharge  Goal: Maintains adequate nutritional intake  Outcome: Adequate for Discharge     Problem: Genitourinary - Adult  Goal: Absence of urinary retention  Outcome: Adequate for Discharge     Problem: Infection - Adult  Goal: Absence of infection at discharge  Outcome: Adequate for Discharge  Goal: Absence of infection during hospitalization  Outcome: Adequate

## 2025-07-27 NOTE — PROGRESS NOTES
0840: BMP specimen results moderately hemolyzed; received verbal order with readback for STAT BMP per NANCY Donnelly.     0845: BMP sent to laboratory at this time; NAIDA Payan notified.

## 2025-07-27 NOTE — PROGRESS NOTES
0650: BMP resulted, but specimen moderately hemolyzed. Received verbal order with readback for STAT BMP per NANCY Donnelly.    0701: CBC rejected from hematology d/t clotted specimen.    0720: CAIOB still pending per NAIDA Mariano.    0740: this RN recollected labs at this time; green with gel, green without gel, and lavender tube sent. NAIDA Mariano notified.

## 2025-07-27 NOTE — PROGRESS NOTES
Cardiothoracic Surgery  IN-PATIENT SERVICE   [facility]    Daily progress note            Date:   7/27/2025  Patient name:  Radha Gomez  Date of admission:  7/25/2025 12:40 PM  MRN:   [id number]  Account:  [id number]  YOB: 1955  PCP:    Luis Buenrostro MD  Room:   2004/2004  Code Status:    Full Code    Physician Requesting Consult: Gerry Buenrostro MD    Reason for Consult: S/p CORONARY ARTERY BYPASS GRAFT x 2, SVG-RCA, SVG-DIAG, AORTIC VALVE REPLACEMENT UTILIZING A 21MM REYES INSPIRIS TISSUE VALVE; LEFT ATRIAL APPENDAGE LIGATION UTILIZING A 50MM ATRICLIP; BILATERAL ENDOSCOPIC GREATER SAPHENOUS VEIN HARVEST; INTRAOPERATIVE TRANSESOPHAGEAL ECHOCARDIOGRAM  on 7/9/25     Chief Complaint:     Hypokalemia    History of Present Illness:     Radha Gomez is a 69 y.o. female that presents to department for concerns of hypokalemia.  Patient is status post coronary artery bypass graft x 2 with aortic valve replacement.  This was performed on 7/8/2025 by cardiothoracic surgery.  Ultimately, patient was discharged to [facility].  She is on 4 to 6 L nasal cannula oxygen as needed.  States that she has been doing well at the [facility].  She has been doing all of her therapy.  She does report some intermittent throbbing pain in her chest from the procedure but this has been persistent.  Is not new.  She declines any new onset heaviness pressure arm pain jaw pain.  She declines any associated shortness of breath.  It appears as if her potassium was 2.9 she received oral replacement and repeat was 2.9 therefore she came here for replacement.  She otherwise is asymptomatic.  Declines headache neck pain.  Declines blurry vision double vision.  Declines and unilateral weakness numbness or tingling.  Still been tolerating p.o. intake.  Declines nausea vomiting diarrhea.  No fevers or chills.  CT surgery team consulted due to recent surgical

## 2025-07-27 NOTE — PROGRESS NOTES
0934: received verbal order with readback to give an additional 20 mEq of potassium chloride per NANCY Donnelly.

## 2025-07-27 NOTE — PROGRESS NOTES
Patient repeat potassium level was 4.1.  Patient is being discharged home after clarifying orders with Gal Nicole MD

## 2025-07-27 NOTE — PROGRESS NOTES
1201: K+ 3.9 after 100 mEq of potassium chloride PO;  NANCY Donnelly notified at this time.    Received telephone order with readback to give 40 mEq of potassium chloride PO and send a STAT K level to laboratory at 1345 per NANCY Donnelly.

## 2025-07-27 NOTE — PROGRESS NOTES
1400: this RN educated pt and , Virgilio Gomez on discharge instructions by utilizing the teach-back method. Pt instructed to weigh themselves daily; preferably at the same time each day, after voiding, and before eating. This RN emphasized the importance of medication compliance; we reviewed medication modifications. Pt instructed to assess HR and BP prior to taking medications. This RN informed pt to hold blood pressure medication and notify physician, if HR is less than 60 bpm or SBP is less than 100 mmHg. Pt instructed to gently cleanse incisions with antibacterial soap; pat site dry, leaving it open to air, and to apply bacitracin bid. This RN educated to notify physician if any of the following s/s of infection arise, such as: redness, swelling, warmth-to-touch or painful-to-touch, purulent drainage, bleeding at site, odor, a temperature of 101 degrees fahrenheit or greater. We reviewed diet modifications, activity restrictions, and upcoming follow-up appointments. All questions and concerns addressed at this time. This RN provided CVICU Charge RN phone number for any further questions.

## 2025-07-28 DIAGNOSIS — M05.79 RHEUMATOID ARTHRITIS INVOLVING MULTIPLE SITES WITH POSITIVE RHEUMATOID FACTOR (HCC): ICD-10-CM

## 2025-07-28 RX ORDER — PREDNISONE 5 MG/1
5 TABLET ORAL EVERY OTHER DAY
Qty: 30 TABLET | Refills: 0 | Status: SHIPPED | OUTPATIENT
Start: 2025-07-28

## 2025-07-29 ENCOUNTER — HOSPITAL ENCOUNTER (OUTPATIENT)
Age: 70
Discharge: HOME | End: 2025-07-29
Payer: MEDICARE

## 2025-07-29 DIAGNOSIS — Z95.1 S/P CABG X 2: ICD-10-CM

## 2025-07-29 LAB
ANION GAP SERPL CALCULATED.3IONS-SCNC: 16 MMOL/L (ref 9–17)
BASOPHILS # BLD: 0.09 K/UL
BASOPHILS NFR BLD: 1 % (ref 0–1)
BUN SERPL-MCNC: 20 MG/DL (ref 7–20)
CALCIUM SERPL-MCNC: 9 MG/DL (ref 8.3–10.6)
CHLORIDE SERPL-SCNC: 98 MMOL/L (ref 99–110)
CO2 SERPL-SCNC: 25 MMOL/L (ref 21–32)
CREAT SERPL-MCNC: 1.7 MG/DL (ref 0.6–1.2)
EOSINOPHIL # BLD: 0.38 K/UL
EOSINOPHILS RELATIVE PERCENT: 4 % (ref 0–3)
ERYTHROCYTE [DISTWIDTH] IN BLOOD BY AUTOMATED COUNT: 16.3 % (ref 11.7–14.9)
GFR, ESTIMATED: 29 ML/MIN/1.73M2
GLUCOSE SERPL-MCNC: 134 MG/DL (ref 74–99)
HCT VFR BLD AUTO: 36.1 % (ref 37–47)
HGB BLD-MCNC: 11.2 G/DL (ref 12.5–16)
IMM GRANULOCYTES # BLD AUTO: 0.05 K/UL
IMM GRANULOCYTES NFR BLD: 1 %
LYMPHOCYTES NFR BLD: 1.33 K/UL
LYMPHOCYTES RELATIVE PERCENT: 14 % (ref 24–44)
MCH RBC QN AUTO: 27.7 PG (ref 27–31)
MCHC RBC AUTO-ENTMCNC: 31 G/DL (ref 32–36)
MCV RBC AUTO: 89.4 FL (ref 78–100)
MONOCYTES NFR BLD: 1.04 K/UL
MONOCYTES NFR BLD: 11 % (ref 0–5)
NEUTROPHILS NFR BLD: 70 % (ref 36–66)
NEUTS SEG NFR BLD: 6.58 K/UL
PHOSPHATE SERPL-MCNC: 2.9 MG/DL (ref 2.5–4.9)
PLATELET # BLD AUTO: 311 K/UL (ref 140–440)
PMV BLD AUTO: 10.4 FL (ref 7.5–11.1)
POTASSIUM SERPL-SCNC: 3.2 MMOL/L (ref 3.5–5.1)
RBC # BLD AUTO: 4.04 M/UL (ref 4.2–5.4)
SODIUM SERPL-SCNC: 139 MMOL/L (ref 136–145)
WBC OTHER # BLD: 9.5 K/UL (ref 4–10.5)

## 2025-07-29 PROCEDURE — 80048 BASIC METABOLIC PNL TOTAL CA: CPT

## 2025-07-29 PROCEDURE — 84100 ASSAY OF PHOSPHORUS: CPT

## 2025-07-29 PROCEDURE — 85025 COMPLETE CBC W/AUTO DIFF WBC: CPT

## 2025-07-29 NOTE — PROGRESS NOTES
7/30/2025    Luis Buenrostro MD   [address] [id number]    MD Raulito Valentin MD         Re: , Radha      Dear Dr. Buenrostro  Dear Dr. Taylor  Dear Dr. Krishnan,    I had the pleasure of seeing your patient, Radha Gomez (70 y.o. female) in the office for follow up after her coronary artery bypass grafting and aortic valve replacement on 7/8/25.  He has significant lung disease preoperatively and was on chronic oxygen at home before her surgery.  She did well postoperatively and was discharged to ARU.  She has significant lung disease and needed prolonged diuresis, however she then became hypokalemic and had a readmission for hypokalemia.  Leg swelling has completely resolved and she feels well.  She has no incisional pain.  Her wounds have been healing well.  She is here with her  today.  She had a potassium drawn yesterday, and it was 3.2.    Current Medications    Current Outpatient Medications:     potassium chloride (KLOR-CON M) 20 MEQ extended release tablet, Take 1 tablet by mouth 2 times daily, Disp: 30 tablet, Rfl: 1    torsemide (DEMADEX) 10 MG tablet, Take 1 tablet by mouth daily, Disp: 30 tablet, Rfl: 1    predniSONE (DELTASONE) 5 MG tablet, TAKE 1 TABLET BY MOUTH EVERY OTHER DAY, Disp: 30 tablet, Rfl: 0    traMADol (ULTRAM) 50 MG tablet, Take 1 tablet by mouth every 6 hours as needed for Pain for up to 3 days. Intended supply: 3 days. Take lowest dose possible to manage pain Max Daily Amount: 200 mg, Disp: 12 tablet, Rfl: 0    amiodarone (CORDARONE) 200 MG tablet, Take 1 tablet by mouth daily, Disp: 30 tablet, Rfl: 1    clopidogrel (PLAVIX) 75 MG tablet, Take 1 tablet by mouth daily, Disp: 30 tablet, Rfl: 1    budesonide-formoterol (SYMBICORT) 160-4.5 MCG/ACT AERO, Inhale 2 puffs into the lungs in the morning and 2 puffs in the evening., Disp: 10.2 g, Rfl: 3    ipratropium 0.5 mg-albuterol 2.5 mg (DUONEB) 0.5-2.5 (3) MG/3ML SOLN nebulizer solution,

## 2025-07-30 ENCOUNTER — OFFICE VISIT (OUTPATIENT)
Dept: CARDIOTHORACIC SURGERY | Age: 70
End: 2025-07-30

## 2025-07-30 VITALS
OXYGEN SATURATION: 94 % | WEIGHT: 156.5 LBS | DIASTOLIC BLOOD PRESSURE: 60 MMHG | HEART RATE: 77 BPM | SYSTOLIC BLOOD PRESSURE: 108 MMHG | HEIGHT: 67 IN | BODY MASS INDEX: 24.56 KG/M2

## 2025-07-30 DIAGNOSIS — E87.6 HYPOKALEMIA: Primary | ICD-10-CM

## 2025-07-30 DIAGNOSIS — Z95.1 S/P CABG (CORONARY ARTERY BYPASS GRAFT): ICD-10-CM

## 2025-07-30 DIAGNOSIS — M05.79 RHEUMATOID ARTHRITIS INVOLVING MULTIPLE SITES WITH POSITIVE RHEUMATOID FACTOR (HCC): ICD-10-CM

## 2025-07-30 PROCEDURE — 99024 POSTOP FOLLOW-UP VISIT: CPT | Performed by: THORACIC SURGERY (CARDIOTHORACIC VASCULAR SURGERY)

## 2025-07-30 RX ORDER — TORSEMIDE 10 MG/1
10 TABLET ORAL DAILY
Qty: 30 TABLET | Refills: 1 | Status: SHIPPED | OUTPATIENT
Start: 2025-07-30

## 2025-07-30 RX ORDER — POTASSIUM CHLORIDE 1500 MG/1
20 TABLET, EXTENDED RELEASE ORAL 2 TIMES DAILY
Qty: 30 TABLET | Refills: 1 | Status: SHIPPED | OUTPATIENT
Start: 2025-07-30

## 2025-07-31 ENCOUNTER — TELEPHONE (OUTPATIENT)
Dept: CARDIOTHORACIC SURGERY | Age: 70
End: 2025-07-31

## 2025-07-31 RX ORDER — PREDNISONE 5 MG/1
5 TABLET ORAL EVERY OTHER DAY
Qty: 30 TABLET | Refills: 0 | OUTPATIENT
Start: 2025-07-31

## 2025-08-05 ENCOUNTER — HOSPITAL ENCOUNTER (OUTPATIENT)
Dept: LAB | Age: 70
Discharge: HOME | End: 2025-08-05
Payer: MEDICARE

## 2025-08-05 DIAGNOSIS — M05.79 RHEUMATOID ARTHRITIS INVOLVING MULTIPLE SITES WITH POSITIVE RHEUMATOID FACTOR (HCC): ICD-10-CM

## 2025-08-05 DIAGNOSIS — E87.6 HYPOKALEMIA: ICD-10-CM

## 2025-08-05 DIAGNOSIS — I10 ESSENTIAL HYPERTENSION: ICD-10-CM

## 2025-08-05 LAB
ALBUMIN SERPL-MCNC: 3.6 G/DL (ref 3.4–5)
ALBUMIN/GLOB SERPL: 1 {RATIO} (ref 1.1–2.2)
ALP SERPL-CCNC: 128 U/L (ref 40–129)
ALT SERPL-CCNC: 18 U/L (ref 10–40)
ANION GAP SERPL CALCULATED.3IONS-SCNC: 10 MMOL/L (ref 9–17)
AST SERPL-CCNC: 24 U/L (ref 15–37)
BASOPHILS # BLD: 0.09 K/UL
BASOPHILS NFR BLD: 1 % (ref 0–1)
BILIRUB DIRECT SERPL-MCNC: <0.2 MG/DL (ref 0–0.3)
BILIRUB INDIRECT SERPL-MCNC: ABNORMAL MG/DL (ref 0–0.7)
BILIRUB SERPL-MCNC: 0.4 MG/DL (ref 0–1)
BILIRUB UR QL STRIP: NEGATIVE
BUN SERPL-MCNC: 17 MG/DL (ref 7–20)
CALCIUM SERPL-MCNC: 9.1 MG/DL (ref 8.3–10.6)
CASTS #/AREA URNS LPF: ABNORMAL /LPF
CHLORIDE SERPL-SCNC: 103 MMOL/L (ref 99–110)
CLARITY UR: CLEAR
CO2 SERPL-SCNC: 23 MMOL/L (ref 21–32)
COLOR UR: YELLOW
CREAT SERPL-MCNC: 1.2 MG/DL (ref 0.6–1.2)
CREAT UR-MCNC: 43 MG/DL (ref 28–217)
EOSINOPHIL # BLD: 0.32 K/UL
EOSINOPHILS RELATIVE PERCENT: 4 % (ref 0–3)
EPI CELLS #/AREA URNS HPF: 3 /HPF
ERYTHROCYTE [DISTWIDTH] IN BLOOD BY AUTOMATED COUNT: 16.4 % (ref 11.7–14.9)
GFR, ESTIMATED: 43 ML/MIN/1.73M2
GLUCOSE SERPL-MCNC: 107 MG/DL (ref 74–99)
GLUCOSE UR STRIP-MCNC: 250 MG/DL
HCT VFR BLD AUTO: 35 % (ref 37–47)
HGB BLD-MCNC: 10.6 G/DL (ref 12.5–16)
HGB UR QL STRIP.AUTO: NEGATIVE
IMM GRANULOCYTES # BLD AUTO: 0.05 K/UL
IMM GRANULOCYTES NFR BLD: 1 %
KETONES UR STRIP-MCNC: NEGATIVE MG/DL
LEUKOCYTE ESTERASE UR QL STRIP: ABNORMAL
LYMPHOCYTES NFR BLD: 1.87 K/UL
LYMPHOCYTES RELATIVE PERCENT: 21 % (ref 24–44)
MAGNESIUM SERPL-MCNC: 2.4 MG/DL (ref 1.8–2.4)
MCH RBC QN AUTO: 28.1 PG (ref 27–31)
MCHC RBC AUTO-ENTMCNC: 30.3 G/DL (ref 32–36)
MCV RBC AUTO: 92.8 FL (ref 78–100)
MONOCYTES NFR BLD: 1.02 K/UL
MONOCYTES NFR BLD: 12 % (ref 0–5)
NEUTROPHILS NFR BLD: 62 % (ref 36–66)
NEUTS SEG NFR BLD: 5.42 K/UL
NITRITE UR QL STRIP: NEGATIVE
PH UR STRIP: 6.5 [PH] (ref 5–8)
PHOSPHATE SERPL-MCNC: 1.9 MG/DL (ref 2.5–4.9)
PLATELET # BLD AUTO: 212 K/UL (ref 140–440)
PMV BLD AUTO: 10.8 FL (ref 7.5–11.1)
POTASSIUM SERPL-SCNC: 3.7 MMOL/L (ref 3.5–5.1)
PROT SERPL-MCNC: 7.2 G/DL (ref 6.4–8.2)
PROT UR STRIP-MCNC: NEGATIVE MG/DL
RBC # BLD AUTO: 3.77 M/UL (ref 4.2–5.4)
RBC #/AREA URNS HPF: 7 /HPF (ref 0–2)
SODIUM SERPL-SCNC: 136 MMOL/L (ref 136–145)
SP GR UR STRIP: 1.01 (ref 1–1.03)
TOTAL PROTEIN, URINE: 10 MG/DL
URINE TOTAL PROTEIN CREATININE RATIO: 0.23 (ref 0–0.2)
UROBILINOGEN UR STRIP-ACNC: 0.2 EU/DL (ref 0–1)
WBC #/AREA URNS HPF: 10 /HPF (ref 0–5)
WBC OTHER # BLD: 8.8 K/UL (ref 4–10.5)

## 2025-08-05 PROCEDURE — 85025 COMPLETE CBC W/AUTO DIFF WBC: CPT

## 2025-08-05 PROCEDURE — 84100 ASSAY OF PHOSPHORUS: CPT

## 2025-08-05 PROCEDURE — 81001 URINALYSIS AUTO W/SCOPE: CPT

## 2025-08-05 PROCEDURE — 82570 ASSAY OF URINE CREATININE: CPT

## 2025-08-05 PROCEDURE — 82248 BILIRUBIN DIRECT: CPT

## 2025-08-05 PROCEDURE — 36415 COLL VENOUS BLD VENIPUNCTURE: CPT

## 2025-08-05 PROCEDURE — 80053 COMPREHEN METABOLIC PANEL: CPT

## 2025-08-05 PROCEDURE — 84156 ASSAY OF PROTEIN URINE: CPT

## 2025-08-05 PROCEDURE — 83735 ASSAY OF MAGNESIUM: CPT

## 2025-08-06 ENCOUNTER — OFFICE VISIT (OUTPATIENT)
Age: 70
End: 2025-08-06
Payer: MEDICARE

## 2025-08-06 VITALS
BODY MASS INDEX: 25.43 KG/M2 | SYSTOLIC BLOOD PRESSURE: 130 MMHG | OXYGEN SATURATION: 89 % | DIASTOLIC BLOOD PRESSURE: 78 MMHG | HEART RATE: 93 BPM | WEIGHT: 162.4 LBS

## 2025-08-06 DIAGNOSIS — M85.89 OSTEOPENIA OF MULTIPLE SITES: ICD-10-CM

## 2025-08-06 DIAGNOSIS — Z51.81 ENCOUNTER FOR MONITORING OF HYDROXYCHLOROQUINE THERAPY: ICD-10-CM

## 2025-08-06 DIAGNOSIS — M05.79 RHEUMATOID ARTHRITIS INVOLVING MULTIPLE SITES WITH POSITIVE RHEUMATOID FACTOR (HCC): Primary | ICD-10-CM

## 2025-08-06 DIAGNOSIS — Z79.899 ENCOUNTER FOR MONITORING OF HYDROXYCHLOROQUINE THERAPY: ICD-10-CM

## 2025-08-06 DIAGNOSIS — Z79.52 CURRENT CHRONIC USE OF SYSTEMIC STEROIDS: ICD-10-CM

## 2025-08-06 PROCEDURE — 3078F DIAST BP <80 MM HG: CPT | Performed by: STUDENT IN AN ORGANIZED HEALTH CARE EDUCATION/TRAINING PROGRAM

## 2025-08-06 PROCEDURE — 3075F SYST BP GE 130 - 139MM HG: CPT | Performed by: STUDENT IN AN ORGANIZED HEALTH CARE EDUCATION/TRAINING PROGRAM

## 2025-08-06 PROCEDURE — 99215 OFFICE O/P EST HI 40 MIN: CPT | Performed by: STUDENT IN AN ORGANIZED HEALTH CARE EDUCATION/TRAINING PROGRAM

## 2025-08-06 PROCEDURE — 1123F ACP DISCUSS/DSCN MKR DOCD: CPT | Performed by: STUDENT IN AN ORGANIZED HEALTH CARE EDUCATION/TRAINING PROGRAM

## 2025-08-06 PROCEDURE — 1159F MED LIST DOCD IN RCRD: CPT | Performed by: STUDENT IN AN ORGANIZED HEALTH CARE EDUCATION/TRAINING PROGRAM

## 2025-08-06 RX ORDER — HYDROXYCHLOROQUINE SULFATE 200 MG/1
300 TABLET, FILM COATED ORAL DAILY
Qty: 135 TABLET | Refills: 0 | Status: SHIPPED | OUTPATIENT
Start: 2025-08-06

## 2025-08-12 ENCOUNTER — OFFICE VISIT (OUTPATIENT)
Dept: PULMONOLOGY | Age: 70
End: 2025-08-12
Payer: MEDICARE

## 2025-08-12 VITALS
DIASTOLIC BLOOD PRESSURE: 78 MMHG | BODY MASS INDEX: 24.71 KG/M2 | SYSTOLIC BLOOD PRESSURE: 124 MMHG | HEIGHT: 67 IN | WEIGHT: 157.4 LBS | HEART RATE: 87 BPM | OXYGEN SATURATION: 95 %

## 2025-08-12 DIAGNOSIS — R09.02 HYPOXIA: ICD-10-CM

## 2025-08-12 DIAGNOSIS — J84.9 ILD (INTERSTITIAL LUNG DISEASE) (HCC): ICD-10-CM

## 2025-08-12 DIAGNOSIS — Z87.891 EX-CIGARETTE SMOKER: ICD-10-CM

## 2025-08-12 DIAGNOSIS — R91.1 RIGHT MIDDLE LOBE PULMONARY NODULE: ICD-10-CM

## 2025-08-12 DIAGNOSIS — J44.9 CHRONIC OBSTRUCTIVE PULMONARY DISEASE, UNSPECIFIED COPD TYPE (HCC): Primary | ICD-10-CM

## 2025-08-12 PROCEDURE — 99215 OFFICE O/P EST HI 40 MIN: CPT | Performed by: INTERNAL MEDICINE

## 2025-08-12 PROCEDURE — 1159F MED LIST DOCD IN RCRD: CPT | Performed by: INTERNAL MEDICINE

## 2025-08-12 PROCEDURE — 3078F DIAST BP <80 MM HG: CPT | Performed by: INTERNAL MEDICINE

## 2025-08-12 PROCEDURE — 3074F SYST BP LT 130 MM HG: CPT | Performed by: INTERNAL MEDICINE

## 2025-08-12 PROCEDURE — 1123F ACP DISCUSS/DSCN MKR DOCD: CPT | Performed by: INTERNAL MEDICINE

## 2025-08-12 ASSESSMENT — ENCOUNTER SYMPTOMS
BACK PAIN: 0
ABDOMINAL DISTENTION: 0
COUGH: 1
SHORTNESS OF BREATH: 1
EYE DISCHARGE: 0
EYE ITCHING: 0
ABDOMINAL PAIN: 0

## 2025-08-18 ENCOUNTER — OFFICE VISIT (OUTPATIENT)
Dept: CARDIOLOGY CLINIC | Age: 70
End: 2025-08-18
Payer: MEDICARE

## 2025-08-18 VITALS
HEART RATE: 84 BPM | HEIGHT: 67 IN | BODY MASS INDEX: 24.8 KG/M2 | DIASTOLIC BLOOD PRESSURE: 60 MMHG | WEIGHT: 158 LBS | SYSTOLIC BLOOD PRESSURE: 132 MMHG

## 2025-08-18 DIAGNOSIS — Z95.2 S/P AVR: ICD-10-CM

## 2025-08-18 DIAGNOSIS — E87.6 HYPOKALEMIA: ICD-10-CM

## 2025-08-18 DIAGNOSIS — I10 ESSENTIAL HYPERTENSION: ICD-10-CM

## 2025-08-18 DIAGNOSIS — F17.200 TOBACCO DEPENDENCE: ICD-10-CM

## 2025-08-18 DIAGNOSIS — I65.22 CAROTID STENOSIS, ASYMPTOMATIC, LEFT: ICD-10-CM

## 2025-08-18 DIAGNOSIS — I50.33 ACUTE ON CHRONIC DIASTOLIC HEART FAILURE (HCC): ICD-10-CM

## 2025-08-18 DIAGNOSIS — K76.0 NAFLD (NONALCOHOLIC FATTY LIVER DISEASE): ICD-10-CM

## 2025-08-18 DIAGNOSIS — I65.23 BILATERAL CAROTID ARTERY STENOSIS: ICD-10-CM

## 2025-08-18 DIAGNOSIS — G47.10 HYPERSOMNIA: ICD-10-CM

## 2025-08-18 DIAGNOSIS — I50.33 ACUTE ON CHRONIC DIASTOLIC CONGESTIVE HEART FAILURE (HCC): Primary | ICD-10-CM

## 2025-08-18 DIAGNOSIS — J98.9 CHRONIC RESPIRATORY DISEASE: ICD-10-CM

## 2025-08-18 DIAGNOSIS — I38 VALVULAR HEART DISEASE: ICD-10-CM

## 2025-08-18 DIAGNOSIS — R06.00 DYSPNEA, UNSPECIFIED TYPE: ICD-10-CM

## 2025-08-18 DIAGNOSIS — J96.21 ACUTE ON CHRONIC RESPIRATORY FAILURE WITH HYPOXIA (HCC): ICD-10-CM

## 2025-08-18 DIAGNOSIS — I35.1 NONRHEUMATIC AORTIC VALVE INSUFFICIENCY: ICD-10-CM

## 2025-08-18 DIAGNOSIS — N18.32 STAGE 3B CHRONIC KIDNEY DISEASE (HCC): ICD-10-CM

## 2025-08-18 DIAGNOSIS — M05.79 RHEUMATOID ARTHRITIS WITH RHEUMATOID FACTOR OF MULTIPLE SITES WITHOUT ORGAN OR SYSTEMS INVOLVEMENT (HCC): ICD-10-CM

## 2025-08-18 DIAGNOSIS — F41.9 ANXIETY: ICD-10-CM

## 2025-08-18 DIAGNOSIS — J84.9 ILD (INTERSTITIAL LUNG DISEASE) (HCC): ICD-10-CM

## 2025-08-18 PROCEDURE — 3078F DIAST BP <80 MM HG: CPT | Performed by: INTERNAL MEDICINE

## 2025-08-18 PROCEDURE — 1123F ACP DISCUSS/DSCN MKR DOCD: CPT | Performed by: INTERNAL MEDICINE

## 2025-08-18 PROCEDURE — 99214 OFFICE O/P EST MOD 30 MIN: CPT | Performed by: INTERNAL MEDICINE

## 2025-08-18 PROCEDURE — 1159F MED LIST DOCD IN RCRD: CPT | Performed by: INTERNAL MEDICINE

## 2025-08-18 PROCEDURE — 3075F SYST BP GE 130 - 139MM HG: CPT | Performed by: INTERNAL MEDICINE

## 2025-08-25 DIAGNOSIS — M05.79 RHEUMATOID ARTHRITIS INVOLVING MULTIPLE SITES WITH POSITIVE RHEUMATOID FACTOR (HCC): ICD-10-CM

## 2025-08-25 RX ORDER — PREDNISONE 5 MG/1
5 TABLET ORAL EVERY OTHER DAY
Qty: 30 TABLET | Refills: 1 | Status: SHIPPED | OUTPATIENT
Start: 2025-08-25

## 2025-09-02 ENCOUNTER — HOSPITAL ENCOUNTER (OUTPATIENT)
Dept: CARDIAC REHAB | Age: 70
Setting detail: THERAPIES SERIES
Discharge: HOME | End: 2025-09-02
Payer: MEDICARE

## 2025-09-02 PROCEDURE — G0422 INTENS CARDIAC REHAB W/EXERC: HCPCS

## 2025-09-04 ENCOUNTER — HOSPITAL ENCOUNTER (OUTPATIENT)
Dept: CARDIAC REHAB | Age: 70
Setting detail: THERAPIES SERIES
Discharge: HOME | End: 2025-09-04
Payer: MEDICARE

## 2025-09-04 PROCEDURE — G0422 INTENS CARDIAC REHAB W/EXERC: HCPCS

## 2025-09-06 PROBLEM — I50.9 ACUTE DECOMPENSATED HEART FAILURE (HCC): Status: ACTIVE | Noted: 2025-09-06

## (undated) DEVICE — SOLUTION PREP PAINT POV IOD FOR SKIN MUCOUS MEM

## (undated) DEVICE — Device: Brand: VIRTUOSAPH PLUS WITH RADIAL INDICATION

## (undated) DEVICE — KIT COMPL CK0289R4  BB9L99R8

## (undated) DEVICE — ADAPTER IV TBNG CLR POLYCARB DBL M LUERLOCK

## (undated) DEVICE — ANGIOGRAPHY KIT CUST MANIFOLD

## (undated) DEVICE — SUTURE PROL SZ 6-0 L30IN NONABSORBABLE BLU L13MM RB-2 1/2 8711H

## (undated) DEVICE — PRESSURE TUBING: Brand: TRUWAVE

## (undated) DEVICE — SUTURE VICRYL SZ 3-0 L36IN ABSRB UD L36MM CT-1 1/2 CIR J944H

## (undated) DEVICE — DRAIN SURG SGL COLL PT TB FOR ATS BG OASIS

## (undated) DEVICE — CATHETER,URETHRAL,REDRUBBER,STRL,18FR: Brand: MEDLINE

## (undated) DEVICE — SUTURE PROL SZ 4-0 L36IN NONABSORBABLE BLU L17MM RB-1 1/2 M8557

## (undated) DEVICE — SUTURE NONABSORBABLE MONOFILAMENT 4-0 RB-1 36 IN BLU PROLENE 8557H

## (undated) DEVICE — SUTURE NRLN SZ 1 L18IN NONABSORBABLE BLK L36MM CT-1 1/2 CIR C520D

## (undated) DEVICE — SUTURE ABSORBABLE BRAIDED 2-0 CT-1 27 IN UD VICRYL J259H

## (undated) DEVICE — BAND COMPR L24CM REG CLR PLAS HEMSTAT EXT HK AND LOOP RETEN

## (undated) DEVICE — AGENT HEMOSTATIC SURG ORIGINAL ABS 2X14IN LOOSE KNIT 12/CA

## (undated) DEVICE — RADIFOCUS OPTITORQUE ANGIOGRAPHIC CATHETER: Brand: OPTITORQUE

## (undated) DEVICE — MEDI-TRACE CADENCE ADULT, DEFIBRILLATION ELECTRODE -RTS  (10 PR/PK) - ZOLL: Brand: MEDI-TRACE CADENCE

## (undated) DEVICE — SET PERF L15IN BLU CLMP MULT FEM LUER ON SGL INLET LEG DLP

## (undated) DEVICE — SUTURE PROL SZ 7-0 L30IN NONABSORBABLE BLU L9.3MM BV-1 3/8 M8703

## (undated) DEVICE — GAUZE,SPONGE,4"X4",8PLY,STRL,LF,10/TRAY: Brand: MEDLINE

## (undated) DEVICE — ELECTRODE ES L2.75IN S STL INSUL BLDE W/ SL EDGE

## (undated) DEVICE — SUTURE PROL SZ 7-0 L24IN NONABSORBABLE BLU L8MM BV175-6 3/8 8735H

## (undated) DEVICE — GLOVE SURG SZ 8 L12IN THK75MIL DK GRN LTX FREE

## (undated) DEVICE — SYRINGE MED 10ML LUERLOCK TIP W/O SFTY DISP

## (undated) DEVICE — LEAD PACE L475MM CHNL A OR V MYOCARDIAL STEROID ELUT SIL

## (undated) DEVICE — SYRINGE 20ML LL S/C 50

## (undated) DEVICE — KIT INTRO 9FR L4IN POLYUR PERC SHTH RADPQ W INTEGR HEMSTAS

## (undated) DEVICE — GLIDESHEATH SLENDER ACCESS KIT: Brand: GLIDESHEATH SLENDER

## (undated) DEVICE — TOWEL,OR,DSP,ST,WHITE,DLX,XR,4/PK,20PK/C: Brand: MEDLINE

## (undated) DEVICE — EZ GLIDE AORTIC CANNULA: Brand: EDWARDS LIFESCIENCES EZ GLIDE AORTIC CANNULA

## (undated) DEVICE — 1LYRTR 16FR10ML100%SILTMPS SNP: Brand: MEDLINE INDUSTRIES, INC.

## (undated) DEVICE — DRAPE,UTILITY,XL,4/PK,STERILE: Brand: MEDLINE

## (undated) DEVICE — DISK-SHAPED STYLE, SILICONE (1 PER STERILE PKG): Brand: SCANLAN® RADIOMARK® GRAFT MARKERS

## (undated) DEVICE — Device

## (undated) DEVICE — CATHETER,SUCTION,14FR,WHISTL,STR PK,CAL: Brand: MEDLINE

## (undated) DEVICE — GUIDEWIRE VASC L260CM DIA0.035IN RAD 3MM J TIP L7CM PTFE

## (undated) DEVICE — CABG ACCESSORY: Brand: MEDLINE INDUSTRIES, INC.

## (undated) DEVICE — DRAIN SURG 19FR 100% SIL RADPQ RND CHN FULL FLUT

## (undated) DEVICE — DRAIN SURG L3/8-1/2IN DIA3/16IN SIL CARD CONN 1:1 BLAK

## (undated) DEVICE — RETROGRADE CARDIOPLEGIA CATHETER: Brand: EDWARDS LIFESCIENCES RETROGRADE CARDIOPLEGIA CATHETER

## (undated) DEVICE — OPEN HEART B: Brand: MEDLINE INDUSTRIES, INC.

## (undated) DEVICE — SUMP INTCARD SUCT AD 20FR PERICARD MAYO STYL FLX VERSATILE

## (undated) DEVICE — STABILIZER TISS CANSTR TBNG EVOLUTION AS OCTPS

## (undated) DEVICE — STRIP SKIN CLSR W0.25XL4IN WHT SPUNBOUND FBR NYL HI ADH

## (undated) DEVICE — OPEN HEART A: Brand: MEDLINE INDUSTRIES, INC.

## (undated) DEVICE — CATHETER DIAG AD 4FR L110CM 145DEG COR RED HYDRPHLC NYL

## (undated) DEVICE — SYRINGE MED 30ML STD CLR PLAS LUERLOCK TIP N CTRL DISP

## (undated) DEVICE — PROVE COVER: Brand: UNBRANDED

## (undated) DEVICE — SUTURE N ABSRB BRAIDED 5-0 60 IN TIE BLK PERMA HND SA11G

## (undated) DEVICE — Z DISCONTINUED USE 2909929 BLANKET WRM W35.4XL86.6IN FULL UNDERBODY + FORC AIR

## (undated) DEVICE — 3M™ STERI-DRAPE™ INSTRUMENT POUCH 1018: Brand: STERI-DRAPE™

## (undated) DEVICE — GLOVE ORANGE PI 8   MSG9080

## (undated) DEVICE — CATHETER THOR 36FR L23CM DIA12MM POLYVI CHL TAPR CONN TIP

## (undated) DEVICE — RETRACTOR SURG INSRT SUT HLD OCTOBASE

## (undated) DEVICE — SUTURE PROL SZ 3-0 L36IN NONABSORBABLE BLU L26MM SH 1/2 CIR 8522H

## (undated) DEVICE — Z DISCONTINUED USE 2220305 SUTURE VICRYL SZ 3-0 L27IN ABSRB UD L24MM PS-1 3/8 CIR PRIM J936H

## (undated) DEVICE — ROTATING SURGICAL PUNCHES, 1 PER POUCH: Brand: A&E MEDICAL / ROTATING SURGICAL PUNCHES

## (undated) DEVICE — PLEDGET VASC W3/16XL3/8IN THK1/16IN PTFE SFT

## (undated) DEVICE — INTRODUCER SHTH THN WALLED 5 FRX10 CM 22 GA ANGLED RAIN SHTH

## (undated) DEVICE — AVID DUAL STAGE VENOUS DRAINAGE CANNULA: Brand: AVID DUAL STAGE VENOUS DRAINAGE CANNULA

## (undated) DEVICE — CONNECTOR IV 3/8X3/8X3/8 Y

## (undated) DEVICE — APPLICATOR PREP 26ML 0.7% IOD POVACRYLEX 74% ISO ALC ST

## (undated) DEVICE — MARKER,SKIN,RULER & LABELS,DUAL,UTILITY: Brand: MEDLINE

## (undated) DEVICE — CANNULA PERF L5.5IN DIA9FR AORT ROOT AG STD TIP W/ VENT LN

## (undated) DEVICE — SUTURE PDS II SZ 0 L36IN ABSRB VLT L36MM CT-1 1/2 CIR Z346H

## (undated) DEVICE — SUTURE VICRYL SZ 0 L36IN ABSRB UD L36MM CT-1 1/2 CIR J946H

## (undated) DEVICE — CONTAINER,SPECIMEN,OR STERILE,4OZ: Brand: MEDLINE

## (undated) DEVICE — DECANTER BAG 9": Brand: MEDLINE INDUSTRIES, INC.

## (undated) DEVICE — SUTURE PDS II SZ 1 L36IN ABSRB VLT CT L40MM 1/2 CIR TAPR Z359T

## (undated) DEVICE — CATHETER THORACENTESIS STR 28 FRX23 IN 6 EYELET TAPR TIP LF

## (undated) DEVICE — 3M™ IOBAN™ 2 ANTIMICROBIAL INCISE DRAPE 6648EZ: Brand: IOBAN™ 2

## (undated) DEVICE — DEVICE BLOWER/MISTER AXIUS

## (undated) DEVICE — SWAN-GANZ CCOMBO V THERMODILUTION CATHETER: Brand: SWAN-GANZ CCOMBO V

## (undated) DEVICE — SWAN-GANZ TRUE SIZE THERMODULTION CATHETER, 5F: Brand: SWAN-GANZ TRUE SIZE

## (undated) DEVICE — SUTURE PROL SZ 4-0 L36IN NONABSORBABLE BLU L26MM SH 1/2 CIR 8521H

## (undated) DEVICE — RADIFOCUS GLIDEWIRE: Brand: GLIDEWIRE

## (undated) DEVICE — AGENT HEMOSTATIC SURGIFLOW MATRIX KIT W/THROMBIN